# Patient Record
Sex: MALE | Race: WHITE | NOT HISPANIC OR LATINO | Employment: OTHER | URBAN - METROPOLITAN AREA
[De-identification: names, ages, dates, MRNs, and addresses within clinical notes are randomized per-mention and may not be internally consistent; named-entity substitution may affect disease eponyms.]

---

## 2017-04-18 ENCOUNTER — HISTORICAL (OUTPATIENT)
Dept: LAB | Facility: HOSPITAL | Age: 67
End: 2017-04-18

## 2017-04-21 ENCOUNTER — HISTORICAL (OUTPATIENT)
Dept: RADIOLOGY | Facility: HOSPITAL | Age: 67
End: 2017-04-21

## 2018-04-25 ENCOUNTER — HISTORICAL (OUTPATIENT)
Dept: LAB | Facility: HOSPITAL | Age: 68
End: 2018-04-25

## 2018-04-25 LAB
ABS NEUT (OLG): 3.39
ALBUMIN SERPL-MCNC: 3.6 GM/DL (ref 3.4–5)
ALBUMIN/GLOB SERPL: 1.2 RATIO (ref 1.1–2)
ALP SERPL-CCNC: 101 UNIT/L (ref 46–116)
ALT SERPL-CCNC: 25 UNIT/L (ref 12–78)
AST SERPL-CCNC: 12 UNIT/L (ref 10–37)
BASOPHILS # BLD AUTO: 0.1 X10(3)/MCL
BASOPHILS NFR BLD AUTO: 1.7 %
BILIRUB SERPL-MCNC: 0.4 MG/DL (ref 0.2–1)
BILIRUBIN DIRECT+TOT PNL SERPL-MCNC: 0.11 MG/DL (ref 0–0.2)
BILIRUBIN DIRECT+TOT PNL SERPL-MCNC: 0.29 MG/DL
BUN SERPL-MCNC: 16 MG/DL (ref 7–18)
CALCIUM SERPL-MCNC: 8.8 MG/DL (ref 8.5–10.1)
CHLORIDE SERPL-SCNC: 102 MMOL/L (ref 98–107)
CHOLEST SERPL-MCNC: 204 MG/DL (ref 50–200)
CHOLEST/HDLC SERPL: 3 {RATIO} (ref 0–5)
CO2 SERPL-SCNC: 26 MMOL/L (ref 21–32)
CREAT SERPL-MCNC: 0.83 MG/DL (ref 0.7–1.3)
EOSINOPHIL # BLD AUTO: 0.41 X10(3)/MCL
EOSINOPHIL NFR BLD AUTO: 7.1 %
ERYTHROCYTE [DISTWIDTH] IN BLOOD BY AUTOMATED COUNT: 12 %
GLOBULIN SER-MCNC: 3.1 GM/DL (ref 2.4–3.5)
GLUCOSE SERPL-MCNC: 91 MG/DL (ref 74–106)
HCT VFR BLD AUTO: 38.2 % (ref 39–49)
HDLC SERPL-MCNC: 69 MG/DL (ref 35–60)
HGB BLD-MCNC: 13.3 GM/DL (ref 12.6–16.6)
IMM GRANULOCYTES # BLD AUTO: 0.02 10*3/UL (ref 0–0.1)
IMM GRANULOCYTES NFR BLD AUTO: 0.3 % (ref 0–1)
LDLC SERPL CALC-MCNC: 117 MG/DL (ref 50–140)
LYMPHOCYTES # BLD AUTO: 1.38 X10(3)/MCL
LYMPHOCYTES NFR BLD AUTO: 23.8 %
MCH RBC QN AUTO: 33.1 PG (ref 27–33)
MCHC RBC AUTO-ENTMCNC: 34.8 GM/DL (ref 32–35)
MCV RBC AUTO: 95 FL (ref 84–97)
MONOCYTES # BLD AUTO: 0.51 X10(3)/MCL
MONOCYTES NFR BLD AUTO: 8.8 %
NEUTROPHILS # BLD AUTO: 3.39 X10(3)/MCL
NEUTROPHILS NFR BLD AUTO: 58.3 %
PLATELET # BLD AUTO: 298 X10(3)/MCL (ref 151–368)
PMV BLD AUTO: 10 FL
POTASSIUM SERPL-SCNC: 3.5 MMOL/L (ref 3.5–5.1)
PROT SERPL-MCNC: 6.7 GM/DL (ref 6.4–8.2)
RBC # BLD AUTO: 4.02 X10(6)/MCL (ref 4.3–5.6)
SODIUM SERPL-SCNC: 136 MMOL/L (ref 136–145)
TRIGL SERPL-MCNC: 89 MG/DL (ref 30–150)
VLDLC SERPL CALC-MCNC: 18 MG/DL
WBC # SPEC AUTO: 5.81 X10(3)/MCL (ref 3.4–9.2)

## 2019-05-08 ENCOUNTER — HISTORICAL (OUTPATIENT)
Dept: LAB | Facility: HOSPITAL | Age: 69
End: 2019-05-08

## 2019-05-08 LAB
CHOLEST SERPL-MCNC: 191 MG/DL (ref 50–200)
CHOLEST/HDLC SERPL: 3 {RATIO} (ref 0–5)
HDLC SERPL-MCNC: 69 MG/DL (ref 35–60)
LDLC SERPL CALC-MCNC: 110 MG/DL (ref 50–140)
TRIGL SERPL-MCNC: 62 MG/DL (ref 30–150)
VLDLC SERPL CALC-MCNC: 12 MG/DL

## 2019-08-14 ENCOUNTER — HISTORICAL (OUTPATIENT)
Dept: LAB | Facility: HOSPITAL | Age: 69
End: 2019-08-14

## 2019-08-14 LAB — PSA SERPL-MCNC: 1.7 NG/ML (ref 0–4)

## 2019-11-18 ENCOUNTER — HISTORICAL (OUTPATIENT)
Dept: LAB | Facility: HOSPITAL | Age: 69
End: 2019-11-18

## 2019-11-18 LAB
ABS NEUT (OLG): 3.59
ALBUMIN SERPL-MCNC: 3.5 GM/DL (ref 3.4–5)
ALBUMIN/GLOB SERPL: 1.2 RATIO (ref 1.1–2)
ALP SERPL-CCNC: 88 UNIT/L (ref 46–116)
ALT SERPL-CCNC: 17 UNIT/L (ref 12–78)
AST SERPL-CCNC: 7 UNIT/L (ref 10–37)
BASOPHILS # BLD AUTO: 0.07 X10(3)/MCL
BASOPHILS NFR BLD AUTO: 1.2 %
BILIRUB SERPL-MCNC: 0.2 MG/DL (ref 0.2–1)
BILIRUBIN DIRECT+TOT PNL SERPL-MCNC: 0.08 MG/DL (ref 0–0.2)
BILIRUBIN DIRECT+TOT PNL SERPL-MCNC: 0.12 MG/DL
BUN SERPL-MCNC: 35 MG/DL (ref 7–18)
CALCIUM SERPL-MCNC: 8.9 MG/DL (ref 8.5–10.1)
CHLORIDE SERPL-SCNC: 110 MMOL/L (ref 98–107)
CO2 SERPL-SCNC: 24.2 MMOL/L (ref 21–32)
CREAT SERPL-MCNC: 1.01 MG/DL (ref 0.7–1.3)
EOSINOPHIL # BLD AUTO: 0.49 X10(3)/MCL
EOSINOPHIL NFR BLD AUTO: 8.1 %
ERYTHROCYTE [DISTWIDTH] IN BLOOD BY AUTOMATED COUNT: 13 %
GLOBULIN SER-MCNC: 2.9 GM/DL (ref 2.4–3.5)
GLUCOSE SERPL-MCNC: 94 MG/DL (ref 74–106)
HCT VFR BLD AUTO: 39 % (ref 39–49)
HGB BLD-MCNC: 12.6 GM/DL (ref 12.6–16.6)
IMM GRANULOCYTES # BLD AUTO: 0.01 10*3/UL (ref 0–0.1)
IMM GRANULOCYTES NFR BLD AUTO: 0.2 % (ref 0–1)
LYMPHOCYTES # BLD AUTO: 1.28 X10(3)/MCL
LYMPHOCYTES NFR BLD AUTO: 21.2 %
MCH RBC QN AUTO: 33.1 PG (ref 27–33)
MCHC RBC AUTO-ENTMCNC: 32.3 GM/DL (ref 32–35)
MCV RBC AUTO: 102.4 FL (ref 84–97)
MONOCYTES # BLD AUTO: 0.61 X10(3)/MCL
MONOCYTES NFR BLD AUTO: 10.1 %
NEUTROPHILS # BLD AUTO: 3.59 X10(3)/MCL
NEUTROPHILS NFR BLD AUTO: 59.2 %
PLATELET # BLD AUTO: 245 X10(3)/MCL (ref 140–450)
PMV BLD AUTO: 10 FL
POTASSIUM SERPL-SCNC: 3.9 MMOL/L (ref 3.5–5.1)
PROT SERPL-MCNC: 6.4 GM/DL (ref 6.4–8.2)
RBC # BLD AUTO: 3.81 X10(6)/MCL (ref 4.3–5.6)
SODIUM SERPL-SCNC: 144 MMOL/L (ref 136–145)
WBC # SPEC AUTO: 6.05 X10(3)/MCL (ref 3.4–9.2)

## 2020-03-03 ENCOUNTER — HISTORICAL (OUTPATIENT)
Dept: LAB | Facility: HOSPITAL | Age: 70
End: 2020-03-03

## 2020-03-03 LAB — PSA SERPL-MCNC: 0.15 NG/ML

## 2020-09-29 ENCOUNTER — HISTORICAL (OUTPATIENT)
Dept: LAB | Facility: HOSPITAL | Age: 70
End: 2020-09-29

## 2020-09-29 LAB
ABS NEUT (OLG): 3.9
ALBUMIN SERPL-MCNC: 3.7 GM/DL (ref 3.4–4.8)
ALBUMIN/GLOB SERPL: 1.3 RATIO (ref 1.1–2)
ALP SERPL-CCNC: 98 UNIT/L (ref 40–150)
ALT SERPL-CCNC: 13 UNIT/L (ref 0–55)
AST SERPL-CCNC: 14 UNIT/L (ref 5–34)
BASOPHILS # BLD AUTO: 0.06 X10(3)/MCL
BASOPHILS NFR BLD AUTO: 1 %
BILIRUB SERPL-MCNC: 0.4 MG/DL
BILIRUBIN DIRECT+TOT PNL SERPL-MCNC: 0.2 MG/DL
BILIRUBIN DIRECT+TOT PNL SERPL-MCNC: 0.2 MG/DL (ref 0–0.5)
BUN SERPL-MCNC: 21 MG/DL (ref 8.4–25.7)
CALCIUM SERPL-MCNC: 9.4 MG/DL (ref 8.8–10)
CHLORIDE SERPL-SCNC: 107 MMOL/L (ref 98–107)
CHOLEST SERPL-MCNC: 161 MG/DL
CHOLEST/HDLC SERPL: 2 {RATIO} (ref 0–5)
CO2 SERPL-SCNC: 23 MEQ/L (ref 23–31)
CREAT SERPL-MCNC: 0.9 MG/DL (ref 0.73–1.18)
EOSINOPHIL # BLD AUTO: 0.46 X10(3)/MCL
EOSINOPHIL NFR BLD AUTO: 7.7 %
ERYTHROCYTE [DISTWIDTH] IN BLOOD BY AUTOMATED COUNT: 13 %
GLOBULIN SER-MCNC: 2.8 GM/DL (ref 2.4–3.5)
GLUCOSE SERPL-MCNC: 97 MG/DL (ref 82–115)
HCT VFR BLD AUTO: 39.1 % (ref 39–49)
HDLC SERPL-MCNC: 68 MG/DL (ref 35–60)
HGB BLD-MCNC: 12.9 GM/DL (ref 12.6–16.6)
IMM GRANULOCYTES # BLD AUTO: 0.01 10*3/UL (ref 0–0.1)
IMM GRANULOCYTES NFR BLD AUTO: 0.2 % (ref 0–1)
LDLC SERPL CALC-MCNC: 79 MG/DL (ref 50–140)
LYMPHOCYTES # BLD AUTO: 0.91 X10(3)/MCL
LYMPHOCYTES NFR BLD AUTO: 15.3 %
MCH RBC QN AUTO: 33 PG (ref 27–33)
MCHC RBC AUTO-ENTMCNC: 33 GM/DL (ref 32–35)
MCV RBC AUTO: 100 FL (ref 84–97)
MONOCYTES # BLD AUTO: 0.61 X10(3)/MCL
MONOCYTES NFR BLD AUTO: 10.3 %
NEUTROPHILS # BLD AUTO: 3.9 X10(3)/MCL
NEUTROPHILS NFR BLD AUTO: 65.5 %
PLATELET # BLD AUTO: 254 X10(3)/MCL (ref 140–450)
PMV BLD AUTO: 10 FL
POTASSIUM SERPL-SCNC: 3.9 MMOL/L (ref 3.5–5.1)
PROT SERPL-MCNC: 6.5 GM/DL (ref 5.8–7.6)
RBC # BLD AUTO: 3.91 X10(6)/MCL (ref 4.3–5.6)
SODIUM SERPL-SCNC: 139 MMOL/L (ref 136–145)
TRIGL SERPL-MCNC: 69 MG/DL (ref 34–140)
VLDLC SERPL CALC-MCNC: 14 MG/DL
WBC # SPEC AUTO: 5.95 X10(3)/MCL (ref 3.4–9.2)

## 2021-03-04 ENCOUNTER — HISTORICAL (OUTPATIENT)
Dept: LAB | Facility: HOSPITAL | Age: 71
End: 2021-03-04

## 2021-03-04 LAB
PSA SERPL-MCNC: 0.05 NG/ML
PSA SERPL-MCNC: 0.05 NG/ML

## 2022-04-11 ENCOUNTER — HISTORICAL (OUTPATIENT)
Dept: ADMINISTRATIVE | Facility: HOSPITAL | Age: 72
End: 2022-04-11

## 2022-04-19 ENCOUNTER — HISTORICAL (OUTPATIENT)
Dept: LAB | Facility: HOSPITAL | Age: 72
End: 2022-04-19

## 2022-04-19 LAB — PSA SERPL-MCNC: <0.1 NG/ML

## 2022-04-27 VITALS
WEIGHT: 249.13 LBS | OXYGEN SATURATION: 99 % | DIASTOLIC BLOOD PRESSURE: 83 MMHG | HEIGHT: 70 IN | BODY MASS INDEX: 35.66 KG/M2 | SYSTOLIC BLOOD PRESSURE: 140 MMHG

## 2022-05-09 DIAGNOSIS — Z13.6 SCREENING FOR CARDIOVASCULAR CONDITION: Primary | ICD-10-CM

## 2022-05-09 DIAGNOSIS — Z12.5 PROSTATE CANCER SCREENING: ICD-10-CM

## 2022-05-09 DIAGNOSIS — Z00.00 WELLNESS EXAMINATION: ICD-10-CM

## 2022-05-17 ENCOUNTER — LAB VISIT (OUTPATIENT)
Dept: LAB | Facility: HOSPITAL | Age: 72
End: 2022-05-17
Attending: FAMILY MEDICINE
Payer: MEDICAID

## 2022-05-17 DIAGNOSIS — Z12.5 PROSTATE CANCER SCREENING: ICD-10-CM

## 2022-05-17 DIAGNOSIS — Z13.6 SCREENING FOR CARDIOVASCULAR CONDITION: ICD-10-CM

## 2022-05-17 DIAGNOSIS — Z00.00 WELLNESS EXAMINATION: ICD-10-CM

## 2022-05-17 LAB
ALBUMIN SERPL-MCNC: 3.8 GM/DL (ref 3.4–4.8)
ALBUMIN/GLOB SERPL: 1.6 RATIO (ref 1.1–2)
ALP SERPL-CCNC: 100 UNIT/L (ref 40–150)
ALT SERPL-CCNC: 11 UNIT/L (ref 0–55)
AST SERPL-CCNC: 13 UNIT/L (ref 5–34)
BASOPHILS # BLD AUTO: 0.07 X10(3)/MCL (ref 0–0.2)
BASOPHILS NFR BLD AUTO: 1.5 %
BILIRUBIN DIRECT+TOT PNL SERPL-MCNC: 0.4 MG/DL
BUN SERPL-MCNC: 22 MG/DL (ref 8.4–25.7)
CALCIUM SERPL-MCNC: 8.8 MG/DL (ref 8.8–10)
CHLORIDE SERPL-SCNC: 106 MMOL/L (ref 98–107)
CHOLEST SERPL-MCNC: 164 MG/DL
CHOLEST/HDLC SERPL: 3 {RATIO} (ref 0–5)
CO2 SERPL-SCNC: 24 MMOL/L (ref 23–31)
CREAT SERPL-MCNC: 1.26 MG/DL (ref 0.73–1.18)
EOSINOPHIL # BLD AUTO: 0.54 X10(3)/MCL (ref 0–0.9)
EOSINOPHIL NFR BLD AUTO: 11.5 %
ERYTHROCYTE [DISTWIDTH] IN BLOOD BY AUTOMATED COUNT: 12.5 % (ref 11.5–17)
GLOBULIN SER-MCNC: 2.4 GM/DL (ref 2.4–3.5)
GLUCOSE SERPL-MCNC: 101 MG/DL (ref 82–115)
HCT VFR BLD AUTO: 36.4 % (ref 42–52)
HDLC SERPL-MCNC: 60 MG/DL (ref 35–60)
HGB BLD-MCNC: 11.6 GM/DL (ref 14–18)
IMM GRANULOCYTES # BLD AUTO: 0.01 X10(3)/MCL (ref 0–0.02)
IMM GRANULOCYTES NFR BLD AUTO: 0.2 % (ref 0–0.43)
LDLC SERPL CALC-MCNC: 85 MG/DL (ref 50–140)
LYMPHOCYTES # BLD AUTO: 1.07 X10(3)/MCL (ref 0.6–4.6)
LYMPHOCYTES NFR BLD AUTO: 22.9 %
MCH RBC QN AUTO: 31.8 PG (ref 27–31)
MCHC RBC AUTO-ENTMCNC: 31.9 MG/DL (ref 33–36)
MCV RBC AUTO: 99.7 FL (ref 80–94)
MONOCYTES # BLD AUTO: 0.49 X10(3)/MCL (ref 0.1–1.3)
MONOCYTES NFR BLD AUTO: 10.5 %
NEUTROPHILS # BLD AUTO: 2.5 X10(3)/MCL (ref 2.1–9.2)
NEUTROPHILS NFR BLD AUTO: 53.4 %
NRBC BLD AUTO-RTO: 0 %
PLATELET # BLD AUTO: 199 X10(3)/MCL (ref 130–400)
PMV BLD AUTO: 10.2 FL (ref 9.4–12.4)
POTASSIUM SERPL-SCNC: 4.6 MMOL/L (ref 3.5–5.1)
PROT SERPL-MCNC: 6.2 GM/DL (ref 5.8–7.6)
PSA SERPL-MCNC: 0.03 NG/ML
RBC # BLD AUTO: 3.65 X10(6)/MCL (ref 4.7–6.1)
SODIUM SERPL-SCNC: 139 MMOL/L (ref 136–145)
TRIGL SERPL-MCNC: 94 MG/DL (ref 34–140)
VLDLC SERPL CALC-MCNC: 19 MG/DL
WBC # SPEC AUTO: 4.7 X10(3)/MCL (ref 4.5–11.5)

## 2022-05-17 PROCEDURE — 85025 COMPLETE CBC W/AUTO DIFF WBC: CPT

## 2022-05-17 PROCEDURE — 80061 LIPID PANEL: CPT

## 2022-05-17 PROCEDURE — 84153 ASSAY OF PSA TOTAL: CPT

## 2022-05-17 PROCEDURE — 36415 COLL VENOUS BLD VENIPUNCTURE: CPT

## 2022-05-17 PROCEDURE — 80053 COMPREHEN METABOLIC PANEL: CPT

## 2022-05-25 DIAGNOSIS — Z11.4 ENCOUNTER FOR SCREENING FOR HIV: Primary | ICD-10-CM

## 2022-05-25 DIAGNOSIS — Z11.59 NEED FOR HEPATITIS C SCREENING TEST: ICD-10-CM

## 2022-05-31 ENCOUNTER — OFFICE VISIT (OUTPATIENT)
Dept: FAMILY MEDICINE | Facility: CLINIC | Age: 72
End: 2022-05-31
Payer: MEDICARE

## 2022-05-31 VITALS
WEIGHT: 246.13 LBS | BODY MASS INDEX: 32.62 KG/M2 | DIASTOLIC BLOOD PRESSURE: 84 MMHG | HEART RATE: 86 BPM | TEMPERATURE: 97 F | RESPIRATION RATE: 18 BRPM | OXYGEN SATURATION: 98 % | HEIGHT: 73 IN | SYSTOLIC BLOOD PRESSURE: 128 MMHG

## 2022-05-31 DIAGNOSIS — Z00.00 WELLNESS EXAMINATION: Primary | ICD-10-CM

## 2022-05-31 PROBLEM — G40.909 SEIZURE DISORDER: Status: ACTIVE | Noted: 2022-05-31

## 2022-05-31 PROBLEM — K59.00 CONSTIPATION: Status: ACTIVE | Noted: 2022-05-31

## 2022-05-31 PROBLEM — E66.9 OBESITY: Status: ACTIVE | Noted: 2022-05-31

## 2022-05-31 PROBLEM — N40.0 BENIGN PROSTATIC HYPERPLASIA: Status: ACTIVE | Noted: 2022-05-31

## 2022-05-31 PROBLEM — I10 HYPERTENSION: Status: ACTIVE | Noted: 2022-05-31

## 2022-05-31 PROCEDURE — G0439 PPPS, SUBSEQ VISIT: HCPCS | Mod: ,,, | Performed by: FAMILY MEDICINE

## 2022-05-31 PROCEDURE — 3008F BODY MASS INDEX DOCD: CPT | Mod: CPTII,,, | Performed by: FAMILY MEDICINE

## 2022-05-31 PROCEDURE — 1101F PT FALLS ASSESS-DOCD LE1/YR: CPT | Mod: CPTII,,, | Performed by: FAMILY MEDICINE

## 2022-05-31 PROCEDURE — 1160F PR REVIEW ALL MEDS BY PRESCRIBER/CLIN PHARMACIST DOCUMENTED: ICD-10-PCS | Mod: CPTII,,, | Performed by: FAMILY MEDICINE

## 2022-05-31 PROCEDURE — G0439 PR MEDICARE ANNUAL WELLNESS SUBSEQUENT VISIT: ICD-10-PCS | Mod: ,,, | Performed by: FAMILY MEDICINE

## 2022-05-31 PROCEDURE — 3074F PR MOST RECENT SYSTOLIC BLOOD PRESSURE < 130 MM HG: ICD-10-PCS | Mod: CPTII,,, | Performed by: FAMILY MEDICINE

## 2022-05-31 PROCEDURE — 3288F PR FALLS RISK ASSESSMENT DOCUMENTED: ICD-10-PCS | Mod: CPTII,,, | Performed by: FAMILY MEDICINE

## 2022-05-31 PROCEDURE — 3074F SYST BP LT 130 MM HG: CPT | Mod: CPTII,,, | Performed by: FAMILY MEDICINE

## 2022-05-31 PROCEDURE — 1160F RVW MEDS BY RX/DR IN RCRD: CPT | Mod: CPTII,,, | Performed by: FAMILY MEDICINE

## 2022-05-31 PROCEDURE — 3008F PR BODY MASS INDEX (BMI) DOCUMENTED: ICD-10-PCS | Mod: CPTII,,, | Performed by: FAMILY MEDICINE

## 2022-05-31 PROCEDURE — 1101F PR PT FALLS ASSESS DOC 0-1 FALLS W/OUT INJ PAST YR: ICD-10-PCS | Mod: CPTII,,, | Performed by: FAMILY MEDICINE

## 2022-05-31 PROCEDURE — 1159F MED LIST DOCD IN RCRD: CPT | Mod: CPTII,,, | Performed by: FAMILY MEDICINE

## 2022-05-31 PROCEDURE — 1159F PR MEDICATION LIST DOCUMENTED IN MEDICAL RECORD: ICD-10-PCS | Mod: CPTII,,, | Performed by: FAMILY MEDICINE

## 2022-05-31 PROCEDURE — 3288F FALL RISK ASSESSMENT DOCD: CPT | Mod: CPTII,,, | Performed by: FAMILY MEDICINE

## 2022-05-31 PROCEDURE — 3079F PR MOST RECENT DIASTOLIC BLOOD PRESSURE 80-89 MM HG: ICD-10-PCS | Mod: CPTII,,, | Performed by: FAMILY MEDICINE

## 2022-05-31 PROCEDURE — 3079F DIAST BP 80-89 MM HG: CPT | Mod: CPTII,,, | Performed by: FAMILY MEDICINE

## 2022-05-31 RX ORDER — ROSUVASTATIN CALCIUM 10 MG/1
10 TABLET, COATED ORAL DAILY
COMMUNITY
Start: 2022-05-02 | End: 2023-10-23 | Stop reason: SDUPTHER

## 2022-05-31 RX ORDER — LEVETIRACETAM 1000 MG/1
1500 TABLET ORAL 2 TIMES DAILY
COMMUNITY
End: 2023-10-23 | Stop reason: SDUPTHER

## 2022-05-31 RX ORDER — TOPIRAMATE 200 MG/1
200 TABLET ORAL 2 TIMES DAILY
COMMUNITY
Start: 2022-05-02 | End: 2023-10-23 | Stop reason: SDUPTHER

## 2022-05-31 RX ORDER — VITAMIN A ACETATE, .ALPHA.-TOCOPHEROL ACETATE, ASCORBIC ACID, THIAMINE MONONITRATE, RIBOFLAVIN, NIACINAMIDE, PYRIDOXINE HYDROCHLORIDE, BIOTIN, CALCIUM PANTOTHENATE, FOLIC ACID, CYANOCOBALAMIN, FLAVONE, FERROUS FUMARATE, CHROMIC CHLORIDE CR-51, MAGNESIUM OXIDE, MANGANESE GLUCONATE, CUPRIC OXIDE, SELENOMETHIONINE, AND ZINC OXIDE 2000; 30; 500; 20; 20; 100; 25; 150; 25; 1; 50; 50; 27; .1; 50; 5; 3; 50; 22.5 [IU]/1; [IU]/1; MG/1; MG/1; MG/1; MG/1; MG/1; UG/1; MG/1; MG/1; UG/1; MG/1; MG/1; MG/1; MG/1; MG/1; MG/1; UG/1; MG/1
1 TABLET, COATED ORAL DAILY
Qty: 30 TABLET | Refills: 3 | Status: SHIPPED | OUTPATIENT
Start: 2022-05-31 | End: 2022-09-28

## 2022-05-31 RX ORDER — TAMSULOSIN HYDROCHLORIDE 0.4 MG/1
0.4 CAPSULE ORAL 2 TIMES DAILY
Qty: 60 CAPSULE | Refills: 3 | Status: SHIPPED | OUTPATIENT
Start: 2022-05-31 | End: 2022-09-20

## 2022-05-31 RX ORDER — OXCARBAZEPINE 600 MG/1
900 TABLET, FILM COATED ORAL 2 TIMES DAILY
COMMUNITY
Start: 2022-05-02 | End: 2023-10-23 | Stop reason: SDUPTHER

## 2022-05-31 RX ORDER — TAMSULOSIN HYDROCHLORIDE 0.4 MG/1
CAPSULE ORAL
COMMUNITY
Start: 2022-05-16 | End: 2022-05-31 | Stop reason: SDUPTHER

## 2022-05-31 RX ORDER — DOCUSATE SODIUM 100 MG/1
100 CAPSULE, LIQUID FILLED ORAL
COMMUNITY
Start: 2022-03-31 | End: 2023-10-23 | Stop reason: SDUPTHER

## 2022-05-31 NOTE — PROGRESS NOTES
"TIME UP & GO (TUG)  Test begins with patient sitting back in standard arm chair.   When "Go" is said, the patient stands up and walks 10 feet at a normal pace before turning, walking back and sitting down.    Observe the patients postural stability, gait, stride length, and sway.  Check all that apply:  ? [] Slow tentative pace  ? [] Loss of balance  ? [x] Short strides  ? [] Little or no arm swing  ? [] Steadying self on walls  ? [] Shuffling  ? [] En bloc turning  ? [] Not using assistive device properly    Time in seconds:  2 Seconds  (Older adults who takes = or > 12 seconds to complete TUG is at risk for falling.  _________________________________________________________________________________________________________________________________________________________    WHISPER TEST  Test begins with patient standing arms length away (2 feet), facing away from examiner.  Patient covers the ear that is NOT being tested with one finger over the tragus.  Whisper a number-letter-number combination.  If a patient gets 3 total letters and/or numbers correct after a second attempt, it is considered a pass.    Right Ear: passed [] 8-M-3 [] K-5-R [] 2-K-7 [] S-4-G  Left Ear: passed    [] 8-M-3 [] K-5-R [] 2-K-7 [] S-4-G    _________________________________________________________________________________________________________________________________________________________    VISION SCREENING  patient declines measurement  _________________________________________________________________________________________________________________________________________________________    MINI-COGNITIVE  Three Word Registration   []Version 1 []Version 2 []Version 3 []Version 4 []Version 5 []Version 6   [place]  Daughter   [place] [employer] [place]   Chair Table Baby Finger Picture [place]     Word Recall 2 points  Clock Drawing " "2  ________________________________________________________________________________________________________________________________________________________    HOME SAFETY QUESTIONNAIRE  Are emergency numbers kept by the phone and regularly updated? Yesyes   Are all household members aware of the dangers of smoking, especially in bed? Yesyes  Are working smoke alarm(s) and fire extinguisher(s) available for use? Yesyes  Do all household members know how to use them? Yesyes  Are firearms stored unloaded and securely locked? N/Ayes  Have throw rugs been removed or fastened down? Yesyes  Are non-slip mats in all bathtubs and showers?  Yes yes  Do all stairways have a railing or banister?  Yesyes  Are sidewalks and all outdoor steps clear of tools, toys and other articles?  Yesyes  Are doorways, halls, and stairs free of clutter?  Yesyes  Are all electrical cords in working order, easily seen, and not run under rug/carpets or wrapped around nails? Yesyes      Subjective:       Patient ID: Nathen Morales is a 72 y.o. male.    Chief Complaint: wellness      Wellness      Review of Systems   Constitutional: Negative.    HENT: Negative.    Respiratory: Negative.    Cardiovascular: Negative.    Gastrointestinal: Negative.    Genitourinary: Negative.    Musculoskeletal: Negative.    Integumentary:  Negative.   Neurological: Positive for seizures (no recent seizure activity).   Hematological: Negative.    Psychiatric/Behavioral:        Insomnia: having trouble staying asleep, has not tried any OTC medication           Objective:      /84 (BP Location: Left arm, Patient Position: Sitting, BP Method: Large (Manual))   Pulse 86   Temp 97.1 °F (36.2 °C)   Resp 18   Ht 6' 1" (1.854 m)   Wt 111.6 kg (246 lb 1.6 oz)   SpO2 98%   BMI 32.47 kg/m²    Physical Exam  Constitutional:       General: He is not in acute distress.     Appearance: Normal appearance.   Cardiovascular:      Rate and Rhythm: Normal rate and regular " rhythm.   Pulmonary:      Effort: Pulmonary effort is normal.      Breath sounds: Normal breath sounds.   Abdominal:      General: Abdomen is flat. Bowel sounds are normal.      Palpations: Abdomen is soft.   Musculoskeletal:         General: Normal range of motion.   Neurological:      General: No focal deficit present.      Mental Status: He is alert and oriented to person, place, and time.   Psychiatric:         Mood and Affect: Mood normal.         Behavior: Behavior normal.         Thought Content: Thought content normal.         Judgment: Judgment normal.           Recent Results (from the past 504 hour(s))   PSA, Screening    Collection Time: 05/17/22  8:20 AM   Result Value Ref Range    Prostate Specific Antigen 0.03 <=4.00 ng/mL   Comprehensive Metabolic Panel    Collection Time: 05/17/22  8:20 AM   Result Value Ref Range    Sodium Level 139 136 - 145 mmol/L    Potassium Level 4.6 3.5 - 5.1 mmol/L    Chloride 106 98 - 107 mmol/L    Carbon Dioxide 24 23 - 31 mmol/L    Glucose Level 101 82 - 115 mg/dL    Blood Urea Nitrogen 22.0 8.4 - 25.7 mg/dL    Creatinine 1.26 (H) 0.73 - 1.18 mg/dL    Calcium Level Total 8.8 8.8 - 10.0 mg/dL    Protein Total 6.2 5.8 - 7.6 gm/dL    Albumin Level 3.8 3.4 - 4.8 gm/dL    Globulin 2.4 2.4 - 3.5 gm/dL    Albumin/Globulin Ratio 1.6 1.1 - 2.0 ratio    Bilirubin Total 0.4 <=1.5 mg/dL    Alkaline Phosphatase 100 40 - 150 unit/L    Alanine Aminotransferase 11 0 - 55 unit/L    Aspartate Aminotransferase 13 5 - 34 unit/L   Lipid Panel    Collection Time: 05/17/22  8:20 AM   Result Value Ref Range    Cholesterol Total 164 <=200 mg/dL    HDL Cholesterol 60 35 - 60 mg/dL    Triglyceride 94 34 - 140 mg/dL    Cholesterol/HDL Ratio 3 0 - 5    Very Low Density Lipoprotein 19     LDL Cholesterol 85.00 50.00 - 140.00 mg/dL   CBC with Differential    Collection Time: 05/17/22  8:20 AM   Result Value Ref Range    WBC 4.7 4.5 - 11.5 x10(3)/mcL    RBC 3.65 (L) 4.70 - 6.10 x10(6)/mcL    Hgb 11.6  (L) 14.0 - 18.0 gm/dL    Hct 36.4 (L) 42.0 - 52.0 %    MCV 99.7 (H) 80.0 - 94.0 fL    MCH 31.8 (H) 27.0 - 31.0 pg    MCHC 31.9 (L) 33.0 - 36.0 mg/dL    RDW 12.5 11.5 - 17.0 %    Platelet 199 130 - 400 x10(3)/mcL    MPV 10.2 9.4 - 12.4 fL    Neut % 53.4 %    Lymph % 22.9 %    Mono % 10.5 %    Eos % 11.5 %    Basophil % 1.5 %    Lymph # 1.07 0.6 - 4.6 x10(3)/mcL    Neut # 2.5 2.1 - 9.2 x10(3)/mcL    Mono # 0.49 0.1 - 1.3 x10(3)/mcL    Eos # 0.54 0 - 0.9 x10(3)/mcL    Baso # 0.07 0 - 0.2 x10(3)/mcL    IG# 0.01 0 - 0.0155 x10(3)/mcL    IG% 0.2 0 - 0.43 %    NRBC% 0.0 %        Assessment:       Problem List Items Addressed This Visit    None          Plan:     1. Wellness  Lab work reviewed with patient  Continue current medication  Continue diet/exercise  Advanced directive discussed with patient  - Insomnia  Patient use OTC melatonin  Monitor  Return to clinic with any concerns

## 2022-10-27 DIAGNOSIS — E56.9 VITAMIN DEFICIENCY: Primary | ICD-10-CM

## 2022-10-27 RX ORDER — VITAMIN A ACETATE, .ALPHA.-TOCOPHEROL ACETATE, ASCORBIC ACID, THIAMINE MONONITRATE, RIBOFLAVIN, NIACINAMIDE, PYRIDOXINE HYDROCHLORIDE, BIOTIN, CALCIUM PANTOTHENATE, FOLIC ACID, CYANOCOBALAMIN, FLAVONE, FERROUS FUMARATE, CHROMIC CHLORIDE CR-51, MAGNESIUM OXIDE, MANGANESE GLUCONATE, CUPRIC OXIDE, SELENOMETHIONINE, AND ZINC OXIDE 2000; 30; 500; 20; 20; 100; 25; 150; 25; 1; 50; 50; 27; .1; 50; 5; 3; 50; 22.5 [IU]/1; [IU]/1; MG/1; MG/1; MG/1; MG/1; MG/1; UG/1; MG/1; MG/1; UG/1; MG/1; MG/1; MG/1; MG/1; MG/1; MG/1; UG/1; MG/1
TABLET, COATED ORAL
Qty: 30 TABLET | Refills: 3 | Status: SHIPPED | OUTPATIENT
Start: 2022-10-27 | End: 2023-06-05

## 2022-12-06 ENCOUNTER — OFFICE VISIT (OUTPATIENT)
Dept: FAMILY MEDICINE | Facility: CLINIC | Age: 72
End: 2022-12-06
Payer: MEDICARE

## 2022-12-06 VITALS
DIASTOLIC BLOOD PRESSURE: 76 MMHG | OXYGEN SATURATION: 98 % | HEART RATE: 63 BPM | HEIGHT: 73 IN | WEIGHT: 246 LBS | SYSTOLIC BLOOD PRESSURE: 126 MMHG | RESPIRATION RATE: 18 BRPM | BODY MASS INDEX: 32.6 KG/M2 | TEMPERATURE: 98 F

## 2022-12-06 DIAGNOSIS — G40.909 SEIZURE DISORDER: ICD-10-CM

## 2022-12-06 DIAGNOSIS — F32.A DEPRESSION, UNSPECIFIED DEPRESSION TYPE: ICD-10-CM

## 2022-12-06 DIAGNOSIS — N40.0 BENIGN PROSTATIC HYPERPLASIA, UNSPECIFIED WHETHER LOWER URINARY TRACT SYMPTOMS PRESENT: Primary | ICD-10-CM

## 2022-12-06 PROCEDURE — 3074F SYST BP LT 130 MM HG: CPT | Mod: CPTII,,, | Performed by: FAMILY MEDICINE

## 2022-12-06 PROCEDURE — 3078F PR MOST RECENT DIASTOLIC BLOOD PRESSURE < 80 MM HG: ICD-10-PCS | Mod: CPTII,,, | Performed by: FAMILY MEDICINE

## 2022-12-06 PROCEDURE — 3008F PR BODY MASS INDEX (BMI) DOCUMENTED: ICD-10-PCS | Mod: CPTII,,, | Performed by: FAMILY MEDICINE

## 2022-12-06 PROCEDURE — 1160F RVW MEDS BY RX/DR IN RCRD: CPT | Mod: CPTII,,, | Performed by: FAMILY MEDICINE

## 2022-12-06 PROCEDURE — 3074F PR MOST RECENT SYSTOLIC BLOOD PRESSURE < 130 MM HG: ICD-10-PCS | Mod: CPTII,,, | Performed by: FAMILY MEDICINE

## 2022-12-06 PROCEDURE — 3078F DIAST BP <80 MM HG: CPT | Mod: CPTII,,, | Performed by: FAMILY MEDICINE

## 2022-12-06 PROCEDURE — 3008F BODY MASS INDEX DOCD: CPT | Mod: CPTII,,, | Performed by: FAMILY MEDICINE

## 2022-12-06 PROCEDURE — 99214 OFFICE O/P EST MOD 30 MIN: CPT | Mod: ,,, | Performed by: FAMILY MEDICINE

## 2022-12-06 PROCEDURE — 99214 PR OFFICE/OUTPT VISIT, EST, LEVL IV, 30-39 MIN: ICD-10-PCS | Mod: ,,, | Performed by: FAMILY MEDICINE

## 2022-12-06 PROCEDURE — 1160F PR REVIEW ALL MEDS BY PRESCRIBER/CLIN PHARMACIST DOCUMENTED: ICD-10-PCS | Mod: CPTII,,, | Performed by: FAMILY MEDICINE

## 2022-12-06 PROCEDURE — 1159F MED LIST DOCD IN RCRD: CPT | Mod: CPTII,,, | Performed by: FAMILY MEDICINE

## 2022-12-06 PROCEDURE — 1159F PR MEDICATION LIST DOCUMENTED IN MEDICAL RECORD: ICD-10-PCS | Mod: CPTII,,, | Performed by: FAMILY MEDICINE

## 2022-12-06 RX ORDER — DILTIAZEM HYDROCHLORIDE 120 MG/1
120 CAPSULE, EXTENDED RELEASE ORAL DAILY
COMMUNITY
Start: 2022-11-28 | End: 2023-10-23 | Stop reason: SDUPTHER

## 2022-12-06 RX ORDER — SERTRALINE HYDROCHLORIDE 50 MG/1
50 TABLET, FILM COATED ORAL
COMMUNITY
Start: 2022-09-22 | End: 2023-10-23 | Stop reason: SDUPTHER

## 2022-12-06 RX ORDER — TAMSULOSIN HYDROCHLORIDE 0.4 MG/1
CAPSULE ORAL
Qty: 180 CAPSULE | Refills: 3 | Status: SHIPPED | OUTPATIENT
Start: 2022-12-06 | End: 2023-10-23 | Stop reason: SDUPTHER

## 2022-12-06 NOTE — PROGRESS NOTES
"Subjective:       Patient ID: aNthen Morales is a 72 y.o. male.    Chief Complaint: 6  month follow up, occasional weakness      Routine      Review of Systems   Constitutional: Negative.    HENT: Negative.     Respiratory: Negative.     Cardiovascular: Negative.    Gastrointestinal: Negative.    Genitourinary: Negative.         BPH: tolerating medication, medication working well, patient without any complaints     Musculoskeletal: Negative.    Integumentary:  Negative.   Neurological:  Positive for seizures (No recent seizure activity, patient without any complaints).   Hematological: Negative.    Psychiatric/Behavioral: Negative.          Depression: tolerating medication, medication working well, patient without any complaints           Objective:      /76 (BP Location: Left arm, Patient Position: Sitting, BP Method: Large (Manual))   Pulse 63   Temp 97.5 °F (36.4 °C)   Resp 18   Ht 6' 1" (1.854 m)   Wt 111.6 kg (246 lb)   SpO2 98%   BMI 32.46 kg/m²    Physical Exam  Constitutional:       Appearance: Normal appearance.   Cardiovascular:      Rate and Rhythm: Normal rate and regular rhythm.      Heart sounds: Normal heart sounds.   Pulmonary:      Effort: Pulmonary effort is normal.      Breath sounds: Normal breath sounds.   Neurological:      Mental Status: He is alert.   Psychiatric:         Mood and Affect: Mood normal.         Behavior: Behavior normal.         Thought Content: Thought content normal.         Judgment: Judgment normal.           Assessment:       Problem List Items Addressed This Visit          Neuro    Seizure disorder       Psychiatric    Depression       Renal/    Benign prostatic hyperplasia - Primary    Relevant Medications    tamsulosin (FLOMAX) 0.4 mg Cap          Plan:   1. Benign prostatic hyperplasia, unspecified whether lower urinary tract symptoms present  -     tamsulosin (FLOMAX) 0.4 mg Cap; TAKE 1 CAPSULE BY MOUTH TWICE DAILY  Dispense: 180 capsule; Refill: " 3  Controlled  Continue current Rx medication  Return to clinic with any concerns    2. Seizure disorder  Controlled  Continue current Rx medication  Return to clinic with any concerns    3. Depression, unspecified depression type  Controlled  Continue current Rx medication  Return to clinic with any concerns

## 2023-03-18 ENCOUNTER — HOSPITAL ENCOUNTER (OUTPATIENT)
Facility: HOSPITAL | Age: 73
Discharge: HOME | End: 2023-03-24
Attending: STUDENT IN AN ORGANIZED HEALTH CARE EDUCATION/TRAINING PROGRAM | Admitting: STUDENT IN AN ORGANIZED HEALTH CARE EDUCATION/TRAINING PROGRAM
Payer: MEDICARE

## 2023-03-18 DIAGNOSIS — R41.82 AMS (ALTERED MENTAL STATUS): ICD-10-CM

## 2023-03-18 DIAGNOSIS — N30.00 ACUTE CYSTITIS WITHOUT HEMATURIA: Primary | ICD-10-CM

## 2023-03-18 DIAGNOSIS — N17.9 AKI (ACUTE KIDNEY INJURY): ICD-10-CM

## 2023-03-18 DIAGNOSIS — N39.0 ACUTE UTI: ICD-10-CM

## 2023-03-18 DIAGNOSIS — E86.0 DEHYDRATION: ICD-10-CM

## 2023-03-18 DIAGNOSIS — G40.909 SEIZURE DISORDER: ICD-10-CM

## 2023-03-18 DIAGNOSIS — I10 HYPERTENSION, UNSPECIFIED TYPE: ICD-10-CM

## 2023-03-18 DIAGNOSIS — R53.1 WEAKNESS: ICD-10-CM

## 2023-03-18 LAB
ALBUMIN SERPL-MCNC: 3.5 G/DL (ref 3.4–4.8)
ALBUMIN/GLOB SERPL: 1.1 RATIO (ref 1.1–2)
ALP SERPL-CCNC: 107 UNIT/L (ref 40–150)
ALT SERPL-CCNC: 8 UNIT/L (ref 0–55)
APPEARANCE UR: ABNORMAL
AST SERPL-CCNC: 11 UNIT/L (ref 5–34)
BACTERIA #/AREA URNS AUTO: ABNORMAL /HPF
BASOPHILS # BLD AUTO: 0.05 X10(3)/MCL (ref 0–0.2)
BASOPHILS NFR BLD AUTO: 0.8 %
BILIRUB UR QL STRIP.AUTO: NEGATIVE MG/DL
BILIRUBIN DIRECT+TOT PNL SERPL-MCNC: 0.3 MG/DL
BUN SERPL-MCNC: 32 MG/DL (ref 8.4–25.7)
CALCIUM SERPL-MCNC: 9.5 MG/DL (ref 8.8–10)
CHLORIDE SERPL-SCNC: 110 MMOL/L (ref 98–107)
CO2 SERPL-SCNC: 20 MMOL/L (ref 23–31)
COLOR UR AUTO: YELLOW
CREAT SERPL-MCNC: 1.51 MG/DL (ref 0.73–1.18)
EOSINOPHIL # BLD AUTO: 0.01 X10(3)/MCL (ref 0–0.9)
EOSINOPHIL NFR BLD AUTO: 0.2 %
ERYTHROCYTE [DISTWIDTH] IN BLOOD BY AUTOMATED COUNT: 12.3 % (ref 11.5–17)
FLUAV AG UPPER RESP QL IA.RAPID: NOT DETECTED
FLUBV AG UPPER RESP QL IA.RAPID: NOT DETECTED
GFR SERPLBLD CREATININE-BSD FMLA CKD-EPI: 48 MLS/MIN/1.73/M2
GLOBULIN SER-MCNC: 3.2 GM/DL (ref 2.4–3.5)
GLUCOSE SERPL-MCNC: 125 MG/DL (ref 82–115)
GLUCOSE UR QL STRIP.AUTO: NEGATIVE MG/DL
HCT VFR BLD AUTO: 35.9 % (ref 42–52)
HGB BLD-MCNC: 11.7 G/DL (ref 14–18)
IMM GRANULOCYTES # BLD AUTO: 0.02 X10(3)/MCL (ref 0–0.04)
IMM GRANULOCYTES NFR BLD AUTO: 0.3 %
KETONES UR QL STRIP.AUTO: NEGATIVE MG/DL
LACTATE SERPL-SCNC: 0.8 MMOL/L (ref 0.5–2.2)
LEUKOCYTE ESTERASE UR QL STRIP.AUTO: ABNORMAL UNIT/L
LYMPHOCYTES # BLD AUTO: 0.56 X10(3)/MCL (ref 0.6–4.6)
LYMPHOCYTES NFR BLD AUTO: 8.9 %
MAGNESIUM SERPL-MCNC: 2.2 MG/DL (ref 1.6–2.6)
MCH RBC QN AUTO: 32.9 PG
MCHC RBC AUTO-ENTMCNC: 32.6 G/DL (ref 33–36)
MCV RBC AUTO: 100.8 FL (ref 80–94)
MONOCYTES # BLD AUTO: 0.28 X10(3)/MCL (ref 0.1–1.3)
MONOCYTES NFR BLD AUTO: 4.5 %
NEUTROPHILS # BLD AUTO: 5.35 X10(3)/MCL (ref 2.1–9.2)
NEUTROPHILS NFR BLD AUTO: 85.3 %
NITRITE UR QL STRIP.AUTO: NEGATIVE
NRBC BLD AUTO-RTO: 0 %
PH UR STRIP.AUTO: 6.5 [PH]
PLATELET # BLD AUTO: 221 X10(3)/MCL (ref 130–400)
PMV BLD AUTO: 9.8 FL (ref 7.4–10.4)
POCT GLUCOSE: 121 MG/DL (ref 70–110)
POTASSIUM SERPL-SCNC: 4.4 MMOL/L (ref 3.5–5.1)
PROT SERPL-MCNC: 6.7 GM/DL (ref 5.8–7.6)
PROT UR QL STRIP.AUTO: NEGATIVE MG/DL
RBC # BLD AUTO: 3.56 X10(6)/MCL (ref 4.7–6.1)
RBC #/AREA URNS AUTO: ABNORMAL /HPF
RBC UR QL AUTO: ABNORMAL UNIT/L
SARS-COV-2 RNA RESP QL NAA+PROBE: NOT DETECTED
SODIUM SERPL-SCNC: 141 MMOL/L (ref 136–145)
SP GR UR STRIP.AUTO: 1.01
SQUAMOUS #/AREA URNS AUTO: ABNORMAL /HPF
TROPONIN I SERPL-MCNC: 0.03 NG/ML (ref 0–0.04)
UROBILINOGEN UR STRIP-ACNC: 0.2 MG/DL
WBC # SPEC AUTO: 6.3 X10(3)/MCL (ref 4.5–11.5)
WBC #/AREA URNS AUTO: >=100 /HPF
WBC CLUMPS UR QL AUTO: ABNORMAL /HPF

## 2023-03-18 PROCEDURE — 93005 ELECTROCARDIOGRAM TRACING: CPT

## 2023-03-18 PROCEDURE — G0378 HOSPITAL OBSERVATION PER HR: HCPCS

## 2023-03-18 PROCEDURE — 87040 BLOOD CULTURE FOR BACTERIA: CPT | Performed by: STUDENT IN AN ORGANIZED HEALTH CARE EDUCATION/TRAINING PROGRAM

## 2023-03-18 PROCEDURE — 83735 ASSAY OF MAGNESIUM: CPT | Performed by: STUDENT IN AN ORGANIZED HEALTH CARE EDUCATION/TRAINING PROGRAM

## 2023-03-18 PROCEDURE — 25000003 PHARM REV CODE 250: Performed by: INTERNAL MEDICINE

## 2023-03-18 PROCEDURE — 96365 THER/PROPH/DIAG IV INF INIT: CPT

## 2023-03-18 PROCEDURE — 63600175 PHARM REV CODE 636 W HCPCS: Performed by: STUDENT IN AN ORGANIZED HEALTH CARE EDUCATION/TRAINING PROGRAM

## 2023-03-18 PROCEDURE — 99900031 HC PATIENT EDUCATION (STAT)

## 2023-03-18 PROCEDURE — 82962 GLUCOSE BLOOD TEST: CPT

## 2023-03-18 PROCEDURE — 63600175 PHARM REV CODE 636 W HCPCS: Performed by: INTERNAL MEDICINE

## 2023-03-18 PROCEDURE — 81001 URINALYSIS AUTO W/SCOPE: CPT | Performed by: STUDENT IN AN ORGANIZED HEALTH CARE EDUCATION/TRAINING PROGRAM

## 2023-03-18 PROCEDURE — 96372 THER/PROPH/DIAG INJ SC/IM: CPT | Mod: 59 | Performed by: INTERNAL MEDICINE

## 2023-03-18 PROCEDURE — 87077 CULTURE AEROBIC IDENTIFY: CPT | Performed by: STUDENT IN AN ORGANIZED HEALTH CARE EDUCATION/TRAINING PROGRAM

## 2023-03-18 PROCEDURE — 83605 ASSAY OF LACTIC ACID: CPT | Performed by: STUDENT IN AN ORGANIZED HEALTH CARE EDUCATION/TRAINING PROGRAM

## 2023-03-18 PROCEDURE — 84484 ASSAY OF TROPONIN QUANT: CPT | Performed by: STUDENT IN AN ORGANIZED HEALTH CARE EDUCATION/TRAINING PROGRAM

## 2023-03-18 PROCEDURE — 99285 EMERGENCY DEPT VISIT HI MDM: CPT | Mod: 25

## 2023-03-18 PROCEDURE — 0240U COVID/FLU A&B PCR: CPT | Performed by: STUDENT IN AN ORGANIZED HEALTH CARE EDUCATION/TRAINING PROGRAM

## 2023-03-18 PROCEDURE — 85025 COMPLETE CBC W/AUTO DIFF WBC: CPT | Performed by: STUDENT IN AN ORGANIZED HEALTH CARE EDUCATION/TRAINING PROGRAM

## 2023-03-18 PROCEDURE — 96375 TX/PRO/DX INJ NEW DRUG ADDON: CPT

## 2023-03-18 PROCEDURE — 80053 COMPREHEN METABOLIC PANEL: CPT | Performed by: STUDENT IN AN ORGANIZED HEALTH CARE EDUCATION/TRAINING PROGRAM

## 2023-03-18 PROCEDURE — 25000003 PHARM REV CODE 250: Performed by: STUDENT IN AN ORGANIZED HEALTH CARE EDUCATION/TRAINING PROGRAM

## 2023-03-18 PROCEDURE — 87184 SC STD DISK METHOD PER PLATE: CPT | Performed by: STUDENT IN AN ORGANIZED HEALTH CARE EDUCATION/TRAINING PROGRAM

## 2023-03-18 RX ORDER — OXCARBAZEPINE 300 MG/1
900 TABLET, FILM COATED ORAL 2 TIMES DAILY
Status: DISCONTINUED | OUTPATIENT
Start: 2023-03-18 | End: 2023-03-24 | Stop reason: HOSPADM

## 2023-03-18 RX ORDER — ATORVASTATIN CALCIUM 10 MG/1
20 TABLET, FILM COATED ORAL NIGHTLY
Status: DISCONTINUED | OUTPATIENT
Start: 2023-03-18 | End: 2023-03-24 | Stop reason: HOSPADM

## 2023-03-18 RX ORDER — ONDANSETRON 2 MG/ML
4 INJECTION INTRAMUSCULAR; INTRAVENOUS ONCE
Status: COMPLETED | OUTPATIENT
Start: 2023-03-18 | End: 2023-03-18

## 2023-03-18 RX ORDER — LEVETIRACETAM 500 MG/1
1000 TABLET ORAL DAILY
Status: DISCONTINUED | OUTPATIENT
Start: 2023-03-19 | End: 2023-03-18

## 2023-03-18 RX ORDER — TOPIRAMATE 100 MG/1
100 TABLET, FILM COATED ORAL NIGHTLY
Status: DISCONTINUED | OUTPATIENT
Start: 2023-03-18 | End: 2023-03-18

## 2023-03-18 RX ORDER — HYDRALAZINE HYDROCHLORIDE 20 MG/ML
10 INJECTION INTRAMUSCULAR; INTRAVENOUS
Status: COMPLETED | OUTPATIENT
Start: 2023-03-18 | End: 2023-03-18

## 2023-03-18 RX ORDER — OXCARBAZEPINE 300 MG/1
600 TABLET, FILM COATED ORAL DAILY
Status: DISCONTINUED | OUTPATIENT
Start: 2023-03-19 | End: 2023-03-18

## 2023-03-18 RX ORDER — TAMSULOSIN HYDROCHLORIDE 0.4 MG/1
0.4 CAPSULE ORAL DAILY
Status: DISCONTINUED | OUTPATIENT
Start: 2023-03-19 | End: 2023-03-24 | Stop reason: HOSPADM

## 2023-03-18 RX ORDER — LEVETIRACETAM 500 MG/1
1500 TABLET ORAL 2 TIMES DAILY
Status: DISCONTINUED | OUTPATIENT
Start: 2023-03-18 | End: 2023-03-19

## 2023-03-18 RX ORDER — NAPROXEN SODIUM 220 MG/1
81 TABLET, FILM COATED ORAL DAILY
Status: DISCONTINUED | OUTPATIENT
Start: 2023-03-19 | End: 2023-03-24 | Stop reason: HOSPADM

## 2023-03-18 RX ORDER — ENOXAPARIN SODIUM 100 MG/ML
40 INJECTION SUBCUTANEOUS EVERY 24 HOURS
Status: DISCONTINUED | OUTPATIENT
Start: 2023-03-18 | End: 2023-03-24 | Stop reason: HOSPADM

## 2023-03-18 RX ORDER — SODIUM CHLORIDE 0.9 % (FLUSH) 0.9 %
10 SYRINGE (ML) INJECTION
Status: DISCONTINUED | OUTPATIENT
Start: 2023-03-18 | End: 2023-03-24 | Stop reason: HOSPADM

## 2023-03-18 RX ORDER — DILTIAZEM HYDROCHLORIDE 120 MG/1
120 CAPSULE, COATED, EXTENDED RELEASE ORAL DAILY
Status: DISCONTINUED | OUTPATIENT
Start: 2023-03-19 | End: 2023-03-18

## 2023-03-18 RX ORDER — TOPIRAMATE 100 MG/1
200 TABLET, FILM COATED ORAL 2 TIMES DAILY
Status: DISCONTINUED | OUTPATIENT
Start: 2023-03-18 | End: 2023-03-24 | Stop reason: HOSPADM

## 2023-03-18 RX ORDER — SODIUM CHLORIDE 9 MG/ML
INJECTION, SOLUTION INTRAVENOUS ONCE
Status: COMPLETED | OUTPATIENT
Start: 2023-03-19 | End: 2023-03-19

## 2023-03-18 RX ORDER — SODIUM CHLORIDE 9 MG/ML
1000 INJECTION, SOLUTION INTRAVENOUS
Status: COMPLETED | OUTPATIENT
Start: 2023-03-18 | End: 2023-03-18

## 2023-03-18 RX ORDER — SERTRALINE HYDROCHLORIDE 50 MG/1
50 TABLET, FILM COATED ORAL NIGHTLY
Status: DISCONTINUED | OUTPATIENT
Start: 2023-03-18 | End: 2023-03-24 | Stop reason: HOSPADM

## 2023-03-18 RX ORDER — DILTIAZEM HYDROCHLORIDE 120 MG/1
120 CAPSULE, COATED, EXTENDED RELEASE ORAL DAILY
Status: DISCONTINUED | OUTPATIENT
Start: 2023-03-18 | End: 2023-03-24 | Stop reason: HOSPADM

## 2023-03-18 RX ADMIN — ENOXAPARIN SODIUM 40 MG: 40 INJECTION SUBCUTANEOUS at 08:03

## 2023-03-18 RX ADMIN — LEVETIRACETAM 1500 MG: 500 TABLET, FILM COATED ORAL at 10:03

## 2023-03-18 RX ADMIN — ONDANSETRON 4 MG: 2 INJECTION INTRAMUSCULAR; INTRAVENOUS at 02:03

## 2023-03-18 RX ADMIN — ATORVASTATIN CALCIUM 20 MG: 10 TABLET, FILM COATED ORAL at 08:03

## 2023-03-18 RX ADMIN — OXCARBAZEPINE 900 MG: 300 TABLET, FILM COATED ORAL at 10:03

## 2023-03-18 RX ADMIN — TOPIRAMATE 200 MG: 100 TABLET, FILM COATED ORAL at 10:03

## 2023-03-18 RX ADMIN — SERTRALINE HYDROCHLORIDE 50 MG: 50 TABLET ORAL at 08:03

## 2023-03-18 RX ADMIN — SODIUM CHLORIDE 1000 ML: 9 INJECTION, SOLUTION INTRAVENOUS at 04:03

## 2023-03-18 RX ADMIN — SODIUM CHLORIDE 1000 ML: 9 INJECTION, SOLUTION INTRAVENOUS at 08:03

## 2023-03-18 RX ADMIN — CEFTRIAXONE SODIUM 2 G: 2 INJECTION, POWDER, FOR SOLUTION INTRAMUSCULAR; INTRAVENOUS at 04:03

## 2023-03-18 RX ADMIN — HYDRALAZINE HYDROCHLORIDE 10 MG: 20 INJECTION INTRAMUSCULAR; INTRAVENOUS at 04:03

## 2023-03-18 NOTE — ED PROVIDER NOTES
Encounter Date: 3/18/2023       History     Chief Complaint   Patient presents with    Weakness     Weakness with one episode of vomiting after breakfast this morning. Denies any n/d. Denies any pain. Symptoms started this morning     Patient is a 72-year-old white gentleman with past medical history of hypertension, seizures (well-controlled), BPH who presents to the ER via EMS due to weakness and nausea and vomiting.  Patient states the symptoms have since resolved.  A neighbor called EMS when they found patient to have had 2 episodes of nonbloody nonbilious emesis in complaining of weakness.  Arrival patient states he became so weak that he had trouble walking.  Pt denies any diplopia, dysarthria, dysphagia, focal deficits or changes in sensation.  Pt denies CP but states he had that in the past. Denies cough, congestion, SOB, abdominal pain.  Last BM was reported as being 1 day PTA.     Review of patient's allergies indicates:  No Known Allergies  Past Medical History:   Diagnosis Date    BPH (benign prostatic hyperplasia)     Hypertension     Seizures      Past Surgical History:   Procedure Laterality Date    APPENDECTOMY      CHOLECYSTECTOMY      COLONOSCOPY  04/21/2016    Dr Brock Brown    TRANSURETHRAL RESECTION OF PROSTATE       Family History   Problem Relation Age of Onset    Hypertension Mother      Social History     Tobacco Use    Smoking status: Never    Smokeless tobacco: Never   Substance Use Topics    Alcohol use: Not Currently    Drug use: Not Currently     Review of Systems   Constitutional:  Positive for fatigue. Negative for chills and fever.   HENT:  Negative for congestion, sore throat and trouble swallowing.    Eyes:  Negative for pain.   Respiratory:  Negative for cough, shortness of breath and wheezing.    Cardiovascular:  Negative for chest pain and palpitations.   Gastrointestinal:  Positive for nausea and vomiting. Negative for abdominal pain, blood in stool, constipation and  diarrhea.   Genitourinary:  Negative for dysuria and hematuria.   Musculoskeletal:  Negative for back pain and myalgias.   Skin:  Negative for rash and wound.   Neurological:  Positive for weakness. Negative for dizziness, tremors, seizures, syncope, facial asymmetry, speech difficulty, light-headedness, numbness and headaches.   Psychiatric/Behavioral:  Negative for confusion. The patient is not nervous/anxious.      Physical Exam     Initial Vitals [03/18/23 1353]   BP Pulse Resp Temp SpO2   (!) 131/108 63 16 97.9 °F (36.6 °C) 100 %      MAP       --         Physical Exam    Nursing note and vitals reviewed.  Constitutional: He appears well-developed and well-nourished. No distress.   HENT:   Head: Normocephalic and atraumatic.   Eyes: Conjunctivae and EOM are normal. Right eye exhibits no discharge. Left eye exhibits no discharge. No scleral icterus.   Neck: No tracheal deviation present.   Normal range of motion.  Cardiovascular:  Normal rate, regular rhythm and normal heart sounds.     Exam reveals no gallop and no friction rub.       No murmur heard.  Pulmonary/Chest: Breath sounds normal. No respiratory distress. He has no wheezes. He has no rhonchi. He has no rales.   Musculoskeletal:         General: No edema. Normal range of motion.      Cervical back: Normal range of motion.     Neurological: He is alert. He has normal strength. No cranial nerve deficit or sensory deficit.   CN II-XII intact bilaterally, PERRLA, EOMI, AO to name only, GCS 14 due to confusion, no facial asymmetry noted, no abnormalities of vision loss, strength 5/5 x 4 extremities, sensation intact throughout, no focal neurological deficits noted on exam.  Gait not assessed. Negative Pronator drift bilaterally.       Skin: Skin is warm and dry. No rash and no abscess noted. No erythema. No pallor.   Psychiatric: His behavior is normal. Judgment normal.       ED Course   Procedures  Labs Reviewed   COMPREHENSIVE METABOLIC PANEL - Abnormal;  Notable for the following components:       Result Value    Chloride 110 (*)     Carbon Dioxide 20 (*)     Glucose Level 125 (*)     Blood Urea Nitrogen 32.0 (*)     Creatinine 1.51 (*)     All other components within normal limits   URINALYSIS, REFLEX TO URINE CULTURE - Abnormal; Notable for the following components:    Appearance, UA Cloudy (*)     Blood, UA Trace (*)     Leukocyte Esterase, UA 2+ (*)     All other components within normal limits   CBC WITH DIFFERENTIAL - Abnormal; Notable for the following components:    RBC 3.56 (*)     Hgb 11.7 (*)     Hct 35.9 (*)     .8 (*)     MCHC 32.6 (*)     Lymph # 0.56 (*)     All other components within normal limits   URINALYSIS, MICROSCOPIC - Abnormal; Notable for the following components:    Bacteria, UA Few (*)     WBC Clumps, UA Few (*)     WBC, UA >=100 (*)     All other components within normal limits   COVID/FLU A&B PCR - Normal    Narrative:     The Xpert Xpress SARS-CoV-2/FLU/RSV plus is a rapid, multiplexed real-time PCR test intended for the simultaneous qualitative detection and differentiation of SARS-CoV-2, Influenza A, Influenza B, and respiratory syncytial virus (RSV) viral RNA in either nasopharyngeal swab or nasal swab specimens.         TROPONIN I - Normal   MAGNESIUM - Normal   LACTIC ACID, PLASMA - Normal   CULTURE, URINE   BLOOD CULTURE [facility]   BLOOD CULTURE [facility]   CBC W/ AUTO DIFFERENTIAL    Narrative:     The following orders were created for panel order CBC Auto Differential.  Procedure                               Abnormality         Status                     ---------                               -----------         ------                     CBC with Differential[[id number]]        Abnormal            Final result                 Please view results for these tests on the individual orders.   POCT GLUCOSE, HAND-HELD DEVICE     EKG Readings: (Independently Interpreted)   Initial Reading: No STEMI. Rhythm: Normal Sinus Rhythm. Heart  Rate: 64. Conduction: 1st Degree AV Block. ST Segments: Normal ST Segments. T Waves: Normal. Axis: Normal.   ECG Results              EKG 12-lead (In process)  Result time 03/18/23 16:52:57      In process by Interface, Lab In [facility] (03/18/23 16:52:57)                   Narrative:    Test Reason : R41.82,    Vent. Rate : 064 BPM     Atrial Rate : 064 BPM     P-R Int : 232 ms          QRS Dur : 096 ms      QT Int : 398 ms       P-R-T Axes : -02 -09 033 degrees     QTc Int : 410 ms    Sinus rhythm with 1st degree A-V block  Otherwise normal ECG  No previous ECGs available    Referred By: [email or web address]   SELF           Confirmed By:                                   Imaging Results              X-Ray Chest 1 View (Final result)  Result time 03/18/23 15:02:58      Final result by Kendy Paredes MD (03/18/23 15:02:58)                   Impression:      No acute cardiopulmonary abnormality.      Electronically signed by: Kendy Paredes  Date:    03/18/2023  Time:    15:02               Narrative:    EXAMINATION:  XR CHEST 1 VIEW    CLINICAL HISTORY:  weakness;    TECHNIQUE:  Single frontal view of the chest was performed.    COMPARISON:  05/12/2021    FINDINGS:  LINES AND TUBES: EKG/telemetry leads overlie the chest.    MEDIASTINUM AND BELIA: Cardiac silhouette is at the upper limits of normal.    LUNGS: No lobar consolidation. No edema.    PLEURA:No pleural effusion. No pneumothorax.    OTHER: No acute osseous abnormality.                                       CT Head Without Contrast (Final result)  Result time 03/18/23 14:45:33      Final result by Dolly Fajardo MD (03/18/23 14:45:33)                   Impression:      No acute abnormality seen      Electronically signed by: Dolly Fajardo  Date:    03/18/2023  Time:    14:45               Narrative:    EXAMINATION:  CT HEAD WITHOUT CONTRAST    CLINICAL HISTORY:  Mental status change, unknown cause;    TECHNIQUE:  Multiple axial images were obtained  from the base of the brain to the vertex without contrast administration.  Sagittal and coronal reconstructions were performed. .Automatic exposure control  (AEC) is utilized to reduce patient radiation exposure.    COMPARISON:  None    FINDINGS:  There is no intracranial mass or lesion seen.  No hemorrhage is seen.  No infarct is seen.  The ventricles and basilar cisterns appear normal.  Brain parenchyma appears grossly unremarkable.    Posterior fossa appears normal.  The calvarium is intact.  The paranasal sinuses appear grossly unremarkable.                                       Medications   ondansetron injection 4 mg (4 mg Intravenous Given 3/18/23 1436)   0.9%  NaCl infusion (1,000 mLs Intravenous New Bag 3/18/23 1615)   cefTRIAXone (ROCEPHIN) 2 g in sodium chloride 0.9 % 50 mL IVPB (MB+) (2 g Intravenous New Bag 3/18/23 1619)   hydrALAZINE injection 10 mg (10 mg Intravenous Given 3/18/23 1643)     Medical Decision Making:   Initial Assessment:   Overall well-appearing 72-year-old male  Differential Diagnosis:   CVA, TIA, ACS, encephalopathy, UTI  Clinical Tests:   Lab Tests: Ordered and Reviewed  The following lab test(s) were unremarkable: CBC, CMP, Lactate, Urinalysis and Troponin  Radiological Study: Reviewed and Ordered  ED Management:  Hypertensive on arrival patient did not take his antihypertensives   GCS 14 secondary to confusion neuro exam is unremarkable   EKG showed no ischemic changes in troponin negative   Basic labs largely noncontributory creatinine is slightly above baseline   IV fluids given   Blood pressure remained 180/95 and thus 10 of hydralazine was given   CT head unremarkable   Chest x-ray unremarkable  You would a consistent with UTI   Blood cultures x2 drawn lactic acid negative and Rocephin started  Given this patient's poor support at home and living alone I did recommended admission and both patient and friend are amenable   Discussed with Dr. Coronado who accepted for further  care                        Clinical Impression:   Final diagnoses:  [R41.82] AMS (altered mental status)  [N30.00] Acute cystitis without hematuria (Primary)  [E86.0] Dehydration  [N17.9] DORIS (acute kidney injury)        ED Disposition Condition    Observation Fair                Espinoza Denney MD  03/18/23 3955

## 2023-03-18 NOTE — ED NOTES
Dr. Coronado accepted patient to [facility].  Called floor to see if they were ready to bring pt.  Stated they would call back.

## 2023-03-19 LAB
ANION GAP SERPL CALC-SCNC: 9 MEQ/L
BASOPHILS # BLD AUTO: 0.07 X10(3)/MCL (ref 0–0.2)
BASOPHILS NFR BLD AUTO: 1.1 %
BUN SERPL-MCNC: 31 MG/DL (ref 8.4–25.7)
CALCIUM SERPL-MCNC: 9 MG/DL (ref 8.8–10)
CHLORIDE SERPL-SCNC: 116 MMOL/L (ref 98–107)
CO2 SERPL-SCNC: 19 MMOL/L (ref 23–31)
CREAT SERPL-MCNC: 1.56 MG/DL (ref 0.73–1.18)
CREAT/UREA NIT SERPL: 20
EOSINOPHIL # BLD AUTO: 0.13 X10(3)/MCL (ref 0–0.9)
EOSINOPHIL NFR BLD AUTO: 2.1 %
ERYTHROCYTE [DISTWIDTH] IN BLOOD BY AUTOMATED COUNT: 12.5 % (ref 11.5–17)
GFR SERPLBLD CREATININE-BSD FMLA CKD-EPI: 46 MLS/MIN/1.73/M2
GLUCOSE SERPL-MCNC: 99 MG/DL (ref 82–115)
HCT VFR BLD AUTO: 32.5 % (ref 42–52)
HGB BLD-MCNC: 10.8 G/DL (ref 14–18)
IMM GRANULOCYTES # BLD AUTO: 0.03 X10(3)/MCL (ref 0–0.04)
IMM GRANULOCYTES NFR BLD AUTO: 0.5 %
LYMPHOCYTES # BLD AUTO: 0.93 X10(3)/MCL (ref 0.6–4.6)
LYMPHOCYTES NFR BLD AUTO: 15.2 %
MCH RBC QN AUTO: 32.8 PG
MCHC RBC AUTO-ENTMCNC: 33.2 G/DL (ref 33–36)
MCV RBC AUTO: 98.8 FL (ref 80–94)
MONOCYTES # BLD AUTO: 0.68 X10(3)/MCL (ref 0.1–1.3)
MONOCYTES NFR BLD AUTO: 11.1 %
NEUTROPHILS # BLD AUTO: 4.29 X10(3)/MCL (ref 2.1–9.2)
NEUTROPHILS NFR BLD AUTO: 70 %
NRBC BLD AUTO-RTO: 0 %
PLATELET # BLD AUTO: 220 X10(3)/MCL (ref 130–400)
PMV BLD AUTO: 9.8 FL (ref 7.4–10.4)
POTASSIUM SERPL-SCNC: 4.1 MMOL/L (ref 3.5–5.1)
RBC # BLD AUTO: 3.29 X10(6)/MCL (ref 4.7–6.1)
SODIUM SERPL-SCNC: 144 MMOL/L (ref 136–145)
WBC # SPEC AUTO: 6.1 X10(3)/MCL (ref 4.5–11.5)

## 2023-03-19 PROCEDURE — 96366 THER/PROPH/DIAG IV INF ADDON: CPT

## 2023-03-19 PROCEDURE — 80048 BASIC METABOLIC PNL TOTAL CA: CPT | Performed by: INTERNAL MEDICINE

## 2023-03-19 PROCEDURE — 25000003 PHARM REV CODE 250: Performed by: INTERNAL MEDICINE

## 2023-03-19 PROCEDURE — 85025 COMPLETE CBC W/AUTO DIFF WBC: CPT | Performed by: INTERNAL MEDICINE

## 2023-03-19 PROCEDURE — 80177 DRUG SCRN QUAN LEVETIRACETAM: CPT | Mod: 90 | Performed by: INTERNAL MEDICINE

## 2023-03-19 PROCEDURE — 94761 N-INVAS EAR/PLS OXIMETRY MLT: CPT

## 2023-03-19 PROCEDURE — G0378 HOSPITAL OBSERVATION PER HR: HCPCS

## 2023-03-19 PROCEDURE — 63600175 PHARM REV CODE 636 W HCPCS: Performed by: INTERNAL MEDICINE

## 2023-03-19 PROCEDURE — 96372 THER/PROPH/DIAG INJ SC/IM: CPT | Performed by: INTERNAL MEDICINE

## 2023-03-19 RX ORDER — LEVETIRACETAM 500 MG/1
1500 TABLET ORAL 2 TIMES DAILY
Status: DISCONTINUED | OUTPATIENT
Start: 2023-03-19 | End: 2023-03-24 | Stop reason: HOSPADM

## 2023-03-19 RX ORDER — LEVETIRACETAM 500 MG/1
500 TABLET ORAL 2 TIMES DAILY
Status: DISCONTINUED | OUTPATIENT
Start: 2023-03-19 | End: 2023-03-19

## 2023-03-19 RX ORDER — DICLOFENAC SODIUM 10 MG/G
2 GEL TOPICAL 2 TIMES DAILY PRN
Status: DISCONTINUED | OUTPATIENT
Start: 2023-03-19 | End: 2023-03-24 | Stop reason: HOSPADM

## 2023-03-19 RX ADMIN — SERTRALINE HYDROCHLORIDE 50 MG: 50 TABLET ORAL at 09:03

## 2023-03-19 RX ADMIN — TOPIRAMATE 200 MG: 100 TABLET, FILM COATED ORAL at 09:03

## 2023-03-19 RX ADMIN — OXCARBAZEPINE 900 MG: 300 TABLET, FILM COATED ORAL at 09:03

## 2023-03-19 RX ADMIN — TAMSULOSIN HYDROCHLORIDE 0.4 MG: 0.4 CAPSULE ORAL at 08:03

## 2023-03-19 RX ADMIN — SODIUM CHLORIDE: 9 INJECTION, SOLUTION INTRAVENOUS at 06:03

## 2023-03-19 RX ADMIN — ATORVASTATIN CALCIUM 20 MG: 10 TABLET, FILM COATED ORAL at 09:03

## 2023-03-19 RX ADMIN — OXCARBAZEPINE 900 MG: 300 TABLET, FILM COATED ORAL at 08:03

## 2023-03-19 RX ADMIN — DICLOFENAC SODIUM TOPICAL GEL, 1%, 2 G: 10 GEL TOPICAL at 03:03

## 2023-03-19 RX ADMIN — LEVETIRACETAM 1500 MG: 500 TABLET, FILM COATED ORAL at 09:03

## 2023-03-19 RX ADMIN — DILTIAZEM HYDROCHLORIDE 120 MG: 120 CAPSULE, COATED, EXTENDED RELEASE ORAL at 08:03

## 2023-03-19 RX ADMIN — ENOXAPARIN SODIUM 40 MG: 40 INJECTION SUBCUTANEOUS at 05:03

## 2023-03-19 RX ADMIN — ASPIRIN 81 MG CHEWABLE TABLET 81 MG: 81 TABLET CHEWABLE at 08:03

## 2023-03-19 RX ADMIN — CEFTRIAXONE SODIUM 2 G: 2 INJECTION, POWDER, FOR SOLUTION INTRAMUSCULAR; INTRAVENOUS at 03:03

## 2023-03-19 NOTE — H&P
Hospital Medicine History & Physical Examination       Patient Name: Nathen Morales  MRN: [id number]  Patient Class: OP- Observation   Admission Date: 3/18/2023   Admitting Physician:  Service   Length of Stay: 0  Attending Physician: Terrie Coronado MD  Primary Care Provider: Brock Brown MD  Face-to-Face encounter date: 03/18/2023  Code Status:full  Chief Complaint: Weakness (Weakness with one episode of vomiting after breakfast this morning. Denies any n/d. Denies any pain. Symptoms started this morning)        Patient information was obtained from patient, patient's family, past medical records and ER records.     HISTORY OF PRESENT ILLNESS:   Nathen Morales is a 72 y.o. male who is a patient of   has a past medical history of BPH (benign prostatic hyperplasia), Hypertension, and Seizures.. The patient presented to [facility] on 3/18/2023 with a primary complaint of generalized weakness and two episode of nausea and vomiting this morning, his friend /neighbour called ems and was brought in to the ER . Workup in the er - ekg- 1st degree av block, no st segment elevation or depression and normal t waves . Cxr-nl. Ct head- normal . Labs -cr 1.51, bun 32 . Ua suggestive of uti. Wbc 6.3 . Lactic acid normal. Pt has been feeling very weak and difficulty walking lately. No focal deficits. Pt admitted under hospitalist services for further treatment. Blood and urine cx were collected in the er and was started on iv rocephin and IVF . He denies any chest pain, sob, orthopnea, pnd or swelling. Follows w cardiology and is scheduled for stress test next week due to him having chest pains few weeks ago. Denies any chest pain now     PAST MEDICAL HISTORY:     Past Medical History:   Diagnosis Date    BPH (benign prostatic hyperplasia)     Hypertension     Seizures        PAST SURGICAL HISTORY:     Past Surgical History:   Procedure Laterality Date    APPENDECTOMY      CHOLECYSTECTOMY      COLONOSCOPY   04/21/2016    Dr Brock Brown    TRANSURETHRAL RESECTION OF PROSTATE         ALLERGIES:   Patient has no known allergies.    FAMILY HISTORY:   Reviewed and negative    SOCIAL HISTORY:     Social History     Tobacco Use    Smoking status: Never    Smokeless tobacco: Never   Substance Use Topics    Alcohol use: Not Currently        HOME MEDICATIONS:     Prior to Admission medications    Medication Sig Start Date End Date Taking? Authorizing Provider   aspirin 81 mg Cap Take 81 mg by mouth. 11/8/21  Yes Historical Provider   levETIRAcetam (KEPPRA) 1000 MG tablet Take 1,500 mg by mouth.   Yes Historical Provider   multivitamin (THERAGRAN) tablet Take 1 tablet by mouth once daily.   Yes Historical Provider   OXcarbazepine (TRILEPTAL) 600 MG Tab  5/2/22  Yes Historical Provider   rosuvastatin (CRESTOR) 10 MG tablet  5/2/22  Yes Historical Provider   tamsulosin (FLOMAX) 0.4 mg Cap TAKE 1 CAPSULE BY MOUTH TWICE DAILY 12/6/22  Yes Brock Brown MD   TIADYLT  mg 24 hr capsule Take 120 mg by mouth. 11/28/22  Yes Historical Provider   topiramate (TOPAMAX) 200 MG Tab  5/2/22  Yes Historical Provider   BACMIN 27 mg iron- 1 mg Tab TAKE 1 TABLET BY MOUTH DAILY 10/27/22   Brock Brown MD   docusate sodium (COLACE) 100 MG capsule Take 100 mg by mouth. 3/31/22   Historical Provider   sertraline (ZOLOFT) 50 MG tablet Take 50 mg by mouth. 9/22/22   Historical Provider       REVIEW OF SYSTEMS:   Except as documented, all other systems reviewed and negative     PHYSICAL EXAM:     VITAL SIGNS: 24 HRS MIN & MAX LAST   Temp  Min: 97.9 °F (36.6 °C)  Max: 97.9 °F (36.6 °C) 97.9 °F (36.6 °C)   BP  Min: 131/108  Max: 186/96 (!) 176/76     Pulse  Min: 61  Max: 82  82   Resp  Min: 14  Max: 22 18   SpO2  Min: 95 %  Max: 100 % 96 %       General appearance: Well-developed, well-nourished male in no apparent distress.  HENT: Atraumatic head. Moist mucous membranes of oral cavity.  Eyes: Normal extraocular movements.   Neck:  Supple.   Lungs: Clear to auscultation bilaterally. No wheezing present.   Heart: Regular rate and rhythm. S1 and S2 present with no murmurs/gallop/rub. No pedal edema. No JVD present.   Abdomen: Soft, non-distended, non-tender. No rebound tenderness/guarding. Bowel sounds are normal.   Extremities: No cyanosis, clubbing, or edema.  Skin: No Rash.   Neuro: no focal neuro deficits   Psych/mental status: Appropriate mood and affect. Responds appropriately to questions.     LABS AND IMAGING:     Recent Labs   Lab 03/18/23  1411   WBC 6.3   RBC 3.56*   HGB 11.7*   HCT 35.9*   .8*   MCH 32.9   MCHC 32.6*   RDW 12.3      MPV 9.8       Recent Labs   Lab 03/18/23  1411      K 4.4   CO2 20*   BUN 32.0*   CREATININE 1.51*   CALCIUM 9.5   MG 2.20   ALBUMIN 3.5   ALKPHOS 107   ALT 8   AST 11   BILITOT 0.3       Microbiology Results (last 7 days)       Procedure Component Value Units Date/Time    Blood Culture [[id number]] Collected: 03/18/23 1623    Order Status: Sent Specimen: Blood from Antecubital, Left Updated: 03/18/23 1625    Blood Culture [[id number]] Collected: 03/18/23 1602    Order Status: Sent Specimen: Blood from Antecubital, Left Updated: 03/18/23 1625    Urine culture [[id number]] Collected: 03/18/23 1516    Order Status: Sent Specimen: Urine Updated: 03/18/23 1534             X-Ray Chest 1 View  Narrative: EXAMINATION:  XR CHEST 1 VIEW    CLINICAL HISTORY:  weakness;    TECHNIQUE:  Single frontal view of the chest was performed.    COMPARISON:  05/12/2021    FINDINGS:  LINES AND TUBES: EKG/telemetry leads overlie the chest.    MEDIASTINUM AND BELIA: Cardiac silhouette is at the upper limits of normal.    LUNGS: No lobar consolidation. No edema.    PLEURA:No pleural effusion. No pneumothorax.    OTHER: No acute osseous abnormality.  Impression: No acute cardiopulmonary abnormality.    Electronically signed by: Kendy Paredes  Date:    03/18/2023  Time:    15:02  CT Head Without  Contrast  Narrative: EXAMINATION:  CT HEAD WITHOUT CONTRAST    CLINICAL HISTORY:  Mental status change, unknown cause;    TECHNIQUE:  Multiple axial images were obtained from the base of the brain to the vertex without contrast administration.  Sagittal and coronal reconstructions were performed. .Automatic exposure control  (AEC) is utilized to reduce patient radiation exposure.    COMPARISON:  None    FINDINGS:  There is no intracranial mass or lesion seen.  No hemorrhage is seen.  No infarct is seen.  The ventricles and basilar cisterns appear normal.  Brain parenchyma appears grossly unremarkable.    Posterior fossa appears normal.  The calvarium is intact.  The paranasal sinuses appear grossly unremarkable.  Impression: No acute abnormality seen    Electronically signed by: Dolly Fajardo  Date:    03/18/2023  Time:    14:45        ASSESSMENT & PLAN:   Acute cystitis - poa  Mo  Dehydration  Hypertension  Bph  Hld  Epilepsy  Anxiety/depression    Plan  Cont iv rocephin  F/u on urine and blood cx   Consult pt/ot   Resume home meds   Prn hydralazine iv w bp parameters  Labs in the am    Pt admitted under observation     VTE Prophylaxis: lovenox   Patient condition:  Stable/    H and P performed using telemedicine w patient at [facility] and the provider at home   __________________________________________________________________________  INPATIENT LIST OF MEDICATIONS     Scheduled Meds:   sodium chloride 0.9%  1,000 mL Intravenous ED 1 Time    [START ON 3/19/2023] aspirin  81 mg Oral Daily    atorvastatin  20 mg Oral QHS    [START ON 3/19/2023] cefTRIAXone (ROCEPHIN) IVPB  2 g Intravenous Q24H    [START ON 3/19/2023] diltiaZEM  120 mg Oral Daily    enoxaparin  40 mg Subcutaneous Daily    [START ON 3/19/2023] levETIRAcetam  1,000 mg Oral Daily    [START ON 3/19/2023] OXcarbazepine  600 mg Oral Daily    sertraline  50 mg Oral QHS    [START ON 3/19/2023] tamsulosin  0.4 mg Oral Daily    topiramate  100 mg Oral QHS      Continuous Infusions:  PRN Meds:.sodium chloride 0.9%        All diagnosis and differential diagnosis have been reviewed; assessment and plan has been documented; I have personally reviewed the labs and test results that are presently available; I have reviewed the patients medication list; I have reviewed the consulting providers response and recommendations. I have reviewed or attempted to review medical records based upon their availability.    All of the patient and family questions have been addressed and answered. Patient's is agreeable to the above stated plan. I will continue to monitor closely and make adjustments to medical management as needed.      Terrie Coronado MD   03/18/2023

## 2023-03-19 NOTE — PROGRESS NOTES
Hospital Medicine Progress Note        Chief Complaint: Inpatient Follow-up for     HPI: Nathen Morales is a 72 y.o. male who is a patient of   has a past medical history of BPH (benign prostatic hyperplasia), Hypertension, and Seizures.. The patient presented to [facility] on 3/18/2023 with a primary complaint of generalized weakness and two episode of nausea and vomiting this morning, his friend /neighbour called ems and was brought in to the ER . Workup in the er - ekg- 1st degree av block, no st segment elevation or depression and normal t waves . Cxr-nl. Ct head- normal . Labs -cr 1.51, bun 32 . Ua suggestive of uti. Wbc 6.3 . Lactic acid normal. Pt has been feeling very weak and difficulty walking lately. No focal deficits. Pt admitted under hospitalist services for further treatment. Blood and urine cx were collected in the er and was started on iv rocephin and IVF . He denies any chest pain, sob, orthopnea, pnd or swelling. Follows w cardiology and is scheduled for stress test next week due to him having chest pains few weeks ago. Denies any chest pain now        Interval Hx:   Seen and examined. Intermittent episodes of confusion , trouble remembering events . Reports he had a fall on [place] and hit his knee and r kknee hurts . Moderate pain, no radiation, unable to stand on it. Bp elevated     Objective/physical exam:  General: In no acute distress, afebrile  Chest: Clear to auscultation bilaterally  Heart: RRR, +S1, S2, no appreciable murmur  Abdomen: Soft, nontender, BS +  MSK: Warm, trace ble edema, no clubbing or cyanosis  Neurologic: Alert and oriented x4, Cranial nerve II-XII intact, Strength 5/5 in all 4 extremities    VITAL SIGNS: 24 HRS MIN & MAX LAST   Temp  Min: 97.9 °F (36.6 °C)  Max: 99.3 °F (37.4 °C) 97.9 °F (36.6 °C)   BP  Min: 131/108  Max: 186/96 (!) 163/76     Pulse  Min: 61  Max: 83  76   Resp  Min: 14  Max: 22 18   SpO2  Min: 94 %  Max: 100 % 96 %       Recent Labs   Lab  03/18/23  1411 03/19/23  0452   WBC 6.3 6.1   RBC 3.56* 3.29*   HGB 11.7* 10.8*   HCT 35.9* 32.5*   .8* 98.8*   MCH 32.9 32.8   MCHC 32.6* 33.2   RDW 12.3 12.5    220   MPV 9.8 9.8       Recent Labs   Lab 03/18/23  1411 03/19/23  0452    144   K 4.4 4.1   CO2 20* 19*   BUN 32.0* 31.0*   CREATININE 1.51* 1.56*   CALCIUM 9.5 9.0   MG 2.20  --    ALBUMIN 3.5  --    ALKPHOS 107  --    ALT 8  --    AST 11  --    BILITOT 0.3  --           Microbiology Results (last 7 days)       Procedure Component Value Units Date/Time    Blood Culture [[id number]] Collected: 03/18/23 1623    Order Status: Sent Specimen: Blood from Antecubital, Left Updated: 03/18/23 1625    Blood Culture [[id number]] Collected: 03/18/23 1602    Order Status: Sent Specimen: Blood from Antecubital, Left Updated: 03/18/23 1625    Urine culture [[id number]] Collected: 03/18/23 1516    Order Status: Sent Specimen: Urine Updated: 03/18/23 1534             See below for Radiology    Scheduled Med:   aspirin  81 mg Oral Daily    atorvastatin  20 mg Oral QHS    cefTRIAXone (ROCEPHIN) IVPB  2 g Intravenous Q24H    diltiaZEM  120 mg Oral Daily    enoxaparin  40 mg Subcutaneous Daily    levETIRAcetam  1,500 mg Oral BID    OXcarbazepine  900 mg Oral BID    sertraline  50 mg Oral QHS    tamsulosin  0.4 mg Oral Daily    topiramate  200 mg Oral BID        Continuous Infusions:       PRN Meds:  diclofenac sodium, sodium chloride 0.9%       Assessment/Plan:  Acute cystitis - poa  Mo vs ckd 3   Hypertension  Bph  Hld  Epilepsy  Anxiety/depression  Dementia   Fall at home w R knee pain     Plan  Cont iv rocephin  F/u on urine and blood cx   Consult pt/ot   Resumed home meds   Prn hydralazine iv w bp parameters  Xr r knee  Prn diclofenac gel   Dc ivf trace edema   Pt lives home by himself since his mom passed away, gait imbalance and high risk for falls  Will get PT to evaluate him and discharge dispo depending on that  Labs in the am     VTE Prophylaxis:  lovenox   Patient condition:  Stable/          All diagnosis and differential diagnosis have been reviewed; assessment and plan has been documented; I have personally reviewed the labs and test results that are presently available; I have reviewed the patients medication list; I have reviewed the consulting providers response and recommendations. I have reviewed or attempted to review medical records based upon their availability    All of the patient's questions have been  addressed and answered. Patient's is agreeable to the above stated plan. I will continue to monitor closely and make adjustments to medical management as needed.  _____________________________________________________________________    Nutrition Status:    Radiology:  X-Ray Chest 1 View  Narrative: EXAMINATION:  XR CHEST 1 VIEW    CLINICAL HISTORY:  weakness;    TECHNIQUE:  Single frontal view of the chest was performed.    COMPARISON:  05/12/2021    FINDINGS:  LINES AND TUBES: EKG/telemetry leads overlie the chest.    MEDIASTINUM AND BELIA: Cardiac silhouette is at the upper limits of normal.    LUNGS: No lobar consolidation. No edema.    PLEURA:No pleural effusion. No pneumothorax.    OTHER: No acute osseous abnormality.  Impression: No acute cardiopulmonary abnormality.    Electronically signed by: Kendy Paredes  Date:    03/18/2023  Time:    15:02  CT Head Without Contrast  Narrative: EXAMINATION:  CT HEAD WITHOUT CONTRAST    CLINICAL HISTORY:  Mental status change, unknown cause;    TECHNIQUE:  Multiple axial images were obtained from the base of the brain to the vertex without contrast administration.  Sagittal and coronal reconstructions were performed. .Automatic exposure control  (AEC) is utilized to reduce patient radiation exposure.    COMPARISON:  None    FINDINGS:  There is no intracranial mass or lesion seen.  No hemorrhage is seen.  No infarct is seen.  The ventricles and basilar cisterns appear normal.  Brain parenchyma appears  grossly unremarkable.    Posterior fossa appears normal.  The calvarium is intact.  The paranasal sinuses appear grossly unremarkable.  Impression: No acute abnormality seen    Electronically signed by: Dolly Fajardo  Date:    03/18/2023  Time:    14:45      Terrie Coronado MD   03/19/2023

## 2023-03-19 NOTE — PLAN OF CARE
Problem: Adult Inpatient Plan of Care  Goal: Plan of Care Review  Outcome: Ongoing, Progressing  Goal: Patient-Specific Goal (Individualized)  Outcome: Ongoing, Progressing  Goal: Absence of Hospital-Acquired Illness or Injury  Outcome: Ongoing, Progressing  Goal: Optimal Comfort and Wellbeing  Outcome: Ongoing, Progressing  Goal: Readiness for Transition of Care  Outcome: Ongoing, Progressing     Problem: Hypertension Comorbidity  Goal: Blood Pressure in Desired Range  Outcome: Ongoing, Progressing     Problem: Pain Chronic (Persistent) (Comorbidity Management)  Goal: Acceptable Pain Control and Functional Ability  Outcome: Ongoing, Progressing     Problem: Seizure Disorder Comorbidity  Goal: Maintenance of Seizure Control  Outcome: Ongoing, Progressing     Problem: Fall Injury Risk  Goal: Absence of Fall and Fall-Related Injury  Outcome: Ongoing, Progressing     Problem: Fluid Volume Deficit  Goal: Fluid Balance  Outcome: Ongoing, Progressing     Problem: Fatigue  Goal: Improved Activity Tolerance  Outcome: Ongoing, Progressing     Problem: UTI (Urinary Tract Infection)  Goal: Improved Infection Symptoms  Outcome: Ongoing, Progressing

## 2023-03-19 NOTE — PROGRESS NOTES
"Inpatient Nutrition Evaluation    Admit Date: 3/18/2023   Total duration of encounter: 1 day    Nutrition Recommendation/Prescription     Continue Cardiac edentulous diet as tolerated.   Boost Plus, TID, to assist with nutrition. Provides 360 kcal, 14 g protein per serving.  Monitor intake, weight, and labs.     RD following and available as needed. Thank you.     Nutrition Assessment     Chart Review    Reason Seen: continuous nutrition monitoring    Malnutrition Screening Tool Results   Have you recently lost weight without trying?: No  Have you been eating poorly because of a decreased appetite?: No   MST Score: 0     Diagnosis:  Acute cystitis - poa  Mo vs ckd 3   Hypertension  Bph  Hld  Epilepsy  Anxiety/depression  Dementia   Fall at home w R knee pain       Relevant Medical History:   BPH (benign prostatic hyperplasia)      Hypertension      Seizures          Nutrition-Related Medications:   Lovenox.    Nutrition-Related Labs:  3/19: BUN/Crea 31/1.56; GFR 46(L); H/H 10.8/32.5(L).    Diet Order: Diet Cardiac  Oral Supplement Order: none  Appetite/Oral Intake: fair/25-50% of meals  Factors Affecting Nutritional Intake: chewing difficulty  Food/Anglican/Cultural Preferences: none reported  Food Allergies: none reported       Wound(s):       Comments    3/19: Pt is new admit with fair intake. Wears dentures but does not have dentures with him. Will add Boost Plus to assist with nutrition and continue to monitor during stay.     Anthropometrics    Height: 6' 1" (185.4 cm) Height Method: Stated  Last Weight: 95.4 kg (210 lb 5.1 oz) (03/19/23 0500) Weight Method: Bed Scale  BMI (Calculated): 27.8  BMI Classification: overweight (BMI 25-29.9)        Ideal Body Weight (IBW), Male: 184 lb     % Ideal Body Weight, Male (lb): 115.26 %                          Usual Weight Provided By: EMR weight history    Wt Readings from Last 3 Encounters:   03/19/23 0500 95.4 kg (210 lb 5.1 oz)   03/18/23 1815 96.2 kg (212 lb 1.3 " oz)   03/18/23 1353 104.3 kg (230 lb)   12/06/22 1330 111.6 kg (246 lb)   05/31/22 1312 111.6 kg (246 lb 1.6 oz)      Weight Change(s) Since Admission:  Admit Weight: 104.3 kg (230 lb) (03/18/23 1353)      Patient Education    Not applicable.    Monitoring & Evaluation     Dietitian will monitor food and beverage intake, energy intake, weight, electrolyte/renal panel, glucose/endocrine profile, and gastrointestinal profile.  Nutrition Risk/Follow-Up: low (follow-up in 5-7 days)  Patients assigned 'low nutrition risk' status do not qualify for a full nutritional assessment but will be monitored and re-evaluated in a 5-7 day time period. Please consult if re-evaluation needed sooner.

## 2023-03-20 PROBLEM — M25.561 ACUTE PAIN OF RIGHT KNEE: Status: ACTIVE | Noted: 2023-03-20

## 2023-03-20 PROBLEM — N39.0 ACUTE UTI: Status: ACTIVE | Noted: 2023-03-20

## 2023-03-20 PROBLEM — R53.1 WEAKNESS: Status: ACTIVE | Noted: 2023-03-20

## 2023-03-20 LAB
ANION GAP SERPL CALC-SCNC: 10 MEQ/L
BUN SERPL-MCNC: 29 MG/DL (ref 8.4–25.7)
CALCIUM SERPL-MCNC: 9 MG/DL (ref 8.8–10)
CHLORIDE SERPL-SCNC: 116 MMOL/L (ref 98–107)
CO2 SERPL-SCNC: 19 MMOL/L (ref 23–31)
CREAT SERPL-MCNC: 1.57 MG/DL (ref 0.73–1.18)
CREAT/UREA NIT SERPL: 18
GFR SERPLBLD CREATININE-BSD FMLA CKD-EPI: 46 MLS/MIN/1.73/M2
GLUCOSE SERPL-MCNC: 98 MG/DL (ref 82–115)
POTASSIUM SERPL-SCNC: 4.2 MMOL/L (ref 3.5–5.1)
SODIUM SERPL-SCNC: 145 MMOL/L (ref 136–145)

## 2023-03-20 PROCEDURE — 99231 SBSQ HOSP IP/OBS SF/LOW 25: CPT | Mod: ,,, | Performed by: FAMILY MEDICINE

## 2023-03-20 PROCEDURE — 97116 GAIT TRAINING THERAPY: CPT

## 2023-03-20 PROCEDURE — 97162 PT EVAL MOD COMPLEX 30 MIN: CPT

## 2023-03-20 PROCEDURE — 97130 THER IVNTJ EA ADDL 15 MIN: CPT

## 2023-03-20 PROCEDURE — 97166 OT EVAL MOD COMPLEX 45 MIN: CPT

## 2023-03-20 PROCEDURE — 96372 THER/PROPH/DIAG INJ SC/IM: CPT | Performed by: INTERNAL MEDICINE

## 2023-03-20 PROCEDURE — 96376 TX/PRO/DX INJ SAME DRUG ADON: CPT

## 2023-03-20 PROCEDURE — 92523 SPEECH SOUND LANG COMPREHEN: CPT

## 2023-03-20 PROCEDURE — 25000003 PHARM REV CODE 250: Performed by: INTERNAL MEDICINE

## 2023-03-20 PROCEDURE — 97129 THER IVNTJ 1ST 15 MIN: CPT

## 2023-03-20 PROCEDURE — 63600175 PHARM REV CODE 636 W HCPCS: Performed by: INTERNAL MEDICINE

## 2023-03-20 PROCEDURE — 94761 N-INVAS EAR/PLS OXIMETRY MLT: CPT

## 2023-03-20 PROCEDURE — 96366 THER/PROPH/DIAG IV INF ADDON: CPT

## 2023-03-20 PROCEDURE — G0378 HOSPITAL OBSERVATION PER HR: HCPCS

## 2023-03-20 PROCEDURE — 80048 BASIC METABOLIC PNL TOTAL CA: CPT | Performed by: INTERNAL MEDICINE

## 2023-03-20 PROCEDURE — 99231 PR SUBSEQUENT HOSPITAL CARE,LEVL I: ICD-10-PCS | Mod: ,,, | Performed by: FAMILY MEDICINE

## 2023-03-20 RX ORDER — POLYVINYL ALCOHOL 14 MG/ML
1 SOLUTION/ DROPS OPHTHALMIC
Status: DISCONTINUED | OUTPATIENT
Start: 2023-03-20 | End: 2023-03-24 | Stop reason: HOSPADM

## 2023-03-20 RX ORDER — HYDRALAZINE HYDROCHLORIDE 20 MG/ML
10 INJECTION INTRAMUSCULAR; INTRAVENOUS EVERY 4 HOURS PRN
Status: DISCONTINUED | OUTPATIENT
Start: 2023-03-20 | End: 2023-03-24 | Stop reason: HOSPADM

## 2023-03-20 RX ADMIN — SERTRALINE HYDROCHLORIDE 50 MG: 50 TABLET ORAL at 08:03

## 2023-03-20 RX ADMIN — LEVETIRACETAM 1500 MG: 500 TABLET, FILM COATED ORAL at 08:03

## 2023-03-20 RX ADMIN — TOPIRAMATE 200 MG: 100 TABLET, FILM COATED ORAL at 08:03

## 2023-03-20 RX ADMIN — ATORVASTATIN CALCIUM 20 MG: 10 TABLET, FILM COATED ORAL at 08:03

## 2023-03-20 RX ADMIN — CEFTRIAXONE SODIUM 2 G: 2 INJECTION, POWDER, FOR SOLUTION INTRAMUSCULAR; INTRAVENOUS at 03:03

## 2023-03-20 RX ADMIN — POLYVINYL ALCOHOL 1 DROP: 14 SOLUTION/ DROPS OPHTHALMIC at 12:03

## 2023-03-20 RX ADMIN — DILTIAZEM HYDROCHLORIDE 120 MG: 120 CAPSULE, COATED, EXTENDED RELEASE ORAL at 08:03

## 2023-03-20 RX ADMIN — OXCARBAZEPINE 900 MG: 300 TABLET, FILM COATED ORAL at 08:03

## 2023-03-20 RX ADMIN — ENOXAPARIN SODIUM 40 MG: 40 INJECTION SUBCUTANEOUS at 05:03

## 2023-03-20 RX ADMIN — HYDRALAZINE HYDROCHLORIDE 10 MG: 20 INJECTION INTRAMUSCULAR; INTRAVENOUS at 01:03

## 2023-03-20 RX ADMIN — ASPIRIN 81 MG CHEWABLE TABLET 81 MG: 81 TABLET CHEWABLE at 08:03

## 2023-03-20 RX ADMIN — TAMSULOSIN HYDROCHLORIDE 0.4 MG: 0.4 CAPSULE ORAL at 08:03

## 2023-03-20 NOTE — PLAN OF CARE
03/20/23 0906   Discharge Assessment   Assessment Type Discharge Planning Assessment   Confirmed/corrected address, phone number and insurance Yes   Confirmed Demographics Correct on Facesheet   Source of Information patient   Does patient/caregiver understand observation status Yes   Communicated DILSHAD with patient/caregiver Date not available/Unable to determine   Reason For Admission UTI   People in Home alone;significant other  (Has friend that does assist)   Facility Arrived From: home   Do you expect to return to your current living situation? Yes   Do you have help at home or someone to help you manage your care at home? No  (Has a neighbor and a friend to assist with his errands, etc)   Prior to hospitilization cognitive status: Alert/Oriented   Current cognitive status: Alert/Oriented   Do you have any problems with: Errands/Grocery  (Neighbor and friend assists with these errands)   Home Layout Able to live on 1st floor   Equipment Currently Used at Home shower chair   Readmission within 30 days? No   Patient currently being followed by outpatient case management? No   Do you currently have service(s) that help you manage your care at home? No   Do you take prescription medications? Yes   Do you have prescription coverage? Yes   Coverage [employer] dual   Do you have any problems affording any of your prescribed medications? No   Is the patient taking medications as prescribed? yes   Who is going to help you get home at discharge? Kika Hirsch [phone]   How do you get to doctors appointments? family or friend will provide   Are you on dialysis? No   Do you take coumadin? No   Discharge Plan A Home   DME Needed Upon Discharge  none   Discharge Plan discussed with: Patient   Discharge Barriers Identified None   Physical Activity   On average, how many days per week do you engage in moderate to strenuous exercise (like a brisk walk)? 0 days   On average, how many minutes do you engage in  exercise at this level? 0 min   Financial Resource Strain   How hard is it for you to pay for the very basics like food, housing, medical care, and heating? Somewhat   Housing Stability   In the last 12 months, was there a time when you were not able to pay the mortgage or rent on time? N   In the last 12 months, how many places have you lived? 1   In the last 12 months, was there a time when you did not have a steady place to sleep or slept in a shelter (including now)? N   Transportation Needs   In the past 12 months, has lack of transportation kept you from medical appointments or from getting medications? no   In the past 12 months, has lack of transportation kept you from meetings, work, or from getting things needed for daily living? No   Food Insecurity   Within the past 12 months, you worried that your food would run out before you got the money to buy more. Never true   Within the past 12 months, the food you bought just didn't last and you didn't have money to get more. Never true   Stress   Do you feel stress - tense, restless, nervous, or anxious, or unable to sleep at night because your mind is troubled all the time - these days? To some exte   Social Connections   In a typical week, how many times do you talk on the phone with family, friends, or neighbors? More than 3   How often do you get together with friends or relatives? More than 3   How often do you attend Restorationism or Islam services? More than 4   Do you belong to any clubs or organizations such as Restorationism groups, unions, fraternal or athletic groups, or school groups? No   How often do you attend meetings of the clubs or organizations you belong to? Never   Are you , , , , never , or living with a partner? Never marrie   Alcohol Use   Q1: How often do you have a drink containing alcohol? Never   Q2: How many drinks containing alcohol do you have on a typical day when you are drinking? None   Q3: How often  do you have six or more drinks on one occasion? Never   OTHER   Name(s) of People in Home None

## 2023-03-20 NOTE — PLAN OF CARE
[place] [facility] contacted for Meals on Wheels to be sent to patient's home. At this time this service is unable to deliver more meals however, patients name and phone number were placed on a waiting list. He will be contacted when the opportunity is available.

## 2023-03-20 NOTE — PLAN OF CARE
ST POC established.     Problem: SLP  Goal: SLP Goal  Description: LTG:   Pt will demonstrate recall and reasoning/problems solving skills for ADLs, IADLs, and safety in home environment.    STGs:  1. Pt will recall and demonstrate safety strategies for mobility and ADLs with 75% acc and use of strategies for improved safety.  2. Pt will complete teach back of safety precautions with >90% acc with min cues for improved safety.  3. Pt will demonstrate recall and reasoning skills with personally relevant IADLs with adequate awareness of when to ask for assistance with 75% acc for increased independence with IADLs.   Outcome: Ongoing, Progressing

## 2023-03-20 NOTE — NURSING
Marc, PT, reported elevated BP after therapy. Rechecked vitals. /89, HR 82. Patient denies any s/s. Hydralazine 10mg IVP administered per MD order.       1348-/83, HR 80 after medication. No s/s of acute distress. Advised to call if any symptoms occur. Call light within reach. Safety precautions in place. Will continue to monitor.

## 2023-03-20 NOTE — SUBJECTIVE & OBJECTIVE
Interval History:     Review of Systems   Constitutional: Negative.    Respiratory: Negative.     Cardiovascular: Negative.    Genitourinary:  Positive for dysuria.   Musculoskeletal:         Right knee pain   Neurological:  Positive for weakness.   Objective:     Vital Signs (Most Recent):  Temp: 98.6 °F (37 °C) (03/20/23 1600)  Pulse: 86 (03/20/23 1600)  Resp: 20 (03/20/23 1600)  BP: (!) 152/85 (03/20/23 1600)  SpO2: 99 % (03/20/23 1600)   Vital Signs (24h Range):  Temp:  [97.9 °F (36.6 °C)-98.6 °F (37 °C)] 98.6 °F (37 °C)  Pulse:  [67-86] 86  Resp:  [18-20] 20  SpO2:  [95 %-99 %] 99 %  BP: (145-180)/(75-91) 152/85     Weight: 95.4 kg (210 lb 5.1 oz)  Body mass index is 27.75 kg/m².    Intake/Output Summary (Last 24 hours) at 3/20/2023 1758  Last data filed at 3/20/2023 1204  Gross per 24 hour   Intake 240 ml   Output --   Net 240 ml      Physical Exam  Constitutional:       General: He is not in acute distress.     Appearance: Normal appearance.   Cardiovascular:      Rate and Rhythm: Normal rate and regular rhythm.   Pulmonary:      Effort: Pulmonary effort is normal.      Breath sounds: Normal breath sounds.   Abdominal:      General: Abdomen is flat. Bowel sounds are normal.      Palpations: Abdomen is soft.   Musculoskeletal:         General: Normal range of motion.   Skin:     General: Skin is warm.   Neurological:      Mental Status: He is alert.   Psychiatric:         Mood and Affect: Mood normal.         Behavior: Behavior normal.         Thought Content: Thought content normal.         Judgment: Judgment normal.       Significant Labs: All pertinent labs within the past 24 hours have been reviewed.  CBC:   Recent Labs   Lab 03/19/23  0452   WBC 6.1   HGB 10.8*   HCT 32.5*        CMP:   Recent Labs   Lab 03/19/23 0452 03/20/23  0450    145   K 4.1 4.2   CO2 19* 19*   BUN 31.0* 29.0*   CREATININE 1.56* 1.57*   CALCIUM 9.0 9.0       Significant Imaging: I have reviewed all pertinent imaging  results/findings within the past 24 hours.

## 2023-03-20 NOTE — PT/OT/SLP EVAL
"Physical Therapy Acute Care Evaluation    Patient Name:  Nathen Morales   MRN:  [id number]    History:     Per MD:  Nathen Morales is a 72 y.o. male who is a patient of   has a past medical history of BPH (benign prostatic hyperplasia), Hypertension, and Seizures.. The patient presented to [facility] on 3/18/2023 with a primary complaint of generalized weakness and two episode of nausea and vomiting this morning, his friend /neighbour called ems and was brought in to the ER . Workup in the er - ekg- 1st degree av block, no st segment elevation or depression and normal t waves . Cxr-nl. Ct head- normal . Labs -cr 1.51, bun 32 . Ua suggestive of uti. Wbc 6.3 . Lactic acid normal. Pt has been feeling very weak and difficulty walking lately. No focal deficits. Pt admitted under hospitalist services for further treatment. Blood and urine cx were collected in the er and was started on iv rocephin and IVF . He denies any chest pain, sob, orthopnea, pnd or swelling. Follows w cardiology and is scheduled for stress test next week due to him having chest pains few weeks ago. Denies any chest pain now     Past Medical History:   Diagnosis Date    BPH (benign prostatic hyperplasia)     Hypertension     Seizures        Past Surgical History:   Procedure Laterality Date    APPENDECTOMY      CHOLECYSTECTOMY      COLONOSCOPY  04/21/2016    Dr Brock Brown    TRANSURETHRAL RESECTION OF PROSTATE         Recent Surgery: * No surgery found *      Subjective     Chief Complaint:  None stated  Patient/Family Comments/goals: "to go home"  Pain/Comfort:  Location - Side 1: Right  Location 1: knee      Living Environment:  Pt currently resides at home alone in a single story home with 3 steps to enter (R railing). Pt is capable of performing ADLs independently and is also able to cook and clean.  Pt does not drive, but has a friend who provides transportation.  SLP spoke with pt's neighbor who stated that several local " "neighbors assist pt as needed; however, are not available to provide 24 hour care.  Neighbor did report that they can check in on patient several times per day as needed.   Equipment used at home: shower chair.  DME owned (not currently used): rolling walker and wheelchair.        Objective:     Patient found supine with bed alarm  upon PT entry to room.    General Precautions: Standard, fall   Orthopedic Precautions:N/A   Braces: N/A  Respiratory Status: Room air      Functional Mobility:     Assist Level Assistive Device Comments    Bed Mobility SBA  Semi-supine to sitting at EOB.  Pt utilized the bed railing for assistance.     Sit to stand/Stand to sit SBA RW Sit to stand/stand to sit performed from the EOB.  Cues provided for reaching back prior to descent.    Transfers SBA  Stand step performed from the EOB to the WC using no AD.  Somewhat uncontrolled descent into sitting observed.  Flexed posture noted throughout this transition.     Gait SBA-CGA RW Pt able to ambulate 130 ft with a step through gait pattern and slow gait speed.  Cues provided for maintenance of RW proximity. Pt did experience one episode of L knee buckling, but c/o "trouble" with the R knee.  No overt LOB observed.  No significant c/o shortness of breath expressed.    Balance Training      Wheelchair Mobility      Stair Climbing      Car Transfer      Other:               Assessment:     Nathen Morales is a 72 y.o. male admitted with a medical diagnosis of DORIS.  He presents with the following impairments/functional limitations:  weakness, gait instability, impaired endurance, impaired balance, impaired functional mobility, impaired cognition, pain.    Rehab Prognosis: Good; patient would benefit from acute skilled PT services to address these deficits and reach maximum level of function.      Additional information: Pt tolerated evaluation well and was cooperative throughout. Pt is likely near his baseline in terms of  his functional " "mobility.  Pt reports that he does not "walk very far" at home.  Per his reports, pt is likely fairly sedentary at home.  Pt would, however, benefit from continued physical therapy in order to maximize his safety and overall activity tolerance prior to returning home.      Patient left up in chair with call button in reach and chair alarm on.      Plan:     During this hospitalization, patient to be seen 6 x/week to address the identified rehab impairments via gait training, therapeutic activities, therapeutic exercises and progress toward the following goals:    GOALS:   Multidisciplinary Problems       Physical Therapy Goals          Problem: Physical Therapy    Goal Priority Disciplines Outcome Goal Variances Interventions   Physical Therapy Goal     PT, PT/OT      Description: Patient will increase functional independence with mobility by performin. Sit to stand transfer with Modified [place]  2. Gait  x 250 feet with Modified [place] using Rolling Walker.   3. Ascend/descend 3 stair with right Handrails Supervision.                         Recommendations:     Discharge Recommendations:  home with home health   Discharge Equipment Recommendations: none     Time Tracking:       PT Start Time: 1033     PT Stop Time: 1051  PT Total Time (min): 18 min     Billable Minutes: Evaluation 18      2023    "

## 2023-03-20 NOTE — PT/OT/SLP PROGRESS
"Speech Language Pathology Treatment    Patient Name:  Nathen Morales   MRN:  [id number]  Admitting Diagnosis: <principal problem not specified>    Recommendations:                 General Recommendations:  Cognitive-linguistic therapy  Diet recommendations:  Easy to Chew Diet - IDDSI Level 7, Liquid Diet Level: Thin liquids - IDDSI Level 0   Aspiration Precautions:  none; no swallow orders; Pt and nursing report tolerance of diet with avoidance of some items due to dentures at home, Pt reported he does not want a reduced diet level     General Precautions: Standard, fall, seizure  Communication strategies:  repeat as needed, speak slowly    Subjective     Pt participated well throughout. Some frustration with more complex tasks (walking and talking at same time) noted at times.    Patient goals: "get to feeling better"    Pain/Comfort:  Pain Rating 1: 0/10    Respiratory Status: Room air    Objective:     Co treatment with PT targeting mobility with walker and SLP  assessing and targeting dual tasking for attention and recall within more functional situations. Co-treatment necessary for increased Pt safety to target more functional tasks for improved generalization.     Pt required repetition and cues to answer basic questions while walking. Pt demonstrated reduced divided attention with requests to stop to answer questions, stating, "I can't... I need to stop." Repetition of stim and simplified language required with questioning re: BP monitoring at home. It appears Pt takes his blood pressure and documents findings daily at home, but is unaware of BP findings requiring need to call the doctor. BP education recommended, nursing aware.     High BP noted after walking with PT, nursing informed and arrived with medication.      Has the patient been evaluated by SLP for swallowing?   No  Keep patient NPO? No   Current Respiratory Status:        Assessment:     Nathen Morales is a 72 y.o. male with an SLP diagnosis of " Cognitive-Linguistic Impairment.   He presents with poor recall and reasoning skills. Some deficits may be premorbid as it appears Pt calls neighbors for help when needed. Further assessment of cognitive function within ADLs and IADLs warranted.    DARREL NOMS (Functional Communication Measures):  DARREL NOMS: BRISSA (23) CURRENT   Attention: 5 5   Memory: 3 3   Motor Speech: 7 7   Reading: 3 - vision change reported 3   Spoken Language Comprehension: 5 5   Spoken Language Expression: 5 5   Swallowin - Pt reports avoiding some dryer food consistencies 6        Goals:   Multidisciplinary Problems       SLP Goals          Problem: SLP    Goal Priority Disciplines Outcome   SLP Goal     SLP Ongoing, Progressing   Description: LTG:   Pt will demonstrate recall and reasoning/problems solving skills for ADLs, IADLs, and safety in home environment.    STGs:  1. Pt will recall and demonstrate safety strategies for mobility and ADLs with 75% acc and use of strategies for improved safety.  2. Pt will complete teach back of safety precautions with >90% acc with min cues for improved safety.  3. Pt will demonstrate recall and reasoning skills with personally relevant IADLs with adequate awareness of when to ask for assistance with 75% acc for increased independence with IADLs.                        Plan:     Patient to be seen:   (3-5X/week)   Plan of Care expires:  23  Plan of Care reviewed with:  patient   SLP Follow-Up:  Yes       Discharge recommendations:  home with home health   Barriers to Discharge:  Level of Skilled Assistance Needed      Time Tracking:     SLP Treatment Date:   23  Speech Start Time:  1325  Speech Stop Time:  1351     Speech Total Time (min):  26 min    Billable Minutes: Cog Tx / 2 units     Bonnie Brewster MA, CCC-SLP  2023

## 2023-03-20 NOTE — PT/OT/SLP EVAL
Occupational Therapy   Acute Care Evaluation    Name: Nathen Morales  MRN: [id number]  Admitting Diagnosis:  <principal problem not specified>      History:      Nathen Morales is a 72 y.o. male who is a patient of   has a past medical history of BPH (benign prostatic hyperplasia), Hypertension, and Seizures.. The patient presented to [facility] on 3/18/2023 with a primary complaint of generalized weakness and two episode of nausea and vomiting this morning, his friend /neighbour called ems and was brought in to the ER . Workup in the er - ekg- 1st degree av block, no st segment elevation or depression and normal t waves . Cxr-nl. Ct head- normal . Labs -cr 1.51, bun 32 . Ua suggestive of uti. Wbc 6.3 . Lactic acid normal. Pt has been feeling very weak and difficulty walking lately. No focal deficits. Pt admitted under hospitalist services for further treatment. Blood and urine cx were collected in the er and was started on iv rocephin and IVF . He denies any chest pain, sob, orthopnea, pnd or swelling. Follows w cardiology and is scheduled for stress test next week due to him having chest pains few weeks ago. Denies any chest pain now     Past Medical History:   Diagnosis Date    BPH (benign prostatic hyperplasia)     Hypertension     Seizures          Past Surgical History:   Procedure Laterality Date    APPENDECTOMY      CHOLECYSTECTOMY      COLONOSCOPY  04/21/2016    Dr Brock Brown    TRANSURETHRAL RESECTION OF PROSTATE           Subjective     Chief Complaint: Right discomfort with mobility  Patient/Family Comments/goals: Return home and take care of self.    Occupational Profile:  Lives with: Alone  Home Environment: [facility] [place] with 4 steps to enter with rail on right side.  Previous level of function: Prior to admit, pt was living alone independently with functional transfers, mobility, and  ADLs/IADLS.  Pt reported he does not drive secondary to history of seizures, last being 3 months prior.  He has  a friend that assists with driving needs.  Equipment Used at Home:  cane, straight, shower chair, walker, rolling      Objective:     Communicated with: RN prior to session.  Patient found supine with peripheral IV, bed alarm upon OT entry to room.    General Precautions: Standard, fall   Orthopedic Precautions:Full weight bearing   Braces:    Respiratory Status: Room air    Occupational Performance:    Mobility  Assist level Comments    Bed mobility SBA    Balance training     Transfer SBA    Functional mobility     Sit to stand transitions SBA Verbal cuing for proper hand placement.   Other:         Activities of Daily Living Assist level Comments    Feeding Indep    Grooming/hygiene Setup  Oral care face washing and hair care.   Bathing TBA    Upper body dressing Min Tie gown   Lower body dressing SBA Doff/royer pull up and slipper socks using figure 4 method sitting at EOB   Toileting     Toilet transfer     Adaptive equipment training     Other:       Upper Extremity Strength:  Right Upper Extremity: WFL except noted decreased shoulder AROM with proximal weakness.  Left Upper Extremity: WFL except noted decreased shoulder AROM with proximal weakness.    Cognitive/Visual Perceptual:  Pt is alert and oriented x4.  He is able to follow 2 step commands without cuing.  ST to perform cognitive testing      Treatment & Education:  Pt educated on scope and goals of OT and POC.  Pt educated on call bell function and call before you fall.  Pt verbalized understanding.      Assessment:     Nathen Morales is a 72 y.o. male with a medical diagnosis of <principal problem not specified>.  He presents with decreased functional transfers and ADLs secondary to recent illness. Performance deficits affecting function: weakness, impaired endurance, impaired self care skills.      Rehab Prognosis: Good; patient would benefit from acute skilled OT services to address these deficits and reach maximum level of function.      Pain/Comfort:  Pain Rating 1: 5/10  Location - Side 1: Right  Location 1: knee  Pain Addressed 1: Reposition, Distraction    Blood Pressure:        Plan:     Patient to be seen daily (5-7x/wk) to address the above listed problems via self-care/home management, therapeutic exercises, therapeutic activities  Plan of Care Reviewed with: patient      GOALS:   Multidisciplinary Problems       Occupational Therapy Goals          Problem: Occupational Therapy    Goal Priority Disciplines Outcome Interventions   Occupational Therapy Goal     OT, PT/OT     Description:   Patient will increase functional independence with ADLs by performing:    Toileting from toilet with Modified [place] for hygiene and clothing management.   Bathing from  shower chair/bench with Modified [place].  Toilet transfer to toilet with Modified [place].                             Patient left supine with all lines intact, call button in reach, and bed alarm on      Recommendations:     Discharge Recommendations: home health OT  Discharge Equipment Recommendations:  none  Barriers to discharge:  Decreased caregiver support      Time Tracking:     OT Date of Treatment: 03/20/23  OT Start Time: 0830  OT Stop Time: 0900  OT Total Time (min): 30 min    Billable Minutes:Evaluation 30      3/20/2023

## 2023-03-20 NOTE — HOSPITAL COURSE
03/20/2023:  Patient seen on rounds today sitting in bedside wheelchair.  He continues to have right knee pain.  Patient evaluated by PT earlier today.    03/21/2023: Patient seen on rounds today eating supper in bed.  Patient did participate with PT earlier today.  Patient without any complaints    03/22/2023:  Patient seen on rounds today standing up at bedside with PT at side.  Patient rested well last p.m. and without any complaints    03/23/2023:  Patient seen on rounds today lying in bed.  He participated with PT earlier today.  He reports some BM earlier this a.m.

## 2023-03-20 NOTE — HPI
HPI: Nathen Morales is a 72 y.o. male who is a patient of   has a past medical history of BPH (benign prostatic hyperplasia), Hypertension, and Seizures.. The patient presented to [facility] on 3/18/2023 with a primary complaint of generalized weakness and two episode of nausea and vomiting this morning, his friend /neighbour called ems and was brought in to the ER . Workup in the er - ekg- 1st degree av block, no st segment elevation or depression and normal t waves . Cxr-nl. Ct head- normal . Labs -cr 1.51, bun 32 . Ua suggestive of uti. Wbc 6.3 . Lactic acid normal. Pt has been feeling very weak and difficulty walking lately. No focal deficits. Pt admitted under hospitalist services for further treatment. Blood and urine cx were collected in the er and was started on iv rocephin and IVF . He denies any chest pain, sob, orthopnea, pnd or swelling. Follows w cardiology and is scheduled for stress test next week due to him having chest pains few weeks ago. Denies any chest pain now

## 2023-03-20 NOTE — PROGRESS NOTES
[facility] - Medical Surgical Unit  [facility] Medicine  Progress Note    Patient Name: Nathen Morales  MRN: [id number]  Patient Class: OP- Observation   Admission Date: 3/18/2023  Length of Stay: 0 days  Attending Physician: Terrie Coronado MD  Primary Care Provider: Brock Brown MD        Subjective:     Principal Problem:Acute UTI        HPI:  HPI: Nathen Morales is a 72 y.o. male who is a patient of   has a past medical history of BPH (benign prostatic hyperplasia), Hypertension, and Seizures.. The patient presented to [facility] on 3/18/2023 with a primary complaint of generalized weakness and two episode of nausea and vomiting this morning, his friend /neighbour called ems and was brought in to the ER . Workup in the er - ekg- 1st degree av block, no st segment elevation or depression and normal t waves . Cxr-nl. Ct head- normal . Labs -cr 1.51, bun 32 . Ua suggestive of uti. Wbc 6.3 . Lactic acid normal. Pt has been feeling very weak and difficulty walking lately. No focal deficits. Pt admitted under hospitalist services for further treatment. Blood and urine cx were collected in the er and was started on iv rocephin and IVF . He denies any chest pain, sob, orthopnea, pnd or swelling. Follows w cardiology and is scheduled for stress test next week due to him having chest pains few weeks ago. Denies any chest pain now       Overview/Hospital Course:  03/20/2023:  Patient seen on rounds today sitting in bedside wheelchair.  He continues to have right knee pain.  Patient evaluated by PT earlier today.      Interval History:     Review of Systems   Constitutional: Negative.    Respiratory: Negative.     Cardiovascular: Negative.    Genitourinary:  Positive for dysuria.   Musculoskeletal:         Right knee pain   Neurological:  Positive for weakness.   Objective:     Vital Signs (Most Recent):  Temp: 98.6 °F (37 °C) (03/20/23 1600)  Pulse: 86 (03/20/23 1600)  Resp: 20 (03/20/23  1600)  BP: (!) 152/85 (03/20/23 1600)  SpO2: 99 % (03/20/23 1600)   Vital Signs (24h Range):  Temp:  [97.9 °F (36.6 °C)-98.6 °F (37 °C)] 98.6 °F (37 °C)  Pulse:  [67-86] 86  Resp:  [18-20] 20  SpO2:  [95 %-99 %] 99 %  BP: (145-180)/(75-91) 152/85     Weight: 95.4 kg (210 lb 5.1 oz)  Body mass index is 27.75 kg/m².    Intake/Output Summary (Last 24 hours) at 3/20/2023 1758  Last data filed at 3/20/2023 1204  Gross per 24 hour   Intake 240 ml   Output --   Net 240 ml      Physical Exam  Constitutional:       General: He is not in acute distress.     Appearance: Normal appearance.   Cardiovascular:      Rate and Rhythm: Normal rate and regular rhythm.   Pulmonary:      Effort: Pulmonary effort is normal.      Breath sounds: Normal breath sounds.   Abdominal:      General: Abdomen is flat. Bowel sounds are normal.      Palpations: Abdomen is soft.   Musculoskeletal:         General: Normal range of motion.   Skin:     General: Skin is warm.   Neurological:      Mental Status: He is alert.   Psychiatric:         Mood and Affect: Mood normal.         Behavior: Behavior normal.         Thought Content: Thought content normal.         Judgment: Judgment normal.       Significant Labs: All pertinent labs within the past 24 hours have been reviewed.  CBC:   Recent Labs   Lab 03/19/23  0452   WBC 6.1   HGB 10.8*   HCT 32.5*        CMP:   Recent Labs   Lab 03/19/23  0452 03/20/23  0450    145   K 4.1 4.2   CO2 19* 19*   BUN 31.0* 29.0*   CREATININE 1.56* 1.57*   CALCIUM 9.0 9.0       Significant Imaging: I have reviewed all pertinent imaging results/findings within the past 24 hours.      Assessment/Plan:      * Acute UTI  Continue IV Rocephin   Urine culture growing gamma Streptococcus  Sensitivities pending      Acute pain of right knee  X-ray negative   NSAIDs p.r.n.        Weakness  Continue PT/OT in order for patient to be able to transfer back home on discharge      Seizure disorder  Continue home  medication      Hypertension  Continue home medication   Monitor BP        VTE Risk Mitigation (From admission, onward)         Ordered     enoxaparin injection 40 mg  Daily         03/18/23 1858     IP VTE HIGH RISK PATIENT  Once         03/18/23 1858                Discharge Planning   DILSHAD:      Code Status: Full Code   Is the patient medically ready for discharge?:     Reason for patient still in hospital (select all that apply): Patient trending condition  Discharge Plan A: Home                  Brock Brown MD  Department of Hospital Medicine   [facility] - [facility] Surgical Unit

## 2023-03-20 NOTE — PT/OT/SLP PROGRESS
"         Physical Therapy Treatment Note           Name: Nathen Morales    : 1950 (72 y.o.)  MRN: [id number]             Subjective Assessment:     No complaints  Lethargic   X Awake, alert, cooperative  Uncooperative    Agitated  c/o pain    Appropriate  c/o fatigue    Confused  Treated at bedside     Emotionally labile  Treated in gym/dept.    Impulsive  Other:    Flat affect       Pain/Comfort:    Pain Rating 1: 0/10  Location - Side 1: Right  Location 1: knee    Therapy Precautions:      Cognitive deficits  Spinal precautions    Collar - hard  Sternal precautions    Collar - soft   TLSO   X Fall risk  LSO    Hip precautions - posterior  Knee immobilizer    Hip precautions - anterior  WBAT    Impaired communication  Partial weightbearing    Oxygen  TTWB    PEG tube  NWB    Visual deficits  Other:    Hearing deficits        Vital Signs:     Vitals Value    Room air      Oxygen (L)     Blood pressure 183/95    Heart rate          Mobility Training:     Assist Level Assistive Device Comments    Bed Mobility SBA  Semi-supine to sitting at EOB, EOB to supine.  Pt utilized the bed railing to assist with each transition.    Sit to stand/Stand to sit SBA RW Sit to stand/stand to sit performed from EOB. PT provided reminders to reach back prior to sitting, along with cues for RW proximity when approaching the EOB.    Transfers      Gait SBA-CGA RW Pt able to ambulate 200+ ft with a step through gait pattern and slow gait speed.  No knee buckling observed, but foot clearance is quite limited.  Pt experienced no major LOB, but did require cueing for maintenance of RW proximity.  Pt participated in conversation with SLP while PT assessed and monitored pt's gait quality. Per SLP, pt began demonstrated long, slow blinking.  Pt denied complaints initially, but did agreed to return to his room.  Upon returning to sitting, pt stated "I can hardly see at all right now".  BP assessed as documented above.  Nsg notified " immediately.    Balance Training      Wheelchair Mobility      Stair Climbing      Car Transfer      Other:             Assessment:     Patient tolerated session fairly but overall activity was limited due to elevated BP.  PT intended to assess pt's gait without an assistive device; however, activity was discontinued for safety. Nsg was present and remeasured pt's BP as 180/89.  Nsg to administer medication. Pt returned to bed and was left with needs in reach.      PT Plan:     Will continue to see pt for PT services in an effort to maximize his safety with mobility.  At this time, concerns do remain regarding pt returning home alone. Pt would benefit from continued therapy prior to D/C.     GOALS:   Multidisciplinary Problems       Physical Therapy Goals          Problem: Physical Therapy    Goal Priority Disciplines Outcome Goal Variances Interventions   Physical Therapy Goal     PT, PT/OT Ongoing, Progressing     Description: Patient will increase functional independence with mobility by performin. Sit to stand transfer with Modified [place]  2. Gait  x 250 feet with Modified [place] using Rolling Walker.   3. Ascend/descend 3 stair with right Handrails Supervision.                           Discharge Recommendations:      home with home health     Discharge Equipment Recommendations:     none     At end of treatment, patient remained:      Up in chair    X In bed   X With alarm activated    Bed rails up   X Call bell in reach      Family/friends present     Restraints secured properly     In bathroom with CNA/RN notified     In gym with PT/PTA/tech     Nurse aware     Other:           PT Start Time: 1322     PT Stop Time: 1350  PT Total Time (min): 28 min     Billable Minutes: Gait Training 2023

## 2023-03-20 NOTE — PT/OT/SLP EVAL
"Speech Language Pathology Evaluation  Cognitive Communication    Patient Name:  Nathen Morales   MRN:  [id number]  Admitting Diagnosis: general weakness, likely UTI (awaiting cultures)    Recommendations:     Recommendations:                General Recommendations:  Cognitive-linguistic therapy  Diet recommendations:  Easy to Chew Diet - IDDSI Level 7, Thin liquids - IDDSI Level 0   Aspiration Precautions:  none; no dysphagia orders     General Precautions: Standard, fall, seizure  Communication strategies:   repeat as needed, speak slowly    History:     Past Medical History:   Diagnosis Date    BPH (benign prostatic hyperplasia)     Hypertension     Seizures        Past Surgical History:   Procedure Laterality Date    APPENDECTOMY      CHOLECYSTECTOMY      COLONOSCOPY  04/21/2016    Dr Brock Brown    TRANSURETHRAL RESECTION OF PROSTATE         Social History: Patient lives alone and has neighbors who help him as needed with driving and bills. SLP spoke with neighbor, Mr Lavern Hirsch, who reported the Pt has a long history of seizures and some intellectual impairment may be baseline. General awareness of when to ask for help was reported.     Prior Intubation HX:  unknown    Modified Barium Swallow: unknown    Chest X-Rays: 03/18/23 clear     Prior diet: regular consistency diet "dental soft" on orders as Pt is endentulous and dentures are at home.     Occupation/hobbies/homemaking: Pt reports he has never worked. He cared for his mother prior to her passing 5-6 years ago. Pt has a 7th grade education and long standing history of seizures.       Subjective     Pt seen at bedside and was pleasant and cooperative throughout.     Patient goals: "get to feeling better"    Pain/Comfort:  Pain Rating 1: 0/10    Respiratory Status: Room air    Objective:   Orientation: not oriented to time, age, or situation besides "I threw up"  Attention: repetition of stim required at time, although Pt reports some hearing " difficulty     LANGUAGE:   Receptive Language:  1 Step Directions: 100%   2 Step Directions: 75%  Complex Directions: 0%  Simple y/n Questions: 100%  Complex y/n Questions: 50%  Paragraph Comprehension: 0%    Expressive Language:   Automatic Speech: 100%   Repetition: limited to 4 single digits only; unable to repeat 5 single digits nor 3 double digits   Naming:   Divergent: 4/10 - perseverative answers noted   Convergent: not tested  Confrontational: 100%  Phrase completion: not tested  WH questions: 38% - perseverative answers noted   Picture Description: not tested    COGNITIVE STATUS:  Behavioral Observations: somewhat flat affect; reduced understanding of directions/concepts at times, good awareness of confusion demonstrated   Memory:  Immediate: 2/5  Short Term: MIS: 0/8  Long Term: 50%   ORGANIZATION:  Convergent: not tested   Divergent: 4/10  Sequencing:   Functional Events: not tested  Mental Manipulation: 0/3  Problem Solvin%   Insight / Awareness: 50%  Pragmatics: adequate turn taking  Simple Money/Time Management: further assessment warranted  Executive Function: further assessment warranted      RATING SCALES:  Objective Measure:   Memory Impairment Scale (MIS) = 0/8       Assessment:   Nathen Morales is a 72 y.o. male with an SLP diagnosis of Cognitive-Linguistic Impairment.  He presents with poor recall and reasoning skills. Some deficits may be premorbid as it appears Pt calls neighbors for help when needed. Further assessment of cognitive function within ADLs and IADLs warranted.    Goals:   Multidisciplinary Problems       SLP Goals          Problem: SLP    Goal Priority Disciplines Outcome   SLP Goal     SLP Ongoing, Progressing   Description: LTG:   Pt will demonstrate recall and reasoning/problems solving skills for ADLs, IADLs, and safety in home environment.    STGs:  1. Pt will recall and demonstrate safety strategies for mobility and ADLs with 75% acc and use of strategies for improved  safety.  2. Pt will complete teach back of safety precautions with >90% acc with min cues for improved safety.  3. Pt will demonstrate recall and reasoning skills with personally relevant IADLs with adequate awareness of when to ask for assistance with 75% acc for increased independence with IADLs.                        Plan:   Patient to be seen:   (3-5X/week)   Plan of Care expires:  04/03/23  Plan of Care reviewed with:  patient   SLP Follow-Up:  Yes       Discharge recommendations:  Discharge Facility/Level of Care Needs: home with home health   Barriers to Discharge:  Level of Skilled Assistance Needed      Time Tracking:   SLP Treatment Date:   03/20/23  Speech Start Time:  0928  Speech Stop Time:  1000     Speech Total Time (min):  32 min    Billable Minutes: Rola 32 / 1unit         Bonnie Brewster MA, CCC-SLP  03/20/2023

## 2023-03-20 NOTE — PROGRESS NOTES
[facility] Surgical Unit  [facility] Medicine  Progress Note    Patient Name: Nathen Morales  MRN: [id number]  Patient Class: OP- Observation   Admission Date: 3/18/2023  Length of Stay: 0 days  Attending Physician: Terrie Coronado MD  Primary Care Provider: Brock Brown MD        Subjective:     Principal Problem:Acute UTI        HPI:  HPI: Nathen Morales is a 72 y.o. male who is a patient of   has a past medical history of BPH (benign prostatic hyperplasia), Hypertension, and Seizures.. The patient presented to [facility] on 3/18/2023 with a primary complaint of generalized weakness and two episode of nausea and vomiting this morning, his friend /neighbour called ems and was brought in to the ER . Workup in the er - ekg- 1st degree av block, no st segment elevation or depression and normal t waves . Cxr-nl. Ct head- normal . Labs -cr 1.51, bun 32 . Ua suggestive of uti. Wbc 6.3 . Lactic acid normal. Pt has been feeling very weak and difficulty walking lately. No focal deficits. Pt admitted under hospitalist services for further treatment. Blood and urine cx were collected in the er and was started on iv rocephin and IVF . He denies any chest pain, sob, orthopnea, pnd or swelling. Follows w cardiology and is scheduled for stress test next week due to him having chest pains few weeks ago. Denies any chest pain now       Overview/Hospital Course:  03/20/2023:  Patient seen on rounds today sitting in bedside wheelchair.  He continues to have right knee pain.  Patient evaluated by PT earlier today.      No new subjective & objective note has been filed under this hospital service since the last note was generated.      Assessment/Plan:      * Acute UTI  Continue IV Rocephin   Urine culture growing gamma Streptococcus  Sensitivities pending      Acute pain of right knee  X-ray negative   NSAIDs p.r.n.        Weakness  Continue PT/OT in order for patient to be able to transfer back  home on discharge      Seizure disorder  Continue home medication      Hypertension  Continue home medication   Monitor BP        VTE Risk Mitigation (From admission, onward)         Ordered     enoxaparin injection 40 mg  Daily         03/18/23 1858     IP VTE HIGH RISK PATIENT  Once         03/18/23 1858                Discharge Planning   DILSHAD:      Code Status: Full Code   Is the patient medically ready for discharge?:     Reason for patient still in hospital (select all that apply): Patient trending condition  Discharge Plan A: Home                  Brock Brown MD  Department of Hospital Medicine   [facility]

## 2023-03-21 PROBLEM — E86.0 DEHYDRATION: Status: ACTIVE | Noted: 2023-03-21

## 2023-03-21 LAB
ANION GAP SERPL CALC-SCNC: 12 MEQ/L
BACTERIA UR CULT: ABNORMAL
BUN SERPL-MCNC: 31 MG/DL (ref 8.4–25.7)
CALCIUM SERPL-MCNC: 8.9 MG/DL (ref 8.8–10)
CHLORIDE SERPL-SCNC: 115 MMOL/L (ref 98–107)
CO2 SERPL-SCNC: 20 MMOL/L (ref 23–31)
CREAT SERPL-MCNC: 1.59 MG/DL (ref 0.73–1.18)
CREAT/UREA NIT SERPL: 19
GFR SERPLBLD CREATININE-BSD FMLA CKD-EPI: 45 MLS/MIN/1.73/M2
GLUCOSE SERPL-MCNC: 99 MG/DL (ref 82–115)
LEVETIRACETAM SERPL-MCNC: 30.8 MCG/ML (ref 10–40)
POTASSIUM SERPL-SCNC: 3.9 MMOL/L (ref 3.5–5.1)
SODIUM SERPL-SCNC: 147 MMOL/L (ref 136–145)

## 2023-03-21 PROCEDURE — 97530 THERAPEUTIC ACTIVITIES: CPT

## 2023-03-21 PROCEDURE — 97129 THER IVNTJ 1ST 15 MIN: CPT

## 2023-03-21 PROCEDURE — 96367 TX/PROPH/DG ADDL SEQ IV INF: CPT

## 2023-03-21 PROCEDURE — 25000003 PHARM REV CODE 250: Performed by: INTERNAL MEDICINE

## 2023-03-21 PROCEDURE — G0378 HOSPITAL OBSERVATION PER HR: HCPCS

## 2023-03-21 PROCEDURE — 96375 TX/PRO/DX INJ NEW DRUG ADDON: CPT

## 2023-03-21 PROCEDURE — 94761 N-INVAS EAR/PLS OXIMETRY MLT: CPT

## 2023-03-21 PROCEDURE — 96372 THER/PROPH/DIAG INJ SC/IM: CPT | Performed by: INTERNAL MEDICINE

## 2023-03-21 PROCEDURE — 96361 HYDRATE IV INFUSION ADD-ON: CPT

## 2023-03-21 PROCEDURE — 97116 GAIT TRAINING THERAPY: CPT

## 2023-03-21 PROCEDURE — 97535 SELF CARE MNGMENT TRAINING: CPT

## 2023-03-21 PROCEDURE — 99231 SBSQ HOSP IP/OBS SF/LOW 25: CPT | Mod: ,,, | Performed by: FAMILY MEDICINE

## 2023-03-21 PROCEDURE — 80048 BASIC METABOLIC PNL TOTAL CA: CPT | Performed by: FAMILY MEDICINE

## 2023-03-21 PROCEDURE — 63600175 PHARM REV CODE 636 W HCPCS: Performed by: INTERNAL MEDICINE

## 2023-03-21 PROCEDURE — 97130 THER IVNTJ EA ADDL 15 MIN: CPT

## 2023-03-21 PROCEDURE — 63600175 PHARM REV CODE 636 W HCPCS: Performed by: FAMILY MEDICINE

## 2023-03-21 PROCEDURE — 99231 PR SUBSEQUENT HOSPITAL CARE,LEVL I: ICD-10-PCS | Mod: ,,, | Performed by: FAMILY MEDICINE

## 2023-03-21 PROCEDURE — 25000003 PHARM REV CODE 250: Performed by: FAMILY MEDICINE

## 2023-03-21 RX ORDER — LEVOFLOXACIN 5 MG/ML
500 INJECTION, SOLUTION INTRAVENOUS
Status: DISCONTINUED | OUTPATIENT
Start: 2023-03-21 | End: 2023-03-24 | Stop reason: HOSPADM

## 2023-03-21 RX ORDER — SODIUM CHLORIDE 9 MG/ML
INJECTION, SOLUTION INTRAVENOUS CONTINUOUS
Status: ACTIVE | OUTPATIENT
Start: 2023-03-21 | End: 2023-03-23

## 2023-03-21 RX ADMIN — DILTIAZEM HYDROCHLORIDE 120 MG: 120 CAPSULE, COATED, EXTENDED RELEASE ORAL at 09:03

## 2023-03-21 RX ADMIN — OXCARBAZEPINE 900 MG: 300 TABLET, FILM COATED ORAL at 08:03

## 2023-03-21 RX ADMIN — LEVOFLOXACIN 500 MG: 500 INJECTION, SOLUTION INTRAVENOUS at 10:03

## 2023-03-21 RX ADMIN — ATORVASTATIN CALCIUM 20 MG: 10 TABLET, FILM COATED ORAL at 08:03

## 2023-03-21 RX ADMIN — SODIUM CHLORIDE: 9 INJECTION, SOLUTION INTRAVENOUS at 05:03

## 2023-03-21 RX ADMIN — TOPIRAMATE 200 MG: 100 TABLET, FILM COATED ORAL at 08:03

## 2023-03-21 RX ADMIN — TAMSULOSIN HYDROCHLORIDE 0.4 MG: 0.4 CAPSULE ORAL at 09:03

## 2023-03-21 RX ADMIN — ENOXAPARIN SODIUM 40 MG: 40 INJECTION SUBCUTANEOUS at 05:03

## 2023-03-21 RX ADMIN — ASPIRIN 81 MG CHEWABLE TABLET 81 MG: 81 TABLET CHEWABLE at 09:03

## 2023-03-21 RX ADMIN — SERTRALINE HYDROCHLORIDE 50 MG: 50 TABLET ORAL at 08:03

## 2023-03-21 RX ADMIN — OXCARBAZEPINE 900 MG: 300 TABLET, FILM COATED ORAL at 09:03

## 2023-03-21 RX ADMIN — LEVETIRACETAM 1500 MG: 500 TABLET, FILM COATED ORAL at 08:03

## 2023-03-21 RX ADMIN — HYDRALAZINE HYDROCHLORIDE 10 MG: 20 INJECTION INTRAMUSCULAR; INTRAVENOUS at 01:03

## 2023-03-21 RX ADMIN — TOPIRAMATE 200 MG: 100 TABLET, FILM COATED ORAL at 09:03

## 2023-03-21 RX ADMIN — LEVETIRACETAM 1500 MG: 500 TABLET, FILM COATED ORAL at 09:03

## 2023-03-21 RX ADMIN — POLYVINYL ALCOHOL 1 DROP: 14 SOLUTION/ DROPS OPHTHALMIC at 09:03

## 2023-03-21 NOTE — ASSESSMENT & PLAN NOTE
Continue PT/OT in order for patient to be able to transfer back home on discharge  PT Plan:      Concerns remain regarding pt's ability to return home independently.  PT recommends continued inpatient therapy in order to maximize pt's safety and overall activity tolerance prior to D/C. At this time, pt remains at a heightened risk for falls and further decompensation.

## 2023-03-21 NOTE — PT/OT/SLP PROGRESS
Occupational Therapy  Treatment    Name: Nathen Morales    : 1950 (72 y.o.)  MRN: [id number]          TREATMENT SUMMARY AND RECOMMENDATIONS:      Subjective Assessment:   No complaints  Lethargic   x Awake, alert, cooperative  Uncooperative    Agitated x Flat affect    Appropriate  c/o fatigue   x Confused  Treated at bedside     Emotionally liable  Treated in gym/dept.    Impulsive x Other: Pt reported sleeping well last night.       Pain/Comfort:  Pain Rating 1: 4/10  Location - Side 1: Right  Location 1: knee  Pain Addressed 1: Reposition, Distraction      Therapy Precautions:   x Cognitive deficits  Spinal precautions    Collar - hard  Sternal precautions    Collar - soft   TLSO   x Fall risk  LSO    Hip precautions - posterior  Knee immobilizer    Hip precautions - anterior  WBAT    Impaired communication  Partial weightbearing    Oxygen  TTWB    PEG tube  NWB    Visual deficits  Other:    Hearing deficits           Vitals Value   x Room air      Oxygen (L)     Blood pressure     Heart rate         Treatment Objectives:     Mobility Training:    Mobility task Assist level Comments    Bed mobility SBA Transfer training supine to sit SBA using bed rail with HOB elevated.   Balance training CGA Dynamic standing bal/saman during ADL activities, CGA and verbal cuing for hand placement.   Transfer CGA/Min Transfer training performed bed to  CGA and verbal cuing for hand placement.  Transfer training in<>out of walk in shower with hand held assist, grab bar, and verbal cuing.   Functional mobility     Sit to stand transitions CGA Pt required verbal cuing on proper hand placement and forward weight shift sit<>stand 3's from TTB   Other:       ADL Training:    ADL Assist level Comments    Feeding     Grooming/hygiene Setup ADL Grooming activity performed sitting in  setup for; oral care and shaving activity with razor.   Bathing Min ADL bathing training performed sitting on TTB using  shower.  Pt able to  perform with min assist with back and bottom, but required 4 verbal cues during activity for sequencing.   Upper body dressing Min Tie gown   Lower body dressing Min Pt able to doff pull up and slipper socks. Assist required to thread new pull up over feet and start bilat slipper socks secondary to dampness of LEs   Toileting     Toilet transfer     Adaptive equipment training     Other:       Additional Treatment:  Reinfoced call bell function and call before you fall.    Assessment: Patient tolerated session fair.  Performed ADL bathing and dressing activities. Noted pt required extended time and min assist with bathing and dressing.  Noted activities required verbal cuing for sequencing. Pt will benefit from  OT to assess in home setting.    OT Plan: Cont POC      GOALS:   Multidisciplinary Problems       Occupational Therapy Goals          Problem: Occupational Therapy    Goal Priority Disciplines Outcome Interventions   Occupational Therapy Goal     OT, PT/OT Ongoing, Progressing    Description:   Patient will increase functional independence with ADLs by performing:    Toileting from toilet with Modified [place] for hygiene and clothing management.   Bathing from  shower chair/bench with Modified [place].  Toilet transfer to toilet with Modified [place].                         Communication with Treatment Team:     Discharge Recommendations: home health OT  Discharge Equipment Recommendations:  none  Barriers to discharge:  Decreased caregiver support      At end of treatment, patient remained:  x Up in chair     In bed    With alarm activated    Bed rails up   x Call bell in reach     Family/friends present    Restraints secured properly    In bathroom with CNA/RN notified    In gym with PT/PTA/tech   x Nurse aware    Other:         OT Date of Treatment: 03/21/23  OT Start Time: 0800  OT Stop Time: 0900  OT Total Time (min): 60 min    Billable Minutes:Self Care/Home Management  45  Therapeutic Activity 15      3/21/2023

## 2023-03-21 NOTE — ASSESSMENT & PLAN NOTE
Start normal saline at 75 cc/hour   Repeat BMP in a.m.  Patient encouraged to increase p.o. intake

## 2023-03-21 NOTE — PT/OT/SLP PROGRESS
Physical Therapy Treatment Note           Name: Nathen Morales    : 1950 (72 y.o.)  MRN: [id number]             Subjective Assessment:     No complaints  Lethargic   X Awake, alert, cooperative  Uncooperative    Agitated X c/o pain    Appropriate X c/o fatigue    Confused  Treated at bedside     Emotionally labile  Treated in gym/dept.    Impulsive  Other:    Flat affect       Pain/Comfort:    Pain Rating 1: 0/10  Location - Side 1: Right  Location 1: knee    Therapy Precautions:     X Cognitive deficits  Spinal precautions    Collar - hard  Sternal precautions    Collar - soft   TLSO   X Fall risk  LSO    Hip precautions - posterior  Knee immobilizer    Hip precautions - anterior  WBAT    Impaired communication  Partial weightbearing    Oxygen  TTWB    PEG tube  NWB    Visual deficits  Other:    Hearing deficits               Mobility Training:     Assist Level Assistive Device Comments    Bed Mobility SBA  Semi-supine to sitting at EOB. EOB to supine.  Bed railing utilized for assistance during these transitions.    Sit to stand/Stand to sit SBA-CGA  Sit to stand/stand to sit performed from EOB using no AD.  Increased effort required to reach upright, but pt was able to do so without physical assistance from PT.    Transfers      Gait CGA  Pt able to ambulate 150 ft using no AD. Pt initially demonstrated short, shuffled steps but this improved as gait progressed.  Approximately midway through ambulation, pt began to c/o increased R knee pain and gait became more antalgic.  Decreased time spent in single limb stance on the R.  Pt reached for the hallway railing and wall for support/security.   Balance Training      Wheelchair Mobility      Stair Climbing      Car Transfer      Other:           Therapeutic Exercise:     Exercise Sets Reps Comments   Core strengthening 2 10 Modified sit ups at EOB with arms crossed over the chest and two pillows in place for support.   Lat push downs on the RW with  focus on core engagement.                         Assessment:     Patient tolerated session fairly but did c/o increased R knee pain during mobility. Pt's R knee did feel slightly warm to the touch compared to the L.  Pt c/o tenderness to palpation over both the medial and lateral joint spaces.  Nsg aware of pt's complaints.    PT Plan:     Pt will continue to benefit from inpatient PT services to maximize his safety with basic mobility prior to D/C. PT remains concerned about pt's ability to return home alone. Pt is currently at an elevated risk for falls as previously mentioned.     GOALS:   Multidisciplinary Problems       Physical Therapy Goals          Problem: Physical Therapy    Goal Priority Disciplines Outcome Goal Variances Interventions   Physical Therapy Goal     PT, PT/OT Ongoing, Progressing     Description: Patient will increase functional independence with mobility by performin. Sit to stand transfer with Modified [place]  2. Gait  x 250 feet with Modified [place] using Rolling Walker.   3. Ascend/descend 3 stair with right Handrails Supervision.                           Discharge Recommendations:      home with home health     Discharge Equipment Recommendations:     none     At end of treatment, patient remained:      Up in chair    X In bed   X With alarm activated    Bed rails up   X Call bell in reach      Family/friends present     Restraints secured properly     In bathroom with CNA/RN notified     In gym with PT/PTA/tech     Nurse aware     Other:           PT Start Time: 1418     PT Stop Time: 1434  PT Total Time (min): 16 min     Billable Minutes: Therapeutic Activity 2023

## 2023-03-21 NOTE — PT/OT/SLP PROGRESS
"         Physical Therapy Treatment Note           Name: Nathen Morales    : 1950 (72 y.o.)  MRN: [id number]             Subjective Assessment:     No complaints  Lethargic   X Awake, alert, cooperative  Uncooperative    Agitated  c/o pain    Appropriate X c/o fatigue    Confused  Treated at bedside     Emotionally labile  Treated in gym/dept.    Impulsive  Other:    Flat affect       Pain/Comfort:    Pain Rating 1: 0/10    Therapy Precautions:      Cognitive deficits  Spinal precautions    Collar - hard  Sternal precautions    Collar - soft   TLSO   X Fall risk  LSO    Hip precautions - posterior  Knee immobilizer    Hip precautions - anterior  WBAT    Impaired communication  Partial weightbearing    Oxygen  TTWB    PEG tube  NWB    Visual deficits  Other:    Hearing deficits        Vital Signs:     Vitals Value    Room air      Oxygen (L)     Blood pressure 175/86    Heart rate          Mobility Training:     Assist Level Assistive Device Comments    Bed Mobility      Sit to stand/Stand to sit SBA-CGA RW vs no AD Sit to stand/stand to sit performed x 3 trials from WC level. Pt stood with the RW x 1 trial and with no AD x 2 trials.  With the RW, cues were provided for RW proximity when approaching the WC to sit.    Transfers      Gait SBA-CGA RW vs no AD Pt able to ambulate 140 ft x 3 trials with seated rest breaks between each. Pt utilized the RW for the first trial, but no AD for the following two. With the RW, pt requires relatively constant cueing for RW proximity and demonstrated difficulty maintaining corrections.  Since pt plans to D/C home alone, it would certainly be less burdensome for pt to ambulate without an AD.  Without the RW, pt initially demonstrated short, shuffled steps and forward head posture. With increased distance, pt's stride length and overall gait quality improved.  Some increased lateral sway observed during the final gait trial, but no overt LOB was noted. Pt reported "that's how " "I normally walk".     Balance Training      Wheelchair Mobility      Stair Climbing      Car Transfer      Other:             Assessment:     Patient tolerated session fairly and was able to ambulate both with and without an AD. Following the second trial of gait, pt appeared somewhat lethargic.  His eyes began to close and his verbal responses were slightly delayed.  PT measured pt's BP as documented above.  Nsg notified and aware.  After a brief rest, pt's alertness improved and pt stated "I'm just tired".  Pt agreed to a third and final trial of ambulation and was then returned to his room for lunch.      PT Plan:     Concerns remain regarding pt's ability to return home independently.  PT recommends continued inpatient therapy in order to maximize pt's safety and overall activity tolerance prior to D/C. At this time, pt remains at a heightened risk for falls and further decompensation.     GOALS:   Multidisciplinary Problems       Physical Therapy Goals          Problem: Physical Therapy    Goal Priority Disciplines Outcome Goal Variances Interventions   Physical Therapy Goal     PT, PT/OT Ongoing, Progressing     Description: Patient will increase functional independence with mobility by performin. Sit to stand transfer with Modified [place]  2. Gait  x 250 feet with Modified [place] using Rolling Walker.   3. Ascend/descend 3 stair with right Handrails Supervision.                           Discharge Recommendations:      home with home health     Discharge Equipment Recommendations:     RW    At end of treatment, patient remained:    X  Up in chair     In bed   X With alarm activated    Bed rails up   X Call bell in reach      Family/friends present     Restraints secured properly     In bathroom with CNA/RN notified     In gym with PT/PTA/tech     Nurse aware     Other:           PT Start Time: 1035     PT Stop Time: 1102  PT Total Time (min): 27 min     Billable Minutes: Gait Training " 27      03/21/2023

## 2023-03-21 NOTE — ASSESSMENT & PLAN NOTE
Urine cultures positive for Enterococcus faecalis  Sensitivities reviewed   Stop Rocephin  Start Levaquin 500 mg IV q.day

## 2023-03-21 NOTE — SUBJECTIVE & OBJECTIVE
Interval History:     Review of Systems   Constitutional: Negative.    Respiratory: Negative.     Cardiovascular: Negative.    Neurological:  Positive for weakness.   Psychiatric/Behavioral: Negative.     Objective:     Vital Signs (Most Recent):  Temp: 97.2 °F (36.2 °C) (03/21/23 1600)  Pulse: 91 (03/21/23 1600)  Resp: 18 (03/21/23 1600)  BP: (!) 154/76 (03/21/23 1600)  SpO2: 97 % (03/21/23 1600)   Vital Signs (24h Range):  Temp:  [97.2 °F (36.2 °C)-99.5 °F (37.5 °C)] 97.2 °F (36.2 °C)  Pulse:  [72-96] 91  Resp:  [18-20] 18  SpO2:  [94 %-98 %] 97 %  BP: (151-175)/(65-93) 154/76     Weight: 95.4 kg (210 lb 5.1 oz)  Body mass index is 27.75 kg/m².    Intake/Output Summary (Last 24 hours) at 3/21/2023 1714  Last data filed at 3/21/2023 1236  Gross per 24 hour   Intake 840 ml   Output --   Net 840 ml      Physical Exam  Constitutional:       General: He is not in acute distress.     Appearance: Normal appearance.   Cardiovascular:      Rate and Rhythm: Normal rate and regular rhythm.   Pulmonary:      Effort: Pulmonary effort is normal.      Breath sounds: Normal breath sounds.   Abdominal:      General: Abdomen is flat. Bowel sounds are normal.      Palpations: Abdomen is soft.   Skin:     General: Skin is warm.   Neurological:      Mental Status: He is alert.   Psychiatric:         Mood and Affect: Mood normal.         Behavior: Behavior normal.         Thought Content: Thought content normal.         Judgment: Judgment normal.       Significant Labs: All pertinent labs within the past 24 hours have been reviewed.  BMP:   Recent Labs   Lab 03/21/23  0445   *   K 3.9   CO2 20*   BUN 31.0*   CREATININE 1.59*   CALCIUM 8.9       Significant Imaging: I have reviewed all pertinent imaging results/findings within the past 24 hours.

## 2023-03-21 NOTE — PT/OT/SLP PROGRESS
"Speech Language Pathology Treatment    Patient Name:  Nathen Morales   MRN:  [id number]  Admitting Diagnosis: Acute UTI    Recommendations:                 General Recommendations:  Cognitive-linguistic therapy  Diet recommendations:  Regular Diet - IDDSI Level 7, Liquid Diet Level: Thin liquids - IDDSI Level 0   Aspiration Precautions:  none; no swallow orders; Pt and nursing report tolerance of diet with avoidance of some items due to dentures at home, Pt reported he does not want a reduced diet level     General Precautions: Standard, fall, seizure  Communication strategies:  repeat as needed, speak slowly    Subjective     Pt participated well throughout. Some frustration with more complex tasks (walking and talking at same time) noted at times.    Patient goals: "get to feeling better"    Pain/Comfort:  Pain Rating 1: 0/10    Respiratory Status: Room air    Objective:     SLP assisted Pt in completing forms for [place] due to Pt report of difficulty getting food at home. Pt required max repetition and cues for comprehension of questions. Reduced awareness of time reference within question with poor attentional switching between questions and topics.     Pt recalled difficulty with showering this AM    Has the patient been evaluated by SLP for swallowing?   No  Keep patient NPO? No   Current Respiratory Status:        Assessment:     Nathen Morales is a 72 y.o. male with an SLP diagnosis of Cognitive-Linguistic Impairment.   He presents with poor recall and reasoning skills. Some deficits may be premorbid as it appears Pt calls neighbors for help when needed. Further assessment of cognitive function within ADLs and IADLs warranted.    DARREL NOMS (Functional Communication Measures):  DARREL NOMS: BRISSA (23) CURRENT   Attention: 5 5   Memory: 3 3   Motor Speech: 7 7   Reading: 3 - vision change reported 3   Spoken Language Comprehension: 5 5   Spoken Language Expression: 5 5   Swallowin - Pt reports avoiding " some dryer food consistencies 6        Goals:   Multidisciplinary Problems       SLP Goals          Problem: SLP    Goal Priority Disciplines Outcome   SLP Goal     SLP Ongoing, Progressing   Description: LTG:   Pt will demonstrate recall and reasoning/problems solving skills for ADLs, IADLs, and safety in home environment.    STGs:  1. Pt will recall and demonstrate safety strategies for mobility and ADLs with 75% acc for improved safety.  2. Pt will complete teach back of safety precautions with >90% acc with min cues for improved safety.  3. Pt will demonstrate recall and reasoning skills with personally relevant IADLs with adequate awareness of when to ask for assistance with 75% acc for increased independence with IADLs.                        Plan:     Patient to be seen:   (3-5X/week)   Plan of Care expires:  04/03/23  Plan of Care reviewed with:  patient   SLP Follow-Up:  Yes       Discharge recommendations:  home with home health   Barriers to Discharge:  Level of Skilled Assistance Needed      Time Tracking:     SLP Treatment Date:   03/21/23  Speech Start Time:  1301  Speech Stop Time:  1325     Speech Total Time (min):  24 min    Billable Minutes: Cog Tx / 2 units     Bonnie Brewster MA, CCC-SLP  03/21/2023

## 2023-03-21 NOTE — PROGRESS NOTES
[facility] - Medical Surgical Unit  [facility] Medicine  Progress Note    Patient Name: Nathen Morales  MRN: [id number]  Patient Class: OP- Observation   Admission Date: 3/18/2023  Length of Stay: 0 days  Attending Physician: Terrie Coronado MD  Primary Care Provider: Brock Brown MD        Subjective:     Principal Problem:Acute UTI        HPI:  HPI: Nathen Morales is a 72 y.o. male who is a patient of   has a past medical history of BPH (benign prostatic hyperplasia), Hypertension, and Seizures.. The patient presented to [facility] on 3/18/2023 with a primary complaint of generalized weakness and two episode of nausea and vomiting this morning, his friend /neighbour called ems and was brought in to the ER . Workup in the er - ekg- 1st degree av block, no st segment elevation or depression and normal t waves . Cxr-nl. Ct head- normal . Labs -cr 1.51, bun 32 . Ua suggestive of uti. Wbc 6.3 . Lactic acid normal. Pt has been feeling very weak and difficulty walking lately. No focal deficits. Pt admitted under hospitalist services for further treatment. Blood and urine cx were collected in the er and was started on iv rocephin and IVF . He denies any chest pain, sob, orthopnea, pnd or swelling. Follows w cardiology and is scheduled for stress test next week due to him having chest pains few weeks ago. Denies any chest pain now       Overview/Hospital Course:  03/20/2023:  Patient seen on rounds today sitting in bedside wheelchair.  He continues to have right knee pain.  Patient evaluated by PT earlier today.    03/21/2023: Patient seen on rounds today eating supper in bed.  Patient did participate with PT earlier today.  Patient without any complaints      Interval History:     Review of Systems   Constitutional: Negative.    Respiratory: Negative.     Cardiovascular: Negative.    Neurological:  Positive for weakness.   Psychiatric/Behavioral: Negative.     Objective:     Vital Signs (Most  Recent):  Temp: 97.2 °F (36.2 °C) (03/21/23 1600)  Pulse: 91 (03/21/23 1600)  Resp: 18 (03/21/23 1600)  BP: (!) 154/76 (03/21/23 1600)  SpO2: 97 % (03/21/23 1600)   Vital Signs (24h Range):  Temp:  [97.2 °F (36.2 °C)-99.5 °F (37.5 °C)] 97.2 °F (36.2 °C)  Pulse:  [72-96] 91  Resp:  [18-20] 18  SpO2:  [94 %-98 %] 97 %  BP: (151-175)/(65-93) 154/76     Weight: 95.4 kg (210 lb 5.1 oz)  Body mass index is 27.75 kg/m².    Intake/Output Summary (Last 24 hours) at 3/21/2023 1714  Last data filed at 3/21/2023 1236  Gross per 24 hour   Intake 840 ml   Output --   Net 840 ml      Physical Exam  Constitutional:       General: He is not in acute distress.     Appearance: Normal appearance.   Cardiovascular:      Rate and Rhythm: Normal rate and regular rhythm.   Pulmonary:      Effort: Pulmonary effort is normal.      Breath sounds: Normal breath sounds.   Abdominal:      General: Abdomen is flat. Bowel sounds are normal.      Palpations: Abdomen is soft.   Skin:     General: Skin is warm.   Neurological:      Mental Status: He is alert.   Psychiatric:         Mood and Affect: Mood normal.         Behavior: Behavior normal.         Thought Content: Thought content normal.         Judgment: Judgment normal.       Significant Labs: All pertinent labs within the past 24 hours have been reviewed.  BMP:   Recent Labs   Lab 03/21/23  0445   *   K 3.9   CO2 20*   BUN 31.0*   CREATININE 1.59*   CALCIUM 8.9       Significant Imaging: I have reviewed all pertinent imaging results/findings within the past 24 hours.      Assessment/Plan:      * Acute UTI  Urine cultures positive for Enterococcus faecalis  Sensitivities reviewed   Stop Rocephin  Start Levaquin 500 mg IV q.day      Dehydration  Start normal saline at 75 cc/hour   Repeat BMP in a.m.  Patient encouraged to increase p.o. intake      Acute pain of right knee  NSAIDs p.r.n.        Weakness  Continue PT/OT in order for patient to be able to transfer back home on discharge  PT  Plan:      Concerns remain regarding pt's ability to return home independently.  PT recommends continued inpatient therapy in order to maximize pt's safety and overall activity tolerance prior to D/C. At this time, pt remains at a heightened risk for falls and further decompensation.       Seizure disorder  Continue home medication      Hypertension  Continue home medication   Monitor BP        VTE Risk Mitigation (From admission, onward)         Ordered     enoxaparin injection 40 mg  Daily         03/18/23 1858     IP VTE HIGH RISK PATIENT  Once         03/18/23 1858                Discharge Planning   DILSHAD:      Code Status: Full Code   Is the patient medically ready for discharge?:     Reason for patient still in hospital (select all that apply): Patient trending condition  Discharge Plan A: Home                  Brock Brown MD  Department of Hospital Medicine   [facility] - Medical Surgical Unit

## 2023-03-22 LAB
ANION GAP SERPL CALC-SCNC: 9 MEQ/L
BUN SERPL-MCNC: 36 MG/DL (ref 8.4–25.7)
CALCIUM SERPL-MCNC: 8.9 MG/DL (ref 8.8–10)
CHLORIDE SERPL-SCNC: 117 MMOL/L (ref 98–107)
CO2 SERPL-SCNC: 18 MMOL/L (ref 23–31)
CREAT SERPL-MCNC: 1.75 MG/DL (ref 0.73–1.18)
CREAT/UREA NIT SERPL: 21
GFR SERPLBLD CREATININE-BSD FMLA CKD-EPI: 40 MLS/MIN/1.73/M2
GLUCOSE SERPL-MCNC: 99 MG/DL (ref 82–115)
POTASSIUM SERPL-SCNC: 4.1 MMOL/L (ref 3.5–5.1)
SODIUM SERPL-SCNC: 144 MMOL/L (ref 136–145)

## 2023-03-22 PROCEDURE — 80048 BASIC METABOLIC PNL TOTAL CA: CPT | Performed by: FAMILY MEDICINE

## 2023-03-22 PROCEDURE — 97116 GAIT TRAINING THERAPY: CPT

## 2023-03-22 PROCEDURE — 25000003 PHARM REV CODE 250: Performed by: INTERNAL MEDICINE

## 2023-03-22 PROCEDURE — 99231 PR SUBSEQUENT HOSPITAL CARE,LEVL I: ICD-10-PCS | Mod: ,,, | Performed by: FAMILY MEDICINE

## 2023-03-22 PROCEDURE — 97130 THER IVNTJ EA ADDL 15 MIN: CPT

## 2023-03-22 PROCEDURE — 97129 THER IVNTJ 1ST 15 MIN: CPT

## 2023-03-22 PROCEDURE — 63600175 PHARM REV CODE 636 W HCPCS: Performed by: INTERNAL MEDICINE

## 2023-03-22 PROCEDURE — 96372 THER/PROPH/DIAG INJ SC/IM: CPT | Performed by: INTERNAL MEDICINE

## 2023-03-22 PROCEDURE — 97530 THERAPEUTIC ACTIVITIES: CPT

## 2023-03-22 PROCEDURE — 92610 EVALUATE SWALLOWING FUNCTION: CPT

## 2023-03-22 PROCEDURE — 96361 HYDRATE IV INFUSION ADD-ON: CPT

## 2023-03-22 PROCEDURE — 99231 SBSQ HOSP IP/OBS SF/LOW 25: CPT | Mod: ,,, | Performed by: FAMILY MEDICINE

## 2023-03-22 PROCEDURE — 63600175 PHARM REV CODE 636 W HCPCS: Performed by: FAMILY MEDICINE

## 2023-03-22 PROCEDURE — 96365 THER/PROPH/DIAG IV INF INIT: CPT | Mod: 59

## 2023-03-22 PROCEDURE — 25000003 PHARM REV CODE 250: Performed by: FAMILY MEDICINE

## 2023-03-22 PROCEDURE — G0378 HOSPITAL OBSERVATION PER HR: HCPCS

## 2023-03-22 PROCEDURE — 94761 N-INVAS EAR/PLS OXIMETRY MLT: CPT

## 2023-03-22 PROCEDURE — 96376 TX/PRO/DX INJ SAME DRUG ADON: CPT

## 2023-03-22 RX ORDER — LISINOPRIL 5 MG/1
5 TABLET ORAL DAILY
Status: DISCONTINUED | OUTPATIENT
Start: 2023-03-22 | End: 2023-03-24 | Stop reason: HOSPADM

## 2023-03-22 RX ORDER — BISACODYL 5 MG
5 TABLET, DELAYED RELEASE (ENTERIC COATED) ORAL DAILY PRN
Status: DISCONTINUED | OUTPATIENT
Start: 2023-03-22 | End: 2023-03-24 | Stop reason: HOSPADM

## 2023-03-22 RX ADMIN — SODIUM CHLORIDE: 9 INJECTION, SOLUTION INTRAVENOUS at 06:03

## 2023-03-22 RX ADMIN — ENOXAPARIN SODIUM 40 MG: 40 INJECTION SUBCUTANEOUS at 05:03

## 2023-03-22 RX ADMIN — DILTIAZEM HYDROCHLORIDE 120 MG: 120 CAPSULE, COATED, EXTENDED RELEASE ORAL at 09:03

## 2023-03-22 RX ADMIN — SERTRALINE HYDROCHLORIDE 50 MG: 50 TABLET ORAL at 08:03

## 2023-03-22 RX ADMIN — HYDRALAZINE HYDROCHLORIDE 10 MG: 20 INJECTION INTRAMUSCULAR; INTRAVENOUS at 06:03

## 2023-03-22 RX ADMIN — OXCARBAZEPINE 900 MG: 300 TABLET, FILM COATED ORAL at 09:03

## 2023-03-22 RX ADMIN — TOPIRAMATE 200 MG: 100 TABLET, FILM COATED ORAL at 08:03

## 2023-03-22 RX ADMIN — OXCARBAZEPINE 900 MG: 300 TABLET, FILM COATED ORAL at 08:03

## 2023-03-22 RX ADMIN — LISINOPRIL 5 MG: 5 TABLET ORAL at 03:03

## 2023-03-22 RX ADMIN — ATORVASTATIN CALCIUM 20 MG: 10 TABLET, FILM COATED ORAL at 08:03

## 2023-03-22 RX ADMIN — TAMSULOSIN HYDROCHLORIDE 0.4 MG: 0.4 CAPSULE ORAL at 09:03

## 2023-03-22 RX ADMIN — BISACODYL 5 MG: 5 TABLET, COATED ORAL at 03:03

## 2023-03-22 RX ADMIN — POLYVINYL ALCOHOL 1 DROP: 14 SOLUTION/ DROPS OPHTHALMIC at 09:03

## 2023-03-22 RX ADMIN — ASPIRIN 81 MG CHEWABLE TABLET 81 MG: 81 TABLET CHEWABLE at 09:03

## 2023-03-22 RX ADMIN — LEVOFLOXACIN 500 MG: 500 INJECTION, SOLUTION INTRAVENOUS at 09:03

## 2023-03-22 RX ADMIN — TOPIRAMATE 200 MG: 100 TABLET, FILM COATED ORAL at 09:03

## 2023-03-22 RX ADMIN — LEVETIRACETAM 1500 MG: 500 TABLET, FILM COATED ORAL at 08:03

## 2023-03-22 RX ADMIN — LEVETIRACETAM 1500 MG: 500 TABLET, FILM COATED ORAL at 09:03

## 2023-03-22 RX ADMIN — SODIUM CHLORIDE: 9 INJECTION, SOLUTION INTRAVENOUS at 09:03

## 2023-03-22 NOTE — PLAN OF CARE
03/22/23 1511   Discharge Reassessment   Assessment Type Discharge Planning Reassessment   Did the patient's condition or plan change since previous assessment? No   Discharge Plan discussed with: Patient   Communicated DILSHAD with patient/caregiver Date not available/Unable to determine   Discharge Plan A Home Health   DME Needed Upon Discharge  walker, rolling   Discharge Barriers Identified None   Why the patient remains in the hospital Requires continued medical care   Post-Acute Status   Post-Acute Authorization Home Health   Coverage [employer]   Discharge Delays None known at this time

## 2023-03-22 NOTE — PLAN OF CARE
Problem: Adult Inpatient Plan of Care  Goal: Patient-Specific Goal (Individualized)  Outcome: Ongoing, Progressing  Goal: Absence of Hospital-Acquired Illness or Injury  Outcome: Ongoing, Progressing  Goal: Optimal Comfort and Wellbeing  Outcome: Ongoing, Progressing  Goal: Readiness for Transition of Care  Outcome: Ongoing, Progressing     Problem: Hypertension Comorbidity  Goal: Blood Pressure in Desired Range  Outcome: Ongoing, Progressing     Problem: Pain Chronic (Persistent) (Comorbidity Management)  Goal: Acceptable Pain Control and Functional Ability  Outcome: Ongoing, Progressing     Problem: Seizure Disorder Comorbidity  Goal: Maintenance of Seizure Control  Outcome: Ongoing, Progressing     Problem: Fall Injury Risk  Goal: Absence of Fall and Fall-Related Injury  Outcome: Ongoing, Progressing     Problem: Fluid Volume Deficit  Goal: Fluid Balance  Outcome: Ongoing, Progressing     Problem: Fatigue  Goal: Improved Activity Tolerance  Outcome: Ongoing, Progressing     Problem: UTI (Urinary Tract Infection)  Goal: Improved Infection Symptoms  Outcome: Ongoing, Progressing     Problem: Constipation  Goal: Effective Bowel Elimination  Outcome: Ongoing, Progressing

## 2023-03-22 NOTE — PT/OT/SLP EVAL
"[facility]  Speech Therapy Clinical Swallow Exam    Name: Nathen Morales    : 1950 (72 y.o.)  MRN: [id number]  Chief Complaint:   Chief Complaint   Patient presents with    Weakness     Weakness with one episode of vomiting after breakfast this morning. Denies any n/d. Denies any pain. Symptoms started this morning       Past Medical History:   Diagnosis Date    BPH (benign prostatic hyperplasia)     Hypertension     Seizures        Past Surgical History:   Procedure Laterality Date    APPENDECTOMY      CHOLECYSTECTOMY      COLONOSCOPY  2016    Dr Brock Brown    TRANSURETHRAL RESECTION OF PROSTATE         Affect: Alert  Cooperative  Confused at times  Predisposing dysphagia risk factors: long term hx of seizures   Precipitating dysphagia risk factors: new onset weakness and reduced mobility     Pt and nursing staff report excessive coughing this morning with first bite of breakfast. Pt reported, "it went down the wrong hole" and also reported that this does not happen very often. SLP notes that some coughing/penetration/aspiration occurs in normal swallow system at times. Pt reported he typically wears dentures so he is avoiding some harder foods at times (dentures are at home).    Objective:       Pt report / Interview findings: "it went down the wrong hole" and Pt reports that this does not happen very often. No history of swallow problems or concerns reported.     ORIENTATION: not oriented to time  GENERAL COGNITIVE IMPRESSION: confusion at times, reduced comprehension and difficulty following basic conversation noted    CRANIAL NERVE EXAM:  Cranial Nerve 5: Trigeminal Nerve  Motor Jaw Posture at rest: Closed  Mandible Elevation/Depression: WFL  Mandible lateralization: WFL  Abnormal movement: absent Interpretation:   intact   Sensory Forehead: WFL  Cheek: WFL  Jaw: WFL  Facial Pain: None noted Interpretation:   intact     Cranial Nerve 7: Facial Nerve  Motor Facial Symmetry: " WNL  Wrinkle Forehead: WFL  Close eyes tightly: WFL  Labial Protrusion: WFL  Labial Retraction: WFL  Abnormal movement: absent Interpretation:   intact   Sensory Formal testing not completed. Patient denied any changes in taste      Cranial Nerves IX and X: Glossopharyngeal and Vagus Nerves  Motor Palatal Symmetry (Rest): WNL  Palatal Symmetry (Movement): WNL  Cough: Perceptually strong  Voice Prior to PO intake: Clear  Resonance: Normal  Abnormal movement: absent Interpretation:   intact     Cranial Nerve XII: Hypoglossal Nerve  Motor Tongue at rest: WNL  Lingual Protrusion: WNL  Lingual Protrusion against Resistance: WNL  Lingual Lateralization: WNL  Abnormal movement: absent Interpretation:   intact       [place] SWALLOW PROTOCOL:  The [place] Swallow Protocol was administered. The patient was alert and provided the instructions prior to the beginning of the protocol. The patient consumed 3 oz before putting the cup down. Patient drank with consecutive swallows. Patient with no cough, no throat clear, no wet vocal quality. Patient without overt signs or symptoms of penetration/aspiration. Patient  passed the screener.    PO Trials:   Patient presented with:   ICE CHIPS: adequate acceptance via spoon, oral transport, and timely swallow initiation as judged at bedside; no s/s aspiration    THIN:-3 oz water test and thin liquid cup and straw sips; adequate acceptance, oral transport, and timely swallow initiation as judged at bedside; no s/s aspiration    PUREE: self feeding with adequate acceptance via spoon, oral transport, and timely swallow initiation as judged at bedside; no s/s aspiration    MINCED AND MOIST: not tested  SOFT AND BITE SIZED: self feeding with adequate acceptance via spoon, oral transport was mildly prolonged and no residue was noted, and timely swallow initiation as judged at bedside; no s/s aspiration    SOLID: self feeding via hand; oral transport was mildly prolonged and mild residue was noted,  and timely swallow initiation as judged at bedside; no s/s aspiration      *Thicken liquids were not used in this assessment. Chantelle (2018) reported that thickened liquids have no sound evidence as reducing risk of pneumonia in patients with dysphagia and can cause harm by increasing risk of dehydration. It also presents an increased risk of UTI, electrolyte imbalance, constipation, fecal impaction, cognitive impairment, functional decline, and even death (Sonal, 2002; Eleonora, 2016).  This supports the assertion that we should confirm a patient requires thickened liquids with an instrumental swallow study prior to recommending them.    Limitations: confusion, impaired comprehension, and impaired cognition        Assessment :     INTERPRETATION: Pt exhibited functional swallowing skills on this assessment. The patient had no anterior loss of the bolus with adequate closure of the lips around the spoon and straw. No residue remained in the oral cavity following the swallow.  Patient without overt clinical signs/symptoms of aspiration on any PO trials given. SLP is recommending con't regular diet with thin liquids at this time. SLP swallow intervention is not warranted at this time. Please feel free to re-consult as warranted for swallow concerns.  SLP educated Pt and nursing on CSE results and diet recommendations. SLP to follow up x 1 regarding diet tolerance.     PILLARS OF PNA: According to Dr. Rickie Milian (2005), there are 3 pillars that contribute to aspiration related pneumonia. The Three Pillars of Aspiration Pneumonia is research showing that aspiration pneumonia only develops when three factors are present: aspiration, compromised immune system, poor oral hygiene.     Pt presents with the following Pillars of Pneumonia (Maicol, 2008)   Risk factors Present (yes/no) Comments   Impaired health status yes    Poor oral health  no    High prevalence of dysphagia risk factors no      RATING  "SCALES:  Objective Measures:   Functional Oral Intake Scale (FOIS): level 4   The [facility] Assessment of Swallowing Ability (MASA): 200/200   EAT 10: 0/10 (no problems reported)    FOIS is a clinical assessment measure used the define the functional oral intake of patients (Irene et al., 2005). FOIS scores range from Level1 (NPO) to Level 7 (total oral diet with no restrictions).  MASA was utilized as an instrument to assess a patient's swallowing function at bedside. The MASA measure 24 different areas to gauge a patient's swallowing ability, in order to make appropriate recommendations for diet and fluid intake. The highest possible score is 200.  MASA Score Cutoff for Severity Groupings of Dysphagia and Aspiration:  Severity Grouping MASA Score- Dysphagia MASA Score- Aspiration   No abnormality detected Less than 178-200 Less than 170-200   Mild Less than 168-177 Less than 149-169   Moderate Less than 139-167 Less than 148   Severe Less than 138 Less than 140   Twyla Engel: The [facility] Assessment of Swallowing Ability. 2002. [facility].      Patient Reported Outcome Measure: Pt reports no swallow concerns, Pt reported,  "it went down the wrong hole. Coughing this morning appears to be typical of the normal swallow penetration/aspiration at times.     Plan:     Multidisciplinary Problems       SLP Goals          Problem: SLP    Goal Priority Disciplines Outcome   SLP Goal     SLP Ongoing, Progressing   Description: LTG:   Pt will demonstrate recall and reasoning/problems solving skills for ADLs, IADLs, and safety in home environment.    STGs:  1. Pt will recall and demonstrate safety strategies for mobility and ADLs with 75% acc for improved safety.  2. Pt will complete teach back of safety precautions with >90% acc with min cues for improved safety.  3. Pt will demonstrate recall and reasoning skills with personally relevant IADLs with adequate awareness of when to ask for assistance with 75% acc for " increased independence with IADLs.                      Reference:   American Speech-Language-Hearing Association. (n.d.b). Adult dysphagia. (Practice Portal). https://www.darrel.org/practice-portal/clinical-topics/adult-dysphagia/  ADRIEL Lockhart (2018). Use of modified diets to prevent aspiration in oropharyngeal dysphagia: Is current practice justified?. BMC Geriatrics, 18(1), 1-10.  ADRIEL Pruitt., JUNIOR Dior, AISHA Fernández, & JUNO Grimm. (2002). Predictors of aspiration pneumonia in nursing home residents.  Dysphagia, 17(4), 298-307.  JUNIOR Crews (2016). Best practices for dehydration prevention. Perspectives of the DARREL Special Interest Groups - SIG 13, 1(2), 72- 80.    Bonnie Brewster MA, CCC-SLP  03/22/2023

## 2023-03-22 NOTE — PROGRESS NOTES
[facility] - Medical Surgical Unit  [facility] Medicine  Progress Note    Patient Name: Nathen Morales  MRN: [id number]  Patient Class: OP- Observation   Admission Date: 3/18/2023  Length of Stay: 0 days  Attending Physician: Terrie Coronado MD  Primary Care Provider: Brock Brown MD        Subjective:     Principal Problem:Acute UTI        HPI:  HPI: Nathen Morales is a 72 y.o. male who is a patient of   has a past medical history of BPH (benign prostatic hyperplasia), Hypertension, and Seizures.. The patient presented to [facility] on 3/18/2023 with a primary complaint of generalized weakness and two episode of nausea and vomiting this morning, his friend /neighbour called ems and was brought in to the ER . Workup in the er - ekg- 1st degree av block, no st segment elevation or depression and normal t waves . Cxr-nl. Ct head- normal . Labs -cr 1.51, bun 32 . Ua suggestive of uti. Wbc 6.3 . Lactic acid normal. Pt has been feeling very weak and difficulty walking lately. No focal deficits. Pt admitted under hospitalist services for further treatment. Blood and urine cx were collected in the er and was started on iv rocephin and IVF . He denies any chest pain, sob, orthopnea, pnd or swelling. Follows w cardiology and is scheduled for stress test next week due to him having chest pains few weeks ago. Denies any chest pain now       Overview/Hospital Course:  03/20/2023:  Patient seen on rounds today sitting in bedside wheelchair.  He continues to have right knee pain.  Patient evaluated by PT earlier today.    03/21/2023: Patient seen on rounds today eating supper in bed.  Patient did participate with PT earlier today.  Patient without any complaints    03/22/2023:  Patient seen on rounds today standing up at bedside with PT at side.  Patient rested well last p.m. and without any complaints      Interval History:     Review of Systems  Constitutional: Negative.    Respiratory: Negative.      Cardiovascular: Negative.    Neurological:  Positive for weakness.   Psychiatric/Behavioral: Negative.     Objective:     Vital Signs (Most Recent):  Temp: 98 °F (36.7 °C) (03/22/23 1201)  Pulse: 72 (03/22/23 1202)  Resp: 20 (03/22/23 0733)  BP: (!) 177/75 (03/22/23 1202)  SpO2: 97 % (03/22/23 1201)   Vital Signs (24h Range):  Temp:  [97.2 °F (36.2 °C)-98.1 °F (36.7 °C)] 98 °F (36.7 °C)  Pulse:  [69-92] 72  Resp:  [18-20] 20  SpO2:  [94 %-99 %] 97 %  BP: (154-178)/(65-98) 177/75     Weight: 95.4 kg (210 lb 5.1 oz)  Body mass index is 27.75 kg/m².    Intake/Output Summary (Last 24 hours) at 3/22/2023 1251  Last data filed at 3/22/2023 0800  Gross per 24 hour   Intake 600 ml   Output --   Net 600 ml      Physical Exam  Constitutional:       General: He is not in acute distress.     Appearance: Normal appearance.   Cardiovascular:      Rate and Rhythm: Normal rate and regular rhythm.   Pulmonary:      Effort: Pulmonary effort is normal.      Breath sounds: Normal breath sounds.   Abdominal:      General: Bowel sounds are normal.      Palpations: Abdomen is soft.   Skin:     General: Skin is warm.   Neurological:      Mental Status: He is alert.   Psychiatric:         Mood and Affect: Mood normal.         Behavior: Behavior normal.         Thought Content: Thought content normal.         Judgment: Judgment normal.       Significant Labs: All pertinent labs within the past 24 hours have been reviewed.  BMP:   Recent Labs   Lab 03/22/23  0440      K 4.1   CO2 18*   BUN 36.0*   CREATININE 1.75*   CALCIUM 8.9       Significant Imaging: I have reviewed all pertinent imaging results/findings within the past 24 hours.      Assessment/Plan:      * Acute UTI  Urine cultures positive for Enterococcus faecalis  Continue Levaquin 500 mg IV q.day      Dehydration  Continue normal saline at 75 cc/hour   Repeat BMP in a.m.        Acute pain of right knee  NSAIDs p.r.n.        Weakness  Continue PT/OT in order for patient to be  able to transfer back home on discharge  PT Plan:      Concerns remain regarding pt's ability to return home independently.  PT recommends continued inpatient therapy in order to maximize pt's safety and overall activity tolerance prior to D/C. At this time, pt remains at a heightened risk for falls and further decompensation.       Seizure disorder  Continue home medication      Hypertension  BP log reviewed   Continue current medication   Add lisinopril 5 mg q.day  Monitor BP      VTE Risk Mitigation (From admission, onward)         Ordered     enoxaparin injection 40 mg  Daily         03/18/23 1858     IP VTE HIGH RISK PATIENT  Once         03/18/23 1858                Discharge Planning   DILSHAD:      Code Status: Full Code   Is the patient medically ready for discharge?:     Reason for patient still in hospital (select all that apply): Patient trending condition  Discharge Plan A: Home                  Brock Brown MD  Department of Hospital Medicine   [facility]

## 2023-03-22 NOTE — PT/OT/SLP PROGRESS
"         Physical Therapy Treatment Note           Name: Nathen Morales    : 1950 (72 y.o.)  MRN: [id number]             Subjective Assessment:     No complaints  Lethargic   X Awake, alert, cooperative  Uncooperative    Agitated  c/o pain    Appropriate  c/o fatigue    Confused  Treated at bedside     Emotionally labile  Treated in gym/dept.    Impulsive  Other:    Flat affect       Pain/Comfort:    Pain Rating 1: 0/10  Location - Side 1: Right  Location 1: knee    Therapy Precautions:      Cognitive deficits  Spinal precautions    Collar - hard  Sternal precautions    Collar - soft   TLSO   X Fall risk  LSO    Hip precautions - posterior  Knee immobilizer    Hip precautions - anterior  WBAT    Impaired communication  Partial weightbearing    Oxygen  TTWB    PEG tube  NWB    Visual deficits  Other:    Hearing deficits               Mobility Training:     Assist Level Assistive Device Comments    Bed Mobility SBA  Semi-supine to sitting at EOB, EOB to supine.  Bed railing utilized for assistance.    Sit to stand/Stand to sit SBA  Sit to stand/stand to sit performed from EOB x several trials. Increased effort was required for pt to rise from the low height of the bed.  No physical assistance required from PT, but close supervision provided for safety.    Transfers      Gait CGA  Pt able to ambulate 150 ft with a step through gait pattern and slow gait speed. Pt's step length continues to improve, though foot clearance remains limited. Gait appeared slightly antalgic, but pt verbalized no c/o pain.  PT offered for pt to increase his gait distance, but pt stated "I better take a break".     Balance Training SBA  Pt stood without UE support and performed R and L weighted med ball passes incorporating trunk rotation.  Pt performed 10 reps to both the R and L.  No LOB noted.  L sided trunk rotation ROM more limited.  Pt also performed R and L sided med ball lifts (low on R to high on L, low on L to high on R).  Pt " performed 10 reps on each side. This activity was performed to challenge pt's dynamic standing balance without UE support.    Wheelchair Mobility      Stair Climbing      Car Transfer      Other:             Assessment:     Patient tolerated session fairly well and remains in good spirits. Pt was more talkative and alert during each of today's treatments as compared to previous sessions. Per CM, authorization is pending for pt to be transitioned to SNF for continued therapy.  A RW has been delivered to the hospital for pt upon D/C.  PT recommends that pt have this device for use as needed; however, PT will continue to assess pt's mobility without an assistive device since this was his recent baseline.         GOALS:   Multidisciplinary Problems       Physical Therapy Goals          Problem: Physical Therapy    Goal Priority Disciplines Outcome Goal Variances Interventions   Physical Therapy Goal     PT, PT/OT Ongoing, Progressing     Description: Patient will increase functional independence with mobility by performin. Sit to stand transfer with Modified [place]  2. Gait  x 250 feet with Modified [place] using Rolling Walker.   3. Ascend/descend 3 stair with right Handrails Supervision.                           Discharge Recommendations:      home with home health     Discharge Equipment Recommendations:     none     At end of treatment, patient remained:      Up in chair    X In bed   X With alarm activated    Bed rails up   X Call bell in reach      Family/friends present     Restraints secured properly     In bathroom with CNA/RN notified     In gym with PT/PTA/tech     Nurse aware     Other:           PT Start Time: 1233     PT Stop Time: 1256  PT Total Time (min): 23 min     Billable Minutes: Gait Training 10 and Therapeutic Activity 13      2023

## 2023-03-22 NOTE — PROGRESS NOTES
"Inpatient Nutrition Evaluation    Admit Date: 3/18/2023   Total duration of encounter: 4 days    Nutrition Recommendation/Prescription     Continue Cardiac edentulous diet as tolerated. Pt still did not get dentures yet.   Continue Boost Plus, TID, to assist with nutrition. Provides 360 kcal, 14 g protein per serving.  Monitor intake, weight, and labs.     RD following and available as needed. Thank you.     Nutrition Assessment     Chart Review    Reason Seen: continuous nutrition monitoring and follow-up    Malnutrition Screening Tool Results   Have you recently lost weight without trying?: No  Have you been eating poorly because of a decreased appetite?: No   MST Score: 0     Diagnosis:  Acute cystitis - poa  Mo vs ckd 3   Hypertension  Bph  Hld  Epilepsy  Anxiety/depression  Dementia   Fall at home w R knee pain       Relevant Medical History:   BPH (benign prostatic hyperplasia)      Hypertension      Seizures          Nutrition-Related Medications:   Lovenox.    Nutrition-Related Labs:  3/22: BUN/Crea 36/1.75(H).    3/19: BUN/Crea 31/1.56; GFR 46(L); H/H 10.8/32.5(L).    Diet Order: Diet Cardiac  Oral Supplement Order: none  Appetite/Oral Intake: good/% of meals  Factors Affecting Nutritional Intake: chewing difficulty  Food/Quaker/Cultural Preferences: none reported  Food Allergies: none reported       Wound(s):       Comments    3/22: Pt continues with good appetite and intake. C/O difficulty chewing 2/2 not having his dentures. Receiving edentulous diet.      3/19: Pt is new admit with fair intake. Wears dentures but does not have dentures with him. Will add Boost Plus to assist with nutrition and continue to monitor during stay.     Anthropometrics    Height: 6' 1" (185.4 cm) Height Method: Stated  Last Weight: 95.4 kg (210 lb 5.1 oz) (03/19/23 0500) Weight Method: Bed Scale  BMI (Calculated): 27.8  BMI Classification: overweight (BMI 25-29.9)        Ideal Body Weight (IBW), Male: 184 lb     % " Ideal Body Weight, Male (lb): 115.26 %                          Usual Weight Provided By: EMR weight history    Wt Readings from Last 3 Encounters:   03/19/23 0500 95.4 kg (210 lb 5.1 oz)   03/18/23 1815 96.2 kg (212 lb 1.3 oz)   03/18/23 1353 104.3 kg (230 lb)   12/06/22 1330 111.6 kg (246 lb)   05/31/22 1312 111.6 kg (246 lb 1.6 oz)      Weight Change(s) Since Admission:  Admit Weight: 104.3 kg (230 lb) (03/18/23 1353)      Patient Education    Not applicable.    Monitoring & Evaluation     Dietitian will monitor food and beverage intake, energy intake, weight, electrolyte/renal panel, glucose/endocrine profile, and gastrointestinal profile.  Nutrition Risk/Follow-Up: low (follow-up in 5-7 days)  Patients assigned 'low nutrition risk' status do not qualify for a full nutritional assessment but will be monitored and re-evaluated in a 5-7 day time period. Please consult if re-evaluation needed sooner.

## 2023-03-22 NOTE — ASSESSMENT & PLAN NOTE
Called to remind patient his INR is past due to be checked,reports he will try to come in tomorrow.   NSAIDs p.r.n.

## 2023-03-22 NOTE — SUBJECTIVE & OBJECTIVE
Interval History:     Review of Systems  Constitutional: Negative.    Respiratory: Negative.     Cardiovascular: Negative.    Neurological:  Positive for weakness.   Psychiatric/Behavioral: Negative.     Objective:     Vital Signs (Most Recent):  Temp: 98 °F (36.7 °C) (03/22/23 1201)  Pulse: 72 (03/22/23 1202)  Resp: 20 (03/22/23 0733)  BP: (!) 177/75 (03/22/23 1202)  SpO2: 97 % (03/22/23 1201)   Vital Signs (24h Range):  Temp:  [97.2 °F (36.2 °C)-98.1 °F (36.7 °C)] 98 °F (36.7 °C)  Pulse:  [69-92] 72  Resp:  [18-20] 20  SpO2:  [94 %-99 %] 97 %  BP: (154-178)/(65-98) 177/75     Weight: 95.4 kg (210 lb 5.1 oz)  Body mass index is 27.75 kg/m².    Intake/Output Summary (Last 24 hours) at 3/22/2023 1251  Last data filed at 3/22/2023 0800  Gross per 24 hour   Intake 600 ml   Output --   Net 600 ml      Physical Exam  Constitutional:       General: He is not in acute distress.     Appearance: Normal appearance.   Cardiovascular:      Rate and Rhythm: Normal rate and regular rhythm.   Pulmonary:      Effort: Pulmonary effort is normal.      Breath sounds: Normal breath sounds.   Abdominal:      General: Bowel sounds are normal.      Palpations: Abdomen is soft.   Skin:     General: Skin is warm.   Neurological:      Mental Status: He is alert.   Psychiatric:         Mood and Affect: Mood normal.         Behavior: Behavior normal.         Thought Content: Thought content normal.         Judgment: Judgment normal.       Significant Labs: All pertinent labs within the past 24 hours have been reviewed.  BMP:   Recent Labs   Lab 03/22/23  0440      K 4.1   CO2 18*   BUN 36.0*   CREATININE 1.75*   CALCIUM 8.9       Significant Imaging: I have reviewed all pertinent imaging results/findings within the past 24 hours.

## 2023-03-22 NOTE — PT/OT/SLP PROGRESS
Physical Therapy Treatment Note           Name: Nathen Morales    : 1950 (72 y.o.)  MRN: [id number]             Subjective Assessment:     No complaints  Lethargic   X Awake, alert, cooperative  Uncooperative    Agitated X c/o pain    Appropriate  c/o fatigue    Confused  Treated at bedside     Emotionally labile  Treated in gym/dept.    Impulsive  Other:    Flat affect       Pain/Comfort:    Location - Side 1: Right  Location 1: knee    Therapy Precautions:      Cognitive deficits  Spinal precautions    Collar - hard  Sternal precautions    Collar - soft   TLSO   X Fall risk  LSO    Hip precautions - posterior  Knee immobilizer    Hip precautions - anterior  WBAT    Impaired communication  Partial weightbearing    Oxygen  TTWB    PEG tube  NWB    Visual deficits  Other:    Hearing deficits               Mobility Training:     Assist Level Assistive Device Comments    Bed Mobility      Sit to stand/Stand to sit SBA  Sit to stand/stand to sit performed x several trials from WC level.  No AD required.    Transfers      Gait SBA-CGA  Pt able to ambulate 140 ft with a step through gait pattern and slow gait speed.  Pt demonstrated minimal unsteadiness but experienced no overt LOB without the use of an AD.  Pt began to c/o increased R knee discomfort near the conclusion of ambulation. Gait did begin to appear more antalgic.  Decreased UE swing observed bilaterally.    Balance Training      Wheelchair Mobility      Stair Climbing      Car Transfer      Other:  SBA-CGA  Functional mobility with item retrieval and carrying was performed to simulate pt walking to the kitchen and getting a glass of water.  Pt ambulated approximately 15 ft to and from his WC, maneuvering around an obstacle that represented his couch.  Pt retrieved his water jug from a cart and carried it back to his WC.  No LOB noted with negotiation of obstacles or narrow spaces.  After a seated rest break, pt performed this task again to  return the water jug to the car (kitchen).           Assessment:     Prior to initiation of mobility, pt remained seated in his WC while nsg prepared his medications.  Pt participated in conversation/education regarding safesty upon D/C.  Patient tolerated session well and was able to perform functional mobility without the use of an AD. Though pt remains at a heightened risk for falls, the introduction of an AD will impair pt's independence in term of carrying items such as glasses of water or plates of food.  SLP was present throughout session for a co-treatment.  SLP assessed cognition and safety while PT targeted balance and functional mobility.  Per CM, pt may transition to swing bed for continued therapy.        GOALS:   Multidisciplinary Problems       Physical Therapy Goals          Problem: Physical Therapy    Goal Priority Disciplines Outcome Goal Variances Interventions   Physical Therapy Goal     PT, PT/OT Ongoing, Progressing     Description: Patient will increase functional independence with mobility by performin. Sit to stand transfer with Modified [place]  2. Gait  x 250 feet with Modified [place] using Rolling Walker.   3. Ascend/descend 3 stair with right Handrails Supervision.                           Discharge Recommendations:      home with home health     Discharge Equipment Recommendations:     none     At end of treatment, patient remained:    X  Up in chair     In bed    With alarm activated    Bed rails up   X Call bell in reach      Family/friends present     Restraints secured properly     In bathroom with CNA/RN notified     In gym with PT/PTA/tech     Nurse aware     Other:           PT Start Time: 912     PT Stop Time: 950  PT Total Time (min): 38 min     Billable Minutes: Gait Training 28 and Therapeutic Activity 10      2023

## 2023-03-22 NOTE — PT/OT/SLP PROGRESS
"Speech Language Pathology Treatment    Patient Name:  Nathen Morales   MRN:  [id number]  Admitting Diagnosis: Acute UTI    Recommendations:                 General Recommendations:  Cognitive-linguistic therapy  Diet recommendations:  Regular Diet - IDDSI Level 7, Liquid Diet Level: Thin liquids - IDDSI Level 0   Aspiration Precautions:  none; no swallow orders; Pt and nursing report tolerance of diet with avoidance of some items due to dentures at home, Pt reported he does not want a reduced diet level     General Precautions: Standard, fall, seizure  Communication strategies:  repeat as needed, speak slowly    Subjective     Pt participated well throughout. Improved affect and overall participation in conversation today. Good use of humor throughout.    Patient goals: "get to feeling better"    Pain/Comfort:  Pain Rating 1: 0/10    Respiratory Status: Room air    Objective:     Co treatment with PT targeting mobility without walker and SLP assessing and targeting following directions, recall, and dual tasking for attention within more functional situations. Co-treatment necessary for increased Pt safety to target more functional tasks for improved generalization.     Pt required minimal repetition and redirection while walking and talking. Pt followed directions and recalled tasks goals without assistance. Pt demonstrated reasoning for walking with obstacles present and items/furniture on wheels.     Pt recalled education re: strategies for vision without cues - repetition of questions required likely due to reduced comprehension. Teachback completed with vision strategies for post d/c.     Pt educated on specific cognitive goals of task and relation of task to various home management situations.       Has the patient been evaluated by SLP for swallowing?   No  Keep patient NPO? No   Current Respiratory Status:        Assessment:     Nathen Morales is a 72 y.o. male with an SLP diagnosis of Cognitive-Linguistic " Impairment.   He presents with poor recall and reasoning skills. Some deficits may be premorbid as it appears Pt calls neighbors for help when needed. Further assessment of cognitive function within ADLs and IADLs warranted.    DARREL NOMS (Functional Communication Measures):  DARREL NOMS: BRISSA (23) CURRENT   Attention: 5 5   Memory: 3 3   Motor Speech: 7 7   Reading: 3 - vision change reported 3   Spoken Language Comprehension: 5 5   Spoken Language Expression: 5 5   Swallowin - Pt reports avoiding some dryer food consistencies 6        Goals:   Multidisciplinary Problems       SLP Goals          Problem: SLP    Goal Priority Disciplines Outcome   SLP Goal     SLP Ongoing, Progressing   Description: LTG:   Pt will demonstrate recall and reasoning/problems solving skills for ADLs, IADLs, and safety in home environment.    STGs:  1. Pt will recall and demonstrate safety strategies for mobility and ADLs with 75% acc for improved safety.  2. Pt will complete teach back of safety precautions with >90% acc with min cues for improved safety.  3. Pt will demonstrate recall and reasoning skills with personally relevant IADLs with adequate awareness of when to ask for assistance with 75% acc for increased independence with IADLs.                        Plan:     Patient to be seen:   (3-5X/week)   Plan of Care expires:  23  Plan of Care reviewed with:  patient   SLP Follow-Up:  Yes       Discharge recommendations:  home with home health   Barriers to Discharge:  Level of Skilled Assistance Needed      Time Tracking:     SLP Treatment Date:   23  Speech Start Time:  914  Speech Stop Time:  950     Speech Total Time (min):  36 min    Billable Minutes: Cog Tx / 2 units     Bonnie Brewster MA, CCC-SLP  2023

## 2023-03-23 LAB
ANION GAP SERPL CALC-SCNC: 9 MEQ/L
BUN SERPL-MCNC: 35 MG/DL (ref 8.4–25.7)
CALCIUM SERPL-MCNC: 8.7 MG/DL (ref 8.8–10)
CHLORIDE SERPL-SCNC: 115 MMOL/L (ref 98–107)
CO2 SERPL-SCNC: 17 MMOL/L (ref 23–31)
CREAT SERPL-MCNC: 1.7 MG/DL (ref 0.73–1.18)
CREAT/UREA NIT SERPL: 21
GFR SERPLBLD CREATININE-BSD FMLA CKD-EPI: 42 MLS/MIN/1.73/M2
GLUCOSE SERPL-MCNC: 101 MG/DL (ref 82–115)
POTASSIUM SERPL-SCNC: 4 MMOL/L (ref 3.5–5.1)
SODIUM SERPL-SCNC: 141 MMOL/L (ref 136–145)

## 2023-03-23 PROCEDURE — 25000003 PHARM REV CODE 250: Performed by: FAMILY MEDICINE

## 2023-03-23 PROCEDURE — 97535 SELF CARE MNGMENT TRAINING: CPT

## 2023-03-23 PROCEDURE — 63600175 PHARM REV CODE 636 W HCPCS: Performed by: INTERNAL MEDICINE

## 2023-03-23 PROCEDURE — 99231 SBSQ HOSP IP/OBS SF/LOW 25: CPT | Mod: ,,, | Performed by: FAMILY MEDICINE

## 2023-03-23 PROCEDURE — 96365 THER/PROPH/DIAG IV INF INIT: CPT | Mod: 59

## 2023-03-23 PROCEDURE — 99231 PR SUBSEQUENT HOSPITAL CARE,LEVL I: ICD-10-PCS | Mod: ,,, | Performed by: FAMILY MEDICINE

## 2023-03-23 PROCEDURE — 97116 GAIT TRAINING THERAPY: CPT

## 2023-03-23 PROCEDURE — 97130 THER IVNTJ EA ADDL 15 MIN: CPT

## 2023-03-23 PROCEDURE — 92526 ORAL FUNCTION THERAPY: CPT

## 2023-03-23 PROCEDURE — G0378 HOSPITAL OBSERVATION PER HR: HCPCS

## 2023-03-23 PROCEDURE — 96372 THER/PROPH/DIAG INJ SC/IM: CPT | Performed by: INTERNAL MEDICINE

## 2023-03-23 PROCEDURE — 63600175 PHARM REV CODE 636 W HCPCS: Performed by: FAMILY MEDICINE

## 2023-03-23 PROCEDURE — 97530 THERAPEUTIC ACTIVITIES: CPT

## 2023-03-23 PROCEDURE — 97129 THER IVNTJ 1ST 15 MIN: CPT

## 2023-03-23 PROCEDURE — 80048 BASIC METABOLIC PNL TOTAL CA: CPT | Performed by: FAMILY MEDICINE

## 2023-03-23 PROCEDURE — 25000003 PHARM REV CODE 250: Performed by: INTERNAL MEDICINE

## 2023-03-23 PROCEDURE — 94761 N-INVAS EAR/PLS OXIMETRY MLT: CPT

## 2023-03-23 RX ADMIN — SODIUM CHLORIDE: 9 INJECTION, SOLUTION INTRAVENOUS at 12:03

## 2023-03-23 RX ADMIN — ENOXAPARIN SODIUM 40 MG: 40 INJECTION SUBCUTANEOUS at 06:03

## 2023-03-23 RX ADMIN — ASPIRIN 81 MG CHEWABLE TABLET 81 MG: 81 TABLET CHEWABLE at 09:03

## 2023-03-23 RX ADMIN — LEVOFLOXACIN 500 MG: 500 INJECTION, SOLUTION INTRAVENOUS at 11:03

## 2023-03-23 RX ADMIN — LEVETIRACETAM 1500 MG: 500 TABLET, FILM COATED ORAL at 09:03

## 2023-03-23 RX ADMIN — TOPIRAMATE 200 MG: 100 TABLET, FILM COATED ORAL at 09:03

## 2023-03-23 RX ADMIN — OXCARBAZEPINE 900 MG: 300 TABLET, FILM COATED ORAL at 09:03

## 2023-03-23 RX ADMIN — LISINOPRIL 5 MG: 5 TABLET ORAL at 09:03

## 2023-03-23 RX ADMIN — TAMSULOSIN HYDROCHLORIDE 0.4 MG: 0.4 CAPSULE ORAL at 09:03

## 2023-03-23 RX ADMIN — ATORVASTATIN CALCIUM 20 MG: 10 TABLET, FILM COATED ORAL at 09:03

## 2023-03-23 RX ADMIN — SERTRALINE HYDROCHLORIDE 50 MG: 50 TABLET ORAL at 09:03

## 2023-03-23 RX ADMIN — DILTIAZEM HYDROCHLORIDE 120 MG: 120 CAPSULE, COATED, EXTENDED RELEASE ORAL at 09:03

## 2023-03-23 NOTE — PT/OT/SLP PROGRESS
"Speech Language Pathology Treatment    Patient Name:  Nathen Morales   MRN:  [id number]  Admitting Diagnosis: Acute UTI    Recommendations:                 General Recommendations:  Cognitive-linguistic therapy  Diet recommendations:  Regular Diet - IDDSI Level 7, Liquid Diet Level: Thin liquids - IDDSI Level 0   Aspiration Precautions:  none; no swallow orders; Pt and nursing report tolerance of diet with avoidance of some items due to dentures at home, Pt reported he does not want a reduced diet level     General Precautions: Standard, fall, seizure  Communication strategies:  repeat as needed, speak slowly    Subjective     Pt participated well and demonstrated good use of humor throughout.     Patient goals: "get to feeling better"    Pain/Comfort:  Pain Rating 1: 0/10    Respiratory Status: Room air    Objective:     SLP following up during mealtime to ensure diet tolerance. Pt reported need to use restroom so SLP assisted to bathroom and targeted cognitive skills and education for more functional tasks. Pt planned and problem solved for managing IV pole and alarms without assistance. Pt instructed SLP in steps for clearance of space for increased safety without need for cues. Pt managed lines with min cues (cue x1) and followed all safety precautions and strategies without cues.     Pt tolerated meal items without overt s/s aspiration. SLP educated Pt and nursing staff on recommendations. General safety at mealtime along with need for increased attention to swallow while if Pt prescribed specific meds reviewed in depth. Pt asked good questions about general swallow function and con't to report swallow is WFL and coughing episode as "not usual." No further swallowing intervention is required at this time. Staff to re-consult should swallowing concerns arise.     Has the patient been evaluated by SLP for swallowing?   Yes  Keep patient NPO? No   Current Respiratory Status:    room air     Assessment:     Nathen" Andrew is a 72 y.o. male with an SLP diagnosis of Cognitive-Linguistic Impairment.   He presents with poor recall and reasoning skills. Some deficits may be premorbid as it appears Pt calls neighbors for help when needed. Improved affect and reduced cues needed with more complex tasks.     DARREL NOMS (Functional Communication Measures):  DARREL NOMS: BRISSA (23) CURRENT   Attention: 5 5   Memory: 3 3   Motor Speech: 7 7   Reading: 3 - vision change reported 3   Spoken Language Comprehension: 5 5   Spoken Language Expression: 5 5   Swallowin - Pt reports avoiding some dryer food consistencies 6        Goals:   Multidisciplinary Problems       SLP Goals          Problem: SLP    Goal Priority Disciplines Outcome   SLP Goal     SLP Ongoing, Progressing   Description: LTG:   Pt will demonstrate recall and reasoning/problems solving skills for ADLs, IADLs, and safety in home environment.    STGs:  1. Pt will recall and demonstrate safety strategies for mobility and ADLs with 75% acc for improved safety.  2. Pt will complete teach back of safety precautions with >90% acc with min cues for improved safety.  3. Pt will demonstrate recall and reasoning skills with personally relevant IADLs with adequate awareness of when to ask for assistance with 75% acc for increased independence with IADLs.                        Plan:     Patient to be seen:   (3-5X/week)   Plan of Care expires:  23  Plan of Care reviewed with:  patient   SLP Follow-Up:  Yes       Discharge recommendations:  home with home health   Barriers to Discharge:  Level of Skilled Assistance Needed      Time Tracking:     SLP Treatment Date:   23  Speech Start Time:  1050  Speech Stop Time:  1105     Speech Total Time (min):  15 min    Billable Minutes: Cog Tx / 1 units     Bonnie Brewster MA, CCC-SLP  2023

## 2023-03-23 NOTE — PT/OT/SLP PROGRESS
Occupational Therapy  Treatment    Name: Nathen Morales    : 1950 (72 y.o.)  MRN: [id number]          TREATMENT SUMMARY AND RECOMMENDATIONS:      Subjective Assessment:   No complaints  Lethargic   x Awake, alert, cooperative  Uncooperative    Agitated x Flat affect    Appropriate  c/o fatigue   x Confused x Treated at bedside     Emotionally liable  Treated in gym/dept.    Impulsive x Other: Pt reported not sleeping well last night.       Pain/Comfort:  Pain Rating 1: 0/10      Therapy Precautions:   x Cognitive deficits  Spinal precautions    Collar - hard  Sternal precautions    Collar - soft   TLSO   x Fall risk  LSO    Hip precautions - posterior  Knee immobilizer    Hip precautions - anterior  WBAT    Impaired communication  Partial weightbearing    Oxygen  TTWB    PEG tube  NWB    Visual deficits  Other:    Hearing deficits           Vitals Value   x Room air      Oxygen (L)     Blood pressure     Heart rate         Treatment Objectives:     Mobility Training:    Mobility task Assist level Comments    Bed mobility SBA Transfer training supine to sit SBA using bedrail with HOB elevated.   Balance training     Transfer CGA Transfer training bed to wc using RW, CGA and verbal cuing for proper hand placement    Functional mobility     Sit to stand transitions CGA Transfer training bed to RW, pt required 3 rocking attempted to weight shift forward to rise.   Other:       ADL Training:    ADL Assist level Comments    Feeding     Grooming/hygiene Setup ADL grooming performed sitting in WC; setup with oral care and shaving with razor.   Bathing Min ADL bathing training performed sitting at EOB for sponge bath.  After setup, pt able to perform with assist with back and bilat feet. CGA during standing for lulu and bottom area.   Upper body dressing Min Tie gown   Lower body dressing Mod  Pt able to doff pull up and bilat slipper socks, assist to thread new pull up over left foot and royer bilat slipper socks.    Toileting     Toilet transfer     Adaptive equipment training     Other:         Additional Treatment:  Reinforced call bell function and call before you fall.    Assessment: Patient tolerated session fair.  Pt continues to require assist with bathing and dressing activities.  Pt to transition to swing bed this afternoon for cont therapy secondary to go home alone.    OT Plan: Cont POC.      GOALS:   Multidisciplinary Problems       Occupational Therapy Goals          Problem: Occupational Therapy    Goal Priority Disciplines Outcome Interventions   Occupational Therapy Goal     OT, PT/OT Ongoing, Progressing    Description:   Patient will increase functional independence with ADLs by performing:    Toileting from toilet with Modified [place] for hygiene and clothing management.   Bathing from  shower chair/bench with Modified [place].  Toilet transfer to toilet with Modified [place].                         Communication with Treatment Team:     Discharge Recommendations: home health OT  Discharge Equipment Recommendations:  none  Barriers to discharge:  Decreased caregiver support      At end of treatment, patient remained:  x Up in chair     In bed   x With alarm activated    Bed rails up   x Call bell in reach     Family/friends present    Restraints secured properly    In bathroom with CNA/RN notified    In gym with PT/PTA/tech   x Nurse aware    Other:         OT Date of Treatment: 03/23/23  OT Start Time: 0900  OT Stop Time: 0935  OT Total Time (min): 35 min    Billable Minutes:Self Care/Home Management 35      3/23/2023

## 2023-03-23 NOTE — PROGRESS NOTES
[facility] - Medical Surgical Unit  [facility]  Progress Note    Patient Name: Nathen Morales  MRN: [id number]  Patient Class: OP- Observation   Admission Date: 3/18/2023  Length of Stay: 0 days  Attending Physician: Terrie Coronado MD  Primary Care Provider: Brock Brown MD        Subjective:     Principal Problem:Acute UTI        HPI:  HPI: Nathen Morales is a 72 y.o. male who is a patient of   has a past medical history of BPH (benign prostatic hyperplasia), Hypertension, and Seizures.. The patient presented to [facility] on 3/18/2023 with a primary complaint of generalized weakness and two episode of nausea and vomiting this morning, his friend /neighbour called ems and was brought in to the ER . Workup in the er - ekg- 1st degree av block, no st segment elevation or depression and normal t waves . Cxr-nl. Ct head- normal . Labs -cr 1.51, bun 32 . Ua suggestive of uti. Wbc 6.3 . Lactic acid normal. Pt has been feeling very weak and difficulty walking lately. No focal deficits. Pt admitted under hospitalist services for further treatment. Blood and urine cx were collected in the er and was started on iv rocephin and IVF . He denies any chest pain, sob, orthopnea, pnd or swelling. Follows w cardiology and is scheduled for stress test next week due to him having chest pains few weeks ago. Denies any chest pain now       Overview/Hospital Course:  03/20/2023:  Patient seen on rounds today sitting in bedside wheelchair.  He continues to have right knee pain.  Patient evaluated by PT earlier today.    03/21/2023: Patient seen on rounds today eating supper in bed.  Patient did participate with PT earlier today.  Patient without any complaints    03/22/2023:  Patient seen on rounds today standing up at bedside with PT at side.  Patient rested well last p.m. and without any complaints    03/23/2023:  Patient seen on rounds today lying in bed.  He participated with PT earlier today.   He reports some BM earlier this a.m.      Interval History:     Review of Systems  Constitutional: Negative.    Respiratory: Negative.     Cardiovascular: Negative.    Neurological:  Positive for weakness.   Psychiatric/Behavioral: Negative.     Objective:     Vital Signs (Most Recent):  Temp: 98 °F (36.7 °C) (03/23/23 1202)  Pulse: 71 (03/23/23 1202)  Resp: 20 (03/23/23 0322)  BP: (!) 164/75 (03/23/23 1202)  SpO2: 100 % (03/23/23 1202)   Vital Signs (24h Range):  Temp:  [97.8 °F (36.6 °C)-99.2 °F (37.3 °C)] 98 °F (36.7 °C)  Pulse:  [3-86] 71  Resp:  [18-20] 20  SpO2:  [94 %-100 %] 100 %  BP: (151-198)/(67-86) 164/75     Weight: 95.4 kg (210 lb 5.1 oz)  Body mass index is 27.75 kg/m².    Intake/Output Summary (Last 24 hours) at 3/23/2023 1247  Last data filed at 3/23/2023 0008  Gross per 24 hour   Intake 720 ml   Output 4 ml   Net 716 ml      Physical Exam  Constitutional:       General: He is not in acute distress.     Appearance: Normal appearance.   Cardiovascular:      Rate and Rhythm: Normal rate and regular rhythm.   Pulmonary:      Effort: Pulmonary effort is normal.      Breath sounds: Normal breath sounds.   Abdominal:      General: Abdomen is flat. Bowel sounds are normal.      Palpations: Abdomen is soft.   Musculoskeletal:      Comments: Left upper extremity:  Edema present from hand to forearm, IV has been removed   Neurological:      Mental Status: He is alert.   Psychiatric:         Mood and Affect: Mood normal.         Behavior: Behavior normal.         Thought Content: Thought content normal.         Judgment: Judgment normal.       Significant Labs: All pertinent labs within the past 24 hours have been reviewed.  BMP:   Recent Labs   Lab 03/23/23  0505      K 4.0   CO2 17*   BUN 35.0*   CREATININE 1.70*   CALCIUM 8.7*       Significant Imaging: I have reviewed all pertinent imaging results/findings within the past 24 hours.      Assessment/Plan:      * Acute UTI  Urine cultures positive for  Enterococcus faecalis  Continue Levaquin 500 mg IV q.day      Dehydration  Continue normal saline at 75 cc/hour   Repeat BMP in a.m.        Acute pain of right knee  NSAIDs p.r.n.        Weakness  Continue PT/OT    Seizure disorder  Continue home medication      Hypertension  BP log reviewed   Continue current medication   Monitor BP      VTE Risk Mitigation (From admission, onward)         Ordered     enoxaparin injection 40 mg  Daily         03/18/23 1858     IP VTE HIGH RISK PATIENT  Once         03/18/23 1858                Discharge Planning   DILSHAD:      Code Status: Full Code   Is the patient medically ready for discharge?:     Reason for patient still in hospital (select all that apply): Patient trending condition  Discharge Plan A: Home Health   Discharge Delays: None known at this time              Brock Brown MD  Department of Hospital Medicine   [facility] - [facility] Surgical Unit

## 2023-03-23 NOTE — PLAN OF CARE
Problem: Adult Inpatient Plan of Care  Goal: Plan of Care Review  Outcome: Ongoing, Progressing  Goal: Patient-Specific Goal (Individualized)  Outcome: Ongoing, Progressing  Goal: Absence of Hospital-Acquired Illness or Injury  Outcome: Ongoing, Progressing  Goal: Optimal Comfort and Wellbeing  Outcome: Ongoing, Progressing  Goal: Readiness for Transition of Care  Outcome: Ongoing, Progressing     Problem: Hypertension Comorbidity  Goal: Blood Pressure in Desired Range  Outcome: Ongoing, Progressing     Problem: Pain Chronic (Persistent) (Comorbidity Management)  Goal: Acceptable Pain Control and Functional Ability  Outcome: Ongoing, Progressing     Problem: Seizure Disorder Comorbidity  Goal: Maintenance of Seizure Control  Outcome: Ongoing, Progressing     Problem: Fall Injury Risk  Goal: Absence of Fall and Fall-Related Injury  Outcome: Ongoing, Progressing     Problem: Fluid Volume Deficit  Goal: Fluid Balance  Outcome: Ongoing, Progressing     Problem: Fatigue  Goal: Improved Activity Tolerance  Outcome: Ongoing, Progressing     Problem: UTI (Urinary Tract Infection)  Goal: Improved Infection Symptoms  Outcome: Ongoing, Progressing     Problem: Constipation  Goal: Effective Bowel Elimination  Outcome: Ongoing, Progressing

## 2023-03-23 NOTE — SUBJECTIVE & OBJECTIVE
Interval History:     Review of Systems  Constitutional: Negative.    Respiratory: Negative.     Cardiovascular: Negative.    Neurological:  Positive for weakness.   Psychiatric/Behavioral: Negative.     Objective:     Vital Signs (Most Recent):  Temp: 98 °F (36.7 °C) (03/23/23 1202)  Pulse: 71 (03/23/23 1202)  Resp: 20 (03/23/23 0322)  BP: (!) 164/75 (03/23/23 1202)  SpO2: 100 % (03/23/23 1202)   Vital Signs (24h Range):  Temp:  [97.8 °F (36.6 °C)-99.2 °F (37.3 °C)] 98 °F (36.7 °C)  Pulse:  [3-86] 71  Resp:  [18-20] 20  SpO2:  [94 %-100 %] 100 %  BP: (151-198)/(67-86) 164/75     Weight: 95.4 kg (210 lb 5.1 oz)  Body mass index is 27.75 kg/m².    Intake/Output Summary (Last 24 hours) at 3/23/2023 1247  Last data filed at 3/23/2023 0008  Gross per 24 hour   Intake 720 ml   Output 4 ml   Net 716 ml      Physical Exam  Constitutional:       General: He is not in acute distress.     Appearance: Normal appearance.   Cardiovascular:      Rate and Rhythm: Normal rate and regular rhythm.   Pulmonary:      Effort: Pulmonary effort is normal.      Breath sounds: Normal breath sounds.   Abdominal:      General: Abdomen is flat. Bowel sounds are normal.      Palpations: Abdomen is soft.   Musculoskeletal:      Comments: Left upper extremity:  Edema present from hand to forearm, IV has been removed   Neurological:      Mental Status: He is alert.   Psychiatric:         Mood and Affect: Mood normal.         Behavior: Behavior normal.         Thought Content: Thought content normal.         Judgment: Judgment normal.       Significant Labs: All pertinent labs within the past 24 hours have been reviewed.  BMP:   Recent Labs   Lab 03/23/23  0505      K 4.0   CO2 17*   BUN 35.0*   CREATININE 1.70*   CALCIUM 8.7*       Significant Imaging: I have reviewed all pertinent imaging results/findings within the past 24 hours.

## 2023-03-23 NOTE — PT/OT/SLP PROGRESS
Physical Therapy Treatment Note           Name: Nathen Morales    : 1950 (72 y.o.)  MRN: [id number]             Subjective Assessment:     No complaints  Lethargic   X Awake, alert, cooperative  Uncooperative    Agitated  c/o pain    Appropriate  c/o fatigue    Confused  Treated at bedside     Emotionally labile  Treated in gym/dept.    Impulsive  Other:    Flat affect       Pain/Comfort:    Location - Side 1: Right  Location 1: knee    Therapy Precautions:      Cognitive deficits  Spinal precautions    Collar - hard  Sternal precautions    Collar - soft   TLSO   X Fall risk  LSO    Hip precautions - posterior  Knee immobilizer    Hip precautions - anterior  WBAT    Impaired communication  Partial weightbearing    Oxygen  TTWB    PEG tube  NWB    Visual deficits  Other:    Hearing deficits             Mobility Training:     Assist Level Assistive Device Comments    Bed Mobility SBA  Semi-supine to sitting at EOB, EOB to supine. Bed railing utilized.     Sit to stand/Stand to sit SBA  Sit to stand/stand to sit performed from EOB using no AD.    Transfers      Gait SBA-CGA  Pt able to ambulate 200 ft with a step through gait pattern and slow gait speed. Pt continues to present with limited foot clearance bilaterally.  Some slight toe-ing out also noted. Pt slightly unsteady at times, but experienced no overt LOB.  Pt becoming more confident with ambulation and gait speed is improving.    Balance Training SBA-CGA  Pt performed exaggerated side stepping and backward walking near the hallway railing for safety. Pt was able to perform each activity without UE support.  No significant LOB observed. With backward walking, pt was able to demonstrated a step through pattern to PT's surprise.    Wheelchair Mobility      Stair Climbing      Car Transfer      Other:             Assessment:     Patient tolerated session fairly well and continues to progress toward his functional goals.  Per CM, pt will be  transitioned to swing bed for continued therapy later today.      GOALS:   Multidisciplinary Problems       Physical Therapy Goals          Problem: Physical Therapy    Goal Priority Disciplines Outcome Goal Variances Interventions   Physical Therapy Goal     PT, PT/OT Ongoing, Progressing     Description: Patient will increase functional independence with mobility by performin. Sit to stand transfer with Modified [place]  2. Gait  x 250 feet with Modified [place] using Rolling Walker.   3. Ascend/descend 3 stair with right Handrails Supervision.                           Discharge Recommendations:      home with home health     Discharge Equipment Recommendations:     none     At end of treatment, patient remained:      Up in chair    X In bed   X With alarm activated    Bed rails up   X Call bell in reach      Family/friends present     Restraints secured properly     In bathroom with CNA/RN notified     In gym with PT/PTA/tech     Nurse aware     Other:           PT Start Time: 1251     PT Stop Time: 1318  PT Total Time (min): 27 min     Billable Minutes: Gait Training 13 and Therapeutic Activity 14      2023

## 2023-03-24 VITALS
TEMPERATURE: 99 F | HEART RATE: 93 BPM | RESPIRATION RATE: 20 BRPM | WEIGHT: 210.31 LBS | DIASTOLIC BLOOD PRESSURE: 77 MMHG | HEIGHT: 73 IN | BODY MASS INDEX: 27.87 KG/M2 | OXYGEN SATURATION: 99 % | SYSTOLIC BLOOD PRESSURE: 148 MMHG

## 2023-03-24 PROBLEM — M25.561 ACUTE PAIN OF RIGHT KNEE: Status: RESOLVED | Noted: 2023-03-20 | Resolved: 2023-03-24

## 2023-03-24 PROBLEM — R53.1 WEAKNESS: Status: RESOLVED | Noted: 2023-03-20 | Resolved: 2023-03-24

## 2023-03-24 PROBLEM — E86.0 DEHYDRATION: Status: RESOLVED | Noted: 2023-03-21 | Resolved: 2023-03-24

## 2023-03-24 LAB
ANION GAP SERPL CALC-SCNC: 8 MEQ/L
BACTERIA BLD CULT: NORMAL
BACTERIA BLD CULT: NORMAL
BUN SERPL-MCNC: 38 MG/DL (ref 8.4–25.7)
CALCIUM SERPL-MCNC: 8.7 MG/DL (ref 8.8–10)
CHLORIDE SERPL-SCNC: 118 MMOL/L (ref 98–107)
CO2 SERPL-SCNC: 17 MMOL/L (ref 23–31)
CREAT SERPL-MCNC: 1.73 MG/DL (ref 0.73–1.18)
CREAT/UREA NIT SERPL: 22
GFR SERPLBLD CREATININE-BSD FMLA CKD-EPI: 41 MLS/MIN/1.73/M2
GLUCOSE SERPL-MCNC: 98 MG/DL (ref 82–115)
POTASSIUM SERPL-SCNC: 4.1 MMOL/L (ref 3.5–5.1)
SODIUM SERPL-SCNC: 143 MMOL/L (ref 136–145)

## 2023-03-24 PROCEDURE — 99238 HOSP IP/OBS DSCHRG MGMT 30/<: CPT | Mod: ,,, | Performed by: FAMILY MEDICINE

## 2023-03-24 PROCEDURE — 97530 THERAPEUTIC ACTIVITIES: CPT

## 2023-03-24 PROCEDURE — 97129 THER IVNTJ 1ST 15 MIN: CPT

## 2023-03-24 PROCEDURE — G0378 HOSPITAL OBSERVATION PER HR: HCPCS

## 2023-03-24 PROCEDURE — 80048 BASIC METABOLIC PNL TOTAL CA: CPT | Performed by: FAMILY MEDICINE

## 2023-03-24 PROCEDURE — 63600175 PHARM REV CODE 636 W HCPCS: Performed by: FAMILY MEDICINE

## 2023-03-24 PROCEDURE — 25000003 PHARM REV CODE 250: Performed by: FAMILY MEDICINE

## 2023-03-24 PROCEDURE — 25000003 PHARM REV CODE 250: Performed by: INTERNAL MEDICINE

## 2023-03-24 PROCEDURE — 94761 N-INVAS EAR/PLS OXIMETRY MLT: CPT

## 2023-03-24 PROCEDURE — 99238 PR HOSPITAL DISCHARGE DAY,<30 MIN: ICD-10-PCS | Mod: ,,, | Performed by: FAMILY MEDICINE

## 2023-03-24 PROCEDURE — 96365 THER/PROPH/DIAG IV INF INIT: CPT | Mod: 59

## 2023-03-24 RX ORDER — LEVOFLOXACIN 500 MG/1
500 TABLET, FILM COATED ORAL DAILY
Qty: 3 TABLET | Refills: 0 | Status: SHIPPED | OUTPATIENT
Start: 2023-03-24 | End: 2023-03-27

## 2023-03-24 RX ADMIN — LISINOPRIL 5 MG: 5 TABLET ORAL at 08:03

## 2023-03-24 RX ADMIN — OXCARBAZEPINE 900 MG: 300 TABLET, FILM COATED ORAL at 08:03

## 2023-03-24 RX ADMIN — ASPIRIN 81 MG CHEWABLE TABLET 81 MG: 81 TABLET CHEWABLE at 08:03

## 2023-03-24 RX ADMIN — DILTIAZEM HYDROCHLORIDE 120 MG: 120 CAPSULE, COATED, EXTENDED RELEASE ORAL at 08:03

## 2023-03-24 RX ADMIN — LEVETIRACETAM 1500 MG: 500 TABLET, FILM COATED ORAL at 08:03

## 2023-03-24 RX ADMIN — POLYVINYL ALCOHOL 1 DROP: 14 SOLUTION/ DROPS OPHTHALMIC at 08:03

## 2023-03-24 RX ADMIN — TOPIRAMATE 200 MG: 100 TABLET, FILM COATED ORAL at 08:03

## 2023-03-24 RX ADMIN — TAMSULOSIN HYDROCHLORIDE 0.4 MG: 0.4 CAPSULE ORAL at 08:03

## 2023-03-24 RX ADMIN — LEVOFLOXACIN 500 MG: 500 INJECTION, SOLUTION INTRAVENOUS at 09:03

## 2023-03-24 NOTE — DISCHARGE SUMMARY
[facility] - Medical Surgical Unit  [facility] Medicine  Discharge Summary      Patient Name: Nathen Morales  MRN: [id number]  TULIO: [id number]  Patient Class: OP- Observation  Admission Date: 3/18/2023  Hospital Length of Stay: 0 days  Discharge Date and Time:  03/24/2023 1:20 PM  Attending Physician: Terrie Coronado MD   Discharging Provider: Brock Brown MD  Primary Care Provider: Brock Brown MD    Primary Care Team: Networked reference to record PCT     HPI:   HPI: Nathen Morales is a 72 y.o. male who is a patient of   has a past medical history of BPH (benign prostatic hyperplasia), Hypertension, and Seizures.. The patient presented to [facility] on 3/18/2023 with a primary complaint of generalized weakness and two episode of nausea and vomiting this morning, his friend /neighbour called ems and was brought in to the ER . Workup in the er - ekg- 1st degree av block, no st segment elevation or depression and normal t waves . Cxr-nl. Ct head- normal . Labs -cr 1.51, bun 32 . Ua suggestive of uti. Wbc 6.3 . Lactic acid normal. Pt has been feeling very weak and difficulty walking lately. No focal deficits. Pt admitted under hospitalist services for further treatment. Blood and urine cx were collected in the er and was started on iv rocephin and IVF . He denies any chest pain, sob, orthopnea, pnd or swelling. Follows w cardiology and is scheduled for stress test next week due to him having chest pains few weeks ago. Denies any chest pain now       * No surgery found *      Hospital Course:   03/20/2023:  Patient seen on rounds today sitting in bedside wheelchair.  He continues to have right knee pain.  Patient evaluated by PT earlier today.    03/21/2023: Patient seen on rounds today eating supper in bed.  Patient did participate with PT earlier today.  Patient without any complaints    03/22/2023:  Patient seen on rounds today standing up at bedside with PT at side.  Patient  rested well last p.m. and without any complaints    03/23/2023:  Patient seen on rounds today lying in bed.  He participated with PT earlier today.  He reports some BM earlier this a.m.       Goals of Care Treatment Preferences:  Code Status: Full Code    Physical Exam  Constitutional:       Appearance: Normal appearance.   Cardiovascular:      Rate and Rhythm: Normal rate and regular rhythm.   Pulmonary:      Effort: Pulmonary effort is normal.      Breath sounds: Normal breath sounds.   Abdominal:      General: Abdomen is flat. Bowel sounds are normal.      Palpations: Abdomen is soft.   Musculoskeletal:         General: Normal range of motion.   Skin:     General: Skin is warm.   Neurological:      Mental Status: He is alert.   Psychiatric:         Mood and Affect: Mood normal.         Behavior: Behavior normal.         Thought Content: Thought content normal.         Judgment: Judgment normal.       Consults:     Neuro  Seizure disorder  Continue home medication      Cardiac/Vascular  Hypertension  Continue home medication    Renal/  * Acute UTI  Urine cultures positive for Enterococcus faecalis  Rx for Levaquin 500 mg sent to pharmacy to complete treatment      Final Active Diagnoses:    Diagnosis Date Noted POA    PRINCIPAL PROBLEM:  Acute UTI [N39.0] 03/20/2023 Unknown    Hypertension [I10] 05/31/2022 Yes    Seizure disorder [G40.909] 05/31/2022 Yes      Problems Resolved During this Admission:    Diagnosis Date Noted Date Resolved POA    Dehydration [E86.0] 03/21/2023 03/24/2023 Unknown    Weakness [R53.1] 03/20/2023 03/24/2023 Unknown    Acute pain of right knee [M25.561] 03/20/2023 03/24/2023 Unknown       Discharged Condition: good    Disposition:     Follow Up:   Follow-up Information     Brock Brown MD Follow up in 1 week(s).    Specialty: Family Medicine  Contact information:  [address] [place] [address] [phone]                       Patient Instructions:      WALKER  "FOR HOME USE     Order Specific Question Answer Comments   Type of Walker: Adult (5'4"-6'6")    With wheels? Yes    Height: 6' 1" (1.854 m)    Weight: 95.4 kg (210 lb 5.1 oz)    Length of need (1-99 months): 99    Does patient have medical equipment at home? shower chair    Please check all that apply: Patient is unable to safely ambulate without equipment.    Please check all that apply: Patient needs help to get in and out of chair.    Please check all that apply: Walker will be used for gait training.      Ambulatory referral/consult to Home Health   Standing Status: Future   Referral Priority: Routine Referral Type: Home Health   Referral Reason: Specialty Services Required   Requested Specialty: Home Health Services   Number of Visits Requested: 1       Significant Diagnostic Studies: Labs:   [facility]   Recent Labs   Lab 03/23/23  0505 03/24/23  0456    143   K 4.0 4.1   CO2 17* 17*   BUN 35.0* 38.0*   CREATININE 1.70* 1.73*   CALCIUM 8.7* 8.7*       Pending Diagnostic Studies:     None         Medications:  Reconciled Home Medications:      Medication List      START taking these medications    levoFLOXacin 500 MG tablet  Commonly known as: LEVAQUIN  Take 1 tablet (500 mg total) by mouth once daily. for 3 days        CONTINUE taking these medications    aspirin 81 mg Cap  Take 81 mg by mouth.     BACMIN 27 mg iron- 1 mg Tab  Generic drug: multivit, min no.20-iron-folic  TAKE 1 TABLET BY MOUTH DAILY     docusate sodium 100 MG capsule  Commonly known as: COLACE  Take 100 mg by mouth.     levETIRAcetam 1000 MG tablet  Commonly known as: KEPPRA  Take 1,500 mg by mouth 2 (two) times daily.     multivitamin tablet  Commonly known as: THERAGRAN  Take 1 tablet by mouth once daily.     OXcarbazepine 600 MG Tab  Commonly known as: TRILEPTAL  Take 900 mg by mouth 2 (two) times daily.     rosuvastatin 10 MG tablet  Commonly known as: CRESTOR  10 mg once daily.     sertraline 50 MG tablet  Commonly known as: ZOLOFT  Take " 50 mg by mouth.     tamsulosin 0.4 mg Cap  Commonly known as: FLOMAX  TAKE 1 CAPSULE BY MOUTH TWICE DAILY     TIADYLT  mg 24 hr capsule  Generic drug: diltiaZEM HCl  Take 120 mg by mouth once daily.     topiramate 200 MG Tab  Commonly known as: TOPAMAX  200 mg 2 (two) times daily.            Indwelling Lines/Drains at time of discharge:   Lines/Drains/Airways     None                 Time spent on the discharge of patient: 45 minutes         Brock Brown MD  Department of Hospital Medicine  [facility]

## 2023-03-24 NOTE — PROGRESS NOTES
Pharmacist Renal Dose Adjustment Note    Nathen Morales is a 72 y.o. male being treated with the medication Levetiracetam 1500 mg BID    Patient Data:    Vital Signs (Most Recent):  Temp: 98.1 °F (36.7 °C) (03/24/23 0800)  Pulse: 70 (03/24/23 0800)  Resp: 20 (03/24/23 0800)  BP: (!) 165/72 (03/24/23 0800)  SpO2: 96 % (03/24/23 0800)   Vital Signs (72h Range):  Temp:  [97.2 °F (36.2 °C)-99.5 °F (37.5 °C)]   Pulse:  [3-92]   Resp:  [18-20]   BP: (151-198)/(65-98)   SpO2:  [94 %-100 %]      Recent Labs   Lab 03/22/23  0440 03/23/23  0505 03/24/23  0456   CREATININE 1.75* 1.70* 1.73*     Serum creatinine: 1.73 mg/dL (H) 03/24/23 0456  Estimated creatinine clearance: 42.3 mL/min (A)    Medication:Levetiracetam dose: 1500 mg frequency BID will be changed to medication:Levetiracetam  dose:750 mg  frequency:BID    Pharmacist's Name: Maryjane Kelley  Pharmacist's Extension: [phone]

## 2023-03-24 NOTE — PT/OT/SLP DISCHARGE
Speech Language Pathology Discharge Summary    Nathen Morales  MRN: [id number]   Acute UTI     Date of Last Treatment Session: 03/24/23    Past Medical History:   Diagnosis Date    BPH (benign prostatic hyperplasia)     Hypertension     Seizures        Status at initiation of therapy: SLP diagnosis of Cognitive-Linguistic Impairment.  He presented with poor recall and reasoning skills. Some deficits may be premorbid as it appears Pt calls neighbors for help when needed.     Treatment Area(s):  Cognition    Goals:   Multidisciplinary Problems       SLP Goals          Problem: SLP    Goal Priority Disciplines Outcome   SLP Goal     SLP Adequate for Care Transition   Description: LTG:   Pt will demonstrate recall and reasoning/problems solving skills for ADLs, IADLs, and safety in home environment.    STGs:  1. Pt will recall and demonstrate safety strategies for mobility and ADLs with 75% acc for improved safety.  2. Pt will complete teach back of safety precautions with >90% acc with min cues for improved safety.  3. Pt will demonstrate recall and reasoning skills with personally relevant IADLs with adequate awareness of when to ask for assistance with 75% acc for increased independence with IADLs.                        Participation in Treatment (at discharge):  Cooperative    Functional Status at time of Discharge:    Cognition: Patient demonstrates minimal cognitive deficits.    Communication: Patient demonstrates minimal communication deficits.    Language: Patient demonstrates minimal language deficits.    Motor Speech: Patient demonstrates no motor speech deficits.    Swallow: Patient demonstrates no dysphagia.                     Clinical Bedside assessment was completed on 03/22/23                       Instrumental assessment was not completed                                Swallowing Guidelines: Patient tolerating  thin liquids and regular diet             no help required    Patient is discharged to  Home    SLP assisted Pt in making list of phone numbers for his cell phone case. Pt unable to program numbers and new learning with technology does not appear to be safest for carryover post-d/c. Pt had a list of numbers that he used when he needed assistance, but has lost it. Pt answered questions and problem solved how to get phone numbers. Outside of session, SLP types numbers and asked  to laminate. SLP brought phone number card back to Pt later in day.     Pt educated on progress in recall and reasoning since admit. Timing of responses and overa ll reduced confusion in noted in functional and structured tasks. Pt plans to d/c home today.     Bonnie Brewster MA, CCC-SLP  03/24/2023

## 2023-03-24 NOTE — PLAN OF CARE
03/24/23 1329   Final Note   Assessment Type Final Discharge Note   Anticipated Discharge Disposition Home   What phone number can be called within the next 1-3 days to see how you are doing after discharge? [phone]   Hospital Resources/Appts/Education Provided Appointments scheduled and added to AVS   Post-Acute Status   Post-Acute Authorization Home Health   Home Health Status Set-up Complete/Auth obtained   Coverage NSI   Patient choice form signed by patient/caregiver List with quality metrics by geographic area provided   Discharge Delays None known at this time

## 2023-03-24 NOTE — PLAN OF CARE
Problem: Adult Inpatient Plan of Care  Goal: Plan of Care Review  3/24/2023 0046 by Dallas Barrios RN  Outcome: Ongoing, Progressing  3/24/2023 0046 by Dallas Barrios RN  Outcome: Ongoing, Progressing  Goal: Patient-Specific Goal (Individualized)  3/24/2023 0046 by Dallas Barrios RN  Outcome: Ongoing, Progressing  3/24/2023 0046 by Dallas Barrios RN  Outcome: Ongoing, Progressing  Goal: Absence of Hospital-Acquired Illness or Injury  3/24/2023 0046 by Dallas Barrios RN  Outcome: Ongoing, Progressing  3/24/2023 0046 by Dallas Barrios RN  Outcome: Ongoing, Progressing  Goal: Optimal Comfort and Wellbeing  3/24/2023 0046 by Dallas Barrios RN  Outcome: Ongoing, Progressing  3/24/2023 0046 by Dallas Barrios RN  Outcome: Ongoing, Progressing  Goal: Readiness for Transition of Care  3/24/2023 0046 by Dallas Barrios RN  Outcome: Ongoing, Progressing  3/24/2023 0046 by Dallas Barrios RN  Outcome: Ongoing, Progressing     Problem: Hypertension Comorbidity  Goal: Blood Pressure in Desired Range  3/24/2023 0046 by Dallas Barrios RN  Outcome: Ongoing, Progressing  3/24/2023 0046 by Dallas Barrios RN  Outcome: Ongoing, Progressing     Problem: Pain Chronic (Persistent) (Comorbidity Management)  Goal: Acceptable Pain Control and Functional Ability  3/24/2023 0046 by Dallas Barrios RN  Outcome: Ongoing, Progressing  3/24/2023 0046 by Dallas Barrios RN  Outcome: Ongoing, Progressing     Problem: Seizure Disorder Comorbidity  Goal: Maintenance of Seizure Control  3/24/2023 0046 by Dallas Barrios RN  Outcome: Ongoing, Progressing  3/24/2023 0046 by Dallas Barrios RN  Outcome: Ongoing, Progressing     Problem: Fall Injury Risk  Goal: Absence of Fall and Fall-Related Injury  3/24/2023 0046 by Dallas Barrios RN  Outcome: Ongoing, Progressing  3/24/2023 0046 by Dallas Barriso RN  Outcome: Ongoing, Progressing     Problem: Fluid Volume Deficit  Goal: Fluid Balance  3/24/2023 0046 by Dallas Barrios RN  Outcome: Ongoing,  Progressing  3/24/2023 0046 by Dallas Barrios RN  Outcome: Ongoing, Progressing     Problem: Fatigue  Goal: Improved Activity Tolerance  3/24/2023 0046 by Dallas Barrios RN  Outcome: Ongoing, Progressing  3/24/2023 0046 by Dallas Barrios RN  Outcome: Ongoing, Progressing     Problem: UTI (Urinary Tract Infection)  Goal: Improved Infection Symptoms  3/24/2023 0046 by Dallas Barrios RN  Outcome: Ongoing, Progressing  3/24/2023 0046 by Dallas Barrios RN  Outcome: Ongoing, Progressing     Problem: Constipation  Goal: Effective Bowel Elimination  3/24/2023 0046 by Dallas Barrios RN  Outcome: Ongoing, Progressing  3/24/2023 0046 by Dallas Barrios RN  Outcome: Ongoing, Progressing

## 2023-03-24 NOTE — ASSESSMENT & PLAN NOTE
Urine cultures positive for Enterococcus faecalis  Rx for Levaquin 500 mg sent to pharmacy to complete treatment

## 2023-03-24 NOTE — PT/OT/SLP DISCHARGE
Physical Therapy Discharge Summary    Name: Nathen Morales  MRN: [id number]   Principal Problem: Acute UTI     Patient Discharged from acute Physical Therapy on 3-.  Please refer to prior PT noted date on 3- for functional status.     Assessment:     Patient appropriate for care in another setting.    Objective:     GOALS:   Multidisciplinary Problems       Physical Therapy Goals          Problem: Physical Therapy    Goal Priority Disciplines Outcome Goal Variances Interventions   Physical Therapy Goal     PT, PT/OT Adequate for Care Transition     Description: Patient will increase functional independence with mobility by performin. Sit to stand transfer with Modified [place]  2. Gait  x 250 feet with Modified [place] using Rolling Walker.   3. Ascend/descend 3 stair with right Handrails Supervision.                         Reasons for Discontinuation of Therapy Services  Transfer to alternate level of care.      Plan:     Patient Discharged to: Home with Home Health Service.      3/24/2023

## 2023-03-24 NOTE — PT/OT/SLP PROGRESS
Physical Therapy Treatment Note           Name: Nathen Morales    : 1950 (72 y.o.)  MRN: [id number]             Subjective Assessment:     No complaints  Lethargic   X Awake, alert, cooperative  Uncooperative    Agitated  c/o pain    Appropriate  c/o fatigue    Confused  Treated at bedside     Emotionally labile  Treated in gym/dept.    Impulsive  Other:    Flat affect       Pain/Comfort:    Pain Rating 1: 0/10    Therapy Precautions:      Cognitive deficits  Spinal precautions    Collar - hard  Sternal precautions    Collar - soft   TLSO   X Fall risk  LSO    Hip precautions - posterior  Knee immobilizer    Hip precautions - anterior  WBAT    Impaired communication  Partial weightbearing    Oxygen  TTWB    PEG tube  NWB    Visual deficits  Other:    Hearing deficits             Mobility Training:     Assist Level Assistive Device Comments    Bed Mobility Colin  Semi-supine to sitting at EOB   Sit to stand/Stand to sit Colin  Sit to stand/stand to sit from EOB. Pt sandip into standing with improved ease.    Transfers      Gait SBA  Pt able to ambulate a total of approximately  180 ft to and from the therapy gym. Pt demonstrated no LOB and appeared confident in his gait.  Pt aware that he will have a RW for home use as needed, but has demonstrated safety without this device.    Balance Training      Wheelchair Mobility      Stair Climbing SBA  Pt able to negotiate up and down 2 steps using the R sided railing. Pt demonstrated a reciprocal climbing pattern and experienced no difficulty with this skill.    Car Transfer      Other:             Assessment:     Patient tolerated session well and it has been determined that pt is prepare for D/C home today. Pt is functioning at or very near his baseline and will benefit from returning to his familiar home environment. HH will be ordered.  As previously mentioned, a RW has been provided for pt for use at home as needed. Pt is currently demonstrating safety  without the use of this device.  Pt expressed no questions or concerns regarding D/C. Pt's friend was present during the initial portion of today's session and stated that he would return to provide pt a ride home.  SLP present throughout session to observe pt's safety with D/C preparation.        GOALS:   Multidisciplinary Problems       Physical Therapy Goals          Problem: Physical Therapy    Goal Priority Disciplines Outcome Goal Variances Interventions   Physical Therapy Goal     PT, PT/OT Ongoing, Progressing     Description: Patient will increase functional independence with mobility by performin. Sit to stand transfer with Modified [place]  2. Gait  x 250 feet with Modified [place] using Rolling Walker.   3. Ascend/descend 3 stair with right Handrails Supervision.                           Discharge Recommendations:      home with home health     Discharge Equipment Recommendations:     none     At end of treatment, patient remained:     X Up in chair     In bed   X With alarm activated    Bed rails up   X Call bell in reach      Family/friends present     Restraints secured properly     In bathroom with CNA/RN notified     In gym with PT/PTA/tech     Nurse aware     Other:           PT Start Time: 1045     PT Stop Time: 1101  PT Total Time (min): 16 min     Billable Minutes: Therapeutic Activity 2023

## 2023-03-24 NOTE — PT/OT/SLP PROGRESS
Occupational Therapy  Treatment    Name: Nathen Morales    : 1950 (72 y.o.)  MRN: [id number]          TREATMENT SUMMARY AND RECOMMENDATIONS:      Subjective Assessment:   No complaints  Lethargic   x Awake, alert, cooperative  Uncooperative    Agitated x Flat affect    Appropriate  c/o fatigue    Confused  Treated at bedside     Emotionally liable  Treated in gym/dept.    Impulsive  Other:       Pain/Comfort:  Pain Rating 1: 0/10      Therapy Precautions:   x Cognitive deficits  Spinal precautions    Collar - hard  Sternal precautions    Collar - soft   TLSO   x Fall risk  LSO    Hip precautions - posterior  Knee immobilizer    Hip precautions - anterior  WBAT    Impaired communication  Partial weightbearing    Oxygen  TTWB    PEG tube  NWB    Visual deficits  Other:    Hearing deficits           Vitals Value   x Room air      Oxygen (L)     Blood pressure     Heart rate         Treatment Objectives:    Began session with LUE edema reduction retrograde massage secondary to recent onset of edema from infiltrated IV.  Performed LUE AAROM 1x10 shoulder and elbow all planes.  Educated pt on edema reduction techniques with elevation and hand pumps.  Pt able to demonstrate.    Additional Treatment:  Reinforced call bell function and call before you fall.    Assessment: Patient tolerated session well.  Pt seen BS supine in bed.  RN just started IV antibiotics and catheter is in right wrist.  Pt with history of multiple IV infiltrates did not transfer out of bed to protect IV.    OT Plan: Pt may DC home with home health today.      GOALS:   Multidisciplinary Problems       Occupational Therapy Goals          Problem: Occupational Therapy    Goal Priority Disciplines Outcome Interventions   Occupational Therapy Goal     OT, PT/OT Ongoing, Progressing    Description:   Patient will increase functional independence with ADLs by performing:    Toileting from toilet with Modified [place] for hygiene and clothing  management.   Bathing from  shower chair/bench with Modified [place].  Toilet transfer to toilet with Modified [place].                         Communication with Treatment Team:     Discharge Recommendations: home health OT  Discharge Equipment Recommendations:  none  Barriers to discharge:  Decreased caregiver support      At end of treatment, patient remained:   Up in chair    x In bed    With alarm activated    Bed rails up   x Call bell in reach     Family/friends present    Restraints secured properly    In bathroom with CNA/RN notified    In gym with PT/PTA/tech   x Nurse aware    Other:         OT Date of Treatment: 03/24/23  OT Start Time: 0930  OT Stop Time: 0945  OT Total Time (min): 15 min    Billable Minutes:Therapeutic Activity 15      3/24/2023

## 2023-03-24 NOTE — PLAN OF CARE
Problem: Adult Inpatient Plan of Care  Goal: Plan of Care Review  Outcome: Met  Goal: Patient-Specific Goal (Individualized)  Outcome: Met  Goal: Absence of Hospital-Acquired Illness or Injury  Outcome: Met  Goal: Optimal Comfort and Wellbeing  Outcome: Met  Goal: Readiness for Transition of Care  Outcome: Met     Problem: Hypertension Comorbidity  Goal: Blood Pressure in Desired Range  Outcome: Met     Problem: Pain Chronic (Persistent) (Comorbidity Management)  Goal: Acceptable Pain Control and Functional Ability  Outcome: Met     Problem: Seizure Disorder Comorbidity  Goal: Maintenance of Seizure Control  Outcome: Met     Problem: Fall Injury Risk  Goal: Absence of Fall and Fall-Related Injury  Outcome: Met     Problem: Fluid Volume Deficit  Goal: Fluid Balance  Outcome: Met     Problem: Fatigue  Goal: Improved Activity Tolerance  Outcome: Met     Problem: UTI (Urinary Tract Infection)  Goal: Improved Infection Symptoms  Outcome: Met     Problem: Constipation  Goal: Effective Bowel Elimination  Outcome: Met

## 2023-03-25 PROCEDURE — G0180 MD CERTIFICATION HHA PATIENT: HCPCS | Mod: ,,, | Performed by: FAMILY MEDICINE

## 2023-03-25 PROCEDURE — G0180 PR HOME HEALTH MD CERTIFICATION: ICD-10-PCS | Mod: ,,, | Performed by: FAMILY MEDICINE

## 2023-03-31 ENCOUNTER — OFFICE VISIT (OUTPATIENT)
Dept: FAMILY MEDICINE | Facility: CLINIC | Age: 73
End: 2023-03-31
Payer: MEDICARE

## 2023-03-31 VITALS
BODY MASS INDEX: 27.57 KG/M2 | TEMPERATURE: 97 F | HEIGHT: 73 IN | OXYGEN SATURATION: 99 % | DIASTOLIC BLOOD PRESSURE: 88 MMHG | SYSTOLIC BLOOD PRESSURE: 122 MMHG | HEART RATE: 79 BPM | WEIGHT: 208 LBS | RESPIRATION RATE: 18 BRPM

## 2023-03-31 DIAGNOSIS — N39.0 ACUTE UTI: Primary | ICD-10-CM

## 2023-03-31 PROCEDURE — 1160F PR REVIEW ALL MEDS BY PRESCRIBER/CLIN PHARMACIST DOCUMENTED: ICD-10-PCS | Mod: CPTII,,, | Performed by: FAMILY MEDICINE

## 2023-03-31 PROCEDURE — 3008F BODY MASS INDEX DOCD: CPT | Mod: CPTII,,, | Performed by: FAMILY MEDICINE

## 2023-03-31 PROCEDURE — 4010F ACE/ARB THERAPY RXD/TAKEN: CPT | Mod: CPTII,,, | Performed by: FAMILY MEDICINE

## 2023-03-31 PROCEDURE — 1160F RVW MEDS BY RX/DR IN RCRD: CPT | Mod: CPTII,,, | Performed by: FAMILY MEDICINE

## 2023-03-31 PROCEDURE — 99213 OFFICE O/P EST LOW 20 MIN: CPT | Mod: ,,, | Performed by: FAMILY MEDICINE

## 2023-03-31 PROCEDURE — 3008F PR BODY MASS INDEX (BMI) DOCUMENTED: ICD-10-PCS | Mod: CPTII,,, | Performed by: FAMILY MEDICINE

## 2023-03-31 PROCEDURE — 3288F FALL RISK ASSESSMENT DOCD: CPT | Mod: CPTII,,, | Performed by: FAMILY MEDICINE

## 2023-03-31 PROCEDURE — 99213 PR OFFICE/OUTPT VISIT, EST, LEVL III, 20-29 MIN: ICD-10-PCS | Mod: ,,, | Performed by: FAMILY MEDICINE

## 2023-03-31 PROCEDURE — 3079F PR MOST RECENT DIASTOLIC BLOOD PRESSURE 80-89 MM HG: ICD-10-PCS | Mod: CPTII,,, | Performed by: FAMILY MEDICINE

## 2023-03-31 PROCEDURE — 1101F PR PT FALLS ASSESS DOC 0-1 FALLS W/OUT INJ PAST YR: ICD-10-PCS | Mod: CPTII,,, | Performed by: FAMILY MEDICINE

## 2023-03-31 PROCEDURE — 1159F MED LIST DOCD IN RCRD: CPT | Mod: CPTII,,, | Performed by: FAMILY MEDICINE

## 2023-03-31 PROCEDURE — 3074F PR MOST RECENT SYSTOLIC BLOOD PRESSURE < 130 MM HG: ICD-10-PCS | Mod: CPTII,,, | Performed by: FAMILY MEDICINE

## 2023-03-31 PROCEDURE — 3074F SYST BP LT 130 MM HG: CPT | Mod: CPTII,,, | Performed by: FAMILY MEDICINE

## 2023-03-31 PROCEDURE — 3288F PR FALLS RISK ASSESSMENT DOCUMENTED: ICD-10-PCS | Mod: CPTII,,, | Performed by: FAMILY MEDICINE

## 2023-03-31 PROCEDURE — 1159F PR MEDICATION LIST DOCUMENTED IN MEDICAL RECORD: ICD-10-PCS | Mod: CPTII,,, | Performed by: FAMILY MEDICINE

## 2023-03-31 PROCEDURE — 3079F DIAST BP 80-89 MM HG: CPT | Mod: CPTII,,, | Performed by: FAMILY MEDICINE

## 2023-03-31 PROCEDURE — 1101F PT FALLS ASSESS-DOCD LE1/YR: CPT | Mod: CPTII,,, | Performed by: FAMILY MEDICINE

## 2023-03-31 PROCEDURE — 4010F PR ACE/ARB THEARPY RXD/TAKEN: ICD-10-PCS | Mod: CPTII,,, | Performed by: FAMILY MEDICINE

## 2023-03-31 NOTE — PROGRESS NOTES
Subjective:       Patient ID: Nathen Morales is a 72 y.o. male.    Chief Complaint: TCM UTI formerly [facility]       TCM    HPI: Nathen Morales is a 72 y.o. male who is a patient of   has a past medical history of BPH (benign prostatic hyperplasia), Hypertension, and Seizures.. The patient presented to [facility] on 3/18/2023 with a primary complaint of generalized weakness and two episode of nausea and vomiting this morning, his friend /neighbour called ems and was brought in to the ER . Workup in the er - ekg- 1st degree av block, no st segment elevation or depression and normal t waves . Cxr-nl. Ct head- normal . Labs -cr 1.51, bun 32 . Ua suggestive of uti. Wbc 6.3 . Lactic acid normal. Pt has been feeling very weak and difficulty walking lately. No focal deficits. Pt admitted under hospitalist services for further treatment. Blood and urine cx were collected in the er and was started on iv rocephin and IVF . He denies any chest pain, sob, orthopnea, pnd or swelling. Follows w cardiology and is scheduled for stress test next week due to him having chest pains few weeks ago. Denies any chest pain now         * No surgery found *       Hospital Course:   03/20/2023:  Patient seen on rounds today sitting in bedside wheelchair.  He continues to have right knee pain.  Patient evaluated by PT earlier today.     03/21/2023: Patient seen on rounds today eating supper in bed.  Patient did participate with PT earlier today.  Patient without any complaints     03/22/2023:  Patient seen on rounds today standing up at bedside with PT at side.  Patient rested well last p.m. and without any complaints     03/23/2023:  Patient seen on rounds today lying in bed.  He participated with PT earlier today.  He reports some BM earlier this a.m.    Discharged on 3/24/2023    Review of Systems   Constitutional: Negative.    Respiratory: Negative.     Cardiovascular: Negative.         Patient scheduled with outpatient Cardiology  "  Genitourinary:         Acute UTI: Recent admit for IV antibiotics.  Patient has completed outpatient p.o. antibiotics.  Patient without any complaints         Objective:      /88 (BP Location: Left arm, Patient Position: Sitting, BP Method: Large (Manual))   Pulse 79   Temp 97.1 °F (36.2 °C)   Resp 18   Ht 6' 1" (1.854 m)   Wt 94.3 kg (208 lb)   SpO2 99%   BMI 27.44 kg/m²    Physical Exam          Assessment:       Problem List Items Addressed This Visit          Renal/    Acute UTI - Primary          Plan:   1. Acute UTI  Resolved   Continue home health  Keep scheduled appointment with Cardiology       "

## 2023-04-19 ENCOUNTER — EXTERNAL HOME HEALTH (OUTPATIENT)
Dept: HOME HEALTH SERVICES | Facility: HOSPITAL | Age: 73
End: 2023-04-19
Payer: MEDICARE

## 2023-04-23 ENCOUNTER — HOSPITAL ENCOUNTER (EMERGENCY)
Facility: HOSPITAL | Age: 73
Discharge: HOME | End: 2023-04-23
Attending: GENERAL PRACTICE
Payer: MEDICARE

## 2023-04-23 VITALS
TEMPERATURE: 98 F | HEART RATE: 57 BPM | WEIGHT: 220 LBS | BODY MASS INDEX: 29.16 KG/M2 | DIASTOLIC BLOOD PRESSURE: 81 MMHG | OXYGEN SATURATION: 97 % | HEIGHT: 73 IN | SYSTOLIC BLOOD PRESSURE: 109 MMHG | RESPIRATION RATE: 22 BRPM

## 2023-04-23 DIAGNOSIS — R07.82 INTERCOSTAL PAIN: Primary | ICD-10-CM

## 2023-04-23 DIAGNOSIS — R07.9 CHEST PAIN: ICD-10-CM

## 2023-04-23 LAB
ALBUMIN SERPL-MCNC: 3.7 G/DL (ref 3.4–4.8)
ALBUMIN/GLOB SERPL: 1.4 RATIO (ref 1.1–2)
ALP SERPL-CCNC: 103 UNIT/L (ref 40–150)
ALT SERPL-CCNC: 8 UNIT/L (ref 0–55)
AST SERPL-CCNC: 11 UNIT/L (ref 5–34)
BASOPHILS # BLD AUTO: 0.06 X10(3)/MCL (ref 0–0.2)
BASOPHILS NFR BLD AUTO: 1.2 %
BILIRUBIN DIRECT+TOT PNL SERPL-MCNC: 0.3 MG/DL
BUN SERPL-MCNC: 21 MG/DL (ref 8.4–25.7)
CALCIUM SERPL-MCNC: 9.6 MG/DL (ref 8.8–10)
CHLORIDE SERPL-SCNC: 104 MMOL/L (ref 98–107)
CK MB SERPL-MCNC: 0.9 NG/ML
CK SERPL-CCNC: 35 U/L (ref 30–200)
CO2 SERPL-SCNC: 20 MMOL/L (ref 23–31)
CREAT SERPL-MCNC: 1.18 MG/DL (ref 0.73–1.18)
EOSINOPHIL # BLD AUTO: 0.23 X10(3)/MCL (ref 0–0.9)
EOSINOPHIL NFR BLD AUTO: 4.7 %
ERYTHROCYTE [DISTWIDTH] IN BLOOD BY AUTOMATED COUNT: 12.4 % (ref 11.5–17)
GFR SERPLBLD CREATININE-BSD FMLA CKD-EPI: >60 MLS/MIN/1.73/M2
GLOBULIN SER-MCNC: 2.6 GM/DL (ref 2.4–3.5)
GLUCOSE SERPL-MCNC: 99 MG/DL (ref 82–115)
HCT VFR BLD AUTO: 32 % (ref 42–52)
HGB BLD-MCNC: 11 G/DL (ref 14–18)
IMM GRANULOCYTES # BLD AUTO: 0.03 X10(3)/MCL (ref 0–0.04)
IMM GRANULOCYTES NFR BLD AUTO: 0.6 %
LYMPHOCYTES # BLD AUTO: 1.02 X10(3)/MCL (ref 0.6–4.6)
LYMPHOCYTES NFR BLD AUTO: 20.8 %
MCH RBC QN AUTO: 33.2 PG (ref 27–31)
MCHC RBC AUTO-ENTMCNC: 34.4 G/DL (ref 33–36)
MCV RBC AUTO: 96.7 FL (ref 80–94)
MONOCYTES # BLD AUTO: 0.45 X10(3)/MCL (ref 0.1–1.3)
MONOCYTES NFR BLD AUTO: 9.2 %
NEUTROPHILS # BLD AUTO: 3.11 X10(3)/MCL (ref 2.1–9.2)
NEUTROPHILS NFR BLD AUTO: 63.5 %
NRBC BLD AUTO-RTO: 0 %
PLATELET # BLD AUTO: 221 X10(3)/MCL (ref 130–400)
PMV BLD AUTO: 9 FL (ref 7.4–10.4)
POTASSIUM SERPL-SCNC: 4 MMOL/L (ref 3.5–5.1)
PROT SERPL-MCNC: 6.3 GM/DL (ref 5.8–7.6)
RBC # BLD AUTO: 3.31 X10(6)/MCL (ref 4.7–6.1)
SODIUM SERPL-SCNC: 134 MMOL/L (ref 136–145)
TROPONIN I SERPL-MCNC: <0.01 NG/ML (ref 0–0.04)
TSH SERPL-ACNC: 0.79 UIU/ML (ref 0.35–4.94)
WBC # SPEC AUTO: 4.9 X10(3)/MCL (ref 4.5–11.5)

## 2023-04-23 PROCEDURE — 84484 ASSAY OF TROPONIN QUANT: CPT | Performed by: GENERAL PRACTICE

## 2023-04-23 PROCEDURE — 82550 ASSAY OF CK (CPK): CPT | Performed by: GENERAL PRACTICE

## 2023-04-23 PROCEDURE — 85025 COMPLETE CBC W/AUTO DIFF WBC: CPT | Performed by: GENERAL PRACTICE

## 2023-04-23 PROCEDURE — 25000003 PHARM REV CODE 250: Performed by: GENERAL PRACTICE

## 2023-04-23 PROCEDURE — 99285 EMERGENCY DEPT VISIT HI MDM: CPT | Mod: 25

## 2023-04-23 PROCEDURE — 80053 COMPREHEN METABOLIC PANEL: CPT | Performed by: GENERAL PRACTICE

## 2023-04-23 PROCEDURE — 99900031 HC PATIENT EDUCATION (STAT)

## 2023-04-23 PROCEDURE — 93005 ELECTROCARDIOGRAM TRACING: CPT

## 2023-04-23 PROCEDURE — 84443 ASSAY THYROID STIM HORMONE: CPT | Performed by: GENERAL PRACTICE

## 2023-04-23 PROCEDURE — 82553 CREATINE MB FRACTION: CPT | Performed by: GENERAL PRACTICE

## 2023-04-23 RX ORDER — NAPROXEN 375 MG/1
375 TABLET ORAL 2 TIMES DAILY WITH MEALS
Qty: 60 TABLET | Refills: 0 | Status: SHIPPED | OUTPATIENT
Start: 2023-04-23 | End: 2023-06-05

## 2023-04-23 RX ORDER — ASPIRIN 325 MG
325 TABLET ORAL
Status: COMPLETED | OUTPATIENT
Start: 2023-04-23 | End: 2023-04-23

## 2023-04-23 RX ADMIN — ASPIRIN 325 MG ORAL TABLET 325 MG: 325 PILL ORAL at 01:04

## 2023-04-23 NOTE — ED NOTES
Pt to Ed via aasi with CP that started this AM, resolved on its own. Denies CP at this time. Pt was standing in kitchen when pain began. Denies SOB. Pt aaox4, gcs 15.

## 2023-04-23 NOTE — ED PROVIDER NOTES
Encounter Date: 4/23/2023       History     Chief Complaint   Patient presents with    Chest Pain     Chest pain this am while standing at stove, sharp to left chest wall.  Reports dizziness.       HPI  Review of patient's allergies indicates:  No Known Allergies  Past Medical History:   Diagnosis Date    BPH (benign prostatic hyperplasia)     Hypertension     Seizures      Past Surgical History:   Procedure Laterality Date    APPENDECTOMY      CHOLECYSTECTOMY      COLONOSCOPY  04/21/2016    Dr Brock Brown    TRANSURETHRAL RESECTION OF PROSTATE       Family History   Problem Relation Age of Onset    Hypertension Mother      Social History     Tobacco Use    Smoking status: Never    Smokeless tobacco: Never   Substance Use Topics    Alcohol use: Not Currently    Drug use: Not Currently     Review of Systems    Physical Exam     Initial Vitals [04/23/23 1319]   BP Pulse Resp Temp SpO2   (!) 165/86 60 20 98.2 °F (36.8 °C) 98 %      MAP       --         Physical Exam    Nursing note and vitals reviewed.  Constitutional: He appears well-developed and well-nourished.   HENT:   Head: Normocephalic and atraumatic.   Eyes: EOM are normal. Pupils are equal, round, and reactive to light.   Neck: Neck supple.   Normal range of motion.  Cardiovascular:  Normal rate, regular rhythm, normal heart sounds and intact distal pulses.           Pulmonary/Chest: Breath sounds normal.   Abdominal: Abdomen is soft. Bowel sounds are normal.   Musculoskeletal:         General: Normal range of motion.      Cervical back: Normal range of motion and neck supple.     Neurological: He is alert and oriented to person, place, and time. He has normal strength. GCS score is 15. GCS eye subscore is 4. GCS verbal subscore is 5. GCS motor subscore is 6.   Skin: Skin is warm and dry.   Psychiatric: He has a normal mood and affect. His behavior is normal. Judgment and thought content normal.       ED Course   Procedures  Labs Reviewed    COMPREHENSIVE METABOLIC PANEL - Abnormal; Notable for the following components:       Result Value    Sodium Level 134 (*)     Carbon Dioxide 20 (*)     All other components within normal limits   CBC WITH DIFFERENTIAL - Abnormal; Notable for the following components:    RBC 3.31 (*)     Hgb 11.0 (*)     Hct 32.0 (*)     MCV 96.7 (*)     MCH 33.2 (*)     All other components within normal limits   TROPONIN I - Normal   CK - Normal   CK-MB - Normal   TSH - Normal   CBC W/ AUTO DIFFERENTIAL    Narrative:     The following orders were created for panel order CBC auto differential.  Procedure                               Abnormality         Status                     ---------                               -----------         ------                     CBC with Differential[[id number]]        Abnormal            Final result                 Please view results for these tests on the individual orders.     EKG Readings: (Independently Interpreted)   Rhythm: Sinus Bradycardia. Heart Rate: 59. Ectopy: No Ectopy. Conduction: 1st Degree AV Block. ST Segments: Normal ST Segments. T Waves: Normal. Axis: Normal. Clinical Impression: Sinus Bradycardia     Imaging Results              X-Ray Chest PA And Lateral (Preliminary result)  Result time 04/23/23 14:20:37      Wet Read by Jim Guerrero MD (04/23/23 14:20:37, [facility] - Emergency Dept, Emergency Medicine)    Bronchiectasia, no acute cardiopulmonary process noted.                                     Medications   aspirin tablet 325 mg (325 mg Oral Given 4/23/23 1330)                              Clinical Impression:   Final diagnoses:  [R07.9] Chest pain  [R07.82] Intercostal pain (Primary)        ED Disposition Condition    Discharge Stable          ED Prescriptions       Medication Sig Dispense Start Date End Date Auth. Provider    naproxen (NAPROSYN) 375 MG tablet Take 1 tablet (375 mg total) by mouth 2 (two) times daily with meals. 60 tablet 4/23/2023 --  Jim Guerrero MD          Follow-up Information       Follow up With Specialties Details Why Contact Info    Brock Brown MD Family Medicine In 1 day  [address]  [place] [address]  [phone]               Jim Guerrero MD  04/23/23 2713

## 2023-05-17 DIAGNOSIS — Z11.4 ENCOUNTER FOR SCREENING FOR HIV: ICD-10-CM

## 2023-05-17 DIAGNOSIS — Z00.00 WELLNESS EXAMINATION: Primary | ICD-10-CM

## 2023-05-17 DIAGNOSIS — Z13.6 SCREENING FOR ISCHEMIC HEART DISEASE: ICD-10-CM

## 2023-05-17 DIAGNOSIS — Z12.5 SCREENING FOR MALIGNANT NEOPLASM OF PROSTATE: ICD-10-CM

## 2023-05-17 DIAGNOSIS — Z11.59 NEED FOR HEPATITIS C SCREENING TEST: ICD-10-CM

## 2023-05-24 PROCEDURE — G0179 MD RECERTIFICATION HHA PT: HCPCS | Mod: ,,, | Performed by: FAMILY MEDICINE

## 2023-05-24 PROCEDURE — G0179 PR HOME HEALTH MD RECERTIFICATION: ICD-10-PCS | Mod: ,,, | Performed by: FAMILY MEDICINE

## 2023-05-30 ENCOUNTER — LAB REQUISITION (OUTPATIENT)
Dept: LAB | Facility: HOSPITAL | Age: 73
End: 2023-05-30
Payer: MEDICARE

## 2023-05-30 DIAGNOSIS — R29.6 REPEATED FALLS: ICD-10-CM

## 2023-05-30 DIAGNOSIS — G40.909 EPILEPSY, UNSPECIFIED, NOT INTRACTABLE, WITHOUT STATUS EPILEPTICUS: ICD-10-CM

## 2023-05-30 DIAGNOSIS — F03.93 UNSPECIFIED DEMENTIA, UNSPECIFIED SEVERITY, WITH MOOD DISTURBANCE: ICD-10-CM

## 2023-05-30 DIAGNOSIS — I10 ESSENTIAL (PRIMARY) HYPERTENSION: ICD-10-CM

## 2023-05-30 DIAGNOSIS — N17.9 ACUTE KIDNEY FAILURE, UNSPECIFIED: ICD-10-CM

## 2023-05-30 DIAGNOSIS — E78.5 HYPERLIPIDEMIA, UNSPECIFIED: ICD-10-CM

## 2023-05-30 LAB
ALBUMIN SERPL-MCNC: 3.6 G/DL (ref 3.4–4.8)
ALBUMIN/GLOB SERPL: 1.6 RATIO (ref 1.1–2)
ALP SERPL-CCNC: 98 UNIT/L (ref 40–150)
ALT SERPL-CCNC: 10 UNIT/L (ref 0–55)
AST SERPL-CCNC: 13 UNIT/L (ref 5–34)
BASOPHILS # BLD AUTO: 0.1 X10(3)/MCL
BASOPHILS NFR BLD AUTO: 1.4 %
BILIRUBIN DIRECT+TOT PNL SERPL-MCNC: 0.4 MG/DL
BUN SERPL-MCNC: 21 MG/DL (ref 8.4–25.7)
CALCIUM SERPL-MCNC: 9.4 MG/DL (ref 8.8–10)
CHLORIDE SERPL-SCNC: 105 MMOL/L (ref 98–107)
CHOLEST SERPL-MCNC: 157 MG/DL
CHOLEST/HDLC SERPL: 2 {RATIO} (ref 0–5)
CO2 SERPL-SCNC: 23 MMOL/L (ref 23–31)
CREAT SERPL-MCNC: 1 MG/DL (ref 0.73–1.18)
EOSINOPHIL # BLD AUTO: 0.56 X10(3)/MCL (ref 0–0.9)
EOSINOPHIL NFR BLD AUTO: 7.9 %
ERYTHROCYTE [DISTWIDTH] IN BLOOD BY AUTOMATED COUNT: 13.2 % (ref 11.5–17)
GFR SERPLBLD CREATININE-BSD FMLA CKD-EPI: >60 MLS/MIN/1.73/M2
GLOBULIN SER-MCNC: 2.2 GM/DL (ref 2.4–3.5)
GLUCOSE SERPL-MCNC: 100 MG/DL (ref 82–115)
HAV IGM SERPL QL IA: NONREACTIVE
HBV CORE IGM SERPL QL IA: NONREACTIVE
HBV SURFACE AG SERPL QL IA: NONREACTIVE
HCT VFR BLD AUTO: 32.5 % (ref 42–52)
HCV AB SERPL QL IA: NONREACTIVE
HDLC SERPL-MCNC: 70 MG/DL (ref 35–60)
HGB BLD-MCNC: 11 G/DL (ref 14–18)
HIV 1+2 AB+HIV1 P24 AG SERPL QL IA: NONREACTIVE
IMM GRANULOCYTES # BLD AUTO: 0.04 X10(3)/MCL (ref 0–0.04)
IMM GRANULOCYTES NFR BLD AUTO: 0.6 %
INR BLD: 1 (ref 0–1.3)
LDLC SERPL CALC-MCNC: 78 MG/DL (ref 50–140)
LYMPHOCYTES # BLD AUTO: 1 X10(3)/MCL (ref 0.6–4.6)
LYMPHOCYTES NFR BLD AUTO: 14.1 %
MCH RBC QN AUTO: 33.1 PG (ref 27–31)
MCHC RBC AUTO-ENTMCNC: 33.8 G/DL (ref 33–36)
MCV RBC AUTO: 97.9 FL (ref 80–94)
MONOCYTES # BLD AUTO: 0.62 X10(3)/MCL (ref 0.1–1.3)
MONOCYTES NFR BLD AUTO: 8.8 %
NEUTROPHILS # BLD AUTO: 4.75 X10(3)/MCL (ref 2.1–9.2)
NEUTROPHILS NFR BLD AUTO: 67.2 %
NRBC BLD AUTO-RTO: 0 %
PLATELET # BLD AUTO: 294 X10(3)/MCL (ref 130–400)
PMV BLD AUTO: 9.6 FL (ref 7.4–10.4)
POTASSIUM SERPL-SCNC: 4.7 MMOL/L (ref 3.5–5.1)
PROT SERPL-MCNC: 5.8 GM/DL (ref 5.8–7.6)
PROTHROMBIN TIME: 9.9 SECONDS (ref 9.1–10.9)
PSA SERPL-MCNC: 0.02 NG/ML
RBC # BLD AUTO: 3.32 X10(6)/MCL (ref 4.7–6.1)
SODIUM SERPL-SCNC: 134 MMOL/L (ref 136–145)
TRIGL SERPL-MCNC: 45 MG/DL (ref 34–140)
VLDLC SERPL CALC-MCNC: 9 MG/DL
WBC # SPEC AUTO: 7.07 X10(3)/MCL (ref 4.5–11.5)

## 2023-05-30 PROCEDURE — 84153 ASSAY OF PSA TOTAL: CPT | Performed by: FAMILY MEDICINE

## 2023-05-30 PROCEDURE — 80061 LIPID PANEL: CPT | Performed by: FAMILY MEDICINE

## 2023-05-30 PROCEDURE — 85610 PROTHROMBIN TIME: CPT | Performed by: FAMILY MEDICINE

## 2023-05-30 PROCEDURE — 85025 COMPLETE CBC W/AUTO DIFF WBC: CPT | Performed by: FAMILY MEDICINE

## 2023-05-30 PROCEDURE — 87389 HIV-1 AG W/HIV-1&-2 AB AG IA: CPT | Performed by: FAMILY MEDICINE

## 2023-05-30 PROCEDURE — 80053 COMPREHEN METABOLIC PANEL: CPT | Performed by: FAMILY MEDICINE

## 2023-05-30 PROCEDURE — 80074 ACUTE HEPATITIS PANEL: CPT | Performed by: FAMILY MEDICINE

## 2023-05-31 ENCOUNTER — EXTERNAL HOME HEALTH (OUTPATIENT)
Dept: HOME HEALTH SERVICES | Facility: HOSPITAL | Age: 73
End: 2023-05-31
Payer: MEDICARE

## 2023-05-31 ENCOUNTER — TELEPHONE (OUTPATIENT)
Dept: FAMILY MEDICINE | Facility: CLINIC | Age: 73
End: 2023-05-31
Payer: MEDICARE

## 2023-05-31 DIAGNOSIS — E78.5 HYPERLIPIDEMIA, UNSPECIFIED HYPERLIPIDEMIA TYPE: Primary | ICD-10-CM

## 2023-05-31 NOTE — TELEPHONE ENCOUNTER
----- Message from Sole Carter sent at 5/31/2023  2:18 PM CDT -----  Regarding: Labs  .Caller is requesting to schedule their Lab appointment prior to annual appointment.  Order is not listed in EPIC.  Please enter order and contact patient to schedule.    Name of Caller:Miesha  - Nursing speciality     Preferred Date and Time of Labs:    Date of EPP Appointment:    Where would they like the lab performed?    Would the patient rather a call back or a response via [facility]?     Best Call Back Number:[phone] fax [phone]    Additional Information:checking if labs was received fax over this morning

## 2023-06-01 LAB — PATH REV: NORMAL

## 2023-06-05 ENCOUNTER — OFFICE VISIT (OUTPATIENT)
Dept: FAMILY MEDICINE | Facility: CLINIC | Age: 73
End: 2023-06-05
Payer: MEDICARE

## 2023-06-05 VITALS
RESPIRATION RATE: 18 BRPM | TEMPERATURE: 97 F | BODY MASS INDEX: 28.2 KG/M2 | DIASTOLIC BLOOD PRESSURE: 78 MMHG | HEART RATE: 73 BPM | OXYGEN SATURATION: 99 % | WEIGHT: 212.81 LBS | HEIGHT: 73 IN | SYSTOLIC BLOOD PRESSURE: 136 MMHG

## 2023-06-05 DIAGNOSIS — R07.9 CHEST PAIN, UNSPECIFIED TYPE: ICD-10-CM

## 2023-06-05 DIAGNOSIS — Z00.00 WELLNESS EXAMINATION: Primary | ICD-10-CM

## 2023-06-05 PROCEDURE — G0439 PPPS, SUBSEQ VISIT: HCPCS | Mod: ,,, | Performed by: FAMILY MEDICINE

## 2023-06-05 PROCEDURE — 1160F RVW MEDS BY RX/DR IN RCRD: CPT | Mod: CPTII,,, | Performed by: FAMILY MEDICINE

## 2023-06-05 PROCEDURE — 4010F PR ACE/ARB THEARPY RXD/TAKEN: ICD-10-PCS | Mod: CPTII,,, | Performed by: FAMILY MEDICINE

## 2023-06-05 PROCEDURE — 1159F MED LIST DOCD IN RCRD: CPT | Mod: CPTII,,, | Performed by: FAMILY MEDICINE

## 2023-06-05 PROCEDURE — G0439 PR MEDICARE ANNUAL WELLNESS SUBSEQUENT VISIT: ICD-10-PCS | Mod: ,,, | Performed by: FAMILY MEDICINE

## 2023-06-05 PROCEDURE — 4010F ACE/ARB THERAPY RXD/TAKEN: CPT | Mod: CPTII,,, | Performed by: FAMILY MEDICINE

## 2023-06-05 PROCEDURE — 1101F PT FALLS ASSESS-DOCD LE1/YR: CPT | Mod: CPTII,,, | Performed by: FAMILY MEDICINE

## 2023-06-05 PROCEDURE — 3075F PR MOST RECENT SYSTOLIC BLOOD PRESS GE 130-139MM HG: ICD-10-PCS | Mod: CPTII,,, | Performed by: FAMILY MEDICINE

## 2023-06-05 PROCEDURE — 1101F PR PT FALLS ASSESS DOC 0-1 FALLS W/OUT INJ PAST YR: ICD-10-PCS | Mod: CPTII,,, | Performed by: FAMILY MEDICINE

## 2023-06-05 PROCEDURE — 1160F PR REVIEW ALL MEDS BY PRESCRIBER/CLIN PHARMACIST DOCUMENTED: ICD-10-PCS | Mod: CPTII,,, | Performed by: FAMILY MEDICINE

## 2023-06-05 PROCEDURE — 3078F PR MOST RECENT DIASTOLIC BLOOD PRESSURE < 80 MM HG: ICD-10-PCS | Mod: CPTII,,, | Performed by: FAMILY MEDICINE

## 2023-06-05 PROCEDURE — 3078F DIAST BP <80 MM HG: CPT | Mod: CPTII,,, | Performed by: FAMILY MEDICINE

## 2023-06-05 PROCEDURE — 1159F PR MEDICATION LIST DOCUMENTED IN MEDICAL RECORD: ICD-10-PCS | Mod: CPTII,,, | Performed by: FAMILY MEDICINE

## 2023-06-05 PROCEDURE — 3288F PR FALLS RISK ASSESSMENT DOCUMENTED: ICD-10-PCS | Mod: CPTII,,, | Performed by: FAMILY MEDICINE

## 2023-06-05 PROCEDURE — 3075F SYST BP GE 130 - 139MM HG: CPT | Mod: CPTII,,, | Performed by: FAMILY MEDICINE

## 2023-06-05 PROCEDURE — 3288F FALL RISK ASSESSMENT DOCD: CPT | Mod: CPTII,,, | Performed by: FAMILY MEDICINE

## 2023-06-05 NOTE — PROGRESS NOTES
Patient ID: [id number]     Chief Complaint: wellness and occasional chest pain- scheduled with Cardiologist tomorrow      HPI:     Nathen Morales is a 73 y.o. male here today for a Medicare Wellness.       Opioid Screening: Patient medication list reviewed, patient is not taking prescription opioids. Patient is not using additional opioids than prescribed. Patient is not at low risk of substance abuse based on this opioid use history.       ----------------------------  BPH (benign prostatic hyperplasia)  Hypertension  Seizures     Past Surgical History:   Procedure Laterality Date    APPENDECTOMY      CHOLECYSTECTOMY      COLONOSCOPY  04/21/2016    Dr Brock Brown    TRANSURETHRAL RESECTION OF PROSTATE         Review of patient's allergies indicates:  No Known Allergies    Outpatient Medications Marked as Taking for the 6/5/23 encounter (Office Visit) with Brock Brown MD   Medication Sig Dispense Refill    aspirin 81 mg Cap Take 81 mg by mouth.      docusate sodium (COLACE) 100 MG capsule Take 100 mg by mouth.      levETIRAcetam (KEPPRA) 1000 MG tablet Take 1,500 mg by mouth 2 (two) times daily.      multivitamin (THERAGRAN) tablet Take 1 tablet by mouth once daily.      OXcarbazepine (TRILEPTAL) 600 MG Tab Take 900 mg by mouth 2 (two) times daily.      rosuvastatin (CRESTOR) 10 MG tablet 10 mg once daily.      tamsulosin (FLOMAX) 0.4 mg Cap TAKE 1 CAPSULE BY MOUTH TWICE DAILY 180 capsule 3    TIADYLT  mg 24 hr capsule Take 120 mg by mouth once daily.      topiramate (TOPAMAX) 200 MG Tab 200 mg 2 (two) times daily.         Social History     Socioeconomic History    Marital status:    Tobacco Use    Smoking status: Never    Smokeless tobacco: Never   Substance and Sexual Activity    Alcohol use: Not Currently    Drug use: Not Currently     Social Determinants of Health     Financial Resource Strain: Medium Risk    Difficulty of Paying Living Expenses: Somewhat hard   Food Insecurity: No  Food Insecurity    Worried About Running Out of Food in the Last Year: Never true    Ran Out of Food in the Last Year: Never true   Transportation Needs: No Transportation Needs    Lack of Transportation (Medical): No    Lack of Transportation (Non-Medical): No   Physical Activity: Inactive    Days of Exercise per Week: 0 days    Minutes of Exercise per Session: 0 min   Stress: Stress Concern Present    Feeling of Stress : To some extent   Social Connections: Moderately Isolated    Frequency of Communication with Friends and Family: More than three times a week    Frequency of Social Gatherings with Friends and Family: More than three times a week    Attends [employer] Services: More than 4 times per year    Active Member of Clubs or Organizations: No    Attends Club or Organization Meetings: Never    Marital Status: Never    Housing Stability: Low Risk     Unable to Pay for Housing in the Last Year: No    Number of Places Lived in the Last Year: 1    Unstable Housing in the Last Year: No        Family History   Problem Relation Age of Onset    Hypertension Mother         Patient Care Team:  Brock Brown MD as PCP - General (Family Medicine)  Willie Bender MD as Consulting Physician (Neurology)  Usman King MD as Consulting Physician (Urology)       Subjective:     Review of Systems   Constitutional: Negative.    Respiratory: Negative.     Cardiovascular:  Positive for chest pain (scheduled for ECHO and LHC with Dr Rcohe).   Gastrointestinal: Negative.    Psychiatric/Behavioral: Negative.         Patient Reported Health Risk Assessment  What [age] 70-79  Are you male or female?: Male  During the past four weeks, how much have you been bothered by emotional problems such as feeling anxious, depressed, irritable, sad, or downhearted and blue?: Not at all  During the past five weeks, has your physical and/or emotional health limited your social activities with family, friends, neighbors,  or groups?: Not at all  During the past four weeks, how much bodily pain have you generally had?: No pain  During the past four weeks, was someone available to help if you needed and wanted help?: Yes, as much as I wanted  During the past four weeks, what was the hardest physical activity you could do for at least two minutes?: Very light  Can you get to places out of walking distance without help?  (For example, can you travel alone on buses or taxis, or drive your own car?): Yes  Can you go shopping for groceries or clothes without someone's help?: Yes  Can you prepare your own meals?: Yes  Can you do your own housework without help?: Yes  Because of any health problems, do you need the help of another person with your personal care needs such as eating, bathing, dressing, or getting around the house?: No  Can you handle your own money without help?: Yes  During the past four weeks, how would you rate your health in general?: Good  How have things been going for you during the past four weeks?: Good and bad parts about equal  Are you having difficulties driving your car?: Yes, often  Do you always fasten your seat belt when you are in a car?: Yes, sometimes  How often in the past four weeks have you been bothered by falling or dizzy when standing up?: Seldom  How often in the past four weeks have you been bothered by sexual problems?: Never  How often in the past four weeks have you been bothered by trouble eating well?: Never  How often in the past four weeks have you been bothered by teeth or denture problems?: Never  How often in the past four weeks have you been bothered with problems using the telephone?: Never  How often in the past four weeks have you been bothered by tiredness or fatigue?: Never  Have you fallen two or more times in the past year?: No  Are you afraid of falling?: No  Are you a smoker?: No  During the past four weeks, how many drinks of wine, beer, or other alcoholic beverages did you  "have?: No alcohol at all  Do you exercise for about 20 minutes three or more days a week?: No, I usually do not exercise this much  Have you been given any information to help you with hazards in your house that might hurt you?: Yes  Have you been given any information to help you with keeping track of your medications?: Yes  How often do you have trouble taking medicines the way you've been told to take them?: I always take them as prescribed  How confident are you that you can control and manage most of your health problems?: Very confident  What is your race? (Check all that apply.):     Objective:     /78 (BP Location: Left arm, Patient Position: Sitting, BP Method: Large (Manual))   Pulse 73   Temp 97.4 °F (36.3 °C)   Resp 18   Ht 6' 1" (1.854 m)   Wt 96.5 kg (212 lb 12.8 oz)   SpO2 99%   BMI 28.08 kg/m²     Physical Exam  Constitutional:       Appearance: Normal appearance.   Cardiovascular:      Rate and Rhythm: Normal rate and regular rhythm.   Pulmonary:      Effort: Pulmonary effort is normal.      Breath sounds: Normal breath sounds.   Abdominal:      General: Abdomen is flat. Bowel sounds are normal.      Palpations: Abdomen is soft.   Neurological:      Mental Status: He is alert.   Psychiatric:         Mood and Affect: Mood normal.         Behavior: Behavior normal.         Thought Content: Thought content normal.         Judgment: Judgment normal.     Recent Results (from the past 504 hour(s))   Comprehensive Metabolic Panel    Collection Time: 05/30/23  7:45 AM   Result Value Ref Range    Sodium Level 134 (L) 136 - 145 mmol/L    Potassium Level 4.7 3.5 - 5.1 mmol/L    Chloride 105 98 - 107 mmol/L    Carbon Dioxide 23 23 - 31 mmol/L    Glucose Level 100 82 - 115 mg/dL    Blood Urea Nitrogen 21.0 8.4 - 25.7 mg/dL    Creatinine 1.00 0.73 - 1.18 mg/dL    Calcium Level Total 9.4 8.8 - 10.0 mg/dL    Protein Total 5.8 5.8 - 7.6 gm/dL    Albumin Level 3.6 3.4 - 4.8 g/dL    Globulin 2.2 " (L) 2.4 - 3.5 gm/dL    Albumin/Globulin Ratio 1.6 1.1 - 2.0 ratio    Bilirubin Total 0.4 <=1.5 mg/dL    Alkaline Phosphatase 98 40 - 150 unit/L    Alanine Aminotransferase 10 0 - 55 unit/L    Aspartate Aminotransferase 13 5 - 34 unit/L    eGFR >60 mls/min/1.73/m2   Lipid Panel    Collection Time: 05/30/23  7:45 AM   Result Value Ref Range    Cholesterol Total 157 <=200 mg/dL    HDL Cholesterol 70 (H) 35 - 60 mg/dL    Triglyceride 45 34 - 140 mg/dL    Cholesterol/HDL Ratio 2 0 - 5    Very Low Density Lipoprotein 9     LDL Cholesterol 78.00 50.00 - 140.00 mg/dL   Protime-INR    Collection Time: 05/30/23  7:45 AM   Result Value Ref Range    PT 9.9 9.1 - 10.9 seconds    INR 1.00 0.00 - 1.30   PSA, Screening    Collection Time: 05/30/23  7:45 AM   Result Value Ref Range    Prostate Specific Antigen 0.02 <=4.00 ng/mL   HIV 1/2 Ag/Ab (4th Gen)    Collection Time: 05/30/23  7:45 AM   Result Value Ref Range    HIV Nonreactive Nonreactive   Hepatitis Panel, Acute    Collection Time: 05/30/23  7:45 AM   Result Value Ref Range    Hepatitis A IgM Nonreactive Nonreactive    Hepatitis B Core IgM Nonreactive Nonreactive    Hepatitis B Surface Antigen Nonreactive Nonreactive    Hepatitis C Antibody Nonreactive Nonreactive   CBC with Differential    Collection Time: 05/30/23  7:45 AM   Result Value Ref Range    WBC 7.07 4.50 - 11.50 x10(3)/mcL    RBC 3.32 (L) 4.70 - 6.10 x10(6)/mcL    Hgb 11.0 (L) 14.0 - 18.0 g/dL    Hct 32.5 (L) 42.0 - 52.0 %    MCV 97.9 (H) 80.0 - 94.0 fL    MCH 33.1 (H) 27.0 - 31.0 pg    MCHC 33.8 33.0 - 36.0 g/dL    RDW 13.2 11.5 - 17.0 %    Platelet 294 130 - 400 x10(3)/mcL    MPV 9.6 7.4 - 10.4 fL    Neut % 67.2 %    Lymph % 14.1 %    Mono % 8.8 %    Eos % 7.9 %    Basophil % 1.4 %    Lymph # 1.00 0.6 - 4.6 x10(3)/mcL    Neut # 4.75 2.1 - 9.2 x10(3)/mcL    Mono # 0.62 0.1 - 1.3 x10(3)/mcL    Eos # 0.56 0 - 0.9 x10(3)/mcL    Baso # 0.10 <=0.2 x10(3)/mcL    IG# 0.04 0 - 0.04 x10(3)/mcL    IG% 0.6 %    NRBC% 0.0 %  "  Pathologist Interpretation    Collection Time: 05/30/23  7:45 AM   Result Value Ref Range    Pathology Review       No serological evidence of recent or past hepatitis A, B, or C infection.   Nathanael العلي M.D., Ph.D.           No flowsheet data found.  Fall Risk Assessment - Outpatient 6/5/2023 3/31/2023 5/31/2022   Mobility Status Ambulatory Ambulatory Ambulatory   Number of falls 0 0 0   Identified as fall risk 0 0 0           Depression Screening  Over the past two weeks, has the patient felt down, depressed, or hopeless?: No  Over the past two weeks, has the patient felt little interest or pleasure in doing things?: No  Functional Ability/Safety Screening  Was the patient's timed Up & Go test unsteady or longer than 30 seconds?: No  Does the patient need help with phone, transportation, shopping, preparing meals, housework, laundry, meds, or managing money?: No  Does the patient's home have rugs in the hallway, lack grab bars in the bathroom, lack handrails on the stairs or have poor lighting?: No  Have you noticed any hearing difficulties?: No  A "Yes" response to any of the above questions should trigger further investigation.: n  Cognitive Function (Assessed through direct observation with due consideration of information obtained by way of patient reports and/or concerns raised by family, friends, caretakers, or others)    Does the patient repeat questions/statements in the same day?: No  Does the patient have trouble remembering the date, year, and time?: No  Does the patient have difficulty managing finances?: No  Does the patient have a decreased sense of direction?: No  A "Yes" response to any of the above questions could indicate mild cognitive impairment and should trigger further investigation.: n  Assessment/Plan:     1. Wellness examination  Lab work reviewed with patient  Continue current medication  Continue diet/exercise  Advanced directive discussed with patient  Return to clinic with any " concerns    2. Chest pain  Keep scheduled appointment with Dr Melchor NAVARRO precautions  Return to clinic with any concerns    Medicare Annual Wellness and Personalized Prevention Plan:   Fall Risk + Home Safety + Hearing Impairment + Depression Screen + Opioid and Substance Abuse Screening + Cognitive Impairment Screen + Health Risk Assessment all reviewed.     Health Maintenance Topics with due status: Not Due       Topic Last Completion Date    Colorectal Cancer Screening 12/22/2019    Influenza Vaccine 10/21/2020    Lipid Panel 05/30/2023      The patient's Health Maintenance was reviewed and the following appears to be due at this time:   Health Maintenance Due   Topic Date Due    TETANUS VACCINE  Never done    Shingles Vaccine (1 of 2) Never done    Pneumococcal Vaccines (Age 65+) (2 - PCV) 04/13/2018    COVID-19 Vaccine (3 - Pfizer series) 04/19/2021       Advance Care Planning   I attest to discussing Advance Care Planning with patient and/or family member.  Education was provided including the importance of the Health Care Power of , Advance Directives, and/or [place] documentation.  The patient expressed understanding to the importance of this information and discussion.         Follow up in about 6 months (around 12/5/2023). In addition to their scheduled follow up, the patient has also been instructed to follow up on as needed basis.

## 2023-06-26 PROBLEM — N39.0 ACUTE UTI: Status: RESOLVED | Noted: 2023-03-20 | Resolved: 2023-06-26

## 2023-10-23 ENCOUNTER — OFFICE VISIT (OUTPATIENT)
Dept: FAMILY MEDICINE | Facility: CLINIC | Age: 73
End: 2023-10-23
Payer: MEDICARE

## 2023-10-23 VITALS
HEART RATE: 69 BPM | BODY MASS INDEX: 27.57 KG/M2 | DIASTOLIC BLOOD PRESSURE: 70 MMHG | OXYGEN SATURATION: 96 % | SYSTOLIC BLOOD PRESSURE: 120 MMHG | WEIGHT: 208 LBS | TEMPERATURE: 97 F | HEIGHT: 73 IN | RESPIRATION RATE: 18 BRPM

## 2023-10-23 DIAGNOSIS — G40.909 SEIZURE DISORDER: Primary | ICD-10-CM

## 2023-10-23 DIAGNOSIS — N40.0 BENIGN PROSTATIC HYPERPLASIA, UNSPECIFIED WHETHER LOWER URINARY TRACT SYMPTOMS PRESENT: ICD-10-CM

## 2023-10-23 DIAGNOSIS — I10 HYPERTENSION, UNSPECIFIED TYPE: ICD-10-CM

## 2023-10-23 DIAGNOSIS — F32.A DEPRESSION, UNSPECIFIED DEPRESSION TYPE: ICD-10-CM

## 2023-10-23 DIAGNOSIS — K59.00 CONSTIPATION, UNSPECIFIED CONSTIPATION TYPE: ICD-10-CM

## 2023-10-23 DIAGNOSIS — Z95.1 S/P CABG X 3: ICD-10-CM

## 2023-10-23 DIAGNOSIS — R51.9 GENERALIZED HEADACHES: ICD-10-CM

## 2023-10-23 DIAGNOSIS — I82.5Y2 CHRONIC DEEP VEIN THROMBOSIS (DVT) OF PROXIMAL VEIN OF LEFT LOWER EXTREMITY: ICD-10-CM

## 2023-10-23 DIAGNOSIS — M21.70 LEG LENGTH DISCREPANCY: ICD-10-CM

## 2023-10-23 DIAGNOSIS — G47.00 INSOMNIA, UNSPECIFIED TYPE: ICD-10-CM

## 2023-10-23 DIAGNOSIS — I82.5Y2 CHRONIC DEEP VEIN THROMBOSIS (DVT) OF PROXIMAL VEIN OF LEFT LOWER EXTREMITY: Primary | ICD-10-CM

## 2023-10-23 DIAGNOSIS — E78.5 HYPERLIPIDEMIA, UNSPECIFIED HYPERLIPIDEMIA TYPE: ICD-10-CM

## 2023-10-23 PROCEDURE — 1159F PR MEDICATION LIST DOCUMENTED IN MEDICAL RECORD: ICD-10-PCS | Mod: CPTII,,, | Performed by: FAMILY MEDICINE

## 2023-10-23 PROCEDURE — 1125F AMNT PAIN NOTED PAIN PRSNT: CPT | Mod: CPTII,,, | Performed by: FAMILY MEDICINE

## 2023-10-23 PROCEDURE — 3074F SYST BP LT 130 MM HG: CPT | Mod: CPTII,,, | Performed by: FAMILY MEDICINE

## 2023-10-23 PROCEDURE — 3078F PR MOST RECENT DIASTOLIC BLOOD PRESSURE < 80 MM HG: ICD-10-PCS | Mod: CPTII,,, | Performed by: FAMILY MEDICINE

## 2023-10-23 PROCEDURE — 1159F MED LIST DOCD IN RCRD: CPT | Mod: CPTII,,, | Performed by: FAMILY MEDICINE

## 2023-10-23 PROCEDURE — 3288F FALL RISK ASSESSMENT DOCD: CPT | Mod: CPTII,,, | Performed by: FAMILY MEDICINE

## 2023-10-23 PROCEDURE — 4010F PR ACE/ARB THEARPY RXD/TAKEN: ICD-10-PCS | Mod: CPTII,,, | Performed by: FAMILY MEDICINE

## 2023-10-23 PROCEDURE — 1160F PR REVIEW ALL MEDS BY PRESCRIBER/CLIN PHARMACIST DOCUMENTED: ICD-10-PCS | Mod: CPTII,,, | Performed by: FAMILY MEDICINE

## 2023-10-23 PROCEDURE — 3074F PR MOST RECENT SYSTOLIC BLOOD PRESSURE < 130 MM HG: ICD-10-PCS | Mod: CPTII,,, | Performed by: FAMILY MEDICINE

## 2023-10-23 PROCEDURE — 1125F PR PAIN SEVERITY QUANTIFIED, PAIN PRESENT: ICD-10-PCS | Mod: CPTII,,, | Performed by: FAMILY MEDICINE

## 2023-10-23 PROCEDURE — 1157F ADVNC CARE PLAN IN RCRD: CPT | Mod: CPTII,,, | Performed by: FAMILY MEDICINE

## 2023-10-23 PROCEDURE — 3078F DIAST BP <80 MM HG: CPT | Mod: CPTII,,, | Performed by: FAMILY MEDICINE

## 2023-10-23 PROCEDURE — 1157F PR ADVANCE CARE PLAN OR EQUIV PRESENT IN MEDICAL RECORD: ICD-10-PCS | Mod: CPTII,,, | Performed by: FAMILY MEDICINE

## 2023-10-23 PROCEDURE — 3288F PR FALLS RISK ASSESSMENT DOCUMENTED: ICD-10-PCS | Mod: CPTII,,, | Performed by: FAMILY MEDICINE

## 2023-10-23 PROCEDURE — 99214 PR OFFICE/OUTPT VISIT, EST, LEVL IV, 30-39 MIN: ICD-10-PCS | Mod: ,,, | Performed by: FAMILY MEDICINE

## 2023-10-23 PROCEDURE — 1101F PR PT FALLS ASSESS DOC 0-1 FALLS W/OUT INJ PAST YR: ICD-10-PCS | Mod: CPTII,,, | Performed by: FAMILY MEDICINE

## 2023-10-23 PROCEDURE — 99214 OFFICE O/P EST MOD 30 MIN: CPT | Mod: ,,, | Performed by: FAMILY MEDICINE

## 2023-10-23 PROCEDURE — 1101F PT FALLS ASSESS-DOCD LE1/YR: CPT | Mod: CPTII,,, | Performed by: FAMILY MEDICINE

## 2023-10-23 PROCEDURE — 4010F ACE/ARB THERAPY RXD/TAKEN: CPT | Mod: CPTII,,, | Performed by: FAMILY MEDICINE

## 2023-10-23 PROCEDURE — 1160F RVW MEDS BY RX/DR IN RCRD: CPT | Mod: CPTII,,, | Performed by: FAMILY MEDICINE

## 2023-10-23 RX ORDER — MELATONIN 3 MG
3 CAPSULE ORAL NIGHTLY
Qty: 30 CAPSULE | Refills: 3 | Status: SHIPPED | OUTPATIENT
Start: 2023-10-23 | End: 2024-01-29

## 2023-10-23 RX ORDER — OXCARBAZEPINE 300 MG/1
900 TABLET, FILM COATED ORAL 2 TIMES DAILY
Qty: 60 TABLET | Refills: 3 | Status: SHIPPED | OUTPATIENT
Start: 2023-10-23 | End: 2024-02-14

## 2023-10-23 RX ORDER — TAMSULOSIN HYDROCHLORIDE 0.4 MG/1
CAPSULE ORAL
Qty: 180 CAPSULE | Refills: 3 | Status: SHIPPED | OUTPATIENT
Start: 2023-10-23

## 2023-10-23 RX ORDER — SERTRALINE HYDROCHLORIDE 50 MG/1
50 TABLET, FILM COATED ORAL DAILY
Qty: 30 TABLET | Refills: 3 | Status: SHIPPED | OUTPATIENT
Start: 2023-10-23

## 2023-10-23 RX ORDER — ROSUVASTATIN CALCIUM 10 MG/1
10 TABLET, COATED ORAL DAILY
Qty: 30 TABLET | Refills: 3 | Status: SHIPPED | OUTPATIENT
Start: 2023-10-23 | End: 2024-02-14

## 2023-10-23 RX ORDER — AMIODARONE HYDROCHLORIDE 200 MG/1
200 TABLET ORAL DAILY
Qty: 30 TABLET | Refills: 3 | Status: SHIPPED | OUTPATIENT
Start: 2023-10-23 | End: 2024-01-29

## 2023-10-23 RX ORDER — AMIODARONE HYDROCHLORIDE 200 MG/1
TABLET ORAL DAILY
COMMUNITY
End: 2023-10-23 | Stop reason: SDUPTHER

## 2023-10-23 RX ORDER — FUROSEMIDE 20 MG/1
20 TABLET ORAL 2 TIMES DAILY
COMMUNITY
End: 2023-10-23 | Stop reason: SDUPTHER

## 2023-10-23 RX ORDER — FUROSEMIDE 20 MG/1
20 TABLET ORAL 2 TIMES DAILY
Qty: 60 TABLET | Refills: 3 | Status: SHIPPED | OUTPATIENT
Start: 2023-10-23 | End: 2024-02-14

## 2023-10-23 RX ORDER — DILTIAZEM HYDROCHLORIDE 120 MG/1
120 CAPSULE, EXTENDED RELEASE ORAL DAILY
Qty: 30 CAPSULE | Refills: 3 | Status: SHIPPED | OUTPATIENT
Start: 2023-10-23

## 2023-10-23 RX ORDER — POTASSIUM CHLORIDE 750 MG/1
10 CAPSULE, EXTENDED RELEASE ORAL ONCE
Qty: 1 CAPSULE | Refills: 0 | Status: SHIPPED | OUTPATIENT
Start: 2023-10-23 | End: 2023-10-23

## 2023-10-23 RX ORDER — DOCUSATE SODIUM 100 MG/1
100 CAPSULE, LIQUID FILLED ORAL 2 TIMES DAILY
Qty: 60 CAPSULE | Refills: 3 | Status: SHIPPED | OUTPATIENT
Start: 2023-10-23 | End: 2023-12-05

## 2023-10-23 RX ORDER — POTASSIUM CHLORIDE 750 MG/1
10 CAPSULE, EXTENDED RELEASE ORAL ONCE
COMMUNITY
End: 2023-10-23 | Stop reason: SDUPTHER

## 2023-10-23 RX ORDER — LEVETIRACETAM 1000 MG/1
1500 TABLET ORAL 2 TIMES DAILY
Qty: 60 TABLET | Refills: 3 | Status: SHIPPED | OUTPATIENT
Start: 2023-10-23 | End: 2024-02-12

## 2023-10-23 RX ORDER — FERROUS SULFATE 325(65) MG
325 TABLET, DELAYED RELEASE (ENTERIC COATED) ORAL
COMMUNITY
End: 2023-10-23 | Stop reason: SDUPTHER

## 2023-10-23 RX ORDER — MELATONIN 3 MG
CAPSULE ORAL
COMMUNITY
End: 2023-10-23 | Stop reason: SDUPTHER

## 2023-10-23 RX ORDER — FERROUS SULFATE 325(65) MG
325 TABLET, DELAYED RELEASE (ENTERIC COATED) ORAL
Qty: 30 TABLET | Refills: 3 | Status: SHIPPED | OUTPATIENT
Start: 2023-10-23 | End: 2023-12-05

## 2023-10-23 RX ORDER — TOPIRAMATE 200 MG/1
200 TABLET ORAL 2 TIMES DAILY
Qty: 60 TABLET | Refills: 3 | Status: SHIPPED | OUTPATIENT
Start: 2023-10-23 | End: 2024-02-12

## 2023-10-23 NOTE — PROGRESS NOTES
"Subjective:       Patient ID: Nathen Morales is a 73 y.o. male.    Chief Complaint: [facility] discharge, started HH and PT      HPI    Review of Systems   Constitutional: Negative.    Respiratory: Negative.     Cardiovascular: Negative.         Recent CABG x 2, nursing home placement for rehab, extended placement due to left leg DVT   Musculoskeletal:         Weakness: recent discharge from nursing home for rehab, currently in [facility] health for PT           Objective:      /70 (BP Location: Left arm, Patient Position: Sitting, BP Method: Large (Manual))   Pulse 69   Temp 97.3 °F (36.3 °C)   Resp 18   Ht 6' 1" (1.854 m)   Wt 94.3 kg (208 lb)   SpO2 96%   BMI 27.44 kg/m²    Physical Exam  Constitutional:       Appearance: Normal appearance.   Cardiovascular:      Rate and Rhythm: Normal rate and regular rhythm.      Heart sounds: Normal heart sounds.   Pulmonary:      Effort: Pulmonary effort is normal.      Breath sounds: Normal breath sounds.   Musculoskeletal:      Comments: Using walker to aid with ambulation; left leg shorter than right   Neurological:      Mental Status: He is alert.   Psychiatric:         Mood and Affect: Mood normal.         Behavior: Behavior normal.         Thought Content: Thought content normal.         Judgment: Judgment normal.               Assessment:       Problem List Items Addressed This Visit          Cardiac/Vascular    S/P CABG x 3     Other Visit Diagnoses       Chronic deep vein thrombosis (DVT) of proximal vein of left lower extremity    -  Primary    Relevant Orders    Ambulatory referral/consult to Vascular Surgery    Leg length discrepancy                   Plan:   1. Chronic deep vein thrombosis (DVT) of proximal vein of left lower extremity  -     Ambulatory referral/consult to Vascular Surgery; Future; Expected date: 10/30/2023  Continue current medication   Refer patient to Dr. Mckay   Return to clinic with any concerns     2. S/P CABG x " 3  Continue current medication   Keep scheduled follow up with Dr. Hitchcock   Return to clinic with any concerns     3. Leg length discrepancy  Refer patient to [facility] for left shoe orthotic placement

## 2023-11-21 ENCOUNTER — TELEPHONE (OUTPATIENT)
Dept: FAMILY MEDICINE | Facility: CLINIC | Age: 73
End: 2023-11-21
Payer: MEDICARE

## 2023-11-22 ENCOUNTER — EXTERNAL HOME HEALTH (OUTPATIENT)
Dept: HOME HEALTH SERVICES | Facility: HOSPITAL | Age: 73
End: 2023-11-22
Payer: MEDICARE

## 2023-11-29 DIAGNOSIS — Z95.1 S/P CABG X 3: Primary | ICD-10-CM

## 2023-11-29 RX ORDER — POTASSIUM CHLORIDE 750 MG/1
10 TABLET, EXTENDED RELEASE ORAL ONCE
COMMUNITY
Start: 2023-06-26 | End: 2023-11-29 | Stop reason: SDUPTHER

## 2023-11-29 RX ORDER — POTASSIUM CHLORIDE 750 MG/1
10 TABLET, EXTENDED RELEASE ORAL ONCE
Qty: 7 TABLET | Refills: 0 | Status: SHIPPED | OUTPATIENT
Start: 2023-11-29 | End: 2023-11-29

## 2023-12-05 ENCOUNTER — OFFICE VISIT (OUTPATIENT)
Dept: FAMILY MEDICINE | Facility: CLINIC | Age: 73
End: 2023-12-05
Payer: MEDICARE

## 2023-12-05 VITALS
HEART RATE: 69 BPM | TEMPERATURE: 97 F | OXYGEN SATURATION: 95 % | DIASTOLIC BLOOD PRESSURE: 60 MMHG | SYSTOLIC BLOOD PRESSURE: 106 MMHG | WEIGHT: 205 LBS | RESPIRATION RATE: 18 BRPM | HEIGHT: 73 IN | BODY MASS INDEX: 27.17 KG/M2

## 2023-12-05 DIAGNOSIS — G47.00 INSOMNIA, UNSPECIFIED TYPE: ICD-10-CM

## 2023-12-05 DIAGNOSIS — I10 HYPERTENSION, UNSPECIFIED TYPE: Primary | ICD-10-CM

## 2023-12-05 PROCEDURE — 4010F PR ACE/ARB THEARPY RXD/TAKEN: ICD-10-PCS | Mod: CPTII,,, | Performed by: FAMILY MEDICINE

## 2023-12-05 PROCEDURE — 1157F PR ADVANCE CARE PLAN OR EQUIV PRESENT IN MEDICAL RECORD: ICD-10-PCS | Mod: CPTII,,, | Performed by: FAMILY MEDICINE

## 2023-12-05 PROCEDURE — 1159F PR MEDICATION LIST DOCUMENTED IN MEDICAL RECORD: ICD-10-PCS | Mod: CPTII,,, | Performed by: FAMILY MEDICINE

## 2023-12-05 PROCEDURE — 3074F PR MOST RECENT SYSTOLIC BLOOD PRESSURE < 130 MM HG: ICD-10-PCS | Mod: CPTII,,, | Performed by: FAMILY MEDICINE

## 2023-12-05 PROCEDURE — 1101F PR PT FALLS ASSESS DOC 0-1 FALLS W/OUT INJ PAST YR: ICD-10-PCS | Mod: CPTII,,, | Performed by: FAMILY MEDICINE

## 2023-12-05 PROCEDURE — 1157F ADVNC CARE PLAN IN RCRD: CPT | Mod: CPTII,,, | Performed by: FAMILY MEDICINE

## 2023-12-05 PROCEDURE — 1160F RVW MEDS BY RX/DR IN RCRD: CPT | Mod: CPTII,,, | Performed by: FAMILY MEDICINE

## 2023-12-05 PROCEDURE — 1160F PR REVIEW ALL MEDS BY PRESCRIBER/CLIN PHARMACIST DOCUMENTED: ICD-10-PCS | Mod: CPTII,,, | Performed by: FAMILY MEDICINE

## 2023-12-05 PROCEDURE — 4010F ACE/ARB THERAPY RXD/TAKEN: CPT | Mod: CPTII,,, | Performed by: FAMILY MEDICINE

## 2023-12-05 PROCEDURE — 1101F PT FALLS ASSESS-DOCD LE1/YR: CPT | Mod: CPTII,,, | Performed by: FAMILY MEDICINE

## 2023-12-05 PROCEDURE — 3288F FALL RISK ASSESSMENT DOCD: CPT | Mod: CPTII,,, | Performed by: FAMILY MEDICINE

## 2023-12-05 PROCEDURE — 3074F SYST BP LT 130 MM HG: CPT | Mod: CPTII,,, | Performed by: FAMILY MEDICINE

## 2023-12-05 PROCEDURE — 3288F PR FALLS RISK ASSESSMENT DOCUMENTED: ICD-10-PCS | Mod: CPTII,,, | Performed by: FAMILY MEDICINE

## 2023-12-05 PROCEDURE — 3078F PR MOST RECENT DIASTOLIC BLOOD PRESSURE < 80 MM HG: ICD-10-PCS | Mod: CPTII,,, | Performed by: FAMILY MEDICINE

## 2023-12-05 PROCEDURE — 3078F DIAST BP <80 MM HG: CPT | Mod: CPTII,,, | Performed by: FAMILY MEDICINE

## 2023-12-05 PROCEDURE — 99214 OFFICE O/P EST MOD 30 MIN: CPT | Mod: ,,, | Performed by: FAMILY MEDICINE

## 2023-12-05 PROCEDURE — 99214 PR OFFICE/OUTPT VISIT, EST, LEVL IV, 30-39 MIN: ICD-10-PCS | Mod: ,,, | Performed by: FAMILY MEDICINE

## 2023-12-05 PROCEDURE — 1159F MED LIST DOCD IN RCRD: CPT | Mod: CPTII,,, | Performed by: FAMILY MEDICINE

## 2023-12-05 RX ORDER — TEMAZEPAM 15 MG/1
15 CAPSULE ORAL NIGHTLY PRN
Qty: 30 CAPSULE | Refills: 1 | Status: SHIPPED | OUTPATIENT
Start: 2023-12-05 | End: 2024-01-03

## 2023-12-05 RX ORDER — POTASSIUM CHLORIDE 750 MG/1
20 CAPSULE, EXTENDED RELEASE ORAL ONCE
COMMUNITY
Start: 2023-10-23 | End: 2024-01-29

## 2023-12-05 NOTE — PROGRESS NOTES
"Subjective:       Patient ID: Nathen Morales is a 73 y.o. male.    Chief Complaint: 6 month follow up, c/o new onset dizziness ; Insomnia; and scheduled with cardio next week       Routine      Hypertension  - tolerating medication (no side effects)  - no headaches  - patient reports dizziness assoc with low BP    Review of Systems   Constitutional: Negative.    Respiratory: Negative.     Cardiovascular: Negative.    Gastrointestinal: Negative.    Psychiatric/Behavioral: Negative.          Insomnia: having trouble staying asleep           Objective:      /60 (BP Location: Left arm, Patient Position: Sitting, BP Method: Large (Manual))   Pulse 69   Temp 97.3 °F (36.3 °C)   Resp 18   Ht 6' 1" (1.854 m)   Wt 93 kg (205 lb)   SpO2 95%   BMI 27.05 kg/m²    Physical Exam  Constitutional:       Appearance: Normal appearance.   Cardiovascular:      Rate and Rhythm: Normal rate and regular rhythm.      Heart sounds: Normal heart sounds.   Pulmonary:      Effort: Pulmonary effort is normal.      Breath sounds: Normal breath sounds.   Neurological:      Mental Status: He is alert.   Psychiatric:         Mood and Affect: Mood normal.         Behavior: Behavior normal.         Thought Content: Thought content normal.         Judgment: Judgment normal.               Assessment:       Problem List Items Addressed This Visit          Cardiac/Vascular    Hypertension - Primary     Other Visit Diagnoses       Insomnia, unspecified type        Relevant Medications    temazepam (RESTORIL) 15 mg Cap               Plan:   1. Hypertension, unspecified type  Decrease Lasix to 20 mg q.day  Monitor daily weights  Keep scheduled appointment with Dr. Hitchcock   Return to clinic with any concerns    2. Insomnia, unspecified type  -     temazepam (RESTORIL) 15 mg Cap; Take 1 capsule (15 mg total) by mouth nightly as needed (insomnia).  Dispense: 30 capsule; Refill: 1  Start Restoril 15 mg q.h.s.  Sleep hygiene  Monitor  Return to " clinic with any concerns

## 2024-01-14 ENCOUNTER — HOSPITAL ENCOUNTER (EMERGENCY)
Facility: HOSPITAL | Age: 74
Discharge: HOME | End: 2024-01-14
Attending: STUDENT IN AN ORGANIZED HEALTH CARE EDUCATION/TRAINING PROGRAM
Payer: MEDICARE

## 2024-01-14 VITALS
OXYGEN SATURATION: 97 % | TEMPERATURE: 97 F | BODY MASS INDEX: 27.83 KG/M2 | SYSTOLIC BLOOD PRESSURE: 194 MMHG | HEIGHT: 73 IN | RESPIRATION RATE: 14 BRPM | DIASTOLIC BLOOD PRESSURE: 87 MMHG | WEIGHT: 210 LBS | HEART RATE: 57 BPM

## 2024-01-14 DIAGNOSIS — N30.00 ACUTE CYSTITIS WITHOUT HEMATURIA: Primary | ICD-10-CM

## 2024-01-14 DIAGNOSIS — R53.1 WEAKNESS: ICD-10-CM

## 2024-01-14 LAB
ALBUMIN SERPL-MCNC: 3.7 G/DL (ref 3.4–4.8)
ALBUMIN/GLOB SERPL: 1.5 RATIO (ref 1.1–2)
ALP SERPL-CCNC: 144 UNIT/L (ref 40–150)
ALT SERPL-CCNC: 11 UNIT/L (ref 0–55)
APPEARANCE UR: ABNORMAL
AST SERPL-CCNC: 16 UNIT/L (ref 5–34)
BACTERIA #/AREA URNS AUTO: ABNORMAL /HPF
BASOPHILS # BLD AUTO: 0.07 X10(3)/MCL
BASOPHILS NFR BLD AUTO: 1.2 %
BILIRUB SERPL-MCNC: 0.2 MG/DL
BILIRUB UR QL STRIP.AUTO: NEGATIVE
BUN SERPL-MCNC: 28 MG/DL (ref 8.4–25.7)
CALCIUM SERPL-MCNC: 9 MG/DL (ref 8.8–10)
CHLORIDE SERPL-SCNC: 107 MMOL/L (ref 98–107)
CO2 SERPL-SCNC: 22 MMOL/L (ref 23–31)
COLOR UR AUTO: YELLOW
CREAT SERPL-MCNC: 1.41 MG/DL (ref 0.73–1.18)
EOSINOPHIL # BLD AUTO: 0.65 X10(3)/MCL (ref 0–0.9)
EOSINOPHIL NFR BLD AUTO: 11.4 %
ERYTHROCYTE [DISTWIDTH] IN BLOOD BY AUTOMATED COUNT: 13.3 % (ref 11.5–17)
FLUAV AG UPPER RESP QL IA.RAPID: NOT DETECTED
FLUBV AG UPPER RESP QL IA.RAPID: NOT DETECTED
GFR SERPLBLD CREATININE-BSD FMLA CKD-EPI: 52 MLS/MIN/1.73/M2
GLOBULIN SER-MCNC: 2.4 GM/DL (ref 2.4–3.5)
GLUCOSE SERPL-MCNC: 87 MG/DL (ref 82–115)
GLUCOSE UR QL STRIP.AUTO: NEGATIVE
HCT VFR BLD AUTO: 37.1 % (ref 42–52)
HGB BLD-MCNC: 12 G/DL (ref 14–18)
IMM GRANULOCYTES # BLD AUTO: 0.01 X10(3)/MCL (ref 0–0.04)
IMM GRANULOCYTES NFR BLD AUTO: 0.2 %
KETONES UR QL STRIP.AUTO: NEGATIVE
LEUKOCYTE ESTERASE UR QL STRIP.AUTO: ABNORMAL
LYMPHOCYTES # BLD AUTO: 1.06 X10(3)/MCL (ref 0.6–4.6)
LYMPHOCYTES NFR BLD AUTO: 18.5 %
MCH RBC QN AUTO: 33.9 PG (ref 27–31)
MCHC RBC AUTO-ENTMCNC: 32.3 G/DL (ref 33–36)
MCV RBC AUTO: 104.8 FL (ref 80–94)
MONOCYTES # BLD AUTO: 0.48 X10(3)/MCL (ref 0.1–1.3)
MONOCYTES NFR BLD AUTO: 8.4 %
NEUTROPHILS # BLD AUTO: 3.45 X10(3)/MCL (ref 2.1–9.2)
NEUTROPHILS NFR BLD AUTO: 60.3 %
NITRITE UR QL STRIP.AUTO: NEGATIVE
NRBC BLD AUTO-RTO: 0 %
PH UR STRIP.AUTO: 5.5 [PH]
PLATELET # BLD AUTO: 220 X10(3)/MCL (ref 130–400)
PMV BLD AUTO: 10.1 FL (ref 7.4–10.4)
POCT GLUCOSE: 91 MG/DL (ref 70–110)
POTASSIUM SERPL-SCNC: 3.8 MMOL/L (ref 3.5–5.1)
PROT SERPL-MCNC: 6.1 GM/DL (ref 5.8–7.6)
PROT UR QL STRIP.AUTO: ABNORMAL
RBC # BLD AUTO: 3.54 X10(6)/MCL (ref 4.7–6.1)
RBC #/AREA URNS AUTO: ABNORMAL /HPF
RBC UR QL AUTO: ABNORMAL
SARS-COV-2 RNA RESP QL NAA+PROBE: NOT DETECTED
SODIUM SERPL-SCNC: 140 MMOL/L (ref 136–145)
SP GR UR STRIP.AUTO: 1.02 (ref 1–1.03)
SQUAMOUS #/AREA URNS AUTO: ABNORMAL /HPF
TROPONIN I SERPL-MCNC: <0.01 NG/ML (ref 0–0.04)
UROBILINOGEN UR STRIP-ACNC: 0.2
WBC # SPEC AUTO: 5.72 X10(3)/MCL (ref 4.5–11.5)
WBC #/AREA URNS AUTO: ABNORMAL /HPF
WBC CLUMPS UR QL AUTO: ABNORMAL

## 2024-01-14 PROCEDURE — 0240U COVID/FLU A&B PCR: CPT | Performed by: STUDENT IN AN ORGANIZED HEALTH CARE EDUCATION/TRAINING PROGRAM

## 2024-01-14 PROCEDURE — 82962 GLUCOSE BLOOD TEST: CPT

## 2024-01-14 PROCEDURE — 80053 COMPREHEN METABOLIC PANEL: CPT | Performed by: STUDENT IN AN ORGANIZED HEALTH CARE EDUCATION/TRAINING PROGRAM

## 2024-01-14 PROCEDURE — 96365 THER/PROPH/DIAG IV INF INIT: CPT

## 2024-01-14 PROCEDURE — 87077 CULTURE AEROBIC IDENTIFY: CPT | Performed by: STUDENT IN AN ORGANIZED HEALTH CARE EDUCATION/TRAINING PROGRAM

## 2024-01-14 PROCEDURE — 87086 URINE CULTURE/COLONY COUNT: CPT | Performed by: STUDENT IN AN ORGANIZED HEALTH CARE EDUCATION/TRAINING PROGRAM

## 2024-01-14 PROCEDURE — 85025 COMPLETE CBC W/AUTO DIFF WBC: CPT | Performed by: STUDENT IN AN ORGANIZED HEALTH CARE EDUCATION/TRAINING PROGRAM

## 2024-01-14 PROCEDURE — 25000003 PHARM REV CODE 250: Performed by: STUDENT IN AN ORGANIZED HEALTH CARE EDUCATION/TRAINING PROGRAM

## 2024-01-14 PROCEDURE — 84484 ASSAY OF TROPONIN QUANT: CPT | Performed by: STUDENT IN AN ORGANIZED HEALTH CARE EDUCATION/TRAINING PROGRAM

## 2024-01-14 PROCEDURE — 81003 URINALYSIS AUTO W/O SCOPE: CPT | Performed by: STUDENT IN AN ORGANIZED HEALTH CARE EDUCATION/TRAINING PROGRAM

## 2024-01-14 PROCEDURE — 99285 EMERGENCY DEPT VISIT HI MDM: CPT | Mod: 25

## 2024-01-14 PROCEDURE — 63600175 PHARM REV CODE 636 W HCPCS: Performed by: STUDENT IN AN ORGANIZED HEALTH CARE EDUCATION/TRAINING PROGRAM

## 2024-01-14 PROCEDURE — 93010 ELECTROCARDIOGRAM REPORT: CPT | Mod: ,,, | Performed by: INTERNAL MEDICINE

## 2024-01-14 PROCEDURE — 93005 ELECTROCARDIOGRAM TRACING: CPT

## 2024-01-14 RX ORDER — SODIUM CHLORIDE 9 MG/ML
1000 INJECTION, SOLUTION INTRAVENOUS
Status: COMPLETED | OUTPATIENT
Start: 2024-01-14 | End: 2024-01-14

## 2024-01-14 RX ORDER — SULFAMETHOXAZOLE AND TRIMETHOPRIM 800; 160 MG/1; MG/1
1 TABLET ORAL 2 TIMES DAILY
Qty: 14 TABLET | Refills: 0 | Status: SHIPPED | OUTPATIENT
Start: 2024-01-14 | End: 2024-01-21

## 2024-01-14 RX ADMIN — SODIUM CHLORIDE 1000 ML: 9 INJECTION, SOLUTION INTRAVENOUS at 12:01

## 2024-01-14 RX ADMIN — CEFTRIAXONE SODIUM 1 G: 1 INJECTION, POWDER, FOR SOLUTION INTRAMUSCULAR; INTRAVENOUS at 02:01

## 2024-01-14 NOTE — ED PROVIDER NOTES
Encounter Date: 1/14/2024       History     Chief Complaint   Patient presents with    weakness     Arrived via [facility] for weakness 1 hour PTA.  States he got out of chair, felt weak and fell back into chair.       Patient is a 73-year-old white gentleman with a history of hypertension, hyperlipidemia, CKD, CAD status post three-vessel CABG who presented to the ER today due to weakness.  Patient states he has no other symptoms.  He states that he went to rise from his recliner to a standing position when he felt weak so sat back down.  This prompted patient to call EMS.  He denies any diplopia, dysarthria, dysphagia, focal deficits or changes in sensation.  The only complaint patient verbalizes is excess gas.  He states he has no fever, chills, cough, congestion, chest pain, shortness of breath or abdominal pain.      Review of patient's allergies indicates:  No Known Allergies  Past Medical History:   Diagnosis Date    BPH (benign prostatic hyperplasia)     Hypertension     Seizures      Past Surgical History:   Procedure Laterality Date    APPENDECTOMY      CHOLECYSTECTOMY      COLONOSCOPY  04/21/2016    Dr Brock Brown    TRANSURETHRAL RESECTION OF PROSTATE       Family History   Problem Relation Age of Onset    Hypertension Mother      Social History     Tobacco Use    Smoking status: Never    Smokeless tobacco: Never   Substance Use Topics    Alcohol use: Not Currently    Drug use: Not Currently     Review of Systems   Constitutional:  Negative for chills, fatigue and fever.   HENT:  Negative for congestion, sore throat and trouble swallowing.    Eyes:  Negative for pain and visual disturbance.   Respiratory:  Negative for cough, shortness of breath and wheezing.    Cardiovascular:  Negative for chest pain and palpitations.   Gastrointestinal:  Negative for abdominal pain, blood in stool, constipation, diarrhea, nausea and vomiting.   Genitourinary:  Negative for dysuria and hematuria.   Musculoskeletal:   Negative for back pain and myalgias.   Skin:  Negative for rash and wound.   Neurological:  Positive for weakness. Negative for dizziness, tremors, seizures, syncope, facial asymmetry, speech difficulty, light-headedness, numbness and headaches.   Psychiatric/Behavioral:  Negative for confusion. The patient is not nervous/anxious.        Physical Exam     Initial Vitals   BP Pulse Resp Temp SpO2   01/14/24 1149 01/14/24 1149 01/14/24 1149 01/14/24 1146 01/14/24 1149   (!) 189/97 70 20 97.3 °F (36.3 °C) 96 %      MAP       --                Physical Exam    Nursing note and vitals reviewed.  Constitutional: He appears well-developed and well-nourished. No distress.   HENT:   Head: Normocephalic and atraumatic.   Eyes: Conjunctivae and EOM are normal. Right eye exhibits no discharge. Left eye exhibits no discharge. No scleral icterus.   Neck: No tracheal deviation present.   Normal range of motion.  Cardiovascular:  Normal rate, regular rhythm and normal heart sounds.     Exam reveals no gallop and no friction rub.       No murmur heard.  Pulmonary/Chest: Breath sounds normal. No respiratory distress. He has no wheezes. He has no rhonchi. He has no rales.   Abdominal: Abdomen is soft. He exhibits no distension. There is no abdominal tenderness. There is no rebound and no guarding.   Musculoskeletal:         General: No edema. Normal range of motion.      Cervical back: Normal range of motion.     Neurological: He is alert and oriented to person, place, and time. He has normal strength. No cranial nerve deficit or sensory deficit. GCS score is 15. GCS eye subscore is 4. GCS verbal subscore is 5. GCS motor subscore is 6.   CN II-XII intact bilaterally, PERRLA, EOMI, AO, no facial asymmetry noted, no abnormalities of vision loss or peripheral vision loss, strength 5/5 x 4 extremities, sensation intact throughout, no focal neurological deficits noted on exam.  Gait not assessed due to weakness. Negative Pronator drift  bilaterally.       Skin: Skin is warm and dry. No rash and no abscess noted. No erythema. No pallor.   Psychiatric: His behavior is normal. Judgment normal.         ED Course   Procedures  Labs Reviewed   COMPREHENSIVE METABOLIC PANEL - Abnormal; Notable for the following components:       Result Value    Carbon Dioxide 22 (*)     Blood Urea Nitrogen 28.0 (*)     Creatinine 1.41 (*)     All other components within normal limits   URINALYSIS, REFLEX TO URINE CULTURE - Abnormal; Notable for the following components:    Appearance, UA SL CLOUDY (*)     Protein, UA Trace (*)     Blood, UA 3+ (*)     Leukocyte Esterase, UA 2+ (*)     All other components within normal limits   CBC WITH DIFFERENTIAL - Abnormal; Notable for the following components:    RBC 3.54 (*)     Hgb 12.0 (*)     Hct 37.1 (*)     .8 (*)     MCH 33.9 (*)     MCHC 32.3 (*)     All other components within normal limits   URINALYSIS, MICROSCOPIC - Abnormal; Notable for the following components:    Bacteria, UA Few (*)     WBC Clumps, UA Few (*)     RBC, UA 11-20 (*)     WBC, UA 21-50 (*)     All other components within normal limits   COVID/FLU A&B PCR - Normal    Narrative:     The Xpert Xpress SARS-CoV-2/FLU/RSV plus is a rapid, multiplexed real-time PCR test intended for the simultaneous qualitative detection and differentiation of SARS-CoV-2, Influenza A, Influenza B, and respiratory syncytial virus (RSV) viral RNA in either nasopharyngeal swab or nasal swab specimens.         TROPONIN I - Normal   CULTURE, URINE   CBC W/ AUTO DIFFERENTIAL    Narrative:     The following orders were created for panel order CBC Auto Differential.  Procedure                               Abnormality         Status                     ---------                               -----------         ------                     CBC with Differential[[id number]]       Abnormal            Final result                 Please view results for these tests on the individual  orders.   POCT GLUCOSE, HAND-HELD DEVICE   POCT GLUCOSE        ECG Results              EKG 12-lead (Final result)  Result time 01/14/24 13:31:26      Final result by Interface, Lab In [facility] (01/14/24 13:31:26)                   Narrative:    Test Reason : R53.1,    Vent. Rate : 065 BPM     Atrial Rate : 065 BPM     P-R Int : 250 ms          QRS Dur : 108 ms      QT Int : 428 ms       P-R-T Axes : -22 -28 030 degrees     QTc Int : 445 ms    Sinus rhythm with 1st degree A-V block  Minimal voltage criteria for LVH, may be normal variant  Borderline Abnormal ECG  When compared with ECG of 23-APR-2023 13:22,  Non-specific change in ST segment in Inferior leads  Confirmed by Gustabo Vargas MD ([id number]) on 1/14/2024 1:31:16 PM    Referred By: FE   SELF           Confirmed By:Gustabo Vargas MD                                  Imaging Results              X-Ray Chest 1 View (Final result)  Result time 01/14/24 12:21:05      Final result by Jewel Ching MD (01/14/24 12:21:05)                   Impression:      No acute findings.      Electronically signed by: Jewel Ching  Date:    01/14/2024  Time:    12:21               Narrative:    EXAMINATION:  XR CHEST 1 VIEW    CLINICAL HISTORY:  weakness;    COMPARISON:  23 April 2023    FINDINGS:  Frontal view of the chest was obtained. Median sternotomy.  The heart is not significantly enlarged.  Lungs are grossly clear.  No pneumothorax.                                       Medications   cefTRIAXone (ROCEPHIN) 1 g in dextrose 5 % in water (D5W) 100 mL IVPB (MB+) (0 g Intravenous Stopped 1/14/24 1458)   0.9%  NaCl infusion (1,000 mLs Intravenous New Bag 1/14/24 1254)     Medical Decision Making  Differentials:  CVA/TIA, delirium, dehydration, electrolyte disturbance, COVID, flu, UTI  Well-appearing 73-year-old male with a normal neurological exam presents for single episode of weakness.  He states all symptoms are now resolved.  Vital signs are stable.  Afebrile.   Complete neurological exam was performed by myself and no abnormalities were noted.  Basic labs largely unremarkable and creatinine seems to be largely at baseline.  UA consistent with UTI.  Patient had questionably positive orthostatics despite being asymptomatic thus fluids were given and orthostatics rechecked and noted to be negative.  I offered patient admission due to his advanced age and a UTI despite no leukocytosis and after discussion with him he states he does not want to be admitted but instead return home and return to the ER if any symptoms represent.  Rocephin was given here in the emergency room and Bactrim was sent to the pharmacy.  All questions were answered in layman's terms and return precautions were discussed.    Amount and/or Complexity of Data Reviewed  Labs: ordered. Decision-making details documented in ED Course.     Details: Creatinine seems to be at baseline   UA consistent with UTI.  No evidence of leukocytosis.  Radiology: ordered. Decision-making details documented in ED Course.     Details: Chest x-ray shows no acute process.    Risk  Prescription drug management.                                      Clinical Impression:  Final diagnoses:  [R53.1] Weakness  [N30.00] Acute cystitis without hematuria (Primary)          ED Disposition Condition    Discharge Stable          ED Prescriptions       Medication Sig Dispense Start Date End Date Auth. Provider    sulfamethoxazole-trimethoprim 800-160mg (BACTRIM DS) 800-160 mg Tab Take 1 tablet by mouth 2 (two) times daily. for 7 days 14 tablet 1/14/2024 1/21/2024 Espinoza Denney MD          Follow-up Information       Follow up With Specialties Details Why Contact Info    [facility] - Emergency Dept Emergency Medicine  If symptoms worsen [address] [place] [address] [id number] [phone]    Brock Brown MD Family Medicine Schedule an appointment as soon as possible for a visit   [address]  [place]  [address]  [phone]               Espinoza Denney MD  01/14/24 8758

## 2024-01-16 LAB — BACTERIA UR CULT: ABNORMAL

## 2024-01-17 ENCOUNTER — HOSPITAL ENCOUNTER (EMERGENCY)
Facility: HOSPITAL | Age: 74
Discharge: HOME | End: 2024-01-17
Attending: FAMILY MEDICINE
Payer: MEDICARE

## 2024-01-17 VITALS
SYSTOLIC BLOOD PRESSURE: 157 MMHG | HEART RATE: 76 BPM | HEIGHT: 73 IN | WEIGHT: 197.38 LBS | BODY MASS INDEX: 26.16 KG/M2 | RESPIRATION RATE: 15 BRPM | TEMPERATURE: 99 F | OXYGEN SATURATION: 98 % | DIASTOLIC BLOOD PRESSURE: 97 MMHG

## 2024-01-17 DIAGNOSIS — R53.1 WEAKNESS: Primary | ICD-10-CM

## 2024-01-17 DIAGNOSIS — E86.0 DEHYDRATION: ICD-10-CM

## 2024-01-17 DIAGNOSIS — R31.9 URINARY TRACT INFECTION WITH HEMATURIA, SITE UNSPECIFIED: ICD-10-CM

## 2024-01-17 DIAGNOSIS — N39.0 URINARY TRACT INFECTION WITH HEMATURIA, SITE UNSPECIFIED: ICD-10-CM

## 2024-01-17 LAB
ALBUMIN SERPL-MCNC: 3.7 G/DL (ref 3.4–4.8)
ALBUMIN/GLOB SERPL: 1.4 RATIO (ref 1.1–2)
ALP SERPL-CCNC: 129 UNIT/L (ref 40–150)
ALT SERPL-CCNC: 11 UNIT/L (ref 0–55)
APPEARANCE UR: ABNORMAL
AST SERPL-CCNC: 14 UNIT/L (ref 5–34)
BACTERIA #/AREA URNS AUTO: ABNORMAL /HPF
BASOPHILS # BLD AUTO: 0.08 X10(3)/MCL
BASOPHILS NFR BLD AUTO: 1.6 %
BILIRUB SERPL-MCNC: 0.3 MG/DL
BILIRUB UR QL STRIP.AUTO: NEGATIVE
BUN SERPL-MCNC: 27 MG/DL (ref 8.4–25.7)
CALCIUM SERPL-MCNC: 9.5 MG/DL (ref 8.8–10)
CHLORIDE SERPL-SCNC: 109 MMOL/L (ref 98–107)
CO2 SERPL-SCNC: 22 MMOL/L (ref 23–31)
COLOR UR AUTO: YELLOW
CREAT SERPL-MCNC: 1.59 MG/DL (ref 0.73–1.18)
EOSINOPHIL # BLD AUTO: 0.23 X10(3)/MCL (ref 0–0.9)
EOSINOPHIL NFR BLD AUTO: 4.5 %
ERYTHROCYTE [DISTWIDTH] IN BLOOD BY AUTOMATED COUNT: 13.3 % (ref 11.5–17)
GFR SERPLBLD CREATININE-BSD FMLA CKD-EPI: 45 MLS/MIN/1.73/M2
GLOBULIN SER-MCNC: 2.6 GM/DL (ref 2.4–3.5)
GLUCOSE SERPL-MCNC: 95 MG/DL (ref 82–115)
GLUCOSE UR QL STRIP.AUTO: NEGATIVE
HCT VFR BLD AUTO: 36.4 % (ref 42–52)
HGB BLD-MCNC: 11.9 G/DL (ref 14–18)
IMM GRANULOCYTES # BLD AUTO: 0.02 X10(3)/MCL (ref 0–0.04)
IMM GRANULOCYTES NFR BLD AUTO: 0.4 %
KETONES UR QL STRIP.AUTO: NEGATIVE
LEUKOCYTE ESTERASE UR QL STRIP.AUTO: ABNORMAL
LYMPHOCYTES # BLD AUTO: 0.77 X10(3)/MCL (ref 0.6–4.6)
LYMPHOCYTES NFR BLD AUTO: 14.9 %
MCH RBC QN AUTO: 33.5 PG (ref 27–31)
MCHC RBC AUTO-ENTMCNC: 32.7 G/DL (ref 33–36)
MCV RBC AUTO: 102.5 FL (ref 80–94)
MONOCYTES # BLD AUTO: 0.5 X10(3)/MCL (ref 0.1–1.3)
MONOCYTES NFR BLD AUTO: 9.7 %
NEUTROPHILS # BLD AUTO: 3.56 X10(3)/MCL (ref 2.1–9.2)
NEUTROPHILS NFR BLD AUTO: 68.9 %
NITRITE UR QL STRIP.AUTO: NEGATIVE
NRBC BLD AUTO-RTO: 0 %
PH UR STRIP.AUTO: 6 [PH]
PLATELET # BLD AUTO: 232 X10(3)/MCL (ref 130–400)
PMV BLD AUTO: 9.5 FL (ref 7.4–10.4)
POTASSIUM SERPL-SCNC: 4.5 MMOL/L (ref 3.5–5.1)
PROT SERPL-MCNC: 6.3 GM/DL (ref 5.8–7.6)
PROT UR QL STRIP.AUTO: ABNORMAL
RBC # BLD AUTO: 3.55 X10(6)/MCL (ref 4.7–6.1)
RBC #/AREA URNS AUTO: >100 /HPF
RBC UR QL AUTO: ABNORMAL
SODIUM SERPL-SCNC: 142 MMOL/L (ref 136–145)
SP GR UR STRIP.AUTO: 1.02 (ref 1–1.03)
SQUAMOUS #/AREA URNS AUTO: ABNORMAL /HPF
TROPONIN I SERPL-MCNC: 0.02 NG/ML (ref 0–0.04)
UROBILINOGEN UR STRIP-ACNC: 0.2
WBC # SPEC AUTO: 5.16 X10(3)/MCL (ref 4.5–11.5)
WBC #/AREA URNS AUTO: ABNORMAL /HPF

## 2024-01-17 PROCEDURE — 81001 URINALYSIS AUTO W/SCOPE: CPT | Performed by: FAMILY MEDICINE

## 2024-01-17 PROCEDURE — 87086 URINE CULTURE/COLONY COUNT: CPT | Performed by: FAMILY MEDICINE

## 2024-01-17 PROCEDURE — 85025 COMPLETE CBC W/AUTO DIFF WBC: CPT | Performed by: FAMILY MEDICINE

## 2024-01-17 PROCEDURE — 99284 EMERGENCY DEPT VISIT MOD MDM: CPT

## 2024-01-17 PROCEDURE — 80053 COMPREHEN METABOLIC PANEL: CPT | Performed by: FAMILY MEDICINE

## 2024-01-17 PROCEDURE — 84484 ASSAY OF TROPONIN QUANT: CPT | Performed by: FAMILY MEDICINE

## 2024-01-17 PROCEDURE — 25000003 PHARM REV CODE 250: Performed by: FAMILY MEDICINE

## 2024-01-17 RX ORDER — CIPROFLOXACIN 500 MG/1
500 TABLET ORAL 2 TIMES DAILY
Qty: 20 TABLET | Refills: 0 | Status: SHIPPED | OUTPATIENT
Start: 2024-01-17 | End: 2024-01-29

## 2024-01-17 RX ORDER — CIPROFLOXACIN 500 MG/1
500 TABLET ORAL
Status: COMPLETED | OUTPATIENT
Start: 2024-01-17 | End: 2024-01-17

## 2024-01-17 RX ORDER — CLONIDINE HYDROCHLORIDE 0.1 MG/1
0.2 TABLET ORAL
Status: DISCONTINUED | OUTPATIENT
Start: 2024-01-17 | End: 2024-01-17

## 2024-01-17 RX ADMIN — CIPROFLOXACIN HYDROCHLORIDE 500 MG: 500 TABLET, FILM COATED ORAL at 01:01

## 2024-01-17 RX ADMIN — SODIUM CHLORIDE 500 ML: 9 INJECTION, SOLUTION INTRAVENOUS at 12:01

## 2024-01-17 NOTE — ED NOTES
Pt to Ed via EMS with c/o diarrhea, weakness & sts he is taking his abx he was prescribed a few days ago when he was seen in ED for UTI, denies june, n/v.

## 2024-01-17 NOTE — ED PROVIDER NOTES
Encounter Date: 1/17/2024       History     Chief Complaint   Patient presents with    Weakness     Arrived via [facility] for weakness and not feeling well, RTC from 3 days ago.  Diagnosed with UTI.       This is a 73-year-old white male who presents complaining of generalized weakness that started about 3 hours ago.  He also complains of diarrhea.  He is currently taking antibiotic for UTI.  He has a past medical history of BPH, hypertension, seizures and heart disease.  He denies any focal deficit, diplopia, blurry vision or headache    The history is provided by the patient and the EMS personnel.     Review of patient's allergies indicates:  No Known Allergies  Past Medical History:   Diagnosis Date    BPH (benign prostatic hyperplasia)     Hypertension     Seizures      Past Surgical History:   Procedure Laterality Date    APPENDECTOMY      CHOLECYSTECTOMY      COLONOSCOPY  04/21/2016    Dr Brock Brown    TRANSURETHRAL RESECTION OF PROSTATE       Family History   Problem Relation Age of Onset    Hypertension Mother      Social History     Tobacco Use    Smoking status: Never    Smokeless tobacco: Never   Substance Use Topics    Alcohol use: Not Currently    Drug use: Not Currently     Review of Systems   Constitutional:  Positive for fatigue. Negative for fever.   HENT:  Negative for congestion.    Eyes:  Negative for visual disturbance.   Respiratory:  Negative for shortness of breath.    Cardiovascular:  Negative for chest pain.   Gastrointestinal:  Positive for diarrhea. Negative for abdominal pain.   Skin:  Negative for rash.   Neurological:  Positive for weakness. Negative for dizziness.   Psychiatric/Behavioral:  Negative for behavioral problems.    All other systems reviewed and are negative.      Physical Exam     Initial Vitals [01/17/24 1102]   BP Pulse Resp Temp SpO2   (!) 183/87 72 20 99.1 °F (37.3 °C) 98 %      MAP       --         Physical Exam    Nursing note and vitals  reviewed.  Constitutional: He appears well-developed and well-nourished. No distress.   HENT:   Head: Normocephalic and atraumatic.   Eyes: Pupils are equal, round, and reactive to light.   Neck: Neck supple.   Normal range of motion.  Cardiovascular:  Normal rate, regular rhythm and normal heart sounds.           Pulmonary/Chest: Breath sounds normal.   Abdominal: Abdomen is soft. Bowel sounds are normal.   Musculoskeletal:         General: Normal range of motion.      Cervical back: Normal range of motion and neck supple.     Neurological: He is alert and oriented to person, place, and time.   Skin: Skin is warm and dry.         ED Course   Procedures  Labs Reviewed   COMPREHENSIVE METABOLIC PANEL - Abnormal; Notable for the following components:       Result Value    Chloride 109 (*)     Carbon Dioxide 22 (*)     Blood Urea Nitrogen 27.0 (*)     Creatinine 1.59 (*)     All other components within normal limits   URINALYSIS, REFLEX TO URINE CULTURE - Abnormal; Notable for the following components:    Appearance, UA SL CLOUDY (*)     Protein, UA 2+ (*)     Blood, UA 3+ (*)     Leukocyte Esterase, UA 1+ (*)     All other components within normal limits   CBC WITH DIFFERENTIAL - Abnormal; Notable for the following components:    RBC 3.55 (*)     Hgb 11.9 (*)     Hct 36.4 (*)     .5 (*)     MCH 33.5 (*)     MCHC 32.7 (*)     All other components within normal limits   URINALYSIS, MICROSCOPIC - Abnormal; Notable for the following components:    RBC, UA >100 (*)     WBC, UA 21-50 (*)     All other components within normal limits   TROPONIN I - Normal   CLOSTRIDIUM DIFFICILE TOXIN A AND B, EIA   CULTURE, URINE   CBC W/ AUTO DIFFERENTIAL    Narrative:     The following orders were created for panel order CBC auto differential.  Procedure                               Abnormality         Status                     ---------                               -----------         ------                     CBC with  Differential[[id number]]       Abnormal            Final result                 Please view results for these tests on the individual orders.          Imaging Results    None          Medications   sodium chloride 0.9% bolus 500 mL 500 mL (500 mLs Intravenous New Bag 1/17/24 1259)   cloNIDine tablet 0.2 mg (has no administration in time range)     Medical Decision Making  Differential diagnosis:  Generalized weakness, dehydration, adverse drug reaction, diarrhea, nausea and vomit, gastroenteritis    Amount and/or Complexity of Data Reviewed  Labs: ordered.    Risk  Prescription drug management.                                      Clinical Impression:  Final diagnoses:  [R53.1] Weakness (Primary)  [E86.0] Dehydration          ED Disposition Condition    Discharge Stable          ED Prescriptions    None       Follow-up Information       Follow up With Specialties Details Why Contact Info    Brock Brown MD Family Medicine In 1 day  [address] [facility] [address]  [phone]               Rachel Robles MD  01/17/24 4692

## 2024-01-19 LAB — BACTERIA UR CULT: NO GROWTH

## 2024-01-29 ENCOUNTER — OFFICE VISIT (OUTPATIENT)
Dept: FAMILY MEDICINE | Facility: CLINIC | Age: 74
End: 2024-01-29
Payer: MEDICARE

## 2024-01-29 VITALS
RESPIRATION RATE: 18 BRPM | HEART RATE: 71 BPM | WEIGHT: 210 LBS | SYSTOLIC BLOOD PRESSURE: 146 MMHG | BODY MASS INDEX: 27.83 KG/M2 | HEIGHT: 73 IN | DIASTOLIC BLOOD PRESSURE: 84 MMHG | OXYGEN SATURATION: 95 % | TEMPERATURE: 98 F

## 2024-01-29 DIAGNOSIS — I10 HYPERTENSION, UNSPECIFIED TYPE: Primary | ICD-10-CM

## 2024-01-29 PROCEDURE — 99213 OFFICE O/P EST LOW 20 MIN: CPT | Mod: ,,, | Performed by: FAMILY MEDICINE

## 2024-01-29 PROCEDURE — 1160F RVW MEDS BY RX/DR IN RCRD: CPT | Mod: CPTII,,, | Performed by: FAMILY MEDICINE

## 2024-01-29 PROCEDURE — 1157F ADVNC CARE PLAN IN RCRD: CPT | Mod: CPTII,,, | Performed by: FAMILY MEDICINE

## 2024-01-29 PROCEDURE — 1100F PTFALLS ASSESS-DOCD GE2>/YR: CPT | Mod: CPTII,,, | Performed by: FAMILY MEDICINE

## 2024-01-29 PROCEDURE — 3077F SYST BP >= 140 MM HG: CPT | Mod: CPTII,,, | Performed by: FAMILY MEDICINE

## 2024-01-29 PROCEDURE — 3079F DIAST BP 80-89 MM HG: CPT | Mod: CPTII,,, | Performed by: FAMILY MEDICINE

## 2024-01-29 PROCEDURE — 3288F FALL RISK ASSESSMENT DOCD: CPT | Mod: CPTII,,, | Performed by: FAMILY MEDICINE

## 2024-01-29 PROCEDURE — 1159F MED LIST DOCD IN RCRD: CPT | Mod: CPTII,,, | Performed by: FAMILY MEDICINE

## 2024-01-29 RX ORDER — FERROUS SULFATE 325(65) MG
325 TABLET, DELAYED RELEASE (ENTERIC COATED) ORAL
COMMUNITY
Start: 2023-07-03 | End: 2024-07-02

## 2024-01-29 NOTE — PROGRESS NOTES
"Subjective:       Patient ID: Nathen Morales is a 73 y.o. male.    Chief Complaint: 1 month follow up, weakness in am, ER weakness +uti      Follow-up    Hypertension  - tolerating medication (no side effects)  - no headaches  - patient without any complaints  - recent medication change with Dr Hitchcock    Review of Systems   Constitutional: Negative.    Respiratory: Negative.     Cardiovascular: Negative.    Gastrointestinal: Negative.    Psychiatric/Behavioral: Negative.             Objective:      BP (!) 146/84 (BP Location: Left arm, Patient Position: Sitting, BP Method: Large (Manual))   Pulse 71   Temp 97.6 °F (36.4 °C)   Resp 18   Ht 6' 1" (1.854 m)   Wt 95.3 kg (210 lb)   SpO2 95%   BMI 27.71 kg/m²    Physical Exam  Constitutional:       Appearance: Normal appearance.   Cardiovascular:      Rate and Rhythm: Normal rate and regular rhythm.      Heart sounds: Normal heart sounds.   Pulmonary:      Effort: Pulmonary effort is normal.      Breath sounds: Normal breath sounds.   Neurological:      Mental Status: He is alert.   Psychiatric:         Mood and Affect: Mood normal.         Behavior: Behavior normal.         Thought Content: Thought content normal.         Judgment: Judgment normal.               Assessment:       Problem List Items Addressed This Visit          Cardiac/Vascular    Hypertension - Primary          Plan:   1. Hypertension, unspecified type  Continue current medication  Monitor BP  ER precautions  Return to clinic with any concerns               "

## 2024-02-12 DIAGNOSIS — R51.9 GENERALIZED HEADACHES: ICD-10-CM

## 2024-02-12 DIAGNOSIS — G40.909 SEIZURE DISORDER: ICD-10-CM

## 2024-02-12 RX ORDER — TOPIRAMATE 200 MG/1
200 TABLET ORAL 2 TIMES DAILY
Qty: 60 TABLET | Refills: 3 | Status: SHIPPED | OUTPATIENT
Start: 2024-02-12

## 2024-02-12 RX ORDER — LEVETIRACETAM 1000 MG/1
1500 TABLET ORAL 2 TIMES DAILY
Qty: 90 TABLET | Refills: 3 | Status: SHIPPED | OUTPATIENT
Start: 2024-02-12

## 2024-02-14 DIAGNOSIS — E78.5 HYPERLIPIDEMIA, UNSPECIFIED HYPERLIPIDEMIA TYPE: ICD-10-CM

## 2024-02-14 DIAGNOSIS — Z95.1 S/P CABG X 3: ICD-10-CM

## 2024-02-14 RX ORDER — OXCARBAZEPINE 300 MG/1
900 TABLET, FILM COATED ORAL 2 TIMES DAILY
Qty: 180 TABLET | Refills: 3 | Status: SHIPPED | OUTPATIENT
Start: 2024-02-14

## 2024-02-14 RX ORDER — APIXABAN 5 MG/1
5 TABLET, FILM COATED ORAL 2 TIMES DAILY
Qty: 60 TABLET | Refills: 3 | Status: ON HOLD | OUTPATIENT
Start: 2024-02-14 | End: 2024-03-19

## 2024-02-14 RX ORDER — FUROSEMIDE 20 MG/1
20 TABLET ORAL DAILY
Qty: 60 TABLET | Refills: 3 | Status: SHIPPED | OUTPATIENT
Start: 2024-02-14

## 2024-02-14 RX ORDER — ROSUVASTATIN CALCIUM 10 MG/1
10 TABLET, COATED ORAL
Qty: 30 TABLET | Refills: 3 | Status: ON HOLD | OUTPATIENT
Start: 2024-02-14 | End: 2024-03-21

## 2024-02-29 ENCOUNTER — LAB VISIT (OUTPATIENT)
Dept: LAB | Facility: HOSPITAL | Age: 74
End: 2024-02-29
Attending: STUDENT IN AN ORGANIZED HEALTH CARE EDUCATION/TRAINING PROGRAM
Payer: MEDICARE

## 2024-02-29 DIAGNOSIS — Z00.00 WELLNESS EXAMINATION: ICD-10-CM

## 2024-02-29 DIAGNOSIS — E78.5 HYPERLIPIDEMIA, UNSPECIFIED HYPERLIPIDEMIA TYPE: ICD-10-CM

## 2024-02-29 DIAGNOSIS — Z12.5 SCREENING FOR MALIGNANT NEOPLASM OF PROSTATE: ICD-10-CM

## 2024-02-29 DIAGNOSIS — Z11.4 ENCOUNTER FOR SCREENING FOR HIV: ICD-10-CM

## 2024-02-29 DIAGNOSIS — Z11.59 NEED FOR HEPATITIS C SCREENING TEST: ICD-10-CM

## 2024-02-29 LAB
ALBUMIN SERPL-MCNC: 3.5 G/DL (ref 3.4–4.8)
ALBUMIN/GLOB SERPL: 1.4 RATIO (ref 1.1–2)
ALP SERPL-CCNC: 89 UNIT/L (ref 40–150)
ALT SERPL-CCNC: 13 UNIT/L (ref 0–55)
AST SERPL-CCNC: 13 UNIT/L (ref 5–34)
BASOPHILS # BLD AUTO: 0.08 X10(3)/MCL
BASOPHILS NFR BLD AUTO: 1.2 %
BILIRUB SERPL-MCNC: 0.2 MG/DL
BUN SERPL-MCNC: 28 MG/DL (ref 8.4–25.7)
CALCIUM SERPL-MCNC: 9 MG/DL (ref 8.8–10)
CHLORIDE SERPL-SCNC: 107 MMOL/L (ref 98–107)
CHOLEST SERPL-MCNC: 152 MG/DL
CHOLEST/HDLC SERPL: 2 {RATIO} (ref 0–5)
CO2 SERPL-SCNC: 24 MMOL/L (ref 23–31)
CREAT SERPL-MCNC: 1.32 MG/DL (ref 0.73–1.18)
EOSINOPHIL # BLD AUTO: 0.62 X10(3)/MCL (ref 0–0.9)
EOSINOPHIL NFR BLD AUTO: 9.4 %
ERYTHROCYTE [DISTWIDTH] IN BLOOD BY AUTOMATED COUNT: 12.9 % (ref 11.5–17)
GFR SERPLBLD CREATININE-BSD FMLA CKD-EPI: 56 MLS/MIN/1.73/M2
GLOBULIN SER-MCNC: 2.5 GM/DL (ref 2.4–3.5)
GLUCOSE SERPL-MCNC: 105 MG/DL (ref 82–115)
HCT VFR BLD AUTO: 34.5 % (ref 42–52)
HCV AB SERPL QL IA: NONREACTIVE
HDLC SERPL-MCNC: 61 MG/DL (ref 35–60)
HGB BLD-MCNC: 11.3 G/DL (ref 14–18)
HIV 1+2 AB+HIV1 P24 AG SERPL QL IA: NONREACTIVE
IMM GRANULOCYTES # BLD AUTO: 0.01 X10(3)/MCL (ref 0–0.04)
IMM GRANULOCYTES NFR BLD AUTO: 0.2 %
LDLC SERPL CALC-MCNC: 70 MG/DL (ref 50–140)
LYMPHOCYTES # BLD AUTO: 1.16 X10(3)/MCL (ref 0.6–4.6)
LYMPHOCYTES NFR BLD AUTO: 17.6 %
MCH RBC QN AUTO: 34.3 PG (ref 27–31)
MCHC RBC AUTO-ENTMCNC: 32.8 G/DL (ref 33–36)
MCV RBC AUTO: 104.9 FL (ref 80–94)
MONOCYTES # BLD AUTO: 0.58 X10(3)/MCL (ref 0.1–1.3)
MONOCYTES NFR BLD AUTO: 8.8 %
NEUTROPHILS # BLD AUTO: 4.13 X10(3)/MCL (ref 2.1–9.2)
NEUTROPHILS NFR BLD AUTO: 62.8 %
NRBC BLD AUTO-RTO: 0 %
PLATELET # BLD AUTO: 242 X10(3)/MCL (ref 130–400)
PMV BLD AUTO: 10.1 FL (ref 7.4–10.4)
POTASSIUM SERPL-SCNC: 4.4 MMOL/L (ref 3.5–5.1)
PROT SERPL-MCNC: 6 GM/DL (ref 5.8–7.6)
PSA SERPL-MCNC: <0.1 NG/ML
RBC # BLD AUTO: 3.29 X10(6)/MCL (ref 4.7–6.1)
SODIUM SERPL-SCNC: 139 MMOL/L (ref 136–145)
TRIGL SERPL-MCNC: 103 MG/DL (ref 34–140)
VLDLC SERPL CALC-MCNC: 21 MG/DL
WBC # SPEC AUTO: 6.58 X10(3)/MCL (ref 4.5–11.5)

## 2024-02-29 PROCEDURE — 87389 HIV-1 AG W/HIV-1&-2 AB AG IA: CPT

## 2024-02-29 PROCEDURE — 80053 COMPREHEN METABOLIC PANEL: CPT

## 2024-02-29 PROCEDURE — 85025 COMPLETE CBC W/AUTO DIFF WBC: CPT

## 2024-02-29 PROCEDURE — 36415 COLL VENOUS BLD VENIPUNCTURE: CPT

## 2024-02-29 PROCEDURE — 80061 LIPID PANEL: CPT

## 2024-02-29 PROCEDURE — 86803 HEPATITIS C AB TEST: CPT

## 2024-02-29 PROCEDURE — 84153 ASSAY OF PSA TOTAL: CPT

## 2024-03-19 ENCOUNTER — OFFICE VISIT (OUTPATIENT)
Dept: FAMILY MEDICINE | Facility: CLINIC | Age: 74
End: 2024-03-19
Payer: MEDICARE

## 2024-03-19 ENCOUNTER — HOSPITAL ENCOUNTER (INPATIENT)
Facility: HOSPITAL | Age: 74
LOS: 2 days | Discharge: HOME HEALTH | DRG: 603 | End: 2024-03-21
Attending: FAMILY MEDICINE | Admitting: FAMILY MEDICINE
Payer: MEDICARE

## 2024-03-19 VITALS
BODY MASS INDEX: 27.71 KG/M2 | SYSTOLIC BLOOD PRESSURE: 138 MMHG | TEMPERATURE: 98 F | DIASTOLIC BLOOD PRESSURE: 88 MMHG | HEART RATE: 84 BPM | OXYGEN SATURATION: 98 % | HEIGHT: 73 IN | RESPIRATION RATE: 18 BRPM

## 2024-03-19 DIAGNOSIS — L02.415 ABSCESS OF RIGHT THIGH: Primary | ICD-10-CM

## 2024-03-19 DIAGNOSIS — E78.5 HYPERLIPIDEMIA, UNSPECIFIED HYPERLIPIDEMIA TYPE: ICD-10-CM

## 2024-03-19 DIAGNOSIS — L02.91 ABSCESS: ICD-10-CM

## 2024-03-19 DIAGNOSIS — Z01.818 PRE-OP TESTING: ICD-10-CM

## 2024-03-19 LAB
ALBUMIN SERPL-MCNC: 3.3 G/DL (ref 3.4–4.8)
ALBUMIN/GLOB SERPL: 1.2 RATIO (ref 1.1–2)
ALP SERPL-CCNC: 99 UNIT/L (ref 40–150)
ALT SERPL-CCNC: 9 UNIT/L (ref 0–55)
AST SERPL-CCNC: 12 UNIT/L (ref 5–34)
BASOPHILS # BLD AUTO: 0.11 X10(3)/MCL
BASOPHILS NFR BLD AUTO: 1.4 %
BILIRUB SERPL-MCNC: 0.2 MG/DL
BUN SERPL-MCNC: 27 MG/DL (ref 8.4–25.7)
CALCIUM SERPL-MCNC: 8.9 MG/DL (ref 8.8–10)
CHLORIDE SERPL-SCNC: 110 MMOL/L (ref 98–107)
CO2 SERPL-SCNC: 22 MMOL/L (ref 23–31)
CREAT SERPL-MCNC: 1.45 MG/DL (ref 0.73–1.18)
EOSINOPHIL # BLD AUTO: 0.77 X10(3)/MCL (ref 0–0.9)
EOSINOPHIL NFR BLD AUTO: 9.9 %
ERYTHROCYTE [DISTWIDTH] IN BLOOD BY AUTOMATED COUNT: 12.8 % (ref 11.5–17)
GFR SERPLBLD CREATININE-BSD FMLA CKD-EPI: 50 MLS/MIN/1.73/M2
GLOBULIN SER-MCNC: 2.7 GM/DL (ref 2.4–3.5)
GLUCOSE SERPL-MCNC: 97 MG/DL (ref 82–115)
HCT VFR BLD AUTO: 33.7 % (ref 42–52)
HGB BLD-MCNC: 11 G/DL (ref 14–18)
IMM GRANULOCYTES # BLD AUTO: 0.02 X10(3)/MCL (ref 0–0.04)
IMM GRANULOCYTES NFR BLD AUTO: 0.3 %
INR PPP: 0.9
LYMPHOCYTES # BLD AUTO: 1.27 X10(3)/MCL (ref 0.6–4.6)
LYMPHOCYTES NFR BLD AUTO: 16.4 %
MCH RBC QN AUTO: 34.4 PG (ref 27–31)
MCHC RBC AUTO-ENTMCNC: 32.6 G/DL (ref 33–36)
MCV RBC AUTO: 105.3 FL (ref 80–94)
MONOCYTES # BLD AUTO: 0.73 X10(3)/MCL (ref 0.1–1.3)
MONOCYTES NFR BLD AUTO: 9.4 %
NEUTROPHILS # BLD AUTO: 4.84 X10(3)/MCL (ref 2.1–9.2)
NEUTROPHILS NFR BLD AUTO: 62.6 %
NRBC BLD AUTO-RTO: 0 %
PLATELET # BLD AUTO: 334 X10(3)/MCL (ref 130–400)
PMV BLD AUTO: 9.8 FL (ref 7.4–10.4)
POTASSIUM SERPL-SCNC: 3.8 MMOL/L (ref 3.5–5.1)
PROT SERPL-MCNC: 6 GM/DL (ref 5.8–7.6)
PROTHROMBIN TIME: 9.9 SECONDS (ref 9.1–10.9)
RBC # BLD AUTO: 3.2 X10(6)/MCL (ref 4.7–6.1)
SODIUM SERPL-SCNC: 141 MMOL/L (ref 136–145)
WBC # SPEC AUTO: 7.74 X10(3)/MCL (ref 4.5–11.5)

## 2024-03-19 PROCEDURE — 63600175 PHARM REV CODE 636 W HCPCS: Performed by: FAMILY MEDICINE

## 2024-03-19 PROCEDURE — 87075 CULTR BACTERIA EXCEPT BLOOD: CPT | Performed by: FAMILY MEDICINE

## 2024-03-19 PROCEDURE — 85610 PROTHROMBIN TIME: CPT | Performed by: FAMILY MEDICINE

## 2024-03-19 PROCEDURE — 85025 COMPLETE CBC W/AUTO DIFF WBC: CPT | Performed by: FAMILY MEDICINE

## 2024-03-19 PROCEDURE — 0J9L3ZZ DRAINAGE OF RIGHT UPPER LEG SUBCUTANEOUS TISSUE AND FASCIA, PERCUTANEOUS APPROACH: ICD-10-PCS | Performed by: SURGERY

## 2024-03-19 PROCEDURE — 80053 COMPREHEN METABOLIC PANEL: CPT | Performed by: FAMILY MEDICINE

## 2024-03-19 PROCEDURE — 99499 UNLISTED E&M SERVICE: CPT | Mod: ,,, | Performed by: FAMILY MEDICINE

## 2024-03-19 PROCEDURE — 11000001 HC ACUTE MED/SURG PRIVATE ROOM

## 2024-03-19 PROCEDURE — 99222 1ST HOSP IP/OBS MODERATE 55: CPT | Mod: ,,, | Performed by: FAMILY MEDICINE

## 2024-03-19 PROCEDURE — 25000003 PHARM REV CODE 250: Performed by: FAMILY MEDICINE

## 2024-03-19 PROCEDURE — 87070 CULTURE OTHR SPECIMN AEROBIC: CPT | Performed by: FAMILY MEDICINE

## 2024-03-19 RX ORDER — HYDROCODONE BITARTRATE AND ACETAMINOPHEN 5; 325 MG/1; MG/1
1 TABLET ORAL EVERY 4 HOURS PRN
Status: DISCONTINUED | OUTPATIENT
Start: 2024-03-19 | End: 2024-03-21 | Stop reason: HOSPADM

## 2024-03-19 RX ORDER — DILTIAZEM HYDROCHLORIDE 120 MG/1
120 CAPSULE, COATED, EXTENDED RELEASE ORAL DAILY
Status: DISCONTINUED | OUTPATIENT
Start: 2024-03-20 | End: 2024-03-21 | Stop reason: HOSPADM

## 2024-03-19 RX ORDER — ATORVASTATIN CALCIUM 40 MG/1
40 TABLET, FILM COATED ORAL NIGHTLY
Status: DISCONTINUED | OUTPATIENT
Start: 2024-03-19 | End: 2024-03-21 | Stop reason: HOSPADM

## 2024-03-19 RX ORDER — FUROSEMIDE 20 MG/1
20 TABLET ORAL DAILY
Status: DISCONTINUED | OUTPATIENT
Start: 2024-03-20 | End: 2024-03-21 | Stop reason: HOSPADM

## 2024-03-19 RX ORDER — LEVETIRACETAM 500 MG/1
1500 TABLET ORAL 2 TIMES DAILY
Status: DISCONTINUED | OUTPATIENT
Start: 2024-03-19 | End: 2024-03-21 | Stop reason: HOSPADM

## 2024-03-19 RX ORDER — OXCARBAZEPINE 300 MG/1
900 TABLET, FILM COATED ORAL 2 TIMES DAILY
Status: DISCONTINUED | OUTPATIENT
Start: 2024-03-19 | End: 2024-03-21 | Stop reason: HOSPADM

## 2024-03-19 RX ORDER — TAMSULOSIN HYDROCHLORIDE 0.4 MG/1
0.4 CAPSULE ORAL NIGHTLY
Status: DISCONTINUED | OUTPATIENT
Start: 2024-03-19 | End: 2024-03-21 | Stop reason: HOSPADM

## 2024-03-19 RX ORDER — TOPIRAMATE 100 MG/1
200 TABLET, FILM COATED ORAL 2 TIMES DAILY
Status: DISCONTINUED | OUTPATIENT
Start: 2024-03-19 | End: 2024-03-21 | Stop reason: HOSPADM

## 2024-03-19 RX ORDER — SERTRALINE HYDROCHLORIDE 50 MG/1
50 TABLET, FILM COATED ORAL DAILY
Status: DISCONTINUED | OUTPATIENT
Start: 2024-03-20 | End: 2024-03-21 | Stop reason: HOSPADM

## 2024-03-19 RX ADMIN — TAMSULOSIN HYDROCHLORIDE 0.4 MG: 0.4 CAPSULE ORAL at 08:03

## 2024-03-19 RX ADMIN — VANCOMYCIN HYDROCHLORIDE 2000 MG: 1 INJECTION, POWDER, LYOPHILIZED, FOR SOLUTION INTRAVENOUS at 08:03

## 2024-03-19 RX ADMIN — ATORVASTATIN CALCIUM 40 MG: 40 TABLET, FILM COATED ORAL at 09:03

## 2024-03-19 RX ADMIN — LEVETIRACETAM 1500 MG: 500 TABLET, FILM COATED ORAL at 08:03

## 2024-03-19 RX ADMIN — OXCARBAZEPINE 900 MG: 300 TABLET, FILM COATED ORAL at 09:03

## 2024-03-19 RX ADMIN — TOPIRAMATE 200 MG: 100 TABLET, FILM COATED ORAL at 08:03

## 2024-03-19 NOTE — PLAN OF CARE
Problem: Adult Inpatient Plan of Care  Goal: Plan of Care Review  Outcome: Ongoing, Progressing  Goal: Patient-Specific Goal (Individualized)  Outcome: Ongoing, Progressing  Goal: Absence of Hospital-Acquired Illness or Injury  Outcome: Ongoing, Progressing  Goal: Optimal Comfort and Wellbeing  Outcome: Ongoing, Progressing  Goal: Readiness for Transition of Care  Outcome: Ongoing, Progressing     Problem: Fall Injury Risk  Goal: Absence of Fall and Fall-Related Injury  Outcome: Ongoing, Progressing     Problem: Impaired Wound Healing  Goal: Optimal Wound Healing  Outcome: Ongoing, Progressing     Problem: Behavioral Health Comorbidity  Goal: Maintenance of Behavioral Health Symptom Control  Outcome: Ongoing, Progressing     Problem: Seizure Disorder Comorbidity  Goal: Maintenance of Seizure Control  Outcome: Ongoing, Progressing     Problem: Electrolyte Imbalance (Chronic Kidney Disease)  Goal: Electrolyte Balance  Outcome: Ongoing, Progressing     Problem: Fluid Volume Excess (Chronic Kidney Disease)  Goal: Fluid Balance  Outcome: Ongoing, Progressing     Problem: Hematologic Alteration (Chronic Kidney Disease)  Goal: Absence of Anemia Signs and Symptoms  Outcome: Ongoing, Progressing     Problem: Renal Function Impairment (Chronic Kidney Disease)  Goal: Minimize Renal Failure Effects  Outcome: Ongoing, Progressing     Problem: Pain Acute  Goal: Acceptable Pain Control and Functional Ability  Outcome: Ongoing, Progressing     Problem: Infection  Goal: Absence of Infection Signs and Symptoms  Outcome: Ongoing, Progressing     Problem: Skin or Soft Tissue Infection  Goal: Absence of Infection Signs and Symptoms  Outcome: Ongoing, Progressing

## 2024-03-19 NOTE — PROGRESS NOTES
"Subjective:       Patient ID: Nathen Morales is a 73 y.o. male.    Chief Complaint: scrotal edema with bloody discharge x 1 week      Scrotal edema        Review of Systems   Constitutional: Negative.  Negative for chills and fever.   Respiratory: Negative.     Cardiovascular: Negative.    Integumentary:         Abscess: right thigh, present x 4 days, draining, began as a bump, recently stopped blood thinner, increasing in size/pain           Objective:      /88 (BP Location: Left arm, Patient Position: Sitting, BP Method: Large (Manual))   Pulse 84   Temp 98.1 °F (36.7 °C)   Resp 18   Ht 6' 1" (1.854 m)   SpO2 98%   BMI 27.71 kg/m²    Physical Exam  Constitutional:       Appearance: Normal appearance.   Cardiovascular:      Rate and Rhythm: Normal rate and regular rhythm.      Heart sounds: Normal heart sounds.   Pulmonary:      Effort: Pulmonary effort is normal.      Breath sounds: Normal breath sounds.   Skin:     Comments: Right thigh:  Large abscess noted, purulent drainage present, TTP   Neurological:      Mental Status: He is alert.   Psychiatric:         Mood and Affect: Mood normal.         Behavior: Behavior normal.         Thought Content: Thought content normal.         Judgment: Judgment normal.               Assessment:       Problem List Items Addressed This Visit    None  Visit Diagnoses       Abscess of right thigh    -  Primary               Plan:   1. Abscess of right thigh  Admit patient   Start IV antibiotics   Consult Dr. Carrasco for incision/debridement/wound cultures             "

## 2024-03-19 NOTE — H&P
[facility]  [facility] Medicine  History & Physical    Patient Name: Nathen Morales  MRN: [id number]  Patient Class: IP- Inpatient  Admission Date: 3/19/2024  Attending Physician: Brock Brown MD   Primary Care Provider: Brock Brown MD         Patient information was obtained from patient and relative(s).     Subjective:     Principal Problem:Abscess    Chief Complaint:   Chief Complaint   Patient presents with    Abscess        HPI: This 73-year-old white male presented to clinic on the afternoon of 03/19/2024.  Patient complaining of right thigh pain.  States area began as a small pustule and began increasing in size.  Present x4 days.  Patient does report purulent drainage.  On physical exam, large abscess noted on right upper inner thigh.  Apparently drainage noted on bandage, TTP.  Due to size/induration, patient will be admitted for IV antibiotics.  We will consult Dr. Carrasco for I&D.    Past Medical History:   Diagnosis Date    BPH (benign prostatic hyperplasia)     Hypertension     Seizures        Past Surgical History:   Procedure Laterality Date    APPENDECTOMY      CHOLECYSTECTOMY      COLONOSCOPY  04/21/2016    Dr Brock Brown    TRANSURETHRAL RESECTION OF PROSTATE         Review of patient's allergies indicates:  No Known Allergies    No current facility-administered medications on file prior to encounter.     Current Outpatient Medications on File Prior to Encounter   Medication Sig    aspirin 81 mg Cap Take 81 mg by mouth Daily.    furosemide (LASIX) 20 MG tablet Take 1 tablet (20 mg total) by mouth once daily.    levETIRAcetam (KEPPRA) 1000 MG tablet take ONE AND ONE-HALF tablets BY MOUTH TWICE DAILY    multivitamin (THERAGRAN) tablet Take 1 tablet by mouth once daily.    OXcarbazepine (TRILEPTAL) 300 MG Tab TAKE THREE TABLETS BY MOUTH TWICE DAILY    rosuvastatin (CRESTOR) 10 MG tablet TAKE ONE TABLET BY MOUTH ONCE daily (Patient taking  differently: Take 10 mg by mouth every evening.)    sertraline (ZOLOFT) 50 MG tablet Take 1 tablet (50 mg total) by mouth once daily.    tamsulosin (FLOMAX) 0.4 mg Cap TAKE 1 CAPSULE BY MOUTH TWICE DAILY (Patient taking differently: 0.4 mg every evening. TAKE 1 CAPSULE BY MOUTH  DAILY)    temazepam (RESTORIL) 15 mg Cap TAKE ONE CAPSULE BY MOUTH AT BEDTIME    topiramate (TOPAMAX) 200 MG Tab TAKE ONE TABLET BY MOUTH TWICE DAILY    ferrous sulfate 325 (65 FE) MG EC tablet Take 325 mg by mouth.    TIADYLT  mg 24 hr capsule Take 1 capsule (120 mg total) by mouth once daily.    [DISCONTINUED] ELIQUIS 5 mg Tab TAKE ONE TABLET BY MOUTH TWICE DAILY (Patient taking differently: Take 5 mg by mouth 2 (two) times daily. Pt no longer taking. Dr. Aragon (cardiologist) took him off medication.)     Family History       Problem Relation (Age of Onset)    Hypertension Mother          Tobacco Use    Smoking status: Never    Smokeless tobacco: Never   Substance and Sexual Activity    Alcohol use: Not Currently    Drug use: Not Currently    Sexual activity: Not on file     Review of Systems   Constitutional: Negative.    Respiratory: Negative.     Cardiovascular: Negative.    Skin:         Abscess: Right thigh     Objective:     Vital Signs (Most Recent):  Temp: 98.7 °F (37.1 °C) (03/19/24 1558)  Pulse: 66 (03/19/24 1558)  Resp: 18 (03/19/24 1501)  BP: (!) 167/75 (03/19/24 1558)  SpO2: 99 % (03/19/24 1558) Vital Signs (24h Range):  Temp:  [98 °F (36.7 °C)-98.7 °F (37.1 °C)] 98.7 °F (37.1 °C)  Pulse:  [66-84] 66  Resp:  [18] 18  SpO2:  [98 %-100 %] 99 %  BP: (138-167)/(75-88) 167/75     Weight: 92.1 kg (203 lb 0.7 oz)  Body mass index is 26.79 kg/m².     Physical Exam  Constitutional:       Appearance: Normal appearance.   Cardiovascular:      Rate and Rhythm: Normal rate and regular rhythm.      Heart sounds: Normal heart sounds.   Pulmonary:      Effort: Pulmonary effort is normal.      Breath sounds: Normal breath sounds.    Skin:     Comments: Abscess (right inner thigh):  Purulent drainage,+ induration   Neurological:      Mental Status: He is alert.   Psychiatric:         Mood and Affect: Mood normal.         Behavior: Behavior normal.         Thought Content: Thought content normal.         Judgment: Judgment normal.                Significant Labs: All pertinent labs within the past 24 hours have been reviewed.    Significant Imaging: I have reviewed all pertinent imaging results/findings within the past 24 hours.  Assessment/Plan:     * Abscess  Admit patient   Start IV antibiotics to include vancomycin   Consult Dr. Carrasco for I&D  Wound cultures taken      Seizure disorder  Continue home medication      Hypertension  Continue home medication   Monitor BP      VTE Risk Mitigation (From admission, onward)      None                            Brock Brown MD  Department of Hospital Medicine  [facility]

## 2024-03-19 NOTE — HPI
This 73-year-old white male presented to clinic on the afternoon of 03/19/2024.  Patient complaining of right thigh pain.  States area began as a small pustule and began increasing in size.  Present x4 days.  Patient does report purulent drainage.  On physical exam, large abscess noted on right upper inner thigh.  Apparently drainage noted on bandage, TTP.  Due to size/induration, patient will be admitted for IV antibiotics.  We will consult Dr. Carrasco for I&D.

## 2024-03-19 NOTE — ASSESSMENT & PLAN NOTE
Admit patient   Start IV antibiotics to include vancomycin   Consult Dr. Carrasco for I&D  Wound cultures taken

## 2024-03-19 NOTE — SUBJECTIVE & OBJECTIVE
Past Medical History:   Diagnosis Date    BPH (benign prostatic hyperplasia)     Hypertension     Seizures        Past Surgical History:   Procedure Laterality Date    APPENDECTOMY      CHOLECYSTECTOMY      COLONOSCOPY  04/21/2016    Dr Brock Brown    TRANSURETHRAL RESECTION OF PROSTATE         Review of patient's allergies indicates:  No Known Allergies    No current facility-administered medications on file prior to encounter.     Current Outpatient Medications on File Prior to Encounter   Medication Sig    aspirin 81 mg Cap Take 81 mg by mouth Daily.    furosemide (LASIX) 20 MG tablet Take 1 tablet (20 mg total) by mouth once daily.    levETIRAcetam (KEPPRA) 1000 MG tablet take ONE AND ONE-HALF tablets BY MOUTH TWICE DAILY    multivitamin (THERAGRAN) tablet Take 1 tablet by mouth once daily.    OXcarbazepine (TRILEPTAL) 300 MG Tab TAKE THREE TABLETS BY MOUTH TWICE DAILY    rosuvastatin (CRESTOR) 10 MG tablet TAKE ONE TABLET BY MOUTH ONCE daily (Patient taking differently: Take 10 mg by mouth every evening.)    sertraline (ZOLOFT) 50 MG tablet Take 1 tablet (50 mg total) by mouth once daily.    tamsulosin (FLOMAX) 0.4 mg Cap TAKE 1 CAPSULE BY MOUTH TWICE DAILY (Patient taking differently: 0.4 mg every evening. TAKE 1 CAPSULE BY MOUTH  DAILY)    temazepam (RESTORIL) 15 mg Cap TAKE ONE CAPSULE BY MOUTH AT BEDTIME    topiramate (TOPAMAX) 200 MG Tab TAKE ONE TABLET BY MOUTH TWICE DAILY    ferrous sulfate 325 (65 FE) MG EC tablet Take 325 mg by mouth.    TIADYLT  mg 24 hr capsule Take 1 capsule (120 mg total) by mouth once daily.    [DISCONTINUED] ELIQUIS 5 mg Tab TAKE ONE TABLET BY MOUTH TWICE DAILY (Patient taking differently: Take 5 mg by mouth 2 (two) times daily. Pt no longer taking. Dr. Aragon (cardiologist) took him off medication.)     Family History       Problem Relation (Age of Onset)    Hypertension Mother          Tobacco Use    Smoking status: Never    Smokeless tobacco: Never   Substance and  Sexual Activity    Alcohol use: Not Currently    Drug use: Not Currently    Sexual activity: Not on file     Review of Systems   Constitutional: Negative.    Respiratory: Negative.     Cardiovascular: Negative.    Skin:         Abscess: Right thigh     Objective:     Vital Signs (Most Recent):  Temp: 98.7 °F (37.1 °C) (03/19/24 1558)  Pulse: 66 (03/19/24 1558)  Resp: 18 (03/19/24 1501)  BP: (!) 167/75 (03/19/24 1558)  SpO2: 99 % (03/19/24 1558) Vital Signs (24h Range):  Temp:  [98 °F (36.7 °C)-98.7 °F (37.1 °C)] 98.7 °F (37.1 °C)  Pulse:  [66-84] 66  Resp:  [18] 18  SpO2:  [98 %-100 %] 99 %  BP: (138-167)/(75-88) 167/75     Weight: 92.1 kg (203 lb 0.7 oz)  Body mass index is 26.79 kg/m².     Physical Exam  Constitutional:       Appearance: Normal appearance.   Cardiovascular:      Rate and Rhythm: Normal rate and regular rhythm.      Heart sounds: Normal heart sounds.   Pulmonary:      Effort: Pulmonary effort is normal.      Breath sounds: Normal breath sounds.   Skin:     Comments: Abscess (right inner thigh):  Purulent drainage,+ induration   Neurological:      Mental Status: He is alert.   Psychiatric:         Mood and Affect: Mood normal.         Behavior: Behavior normal.         Thought Content: Thought content normal.         Judgment: Judgment normal.                Significant Labs: All pertinent labs within the past 24 hours have been reviewed.    Significant Imaging: I have reviewed all pertinent imaging results/findings within the past 24 hours.

## 2024-03-19 NOTE — PROGRESS NOTES
"Pharmacokinetic Initial Assessment: IV Vancomycin    Assessment/Plan:    Initiate intravenous vancomycin with loading dose of 2000 mg once followed by a maintenance dose of vancomycin 1500 mg IV every 24 hours  Desired empiric serum trough concentration is 10 to 15 mcg/mL  Draw vancomycin trough level 60 min prior to third dose on 3/21 at approximately 1900  Pharmacy will continue to follow and monitor vancomycin.      Please contact pharmacy at extension [phone] with any questions regarding this assessment.     Thank you for the consult,   Ori Arash       Patient brief summary:  Nathen Morales is a 73 y.o. male initiated on antimicrobial therapy with IV Vancomycin for treatment of suspected skin & soft tissue infection    Drug Allergies:   Review of patient's allergies indicates:  No Known Allergies    Actual Body Weight:   92.1 kg    Renal Function:   Estimated Creatinine Clearance: 49.7 mL/min (A) (based on SCr of 1.45 mg/dL (H)).,       CBC (last 72 hours):  Recent Labs   Lab Result Units 03/19/24  1715   WBC x10(3)/mcL 7.74   Hgb g/dL 11.0*   Hct % 33.7*   Platelet x10(3)/mcL 334   Mono % % 9.4   Eos % % 9.9   Basophil % % 1.4       Metabolic Panel (last 72 hours):  Recent Labs   Lab Result Units 03/19/24  1715   Sodium Level mmol/L 141   Potassium Level mmol/L 3.8   Chloride mmol/L 110*   Carbon Dioxide mmol/L 22*   Glucose Level mg/dL 97   Blood Urea Nitrogen mg/dL 27.0*   Creatinine mg/dL 1.45*   Albumin Level g/dL 3.3*   Bilirubin Total mg/dL 0.2   Alkaline Phosphatase unit/L 99   Aspartate Aminotransferase unit/L 12   Alanine Aminotransferase unit/L 9       Drug levels (last 3 results):  No results for input(s): "VANCOMYCINRA", "VANCORANDOM", "VANCOMYCINPE", "VANCOPEAK", "VANCOMYCINTR", "VANCOTROUGH" in the last 72 hours.    Microbiologic Results:  Microbiology Results (last 7 days)       Procedure Component Value Units Date/Time    Wound Culture [[id number]] Collected: 03/19/24 1440    Order Status: Sent " Specimen: Wound from Groin Updated: 03/19/24 6462    Anaerobic Culture [[id number]] Collected: 03/19/24 1440    Order Status: Sent Specimen: Wound from Groin Updated: 03/19/24 9094

## 2024-03-20 ENCOUNTER — ANESTHESIA EVENT (OUTPATIENT)
Dept: SURGERY | Facility: HOSPITAL | Age: 74
DRG: 603 | End: 2024-03-20
Payer: MEDICARE

## 2024-03-20 ENCOUNTER — ANESTHESIA (OUTPATIENT)
Dept: SURGERY | Facility: HOSPITAL | Age: 74
DRG: 603 | End: 2024-03-20
Payer: MEDICARE

## 2024-03-20 PROCEDURE — 99231 SBSQ HOSP IP/OBS SF/LOW 25: CPT | Mod: ,,, | Performed by: FAMILY MEDICINE

## 2024-03-20 PROCEDURE — 25000003 PHARM REV CODE 250: Performed by: NURSE ANESTHETIST, CERTIFIED REGISTERED

## 2024-03-20 PROCEDURE — 00400 ANES INTEGUMENTARY SYS NOS: CPT | Mod: QZ,P3,QS | Performed by: NURSE ANESTHETIST, CERTIFIED REGISTERED

## 2024-03-20 PROCEDURE — 25000003 PHARM REV CODE 250: Performed by: FAMILY MEDICINE

## 2024-03-20 PROCEDURE — 87075 CULTR BACTERIA EXCEPT BLOOD: CPT | Performed by: SURGERY

## 2024-03-20 PROCEDURE — 11000001 HC ACUTE MED/SURG PRIVATE ROOM

## 2024-03-20 PROCEDURE — 37000008 HC ANESTHESIA 1ST 15 MINUTES: Performed by: SURGERY

## 2024-03-20 PROCEDURE — 93010 ELECTROCARDIOGRAM REPORT: CPT | Mod: ,,, | Performed by: INTERNAL MEDICINE

## 2024-03-20 PROCEDURE — 87070 CULTURE OTHR SPECIMN AEROBIC: CPT | Performed by: SURGERY

## 2024-03-20 PROCEDURE — D9220AH HC ANESTHESIA PROFESSIONAL FEE: Mod: QZ,P3,QS | Performed by: NURSE ANESTHETIST, CERTIFIED REGISTERED

## 2024-03-20 PROCEDURE — 25000003 PHARM REV CODE 250: Performed by: SURGERY

## 2024-03-20 PROCEDURE — 93005 ELECTROCARDIOGRAM TRACING: CPT

## 2024-03-20 PROCEDURE — 63600175 PHARM REV CODE 636 W HCPCS: Performed by: SURGERY

## 2024-03-20 PROCEDURE — 63600175 PHARM REV CODE 636 W HCPCS: Performed by: NURSE ANESTHETIST, CERTIFIED REGISTERED

## 2024-03-20 PROCEDURE — 36000704 HC OR TIME LEV I 1ST 15 MIN: Performed by: SURGERY

## 2024-03-20 PROCEDURE — 36000705 HC OR TIME LEV I EA ADD 15 MIN: Performed by: SURGERY

## 2024-03-20 PROCEDURE — 37000009 HC ANESTHESIA EA ADD 15 MINS: Performed by: SURGERY

## 2024-03-20 PROCEDURE — 63600175 PHARM REV CODE 636 W HCPCS: Performed by: FAMILY MEDICINE

## 2024-03-20 RX ORDER — PROPOFOL 10 MG/ML
VIAL (ML) INTRAVENOUS
Status: DISCONTINUED | OUTPATIENT
Start: 2024-03-20 | End: 2024-03-20

## 2024-03-20 RX ORDER — LIDOCAINE HYDROCHLORIDE AND EPINEPHRINE 10; 10 MG/ML; UG/ML
INJECTION, SOLUTION INFILTRATION; PERINEURAL
Status: DISCONTINUED | OUTPATIENT
Start: 2024-03-20 | End: 2024-03-20 | Stop reason: HOSPADM

## 2024-03-20 RX ORDER — MIDAZOLAM HYDROCHLORIDE 1 MG/ML
INJECTION INTRAMUSCULAR; INTRAVENOUS
Status: DISCONTINUED | OUTPATIENT
Start: 2024-03-20 | End: 2024-03-20

## 2024-03-20 RX ORDER — SODIUM CHLORIDE 9 MG/ML
INJECTION, SOLUTION INTRAVENOUS CONTINUOUS
Status: DISCONTINUED | OUTPATIENT
Start: 2024-03-20 | End: 2024-03-20

## 2024-03-20 RX ORDER — FENTANYL CITRATE 50 UG/ML
INJECTION, SOLUTION INTRAMUSCULAR; INTRAVENOUS
Status: DISCONTINUED | OUTPATIENT
Start: 2024-03-20 | End: 2024-03-20

## 2024-03-20 RX ORDER — CEFAZOLIN SODIUM 1 G/3ML
2 INJECTION, POWDER, FOR SOLUTION INTRAMUSCULAR; INTRAVENOUS
Status: COMPLETED | OUTPATIENT
Start: 2024-03-20 | End: 2024-03-20

## 2024-03-20 RX ORDER — LIDOCAINE HYDROCHLORIDE 10 MG/ML
INJECTION, SOLUTION EPIDURAL; INFILTRATION; INTRACAUDAL; PERINEURAL
Status: DISCONTINUED | OUTPATIENT
Start: 2024-03-20 | End: 2024-03-20

## 2024-03-20 RX ADMIN — LIDOCAINE HYDROCHLORIDE 20 MG: 10 INJECTION, SOLUTION EPIDURAL; INFILTRATION; INTRACAUDAL; PERINEURAL at 11:03

## 2024-03-20 RX ADMIN — ATORVASTATIN CALCIUM 40 MG: 40 TABLET, FILM COATED ORAL at 09:03

## 2024-03-20 RX ADMIN — PROPOFOL 25 MG: 10 INJECTION, EMULSION INTRAVENOUS at 11:03

## 2024-03-20 RX ADMIN — CEFAZOLIN 2 G: 330 INJECTION, POWDER, FOR SOLUTION INTRAMUSCULAR; INTRAVENOUS at 11:03

## 2024-03-20 RX ADMIN — OXCARBAZEPINE 900 MG: 300 TABLET, FILM COATED ORAL at 02:03

## 2024-03-20 RX ADMIN — FENTANYL CITRATE 100 MCG: 50 INJECTION INTRAMUSCULAR; INTRAVENOUS at 11:03

## 2024-03-20 RX ADMIN — DILTIAZEM HYDROCHLORIDE 120 MG: 120 CAPSULE, COATED, EXTENDED RELEASE ORAL at 02:03

## 2024-03-20 RX ADMIN — LEVETIRACETAM 1500 MG: 500 TABLET, FILM COATED ORAL at 02:03

## 2024-03-20 RX ADMIN — LEVETIRACETAM 1500 MG: 500 TABLET, FILM COATED ORAL at 09:03

## 2024-03-20 RX ADMIN — VANCOMYCIN HYDROCHLORIDE 1500 MG: 1.5 INJECTION, POWDER, LYOPHILIZED, FOR SOLUTION INTRAVENOUS at 08:03

## 2024-03-20 RX ADMIN — TOPIRAMATE 200 MG: 100 TABLET, FILM COATED ORAL at 09:03

## 2024-03-20 RX ADMIN — MIDAZOLAM HYDROCHLORIDE 2 MG: 1 INJECTION, SOLUTION INTRAMUSCULAR; INTRAVENOUS at 11:03

## 2024-03-20 RX ADMIN — SERTRALINE HYDROCHLORIDE 50 MG: 50 TABLET ORAL at 02:03

## 2024-03-20 RX ADMIN — TOPIRAMATE 200 MG: 100 TABLET, FILM COATED ORAL at 02:03

## 2024-03-20 RX ADMIN — FUROSEMIDE 20 MG: 20 TABLET ORAL at 02:03

## 2024-03-20 RX ADMIN — OXCARBAZEPINE 900 MG: 300 TABLET, FILM COATED ORAL at 09:03

## 2024-03-20 RX ADMIN — TAMSULOSIN HYDROCHLORIDE 0.4 MG: 0.4 CAPSULE ORAL at 09:03

## 2024-03-20 NOTE — ANESTHESIA PREPROCEDURE EVALUATION
03/20/2024  Nathen Morales is a 73 y.o., male.      Pre-op Assessment    I have reviewed the Patient Summary Reports.     I have reviewed the Nursing Notes. I have reviewed the NPO Status.   I have reviewed the Medications.     Review of Systems  Anesthesia Hx:  No problems with previous Anesthesia             Denies Family Hx of Anesthesia complications.    Denies Personal Hx of Anesthesia complications.                    Social:  Non-Smoker       Cardiovascular:     Hypertension Valvular problems/Murmurs, MR                                       Pulmonary:  Pulmonary Normal                       Renal/:  Renal/ Normal                 Hepatic/GI:  Hepatic/GI Normal                 Musculoskeletal:  Musculoskeletal Normal                Neurological:       Seizures                                Endocrine:  Endocrine Normal            Psych:    depression                Physical Exam  General: Well nourished, Cooperative, Alert and Oriented    Airway:  Mallampati: II / II  Mouth Opening: Normal  TM Distance: Normal  Tongue: Normal  Neck ROM: Normal ROM    Dental:  Edentulous    Chest/Lungs:  Normal Respiratory Rate    Heart:  Rate: Normal    Musculoskeletal:  Normal mobility      Anesthesia Plan  Type of Anesthesia, risks & benefits discussed:    Anesthesia Type: MAC  Intra-op Monitoring Plan: Standard ASA Monitors  Post Op Pain Control Plan: multimodal analgesia  Induction:  IV  Informed Consent: Informed consent signed with the Patient and all parties understand the risks and agree with anesthesia plan.  All questions answered.   ASA Score: 3  Day of Surgery Review of History & Physical: H&P Update referred to the surgeon/provider.  Anesthesia Plan Notes: Anesthesia plan was discussed with patient and/or representative. Risks and alternatives were discussed including the possibility of alteration of  plan.     Ready For Surgery From Anesthesia Perspective.     .

## 2024-03-20 NOTE — HOSPITAL COURSE
03/20/2024:  Patient seen on rounds today lying in bed.  He is status post I&D with Dr. Carrasco.  Patient without any complaints    03/21/2024:  Patient has undergone I and D with Dr. Carrasco.  Wound cultures positive for MRSA.  Sensitivities reviewed.  Patient will be discharged home today with [facility] to follow along with wound care.  Wound care discussed with patient.  Showers only, no baths till area completely healed.  Rx for clindamycin 150 mg q.i.d. x1 week sent to pharmacy.  We will follow up with patient in clinic in 1 week.

## 2024-03-20 NOTE — ANESTHESIA POSTPROCEDURE EVALUATION
Anesthesia Post Evaluation    Patient: Nathen Morales    Procedure(s) Performed: Procedure(s) (LRB):  INCISION AND DRAINAGE, ABSCESS (Right)    Final Anesthesia Type: MAC      Patient participation: Yes- Able to Participate  Level of consciousness: awake and alert  Post-procedure vital signs: reviewed and stable  Pain management: adequate  Airway patency: patent    PONV status at discharge: No PONV  Anesthetic complications: no      Cardiovascular status: blood pressure returned to baseline  Respiratory status: unassisted  Hydration status: euvolemic  Follow-up not needed.              Vitals Value Taken Time   /89 03/20/24 1209   Temp 36.9 °C (98.5 °F) 03/20/24 1209   Pulse 67 03/20/24 1209   Resp 18 03/20/24 1209   SpO2 98 % 03/20/24 1209         No case tracking events are documented in the log.      Pain/Memo Score: No data recorded

## 2024-03-20 NOTE — PLAN OF CARE
03/20/24 1104   Discharge Assessment   Assessment Type Discharge Planning Assessment   Confirmed/corrected address, phone number and insurance Yes   Confirmed Demographics Correct on Facesheet   Source of Information patient   When was your last doctors appointment? 03/19/24   Communicated DILSHAD with patient/caregiver Yes   Reason For Admission Abcess to Thigh   People in Home alone   Facility Arrived From: Home   Do you expect to return to your current living situation? Yes   Do you have help at home or someone to help you manage your care at home? Yes   Who are your caregiver(s) and their phone number(s)? Cathie (emelia)[phone]   Prior to hospitilization cognitive status: Alert/Oriented   Current cognitive status: Alert/Oriented   Walking or Climbing Stairs Difficulty no   Dressing/Bathing Difficulty no   Home Accessibility wheelchair accessible   Equipment Currently Used at Home rollator;shower chair;wheelchair   Readmission within 30 days? No   Do you currently have service(s) that help you manage your care at home? No   Do you take prescription medications? Yes   Do you have prescription coverage? Yes   Coverage [facility] Medicare   Do you have any problems affording any of your prescribed medications? No   Is the patient taking medications as prescribed? yes   Who is going to help you get home at discharge? Cathie [phone]   How do you get to doctors appointments? family or friend will provide   Are you on dialysis? No   Do you take coumadin? No   Discharge Plan A Home Health;Home   DME Needed Upon Discharge  none   Discharge Plan discussed with: Patient   Transition of Care Barriers None   Physical Activity   On average, how many days per week do you engage in moderate to strenuous exercise (like a brisk walk)? 0 days   On average, how many minutes do you engage in exercise at this level? 0 min   Financial Resource Strain   How hard is it for you to pay for the very basics like food, housing, medical  care, and heating? Not very   Housing Stability   In the last 12 months, was there a time when you were not able to pay the mortgage or rent on time? N   In the last 12 months, how many places have you lived? 1   In the last 12 months, was there a time when you did not have a steady place to sleep or slept in a shelter (including now)? N   Transportation Needs   In the past 12 months, has lack of transportation kept you from medical appointments or from getting medications? no   In the past 12 months, has lack of transportation kept you from meetings, work, or from getting things needed for daily living? No   Food Insecurity   Within the past 12 months, you worried that your food would run out before you got the money to buy more. Never true   Within the past 12 months, the food you bought just didn't last and you didn't have money to get more. Never true   Stress   Do you feel stress - tense, restless, nervous, or anxious, or unable to sleep at night because your mind is troubled all the time - these days? To some exte   Social Connections   In a typical week, how many times do you talk on the phone with family, friends, or neighbors? More than 3   How often do you get together with friends or relatives? More than 3   How often do you attend Yarsani or Jainism services? More than 4   Do you belong to any clubs or organizations such as Yarsani groups, unions, fraternal or athletic groups, or school groups? No   How often do you attend meetings of the clubs or organizations you belong to? Never   Are you , , , , never , or living with a partner? Never marrie   Alcohol Use   Q1: How often do you have a drink containing alcohol? Never   Q2: How many drinks containing alcohol do you have on a typical day when you are drinking? None   Q3: How often do you have six or more drinks on one occasion? Never     Does not have home health at this time but feels he will need when he  discharged to help with wound care. He has a rollator, shower chair, and wheelchair at home, does not feel he needs any other DME's. Will defer to Dr Brown for further discharge plan.

## 2024-03-20 NOTE — PLAN OF CARE
Problem: Adult Inpatient Plan of Care  Goal: Plan of Care Review  3/20/2024 0618 by Chayo Chance RN  Outcome: Ongoing, Progressing  3/20/2024 0500 by Chayo Chance RN  Outcome: Ongoing, Progressing  Goal: Patient-Specific Goal (Individualized)  3/20/2024 0618 by Chayo Chance RN  Outcome: Ongoing, Progressing  3/20/2024 0500 by Chayo Chance RN  Outcome: Ongoing, Progressing  Goal: Absence of Hospital-Acquired Illness or Injury  3/20/2024 0618 by Chayo Chance RN  Outcome: Ongoing, Progressing  3/20/2024 0500 by Chayo Chance RN  Outcome: Ongoing, Progressing  Goal: Optimal Comfort and Wellbeing  3/20/2024 0618 by Chayo Chance RN  Outcome: Ongoing, Progressing  3/20/2024 0500 by Chayo Chance RN  Outcome: Ongoing, Progressing  Goal: Readiness for Transition of Care  3/20/2024 0618 by Chayo Chance RN  Outcome: Ongoing, Progressing  3/20/2024 0500 by Chayo Chance RN  Outcome: Ongoing, Progressing

## 2024-03-20 NOTE — PROGRESS NOTES
[facility]  [facility] Medicine  Progress Note    Patient Name: Nathen Morales  MRN: [id number]  Patient Class: IP- Inpatient   Admission Date: 3/19/2024  Length of Stay: 1 days  Attending Physician: Brock Brown MD  Primary Care Provider: Brock Brown MD        Subjective:     Principal Problem:Abscess        HPI:  This 73-year-old white male presented to clinic on the afternoon of 03/19/2024.  Patient complaining of right thigh pain.  States area began as a small pustule and began increasing in size.  Present x4 days.  Patient does report purulent drainage.  On physical exam, large abscess noted on right upper inner thigh.  Apparently drainage noted on bandage, TTP.  Due to size/induration, patient will be admitted for IV antibiotics.  We will consult Dr. Carrasco for I&D.    Overview/Hospital Course:  03/20/2024:  Patient seen on rounds today lying in bed.  He is status post I&D with Dr. Carrasco.  Patient without any complaints    Interval History:     Review of Systems   Constitutional: Negative.    Respiratory: Negative.     Cardiovascular: Negative.    Skin:         Right thigh abscess     Objective:     Vital Signs (Most Recent):  Temp: 97.7 °F (36.5 °C) (03/20/24 1224)  Pulse: 62 (03/20/24 1224)  Resp: 20 (03/20/24 1114)  BP: (!) 168/64 (03/20/24 1224)  SpO2: 95 % (03/20/24 1224) Vital Signs (24h Range):  Temp:  [97.7 °F (36.5 °C)-98.7 °F (37.1 °C)] 97.7 °F (36.5 °C)  Pulse:  [50-72] 62  Resp:  [18-20] 20  SpO2:  [95 %-100 %] 95 %  BP: (161-186)/(64-87) 168/64     Weight: 92.1 kg (203 lb 0.7 oz)  Body mass index is 26.79 kg/m².    Intake/Output Summary (Last 24 hours) at 3/20/2024 1310  Last data filed at 3/20/2024 1101  Gross per 24 hour   Intake 240 ml   Output 1751 ml   Net -1511 ml         Physical Exam  Constitutional:       Appearance: Normal appearance.   Cardiovascular:      Rate and Rhythm: Normal rate and regular rhythm.      Heart sounds: Normal heart  sounds.   Pulmonary:      Effort: Pulmonary effort is normal.      Breath sounds: Normal breath sounds.   Musculoskeletal:      Comments: SCDs in place on BLE   Skin:     Comments: Right thigh: Dressing in place, no drainage noted   Neurological:      Mental Status: He is alert.   Psychiatric:         Mood and Affect: Mood normal.         Behavior: Behavior normal.         Thought Content: Thought content normal.         Judgment: Judgment normal.             Significant Labs: All pertinent labs within the past 24 hours have been reviewed.  CBC:   Recent Labs   Lab 03/19/24  1715   WBC 7.74   HGB 11.0*   HCT 33.7*        CMP:   Recent Labs   Lab 03/19/24  1715      K 3.8   CO2 22*   BUN 27.0*   CREATININE 1.45*   CALCIUM 8.9   ALBUMIN 3.3*   BILITOT 0.2   ALKPHOS 99   AST 12   ALT 9       Significant Imaging: I have reviewed all pertinent imaging results/findings within the past 24 hours.    Assessment/Plan:      * Abscess  Continue IV antibiotics (vancomycin)   S/p I&D   Wound cultures pending      Seizure disorder  Continue home medication      Hypertension  Continue home medication   Monitor BP      VTE Risk Mitigation (From admission, onward)      None            Discharge Planning   DILSHAD:      Code Status: Prior   Is the patient medically ready for discharge?:     Reason for patient still in hospital (select all that apply): Patient trending condition  Discharge Plan A: Home Health, Home                  Brock Brown MD  Department of Hospital Medicine   [facility]

## 2024-03-20 NOTE — SUBJECTIVE & OBJECTIVE
Interval History:     Review of Systems   Constitutional: Negative.    Respiratory: Negative.     Cardiovascular: Negative.    Skin:         Right thigh abscess     Objective:     Vital Signs (Most Recent):  Temp: 97.7 °F (36.5 °C) (03/20/24 1224)  Pulse: 62 (03/20/24 1224)  Resp: 20 (03/20/24 1114)  BP: (!) 168/64 (03/20/24 1224)  SpO2: 95 % (03/20/24 1224) Vital Signs (24h Range):  Temp:  [97.7 °F (36.5 °C)-98.7 °F (37.1 °C)] 97.7 °F (36.5 °C)  Pulse:  [50-72] 62  Resp:  [18-20] 20  SpO2:  [95 %-100 %] 95 %  BP: (161-186)/(64-87) 168/64     Weight: 92.1 kg (203 lb 0.7 oz)  Body mass index is 26.79 kg/m².    Intake/Output Summary (Last 24 hours) at 3/20/2024 1310  Last data filed at 3/20/2024 1101  Gross per 24 hour   Intake 240 ml   Output 1751 ml   Net -1511 ml         Physical Exam  Constitutional:       Appearance: Normal appearance.   Cardiovascular:      Rate and Rhythm: Normal rate and regular rhythm.      Heart sounds: Normal heart sounds.   Pulmonary:      Effort: Pulmonary effort is normal.      Breath sounds: Normal breath sounds.   Musculoskeletal:      Comments: SCDs in place on BLE   Skin:     Comments: Right thigh: Dressing in place, no drainage noted   Neurological:      Mental Status: He is alert.   Psychiatric:         Mood and Affect: Mood normal.         Behavior: Behavior normal.         Thought Content: Thought content normal.         Judgment: Judgment normal.             Significant Labs: All pertinent labs within the past 24 hours have been reviewed.  CBC:   Recent Labs   Lab 03/19/24  1715   WBC 7.74   HGB 11.0*   HCT 33.7*        CMP:   Recent Labs   Lab 03/19/24  1715      K 3.8   CO2 22*   BUN 27.0*   CREATININE 1.45*   CALCIUM 8.9   ALBUMIN 3.3*   BILITOT 0.2   ALKPHOS 99   AST 12   ALT 9       Significant Imaging: I have reviewed all pertinent imaging results/findings within the past 24 hours.

## 2024-03-21 VITALS
TEMPERATURE: 99 F | SYSTOLIC BLOOD PRESSURE: 179 MMHG | HEART RATE: 96 BPM | HEIGHT: 73 IN | OXYGEN SATURATION: 95 % | RESPIRATION RATE: 16 BRPM | WEIGHT: 203.06 LBS | DIASTOLIC BLOOD PRESSURE: 82 MMHG | BODY MASS INDEX: 26.91 KG/M2

## 2024-03-21 LAB
ANION GAP SERPL CALC-SCNC: 9 MEQ/L
BACTERIA WND CULT: ABNORMAL
BUN SERPL-MCNC: 21 MG/DL (ref 8.4–25.7)
CALCIUM SERPL-MCNC: 9 MG/DL (ref 8.8–10)
CHLORIDE SERPL-SCNC: 111 MMOL/L (ref 98–107)
CO2 SERPL-SCNC: 19 MMOL/L (ref 23–31)
CREAT SERPL-MCNC: 0.94 MG/DL (ref 0.73–1.18)
CREAT/UREA NIT SERPL: 22
GFR SERPLBLD CREATININE-BSD FMLA CKD-EPI: >60 MLS/MIN/1.73/M2
GLUCOSE SERPL-MCNC: 96 MG/DL (ref 82–115)
OHS QRS DURATION: 92 MS
OHS QTC CALCULATION: 443 MS
POTASSIUM SERPL-SCNC: 4.4 MMOL/L (ref 3.5–5.1)
SODIUM SERPL-SCNC: 139 MMOL/L (ref 136–145)

## 2024-03-21 PROCEDURE — 80048 BASIC METABOLIC PNL TOTAL CA: CPT | Performed by: FAMILY MEDICINE

## 2024-03-21 PROCEDURE — 99238 HOSP IP/OBS DSCHRG MGMT 30/<: CPT | Mod: ,,, | Performed by: FAMILY MEDICINE

## 2024-03-21 PROCEDURE — 25000003 PHARM REV CODE 250: Performed by: FAMILY MEDICINE

## 2024-03-21 RX ORDER — ROSUVASTATIN CALCIUM 10 MG/1
10 TABLET, COATED ORAL NIGHTLY
Qty: 30 TABLET | Refills: 3 | Status: SHIPPED | OUTPATIENT
Start: 2024-03-21 | End: 2024-07-19

## 2024-03-21 RX ORDER — CLINDAMYCIN HYDROCHLORIDE 150 MG/1
150 CAPSULE ORAL EVERY 6 HOURS
Qty: 28 CAPSULE | Refills: 0 | Status: SHIPPED | OUTPATIENT
Start: 2024-03-21 | End: 2024-03-28

## 2024-03-21 RX ADMIN — FUROSEMIDE 20 MG: 20 TABLET ORAL at 09:03

## 2024-03-21 RX ADMIN — DILTIAZEM HYDROCHLORIDE 120 MG: 120 CAPSULE, COATED, EXTENDED RELEASE ORAL at 09:03

## 2024-03-21 RX ADMIN — OXCARBAZEPINE 900 MG: 300 TABLET, FILM COATED ORAL at 09:03

## 2024-03-21 RX ADMIN — SERTRALINE HYDROCHLORIDE 50 MG: 50 TABLET ORAL at 09:03

## 2024-03-21 RX ADMIN — LEVETIRACETAM 1500 MG: 500 TABLET, FILM COATED ORAL at 09:03

## 2024-03-21 RX ADMIN — TOPIRAMATE 200 MG: 100 TABLET, FILM COATED ORAL at 09:03

## 2024-03-21 NOTE — DISCHARGE SUMMARY
[facility]  [facility]  Discharge Summary      Patient Name: Nathen Morales  MRN: [id number]  TULIO: [id number]  Patient Class: IP- Inpatient  Admission Date: 3/19/2024  Hospital Length of Stay: 2 days  Discharge Date and Time:  03/21/2024 1:02 PM  Attending Physician: Brock Brown MD   Discharging Provider: Brock Brown MD  Primary Care Provider: Brock Brown MD    Primary Care Team: Networked reference to record PCT     HPI:   This 73-year-old white male presented to clinic on the afternoon of 03/19/2024.  Patient complaining of right thigh pain.  States area began as a small pustule and began increasing in size.  Present x4 days.  Patient does report purulent drainage.  On physical exam, large abscess noted on right upper inner thigh.  Apparently drainage noted on bandage, TTP.  Due to size/induration, patient will be admitted for IV antibiotics.  We will consult Dr. Carrasco for I&D.    Procedure(s) (LRB):  INCISION AND DRAINAGE, ABSCESS (Right)      Hospital Course:   03/20/2024:  Patient seen on rounds today lying in bed.  He is status post I&D with Dr. Carrasco.  Patient without any complaints    03/21/2024:  Patient has undergone I and D with Dr. Carrasco.  Wound cultures positive for MRSA.  Sensitivities reviewed.  Patient will be discharged home today with [facility] to follow along with wound care.  Wound care discussed with patient.  Showers only, no baths till area completely healed.  Rx for clindamycin 150 mg q.i.d. x1 week sent to pharmacy.  We will follow up with patient in clinic in 1 week.     Goals of Care Treatment Preferences:  Code Status: Full Code    Physical Exam  Constitutional:       Appearance: Normal appearance.   Cardiovascular:      Rate and Rhythm: Normal rate and regular rhythm.   Pulmonary:      Effort: Pulmonary effort is normal.      Breath sounds: Normal breath sounds.   Skin:     Comments: Right thigh:  Dressing  removed, packing in place   Neurological:      Mental Status: He is alert.   Psychiatric:         Mood and Affect: Mood normal.         Behavior: Behavior normal.         Thought Content: Thought content normal.         Judgment: Judgment normal.         Consults:   Consults (From admission, onward)          Status Ordering Provider     Pharmacy to dose Vancomycin consult  Once        Provider:  (Not yet assigned)   See Ricky for full Linked Orders Report.    Acknowledged BROCK BROWN     Inpatient consult to General Surgery  Once        Provider:  Amelia Carrasco MD    Acknowledged BROCK BROWN            Neuro  Seizure disorder  Continue home medication      Cardiac/Vascular  Hypertension  Continue home medication       ID  * Abscess  Wound culture positive for MRSA   Rx for clindamycin 150 mg q.i.d. x1 week sent to pharmacy  Wound care discussed   [facility] to follow along with patient      Final Active Diagnoses:    Diagnosis Date Noted POA    PRINCIPAL PROBLEM:  Abscess [L02.91] 03/19/2024 Unknown    Hypertension [I10] 05/31/2022 Yes    Seizure disorder [G40.909] 05/31/2022 Yes      Problems Resolved During this Admission:    Diagnosis Date Noted Date Resolved POA    Abscess of right thigh [L02.415] 03/19/2024 03/19/2024 Unknown       Discharged Condition: good    Disposition: Home or Self Care    Follow Up:   Follow-up Information       Brock Brown MD Follow up in 1 week(s).    Specialty: Family Medicine  Contact information:  [address] [place] [address] [phone]                           Patient Instructions:      Diet Adult Regular     Change dressing (specify)   Order Comments: Dressing change:  2 times per day, no baths, showers only, home health to follow along with patient for wound care     Activity as tolerated       Significant Diagnostic Studies: Microbiology: Wound Culture: positive for MRSA    Pending Diagnostic Studies:       None            Medications:  Reconciled Home Medications:      Medication List        START taking these medications      clindamycin 150 MG capsule  Commonly known as: CLEOCIN  Take 1 capsule (150 mg total) by mouth every 6 (six) hours. for 7 days            CHANGE how you take these medications      tamsulosin 0.4 mg Cap  Commonly known as: FLOMAX  TAKE 1 CAPSULE BY MOUTH TWICE DAILY  What changed:   how much to take  when to take this  additional instructions            CONTINUE taking these medications      aspirin 81 mg Cap  Take 81 mg by mouth Daily.     furosemide 20 MG tablet  Commonly known as: LASIX  Take 1 tablet (20 mg total) by mouth once daily.     levETIRAcetam 1000 MG tablet  Commonly known as: KEPPRA  take ONE AND ONE-HALF tablets BY MOUTH TWICE DAILY     multivitamin tablet  Commonly known as: THERAGRAN  Take 1 tablet by mouth once daily.     OXcarbazepine 300 MG Tab  Commonly known as: TRILEPTAL  TAKE THREE TABLETS BY MOUTH TWICE DAILY     rosuvastatin 10 MG tablet  Commonly known as: CRESTOR  Take 1 tablet (10 mg total) by mouth every evening.     sertraline 50 MG tablet  Commonly known as: ZOLOFT  Take 1 tablet (50 mg total) by mouth once daily.     temazepam 15 mg Cap  Commonly known as: RESTORIL  TAKE ONE CAPSULE BY MOUTH AT BEDTIME     TIADYLT  mg 24 hr capsule  Generic drug: diltiaZEM HCl  Take 1 capsule (120 mg total) by mouth once daily.     topiramate 200 MG Tab  Commonly known as: TOPAMAX  TAKE ONE TABLET BY MOUTH TWICE DAILY            ASK your doctor about these medications      ferrous sulfate 325 (65 FE) MG EC tablet  Take 325 mg by mouth.              Indwelling Lines/Drains at time of discharge:   Lines/Drains/Airways       None                   Time spent on the discharge of patient: 45 minutes         Brock Brown MD  Department of Hospital Medicine  [facility] - Medical Surgical Unit

## 2024-03-21 NOTE — ASSESSMENT & PLAN NOTE
Wound culture positive for MRSA   Rx for clindamycin 150 mg q.i.d. x1 week sent to pharmacy  Wound care discussed   [facility] to follow along with patient

## 2024-03-21 NOTE — PLAN OF CARE
03/21/24 1252   Final Note   Assessment Type Final Discharge Note   Anticipated Discharge Disposition Home-Health   What phone number can be called within the next 1-3 days to see how you are doing after discharge? [phone]   Post-Acute Status   Post-Acute Authorization Home Health   Home Health Status Referrals Sent   Coverage [employer] Medicare   Discharge Delays None known at this time     [facility] is making follow up appointments at this time.

## 2024-03-21 NOTE — DISCHARGE INSTRUCTIONS
Keep all follow-up appointments or reschedule as needed. Home Health to follow up on wound care once home. Take all medications as prescribed and take all antibiotics until completed. Return to the ER if signs of infection occur, such as fever or excessive or foul drainage from wound.

## 2024-03-21 NOTE — PROGRESS NOTES
Inpatient Nutrition Assessment    Admit Date: 3/19/2024   Total duration of encounter: 2 days   Patient Age: 73 y.o.    Nutrition Recommendation/Prescription     Continue regular diet as tolerated  Will provide Boost Plus TID with meals. (360 kcal and 14 gm protein per serving)  Weigh weekly  Consider Vitamin C 500 mg BID to aid in wound healing.        Communication of Recommendations: reviewed with patient    Nutrition Assessment     Malnutrition Assessment/Nutrition-Focused Physical Exam    Malnutrition Context: other (see comments) (Does not meet criteria) (03/21/24 1119)                                                           A minimum of two characteristics is recommended for diagnosis of either severe or non-severe malnutrition.    Chart Review    Reason Seen: continuous nutrition monitoring    Malnutrition Screening Tool Results   Have you recently lost weight without trying?: No  Have you been eating poorly because of a decreased appetite?: No   MST Score: 0   Diagnosis:  Abscess    Relevant Medical History:   HTN, Seizures, BPH     Scheduled Medications:  atorvastatin, 40 mg, QHS  diltiaZEM, 120 mg, Daily  furosemide, 20 mg, Daily  levETIRAcetam, 1,500 mg, BID  OXcarbazepine, 900 mg, BID  sertraline, 50 mg, Daily  tamsulosin, 0.4 mg, QHS  topiramate, 200 mg, BID  vancomycin (VANCOCIN) IV (PEDS and ADULTS), 1,500 mg, Q24H    Continuous Infusions:   PRN Medications: HYDROcodone-acetaminophen, Pharmacy to dose Vancomycin consult **AND** vancomycin - pharmacy to dose    Calorie Containing IV Medications: no significant kcals from medications at this time    Recent Labs   Lab 03/19/24  1715 03/21/24  0536    139   K 3.8 4.4   CALCIUM 8.9 9.0   CHLORIDE 110* 111*   CO2 22* 19*   BUN 27.0* 21.0   CREATININE 1.45* 0.94   EGFRNORACEVR 50 >60   GLUCOSE 97 96   BILITOT 0.2  --    ALKPHOS 99  --    ALT 9  --    AST 12  --    ALBUMIN 3.3*  --    WBC 7.74  --    HGB 11.0*  --    HCT 33.7*  --      Nutrition  "Orders:  Diet Adult Regular      Appetite/Oral Intake: good/% of meals  Factors Affecting Nutritional Intake: none identified  Food/Synagogue/Cultural Preferences: none reported  Food Allergies: none reported  Last Bowel Movement: 03/20/24  Wound(s):     Altered Skin Integrity 03/19/24 1535 Right medial Thigh #1 Full thickness tissue loss. Base is covered by slough and/or eschar in the wound bed-Tissue loss description: Full thickness     Comments    (3/21) Pt with good appetite and intake. Consumes ~75% of meals. Denied N/V/C/D. No issues chewing or swallowing. Able to feed self with set-up assist. Pt reported no recent weight changes. Med/labs reviewed.      Anthropometrics    Height: 6' 1" (185.4 cm), Height Method: Stated  Last Weight: 92.1 kg (203 lb 0.7 oz) (03/19/24 1501), Weight Method: Bed Scale  BMI (Calculated): 26.8  BMI Classification: overweight (BMI 25-29.9)        Ideal Body Weight (IBW), Male: 184 lb     % Ideal Body Weight, Male (lb): 110.35 %                          Usual Weight Provided By: patient denies unintentional weight loss    Wt Readings from Last 5 Encounters:   03/19/24 92.1 kg (203 lb 0.7 oz)   01/29/24 95.3 kg (210 lb)   01/17/24 89.5 kg (197 lb 6.4 oz)   01/14/24 95.3 kg (210 lb)   12/05/23 93 kg (205 lb)     Weight Change(s) Since Admission:   Wt Readings from Last 1 Encounters:   03/19/24 1501 92.1 kg (203 lb 0.7 oz)   Admit Weight: 92.1 kg (203 lb 0.7 oz) (03/19/24 1501), Weight Method: Bed Scale    Estimated Needs    Weight Used For Calorie Calculations: 92.1 kg (203 lb 0.7 oz)  Energy Calorie Requirements (kcal): 2303 kcal (30 kcal/kg)  Energy Need Method: Kcal/kg  Weight Used For Protein Calculations: 92.1 kg (203 lb 0.7 oz)  Protein Requirements: 138 gm protein (1.5 gm/kg)  Fluid Requirements (mL): 2303 ml (1 ml/kcal)    Enteral Nutrition     Patient not receiving enteral nutrition at this time.    Parenteral Nutrition     Patient not receiving parenteral nutrition " support at this time.    Evaluation of Received Nutrient Intake    Calories: not meeting estimated needs  Protein: not meeting estimated needs    Patient Education     Not applicable.    Nutrition Diagnosis     PES: Increased nutrient needs (protein/energy) related to increased protein energy demand for wound healing as evidenced by   Altered Skin Integrity 03/19/24 1535 Right medial Thigh #1 Full thickness tissue loss. Base is covered by slough and/or eschar in the wound bed-Tissue loss description: Full thickness. (new)         Nutrition Interventions     Intervention(s): general/healthful diet, commercial beverage, and collaboration with other providers    Goal: Consume % of oral supplements by follow-up. (new)  Goal: Maintain weight throughout hospitalization. (new)    Nutrition Goals & Monitoring     Dietitian will monitor: energy intake, weight, electrolyte/renal panel, glucose/endocrine profile, and gastrointestinal profile    Nutrition Risk/Follow-Up: moderate (follow-up in 3-5 days)   Please consult if re-assessment needed sooner.

## 2024-03-22 ENCOUNTER — PATIENT OUTREACH (OUTPATIENT)
Dept: ADMINISTRATIVE | Facility: CLINIC | Age: 74
End: 2024-03-22
Payer: MEDICARE

## 2024-03-22 LAB
BACTERIA SPEC ANAEROBE CULT: NORMAL
BACTERIA WND CULT: ABNORMAL

## 2024-03-22 NOTE — PROGRESS NOTES
C3 nurse spoke with Nathen Morales  for a TCC post hospital discharge follow up call. Patient with Home Health nurse @ present and gave nurse the phone. Home Health nurse made aware to let patient know I will call back.The patient has a scheduled HOS appointment with Brock Brown MD  on 03/28/2024 @ 245 pm. Appointment with Dr. Amelia Carrasco on 03/27/2024.

## 2024-03-22 NOTE — PROGRESS NOTES
C3 nurse spoke with Nathen Morales  for a TCC post hospital discharge follow up call. The patient has a scheduled [facility] appointment with Brock Brown MD  on 03/28/2024 @ 245 pm. Appointment with Dr. Amelia Carrasco on 03/27/2024.

## 2024-03-23 LAB — BACTERIA SPEC ANAEROBE CULT: NORMAL

## 2024-04-04 ENCOUNTER — OFFICE VISIT (OUTPATIENT)
Dept: FAMILY MEDICINE | Facility: CLINIC | Age: 74
End: 2024-04-04
Payer: MEDICARE

## 2024-04-04 VITALS
OXYGEN SATURATION: 95 % | RESPIRATION RATE: 18 BRPM | BODY MASS INDEX: 26.51 KG/M2 | DIASTOLIC BLOOD PRESSURE: 82 MMHG | WEIGHT: 200 LBS | SYSTOLIC BLOOD PRESSURE: 168 MMHG | HEIGHT: 73 IN | HEART RATE: 84 BPM | TEMPERATURE: 98 F

## 2024-04-04 DIAGNOSIS — R53.1 WEAKNESS: ICD-10-CM

## 2024-04-04 DIAGNOSIS — I10 HYPERTENSION, UNSPECIFIED TYPE: Primary | ICD-10-CM

## 2024-04-04 PROCEDURE — 99495 TRANSJ CARE MGMT MOD F2F 14D: CPT | Mod: ,,, | Performed by: FAMILY MEDICINE

## 2024-04-04 PROCEDURE — 1160F RVW MEDS BY RX/DR IN RCRD: CPT | Mod: CPTII,,, | Performed by: FAMILY MEDICINE

## 2024-04-04 PROCEDURE — 3079F DIAST BP 80-89 MM HG: CPT | Mod: CPTII,,, | Performed by: FAMILY MEDICINE

## 2024-04-04 PROCEDURE — 3077F SYST BP >= 140 MM HG: CPT | Mod: CPTII,,, | Performed by: FAMILY MEDICINE

## 2024-04-04 PROCEDURE — 1111F DSCHRG MED/CURRENT MED MERGE: CPT | Mod: CPTII,,, | Performed by: FAMILY MEDICINE

## 2024-04-04 PROCEDURE — 1159F MED LIST DOCD IN RCRD: CPT | Mod: CPTII,,, | Performed by: FAMILY MEDICINE

## 2024-04-04 PROCEDURE — 1157F ADVNC CARE PLAN IN RCRD: CPT | Mod: CPTII,,, | Performed by: FAMILY MEDICINE

## 2024-04-04 NOTE — PROGRESS NOTES
"Subjective:       Patient ID: Nathen Morales is a 73 y.o. male.    Chief Complaint: TCM, Weakness, fatigue, insomnia      TCM      HPI:   This 73-year-old white male presented to clinic on the afternoon of 03/19/2024.  Patient complaining of right thigh pain.  States area began as a small pustule and began increasing in size.  Present x4 days.  Patient does report purulent drainage.  On physical exam, large abscess noted on right upper inner thigh.  Apparently drainage noted on bandage, TTP.  Due to size/induration, patient will be admitted for IV antibiotics.  We will consult Dr. Carrasco for I&D.     Procedure(s) (LRB):  INCISION AND DRAINAGE, ABSCESS (Right)       Hospital Course:   03/20/2024:  Patient seen on rounds today lying in bed.  He is status post I&D with Dr. Carrasco.  Patient without any complaints     03/21/2024:  Patient has undergone I and D with Dr. Carrasco.  Wound cultures positive for MRSA.  Sensitivities reviewed.  Patient will be discharged [facility] today with [facility] to follow along with wound care.  Wound care discussed with patient.  Showers only, no baths till area completely healed.  Rx for clindamycin 150 mg q.i.d. x1 week sent to pharmacy.  We will follow up with patient in clinic in 1 week.       Review of Systems   Constitutional:  Positive for fatigue.   Respiratory: Negative.     Cardiovascular: Negative.    Gastrointestinal: Negative.    Neurological:  Positive for weakness.   Psychiatric/Behavioral: Negative.          Insomnia           Objective:      BP (!) 168/82 (BP Location: Right arm, Patient Position: Sitting, BP Method: Large (Manual))   Pulse 84   Temp 97.9 °F (36.6 °C)   Resp 18   Ht 6' 1" (1.854 m)   Wt 90.7 kg (200 lb)   SpO2 95%   BMI 26.39 kg/m²    Physical Exam  Constitutional:       Appearance: Normal appearance.   Cardiovascular:      Rate and Rhythm: Normal rate and regular rhythm.      Heart sounds: Normal heart sounds.   Pulmonary:      Effort: " Pulmonary effort is normal.      Breath sounds: Normal breath sounds.   Musculoskeletal:      Comments: Ambulating with aid of rollator   Neurological:      Mental Status: He is alert.   Psychiatric:         Mood and Affect: Mood normal.         Behavior: Behavior normal.         Thought Content: Thought content normal.         Judgment: Judgment normal.               Assessment:       Problem List Items Addressed This Visit          Cardiac/Vascular    Hypertension - Primary     Other Visit Diagnoses       Weakness                   Plan:   1. Hypertension, unspecified type  Continue Lasix 20 mg q.day  Monitor BP with home health  Return to clinic with any concerns     2. Weakness  Continue Rollator   Refer patient for home health PT

## 2024-04-15 ENCOUNTER — HOSPITAL ENCOUNTER (EMERGENCY)
Facility: HOSPITAL | Age: 74
Discharge: HOME | End: 2024-04-15
Attending: EMERGENCY MEDICINE
Payer: MEDICARE

## 2024-04-15 VITALS
OXYGEN SATURATION: 98 % | HEART RATE: 84 BPM | RESPIRATION RATE: 15 BRPM | BODY MASS INDEX: 26.39 KG/M2 | TEMPERATURE: 98 F | DIASTOLIC BLOOD PRESSURE: 78 MMHG | SYSTOLIC BLOOD PRESSURE: 145 MMHG | HEIGHT: 73 IN

## 2024-04-15 DIAGNOSIS — E86.0 DEHYDRATION: ICD-10-CM

## 2024-04-15 DIAGNOSIS — R19.7 ACUTE DIARRHEA: Primary | ICD-10-CM

## 2024-04-15 LAB
ALBUMIN SERPL-MCNC: 3.1 G/DL (ref 3.4–4.8)
ALBUMIN/GLOB SERPL: 1 RATIO (ref 1.1–2)
ALP SERPL-CCNC: 87 UNIT/L (ref 40–150)
ALT SERPL-CCNC: 14 UNIT/L (ref 0–55)
AST SERPL-CCNC: 23 UNIT/L (ref 5–34)
BASOPHILS # BLD AUTO: 0.05 X10(3)/MCL
BASOPHILS NFR BLD AUTO: 0.3 %
BILIRUB SERPL-MCNC: 0.4 MG/DL
BUN SERPL-MCNC: 35 MG/DL (ref 8.4–25.7)
CALCIUM SERPL-MCNC: 9 MG/DL (ref 8.8–10)
CHLORIDE SERPL-SCNC: 103 MMOL/L (ref 98–107)
CO2 SERPL-SCNC: 20 MMOL/L (ref 23–31)
CREAT SERPL-MCNC: 1.71 MG/DL (ref 0.73–1.18)
EOSINOPHIL # BLD AUTO: 0.18 X10(3)/MCL (ref 0–0.9)
EOSINOPHIL NFR BLD AUTO: 1.1 %
ERYTHROCYTE [DISTWIDTH] IN BLOOD BY AUTOMATED COUNT: 13.2 % (ref 11.5–17)
GFR SERPLBLD CREATININE-BSD FMLA CKD-EPI: 41 MLS/MIN/1.73/M2
GLOBULIN SER-MCNC: 3.2 GM/DL (ref 2.4–3.5)
GLUCOSE SERPL-MCNC: 129 MG/DL (ref 82–115)
HCT VFR BLD AUTO: 32.2 % (ref 42–52)
HGB BLD-MCNC: 10.8 G/DL (ref 14–18)
IMM GRANULOCYTES # BLD AUTO: 0.18 X10(3)/MCL (ref 0–0.04)
IMM GRANULOCYTES NFR BLD AUTO: 1.1 %
LYMPHOCYTES # BLD AUTO: 0.48 X10(3)/MCL (ref 0.6–4.6)
LYMPHOCYTES NFR BLD AUTO: 2.9 %
MCH RBC QN AUTO: 34.1 PG (ref 27–31)
MCHC RBC AUTO-ENTMCNC: 33.5 G/DL (ref 33–36)
MCV RBC AUTO: 101.6 FL (ref 80–94)
MONOCYTES # BLD AUTO: 1.52 X10(3)/MCL (ref 0.1–1.3)
MONOCYTES NFR BLD AUTO: 9.3 %
NEUTROPHILS # BLD AUTO: 13.95 X10(3)/MCL (ref 2.1–9.2)
NEUTROPHILS NFR BLD AUTO: 85.3 %
NRBC BLD AUTO-RTO: 0 %
PLATELET # BLD AUTO: 174 X10(3)/MCL (ref 130–400)
PMV BLD AUTO: 10.6 FL (ref 7.4–10.4)
POTASSIUM SERPL-SCNC: 3.9 MMOL/L (ref 3.5–5.1)
PROT SERPL-MCNC: 6.3 GM/DL (ref 5.8–7.6)
RBC # BLD AUTO: 3.17 X10(6)/MCL (ref 4.7–6.1)
SODIUM SERPL-SCNC: 134 MMOL/L (ref 136–145)
WBC # SPEC AUTO: 16.36 X10(3)/MCL (ref 4.5–11.5)

## 2024-04-15 PROCEDURE — 25000003 PHARM REV CODE 250: Performed by: EMERGENCY MEDICINE

## 2024-04-15 PROCEDURE — 80053 COMPREHEN METABOLIC PANEL: CPT | Performed by: EMERGENCY MEDICINE

## 2024-04-15 PROCEDURE — 85025 COMPLETE CBC W/AUTO DIFF WBC: CPT | Performed by: EMERGENCY MEDICINE

## 2024-04-15 PROCEDURE — 96360 HYDRATION IV INFUSION INIT: CPT

## 2024-04-15 PROCEDURE — 99284 EMERGENCY DEPT VISIT MOD MDM: CPT | Mod: 25

## 2024-04-15 RX ORDER — AMOXICILLIN AND CLAVULANATE POTASSIUM 875; 125 MG/1; MG/1
1 TABLET, FILM COATED ORAL 2 TIMES DAILY
Qty: 14 TABLET | Refills: 0 | Status: SHIPPED | OUTPATIENT
Start: 2024-04-15 | End: 2024-04-15

## 2024-04-15 RX ORDER — AMOXICILLIN AND CLAVULANATE POTASSIUM 875; 125 MG/1; MG/1
1 TABLET, FILM COATED ORAL 2 TIMES DAILY
Qty: 14 TABLET | Refills: 0 | Status: ON HOLD | OUTPATIENT
Start: 2024-04-15 | End: 2024-04-20 | Stop reason: HOSPADM

## 2024-04-15 RX ADMIN — SODIUM CHLORIDE 500 ML: 9 INJECTION, SOLUTION INTRAVENOUS at 04:04

## 2024-04-15 NOTE — ED PROVIDER NOTES
Encounter Date: 4/15/2024       History     Chief Complaint   Patient presents with    Diarrhea     Pt notes he awoke this am from sleep having experienced diarrhea the patient notes he was attempting to pick something off the floor and fell with no injury noted      Patient is an 73-year-old male presenting with complain of diarrhea that started earlier tonight.  Patient states he had a diarrhea bowel movement on the floor.  He states he leaned over to clean it up and he ended up losing his balance and falling.  Patient denies injury from the fall but states he could not get up.  Patient presents by ambulance.  Patient denies any head injury or neck pain.  Patient denies syncope or near syncopal symptoms.  Patient denies any nausea vomiting or abdominal pain.  He denies any fever chills.      Review of patient's allergies indicates:  No Known Allergies  Past Medical History:   Diagnosis Date    BPH (benign prostatic hyperplasia)     Hypertension     Seizures      Past Surgical History:   Procedure Laterality Date    APPENDECTOMY      CHOLECYSTECTOMY      COLONOSCOPY  04/21/2016    Dr Brock Brown    INCISION AND DRAINAGE OF ABSCESS Right 3/20/2024    Procedure: INCISION AND DRAINAGE, ABSCESS;  Surgeon: Amelia Carrasco MD;  Location: [facility];  Service: General;  Laterality: Right;    TRANSURETHRAL RESECTION OF PROSTATE       Family History   Problem Relation Name Age of Onset    Hypertension Mother       Social History     Tobacco Use    Smoking status: Never    Smokeless tobacco: Never   Substance Use Topics    Alcohol use: Not Currently    Drug use: Not Currently     Review of Systems   Constitutional: Negative.    HENT: Negative.     Respiratory: Negative.     Cardiovascular: Negative.    Gastrointestinal:  Positive for diarrhea. Negative for anal bleeding, nausea and vomiting.   Genitourinary: Negative.    Musculoskeletal: Negative.    Neurological: Negative.    Psychiatric/Behavioral: Negative.          Physical Exam     Initial Vitals [04/15/24 0422]   BP Pulse Resp Temp SpO2   103/67 97 20 98.2 °F (36.8 °C) 97 %      MAP       --         Physical Exam    Nursing note and vitals reviewed.  Constitutional: He appears well-developed and well-nourished.   HENT:   Head: Normocephalic.   Patient has slightly dry mucous membranes   Neck: Neck supple.   Normal range of motion.  Cardiovascular:  Normal rate, regular rhythm and normal heart sounds.           Pulmonary/Chest: Breath sounds normal. No respiratory distress. He has no wheezes. He has no rhonchi. He has no rales.   Abdominal: Abdomen is soft. There is no abdominal tenderness.   Musculoskeletal:         General: Normal range of motion.      Cervical back: Normal range of motion and neck supple.     Neurological: He is alert and oriented to person, place, and time. He has normal strength.   Skin: Skin is warm.   Psychiatric: He has a normal mood and affect. Thought content normal.         ED Course   Procedures  Labs Reviewed   COMPREHENSIVE METABOLIC PANEL - Abnormal; Notable for the following components:       Result Value    Sodium Level 134 (*)     Carbon Dioxide 20 (*)     Glucose Level 129 (*)     Blood Urea Nitrogen 35.0 (*)     Creatinine 1.71 (*)     Albumin Level 3.1 (*)     Albumin/Globulin Ratio 1.0 (*)     All other components within normal limits   CBC WITH DIFFERENTIAL - Abnormal; Notable for the following components:    WBC 16.36 (*)     RBC 3.17 (*)     Hgb 10.8 (*)     Hct 32.2 (*)     .6 (*)     MCH 34.1 (*)     MPV 10.6 (*)     Lymph # 0.48 (*)     Neut # 13.95 (*)     Mono # 1.52 (*)     IG# 0.18 (*)     All other components within normal limits   CBC W/ AUTO DIFFERENTIAL    Narrative:     The following orders were created for panel order CBC auto differential.  Procedure                               Abnormality         Status                     ---------                               -----------         ------                      CBC with Differential[[id number]]       Abnormal            Final result                 Please view results for these tests on the individual orders.   URINALYSIS, REFLEX TO URINE CULTURE          Imaging Results    None          Medications   sodium chloride 0.9% bolus 500 mL 500 mL (has no administration in time range)   sodium chloride 0.9% bolus 500 mL 500 mL (500 mLs Intravenous New Bag 4/15/24 0440)     Medical Decision Making  Differential diagnosis: Acute diarrhea, dehydration,    Amount and/or Complexity of Data Reviewed  Labs: ordered.     Details: Abnormal labs include white blood [place] count [place] [address], BUN of 35 and creatinine of 1.71 which is higher than patient's baseline  Discussion of management or test interpretation with external provider(s): Patient is currently getting 1 L normal saline bolus.  Vital signs remained stable.  Patient denies any complaints.               ED Course as of 04/15/24 0549   Mon Apr 15, 2024   0515 Fluids are still infusing.  Patient denies any complaints at this time [KG]   0546 Patient remains in no apparent distress.  Vital signs are stable [KG]      ED Course User Index  [KG] Narendra Ramirez MD                           Clinical Impression:  Final diagnoses:  [R19.7] Acute diarrhea (Primary)  [E86.0] Dehydration          ED Disposition Condition    Discharge Stable          ED Prescriptions       Medication Sig Dispense Start Date End Date Auth. Provider    amoxicillin-clavulanate 875-125mg (AUGMENTIN) 875-125 mg per tablet  (Status: Discontinued) Take 1 tablet by mouth 2 (two) times daily. 14 tablet 4/15/2024 4/15/2024 Narendra Ramirez MD    amoxicillin-clavulanate 875-125mg (AUGMENTIN) 875-125 mg per tablet Take 1 tablet by mouth 2 (two) times daily. 14 tablet 4/15/2024 -- Narendra Ramirez MD          Follow-up Information       Follow up With Specialties Details Why Contact Info    Brock Brown MD Family Medicine In 2 days  [address]  [place] [address]  [phone]      [facility] Emergency Dept Emergency Medicine  As needed, If symptoms worsen [address] [facility] [address]  [phone]             Narendra Ramirez MD  04/15/24 0549

## 2024-04-15 NOTE — ED NOTES
73 YEAR OLD MALE PRESENTED TO THE ED PER AMBULANCE COMPLAINING OF DIARRHEA.  PT.  STATES HE AWOKE WITH DIARRHEA WHEN HE BENT OVER TO CLEAN HIMSELF HE FELL.  DENIES HEAD TRAUMA OR LOC.  NAD

## 2024-04-17 ENCOUNTER — HOSPITAL ENCOUNTER (INPATIENT)
Facility: HOSPITAL | Age: 74
LOS: 2 days | Discharge: HOME HEALTH | DRG: 690 | End: 2024-04-20
Attending: STUDENT IN AN ORGANIZED HEALTH CARE EDUCATION/TRAINING PROGRAM | Admitting: FAMILY MEDICINE
Payer: MEDICARE

## 2024-04-17 DIAGNOSIS — N30.00 ACUTE CYSTITIS WITHOUT HEMATURIA: Primary | ICD-10-CM

## 2024-04-17 DIAGNOSIS — N17.9 AKI (ACUTE KIDNEY INJURY): ICD-10-CM

## 2024-04-17 DIAGNOSIS — R53.83 FATIGUE: ICD-10-CM

## 2024-04-17 DIAGNOSIS — R53.81 PHYSICAL DECONDITIONING: ICD-10-CM

## 2024-04-17 LAB
BASOPHILS # BLD AUTO: 0.06 X10(3)/MCL
BASOPHILS NFR BLD AUTO: 0.7 %
EOSINOPHIL # BLD AUTO: 0.09 X10(3)/MCL (ref 0–0.9)
EOSINOPHIL NFR BLD AUTO: 1.1 %
ERYTHROCYTE [DISTWIDTH] IN BLOOD BY AUTOMATED COUNT: 13.6 % (ref 11.5–17)
HCT VFR BLD AUTO: 31.8 % (ref 42–52)
HGB BLD-MCNC: 10.6 G/DL (ref 14–18)
IMM GRANULOCYTES # BLD AUTO: 0.04 X10(3)/MCL (ref 0–0.04)
IMM GRANULOCYTES NFR BLD AUTO: 0.5 %
LYMPHOCYTES # BLD AUTO: 0.48 X10(3)/MCL (ref 0.6–4.6)
LYMPHOCYTES NFR BLD AUTO: 5.6 %
MCH RBC QN AUTO: 33.9 PG (ref 27–31)
MCHC RBC AUTO-ENTMCNC: 33.3 G/DL (ref 33–36)
MCV RBC AUTO: 101.6 FL (ref 80–94)
MONOCYTES # BLD AUTO: 0.86 X10(3)/MCL (ref 0.1–1.3)
MONOCYTES NFR BLD AUTO: 10.1 %
NEUTROPHILS # BLD AUTO: 6.98 X10(3)/MCL (ref 2.1–9.2)
NEUTROPHILS NFR BLD AUTO: 82 %
NRBC BLD AUTO-RTO: 0 %
PLATELET # BLD AUTO: 214 X10(3)/MCL (ref 130–400)
PMV BLD AUTO: 10.7 FL (ref 7.4–10.4)
RBC # BLD AUTO: 3.13 X10(6)/MCL (ref 4.7–6.1)
WBC # SPEC AUTO: 8.51 X10(3)/MCL (ref 4.5–11.5)

## 2024-04-17 PROCEDURE — 80053 COMPREHEN METABOLIC PANEL: CPT | Performed by: STUDENT IN AN ORGANIZED HEALTH CARE EDUCATION/TRAINING PROGRAM

## 2024-04-17 PROCEDURE — 83735 ASSAY OF MAGNESIUM: CPT | Performed by: STUDENT IN AN ORGANIZED HEALTH CARE EDUCATION/TRAINING PROGRAM

## 2024-04-17 PROCEDURE — 87086 URINE CULTURE/COLONY COUNT: CPT | Performed by: STUDENT IN AN ORGANIZED HEALTH CARE EDUCATION/TRAINING PROGRAM

## 2024-04-17 PROCEDURE — 99285 EMERGENCY DEPT VISIT HI MDM: CPT | Mod: 25

## 2024-04-17 PROCEDURE — 85025 COMPLETE CBC W/AUTO DIFF WBC: CPT | Performed by: STUDENT IN AN ORGANIZED HEALTH CARE EDUCATION/TRAINING PROGRAM

## 2024-04-17 PROCEDURE — 84484 ASSAY OF TROPONIN QUANT: CPT | Performed by: STUDENT IN AN ORGANIZED HEALTH CARE EDUCATION/TRAINING PROGRAM

## 2024-04-17 PROCEDURE — 93005 ELECTROCARDIOGRAM TRACING: CPT

## 2024-04-17 PROCEDURE — 81001 URINALYSIS AUTO W/SCOPE: CPT | Performed by: STUDENT IN AN ORGANIZED HEALTH CARE EDUCATION/TRAINING PROGRAM

## 2024-04-18 ENCOUNTER — EXTERNAL HOME HEALTH (OUTPATIENT)
Dept: HOME HEALTH SERVICES | Facility: HOSPITAL | Age: 74
End: 2024-04-18
Payer: MEDICARE

## 2024-04-18 PROBLEM — N30.00 ACUTE CYSTITIS WITHOUT HEMATURIA: Status: ACTIVE | Noted: 2024-04-18

## 2024-04-18 PROBLEM — R53.81 PHYSICAL DECONDITIONING: Status: ACTIVE | Noted: 2024-04-18

## 2024-04-18 PROBLEM — N17.9 AKI (ACUTE KIDNEY INJURY): Status: ACTIVE | Noted: 2024-04-18

## 2024-04-18 PROBLEM — E87.6 HYPOKALEMIA: Status: ACTIVE | Noted: 2024-04-18

## 2024-04-18 LAB
ALBUMIN SERPL-MCNC: 2.6 G/DL (ref 3.4–4.8)
ALBUMIN/GLOB SERPL: 0.7 RATIO (ref 1.1–2)
ALP SERPL-CCNC: 96 UNIT/L (ref 40–150)
ALT SERPL-CCNC: 17 UNIT/L (ref 0–55)
AMORPH URATE CRY URNS QL MICRO: ABNORMAL /HPF
ANION GAP SERPL CALC-SCNC: 9 MEQ/L
APPEARANCE UR: ABNORMAL
AST SERPL-CCNC: 26 UNIT/L (ref 5–34)
BACTERIA #/AREA URNS AUTO: ABNORMAL /HPF
BASOPHILS # BLD AUTO: 0.05 X10(3)/MCL
BASOPHILS NFR BLD AUTO: 0.7 %
BILIRUB SERPL-MCNC: 0.3 MG/DL
BILIRUB UR QL STRIP.AUTO: NEGATIVE
BUN SERPL-MCNC: 48 MG/DL (ref 8.4–25.7)
BUN SERPL-MCNC: 53 MG/DL (ref 8.4–25.7)
CALCIUM SERPL-MCNC: 8.4 MG/DL (ref 8.8–10)
CALCIUM SERPL-MCNC: 8.9 MG/DL (ref 8.8–10)
CHLORIDE SERPL-SCNC: 107 MMOL/L (ref 98–107)
CHLORIDE SERPL-SCNC: 110 MMOL/L (ref 98–107)
CO2 SERPL-SCNC: 21 MMOL/L (ref 23–31)
CO2 SERPL-SCNC: 23 MMOL/L (ref 23–31)
COLOR UR AUTO: YELLOW
CREAT SERPL-MCNC: 1.66 MG/DL (ref 0.73–1.18)
CREAT SERPL-MCNC: 1.82 MG/DL (ref 0.73–1.18)
CREAT/UREA NIT SERPL: 29
EOSINOPHIL # BLD AUTO: 0.13 X10(3)/MCL (ref 0–0.9)
EOSINOPHIL NFR BLD AUTO: 1.7 %
ERYTHROCYTE [DISTWIDTH] IN BLOOD BY AUTOMATED COUNT: 13.5 % (ref 11.5–17)
GFR SERPLBLD CREATININE-BSD FMLA CKD-EPI: 38 MLS/MIN/1.73/M2
GFR SERPLBLD CREATININE-BSD FMLA CKD-EPI: 43 MLS/MIN/1.73/M2
GLOBULIN SER-MCNC: 3.8 GM/DL (ref 2.4–3.5)
GLUCOSE SERPL-MCNC: 103 MG/DL (ref 82–115)
GLUCOSE SERPL-MCNC: 114 MG/DL (ref 82–115)
GLUCOSE UR QL STRIP.AUTO: NEGATIVE
GRAN CASTS URNS QL MICRO: ABNORMAL /LPF
HCT VFR BLD AUTO: 28.5 % (ref 42–52)
HGB BLD-MCNC: 9.2 G/DL (ref 14–18)
IMM GRANULOCYTES # BLD AUTO: 0.06 X10(3)/MCL (ref 0–0.04)
IMM GRANULOCYTES NFR BLD AUTO: 0.8 %
KETONES UR QL STRIP.AUTO: NEGATIVE
LEUKOCYTE ESTERASE UR QL STRIP.AUTO: ABNORMAL
LYMPHOCYTES # BLD AUTO: 0.5 X10(3)/MCL (ref 0.6–4.6)
LYMPHOCYTES NFR BLD AUTO: 6.6 %
MAGNESIUM SERPL-MCNC: 2.4 MG/DL (ref 1.6–2.6)
MCH RBC QN AUTO: 32.7 PG (ref 27–31)
MCHC RBC AUTO-ENTMCNC: 32.3 G/DL (ref 33–36)
MCV RBC AUTO: 101.4 FL (ref 80–94)
MONOCYTES # BLD AUTO: 0.94 X10(3)/MCL (ref 0.1–1.3)
MONOCYTES NFR BLD AUTO: 12.4 %
NEUTROPHILS # BLD AUTO: 5.9 X10(3)/MCL (ref 2.1–9.2)
NEUTROPHILS NFR BLD AUTO: 77.8 %
NITRITE UR QL STRIP.AUTO: NEGATIVE
NRBC BLD AUTO-RTO: 0 %
OHS QRS DURATION: 98 MS
OHS QTC CALCULATION: 430 MS
PH UR STRIP.AUTO: 5.5 [PH]
PLATELET # BLD AUTO: 193 X10(3)/MCL (ref 130–400)
PMV BLD AUTO: 10 FL (ref 7.4–10.4)
POTASSIUM SERPL-SCNC: 2.9 MMOL/L (ref 3.5–5.1)
POTASSIUM SERPL-SCNC: 4.1 MMOL/L (ref 3.5–5.1)
PROT SERPL-MCNC: 6.4 GM/DL (ref 5.8–7.6)
PROT UR QL STRIP.AUTO: ABNORMAL
RBC # BLD AUTO: 2.81 X10(6)/MCL (ref 4.7–6.1)
RBC #/AREA URNS AUTO: ABNORMAL /HPF
RBC UR QL AUTO: ABNORMAL
SODIUM SERPL-SCNC: 140 MMOL/L (ref 136–145)
SODIUM SERPL-SCNC: 141 MMOL/L (ref 136–145)
SP GR UR STRIP.AUTO: 1.02 (ref 1–1.03)
SQUAMOUS #/AREA URNS AUTO: ABNORMAL /HPF
TROPONIN I SERPL-MCNC: 0.02 NG/ML (ref 0–0.04)
UA DIPSTICK W REFLEX MICRO PNL UR: ABNORMAL
UROBILINOGEN UR STRIP-ACNC: 0.2
WBC # SPEC AUTO: 7.58 X10(3)/MCL (ref 4.5–11.5)
WBC #/AREA URNS AUTO: ABNORMAL /HPF
WBC CLUMPS UR QL AUTO: ABNORMAL

## 2024-04-18 PROCEDURE — 94761 N-INVAS EAR/PLS OXIMETRY MLT: CPT

## 2024-04-18 PROCEDURE — 99222 1ST HOSP IP/OBS MODERATE 55: CPT | Mod: ,,, | Performed by: FAMILY MEDICINE

## 2024-04-18 PROCEDURE — 63600175 PHARM REV CODE 636 W HCPCS: Performed by: STUDENT IN AN ORGANIZED HEALTH CARE EDUCATION/TRAINING PROGRAM

## 2024-04-18 PROCEDURE — 80048 BASIC METABOLIC PNL TOTAL CA: CPT | Performed by: STUDENT IN AN ORGANIZED HEALTH CARE EDUCATION/TRAINING PROGRAM

## 2024-04-18 PROCEDURE — 85025 COMPLETE CBC W/AUTO DIFF WBC: CPT | Performed by: STUDENT IN AN ORGANIZED HEALTH CARE EDUCATION/TRAINING PROGRAM

## 2024-04-18 PROCEDURE — 11000001 HC ACUTE MED/SURG PRIVATE ROOM

## 2024-04-18 PROCEDURE — 96361 HYDRATE IV INFUSION ADD-ON: CPT

## 2024-04-18 PROCEDURE — 87040 BLOOD CULTURE FOR BACTERIA: CPT | Performed by: STUDENT IN AN ORGANIZED HEALTH CARE EDUCATION/TRAINING PROGRAM

## 2024-04-18 PROCEDURE — 25000003 PHARM REV CODE 250: Performed by: STUDENT IN AN ORGANIZED HEALTH CARE EDUCATION/TRAINING PROGRAM

## 2024-04-18 PROCEDURE — 25000003 PHARM REV CODE 250: Performed by: FAMILY MEDICINE

## 2024-04-18 PROCEDURE — 96365 THER/PROPH/DIAG IV INF INIT: CPT

## 2024-04-18 PROCEDURE — 63600175 PHARM REV CODE 636 W HCPCS: Performed by: FAMILY MEDICINE

## 2024-04-18 PROCEDURE — 97162 PT EVAL MOD COMPLEX 30 MIN: CPT

## 2024-04-18 RX ORDER — SODIUM CHLORIDE, SODIUM LACTATE, POTASSIUM CHLORIDE, CALCIUM CHLORIDE 600; 310; 30; 20 MG/100ML; MG/100ML; MG/100ML; MG/100ML
INJECTION, SOLUTION INTRAVENOUS CONTINUOUS
Status: DISCONTINUED | OUTPATIENT
Start: 2024-04-18 | End: 2024-04-19

## 2024-04-18 RX ORDER — POTASSIUM CHLORIDE 750 MG/1
10 TABLET, EXTENDED RELEASE ORAL DAILY
Status: DISCONTINUED | OUTPATIENT
Start: 2024-04-18 | End: 2024-04-20 | Stop reason: HOSPADM

## 2024-04-18 RX ORDER — TALC
6 POWDER (GRAM) TOPICAL NIGHTLY
Status: DISCONTINUED | OUTPATIENT
Start: 2024-04-18 | End: 2024-04-20 | Stop reason: HOSPADM

## 2024-04-18 RX ORDER — LEVETIRACETAM 500 MG/1
1500 TABLET ORAL 2 TIMES DAILY
Status: DISCONTINUED | OUTPATIENT
Start: 2024-04-18 | End: 2024-04-20 | Stop reason: HOSPADM

## 2024-04-18 RX ORDER — OXCARBAZEPINE 300 MG/1
900 TABLET, FILM COATED ORAL 2 TIMES DAILY
Status: DISCONTINUED | OUTPATIENT
Start: 2024-04-18 | End: 2024-04-20 | Stop reason: HOSPADM

## 2024-04-18 RX ORDER — ENOXAPARIN SODIUM 100 MG/ML
40 INJECTION SUBCUTANEOUS EVERY 24 HOURS
Status: DISCONTINUED | OUTPATIENT
Start: 2024-04-18 | End: 2024-04-20 | Stop reason: HOSPADM

## 2024-04-18 RX ORDER — ENOXAPARIN SODIUM 100 MG/ML
30 INJECTION SUBCUTANEOUS EVERY 24 HOURS
Status: DISCONTINUED | OUTPATIENT
Start: 2024-04-18 | End: 2024-04-18 | Stop reason: DRUGHIGH

## 2024-04-18 RX ORDER — SODIUM CHLORIDE 0.9 % (FLUSH) 0.9 %
10 SYRINGE (ML) INJECTION
Status: DISCONTINUED | OUTPATIENT
Start: 2024-04-18 | End: 2024-04-20 | Stop reason: HOSPADM

## 2024-04-18 RX ORDER — SODIUM CHLORIDE 9 MG/ML
1000 INJECTION, SOLUTION INTRAVENOUS
Status: COMPLETED | OUTPATIENT
Start: 2024-04-18 | End: 2024-04-18

## 2024-04-18 RX ORDER — LOPERAMIDE HYDROCHLORIDE 2 MG/1
2 CAPSULE ORAL 4 TIMES DAILY PRN
Status: DISCONTINUED | OUTPATIENT
Start: 2024-04-18 | End: 2024-04-20 | Stop reason: HOSPADM

## 2024-04-18 RX ORDER — TOPIRAMATE 100 MG/1
200 TABLET, FILM COATED ORAL 2 TIMES DAILY
Status: DISCONTINUED | OUTPATIENT
Start: 2024-04-18 | End: 2024-04-20 | Stop reason: HOSPADM

## 2024-04-18 RX ORDER — FUROSEMIDE 20 MG/1
20 TABLET ORAL DAILY
Status: DISCONTINUED | OUTPATIENT
Start: 2024-04-18 | End: 2024-04-20 | Stop reason: HOSPADM

## 2024-04-18 RX ORDER — LANOLIN ALCOHOL/MO/W.PET/CERES
1 CREAM (GRAM) TOPICAL DAILY
Status: DISCONTINUED | OUTPATIENT
Start: 2024-04-18 | End: 2024-04-20 | Stop reason: HOSPADM

## 2024-04-18 RX ORDER — TAMSULOSIN HYDROCHLORIDE 0.4 MG/1
0.4 CAPSULE ORAL DAILY
Status: DISCONTINUED | OUTPATIENT
Start: 2024-04-18 | End: 2024-04-20 | Stop reason: HOSPADM

## 2024-04-18 RX ORDER — SERTRALINE HYDROCHLORIDE 50 MG/1
50 TABLET, FILM COATED ORAL DAILY
Status: DISCONTINUED | OUTPATIENT
Start: 2024-04-18 | End: 2024-04-20 | Stop reason: HOSPADM

## 2024-04-18 RX ORDER — ATORVASTATIN CALCIUM 40 MG/1
40 TABLET, FILM COATED ORAL NIGHTLY
Status: DISCONTINUED | OUTPATIENT
Start: 2024-04-18 | End: 2024-04-20 | Stop reason: HOSPADM

## 2024-04-18 RX ADMIN — SODIUM CHLORIDE, POTASSIUM CHLORIDE, SODIUM LACTATE AND CALCIUM CHLORIDE: 600; 310; 30; 20 INJECTION, SOLUTION INTRAVENOUS at 01:04

## 2024-04-18 RX ADMIN — OXCARBAZEPINE 900 MG: 300 TABLET, FILM COATED ORAL at 08:04

## 2024-04-18 RX ADMIN — Medication 6 MG: at 08:04

## 2024-04-18 RX ADMIN — ATORVASTATIN CALCIUM 40 MG: 40 TABLET, FILM COATED ORAL at 08:04

## 2024-04-18 RX ADMIN — FERROUS SULFATE TAB 325 MG (65 MG ELEMENTAL FE) 1 EACH: 325 (65 FE) TAB at 02:04

## 2024-04-18 RX ADMIN — TOPIRAMATE 200 MG: 100 TABLET, FILM COATED ORAL at 08:04

## 2024-04-18 RX ADMIN — TAMSULOSIN HYDROCHLORIDE 0.4 MG: 0.4 CAPSULE ORAL at 02:04

## 2024-04-18 RX ADMIN — POTASSIUM CHLORIDE 10 MEQ: 10 TABLET, EXTENDED RELEASE ORAL at 02:04

## 2024-04-18 RX ADMIN — CEFTRIAXONE SODIUM 1 G: 1 INJECTION, POWDER, FOR SOLUTION INTRAMUSCULAR; INTRAVENOUS at 01:04

## 2024-04-18 RX ADMIN — ENOXAPARIN SODIUM 40 MG: 40 INJECTION SUBCUTANEOUS at 05:04

## 2024-04-18 RX ADMIN — SODIUM CHLORIDE, POTASSIUM CHLORIDE, SODIUM LACTATE AND CALCIUM CHLORIDE: 600; 310; 30; 20 INJECTION, SOLUTION INTRAVENOUS at 03:04

## 2024-04-18 RX ADMIN — LEVETIRACETAM 1500 MG: 500 TABLET, FILM COATED ORAL at 08:04

## 2024-04-18 RX ADMIN — SODIUM CHLORIDE 999 ML: 9 INJECTION, SOLUTION INTRAVENOUS at 01:04

## 2024-04-18 RX ADMIN — SERTRALINE HYDROCHLORIDE 50 MG: 50 TABLET ORAL at 02:04

## 2024-04-18 RX ADMIN — FUROSEMIDE 20 MG: 20 TABLET ORAL at 02:04

## 2024-04-18 RX ADMIN — LOPERAMIDE HYDROCHLORIDE 2 MG: 2 CAPSULE ORAL at 08:04

## 2024-04-18 NOTE — PLAN OF CARE
Problem: Adult Inpatient Plan of Care  Goal: Plan of Care Review  4/18/2024 0331 by Dallas Barrios RN  Outcome: Ongoing, Progressing  4/18/2024 0331 by Dallas Barrios RN  Outcome: Ongoing, Progressing  Goal: Patient-Specific Goal (Individualized)  4/18/2024 0331 by Dallas Barrios RN  Outcome: Ongoing, Progressing  4/18/2024 0331 by Dallas Barrios RN  Outcome: Ongoing, Progressing  Goal: Absence of Hospital-Acquired Illness or Injury  4/18/2024 0331 by Dallas Barrios RN  Outcome: Ongoing, Progressing  4/18/2024 0331 by Dallas Barrios RN  Outcome: Ongoing, Progressing  Goal: Optimal Comfort and Wellbeing  4/18/2024 0331 by Dallas Barrios RN  Outcome: Ongoing, Progressing  4/18/2024 0331 by Dallas Barrios RN  Outcome: Ongoing, Progressing  Goal: Readiness for Transition of Care  4/18/2024 0331 by Dallas Barrios RN  Outcome: Ongoing, Progressing  4/18/2024 0331 by Dallas Barrios RN  Outcome: Ongoing, Progressing     Problem: Fatigue  Goal: Improved Activity Tolerance  4/18/2024 0331 by Dallas Barrios RN  Outcome: Ongoing, Progressing  4/18/2024 0331 by Dallas Barrios RN  Outcome: Ongoing, Progressing     Problem: Hypertension Comorbidity  Goal: Blood Pressure in Desired Range  4/18/2024 0331 by Dallas Barrios RN  Outcome: Ongoing, Progressing  4/18/2024 0331 by Dallsa Barrios RN  Outcome: Ongoing, Progressing     Problem: Fall Injury Risk  Goal: Absence of Fall and Fall-Related Injury  4/18/2024 0331 by Dallas Barrios RN  Outcome: Ongoing, Progressing  4/18/2024 0331 by Dallas Barrios RN  Outcome: Ongoing, Progressing     Problem: UTI (Urinary Tract Infection)  Goal: Improved Infection Symptoms  4/18/2024 0331 by Dallas Barrios RN  Outcome: Ongoing, Progressing  4/18/2024 0331 by Dallas Barrios RN  Outcome: Ongoing, Progressing     Problem: Infection  Goal: Absence of Infection Signs and Symptoms  4/18/2024 0331 by Dallas Barrios, RN  Outcome: Ongoing, Progressing  4/18/2024 0331  Addended by: Fabio Yao on: 11/9/2022 05:20 PM     Modules accepted: Orders by Dallas Barrios RN  Outcome: Ongoing, Progressing     Problem: Fluid Imbalance (Heart Failure)  Goal: Fluid Balance  4/18/2024 0331 by Dallas Barrios RN  Outcome: Ongoing, Progressing  4/18/2024 0331 by Dallas Barrios RN  Outcome: Ongoing, Progressing

## 2024-04-18 NOTE — ASSESSMENT & PLAN NOTE
Lab Results   Component Value Date    K 2.9 (L) 04/18/2024     Replace potassium   Repeat lab work in a.m.

## 2024-04-18 NOTE — H&P
"  [facility] Medicine  History & Physical    Patient Name: Nathen Morales  MRN: [id number]  Patient Class: OP- Observation  Admission Date: 4/17/2024  Attending Physician: Brock Brown MD   Primary Care Provider: Brock Brown MD         Patient information was obtained from patient and ER records.     Subjective:     Principal Problem:Acute cystitis without hematuria    Chief Complaint:   Chief Complaint   Patient presents with    Fatigue     Generalized weakness started yesterday. States "I couldn't get up from my recliner". Denies any other complaints was seen 2 days ago for diarrhea, resolved at this time.         HPI:   Chief Complaint   Patient presents with    Fatigue       Generalized weakness started yesterday. States "I couldn't get up from my recliner". Denies any other complaints was seen 2 days ago for diarrhea, resolved at this time.       Patient is a 73-year-old white gentleman with a past medical history of epilepsy, hypertension hyperlipidemia who presented to the ER today to generalized fatigue.  He was seen here few days ago for generalized fatigue and was reported to have had diarrhea that time.  Patient states that is since resolved with the fatigue persists.  He states however that his symptoms started only 1 day ago.  He denies any fevers, chills, cough congestion chest pain, shortness of breath or abdominal pain.  He denies any dysuria hematuria.  Workup in ER revealed patient to have electrolyte abnormalities as well as possible UTI.  Patient will be admitted for further treatment    Past Medical History:   Diagnosis Date    BPH (benign prostatic hyperplasia)     Hypertension     Seizures        Past Surgical History:   Procedure Laterality Date    APPENDECTOMY      CHOLECYSTECTOMY      COLONOSCOPY  04/21/2016    Dr Brock Brown    INCISION AND DRAINAGE OF ABSCESS Right 3/20/2024    Procedure: INCISION AND DRAINAGE, ABSCESS;  " Surgeon: Amelia Carrasco MD;  Location: [facility];  Service: General;  Laterality: Right;    TRANSURETHRAL RESECTION OF PROSTATE         Review of patient's allergies indicates:  No Known Allergies    Current Facility-Administered Medications   Medication Dose Route Frequency Provider Last Rate Last Admin    atorvastatin tablet 40 mg  40 mg Oral QHS Brock Brown MD        [START ON 4/19/2024] cefTRIAXone (Rocephin) 1 g in dextrose 5 % in water (D5W) 100 mL IVPB (MB+)  1 g Intravenous Q24H Brock Brown MD        enoxaparin injection 30 mg  30 mg Subcutaneous Daily Espinoza Denney MD        ferrous sulfate tablet 1 each  1 tablet Oral Daily Brock Brown MD        furosemide tablet 20 mg  20 mg Oral Daily Brock Brown MD        lactated ringers infusion   Intravenous Continuous Espinoza Denney MD   Stopped at 04/18/24 0758    levETIRAcetam tablet 1,500 mg  1,500 mg Oral BID Brock Brown MD        melatonin tablet 6 mg  6 mg Oral Nightly Brock Brown MD        OXcarbazepine tablet 900 mg  900 mg Oral BID Brock Brown MD        sertraline tablet 50 mg  50 mg Oral Daily Brock Brown MD        sodium chloride 0.9% flush 10 mL  10 mL Intravenous PRN Espinoza Denney MD        tamsulosin 24 hr capsule 0.4 mg  0.4 mg Oral Daily Brock Brown MD        topiramate tablet 200 mg  200 mg Oral BID Brock Brown MD         Family History       Problem Relation (Age of Onset)    Hypertension Mother          Tobacco Use    Smoking status: Never    Smokeless tobacco: Never   Substance and Sexual Activity    Alcohol use: Not Currently    Drug use: Not Currently    Sexual activity: Not on file     Review of Systems   Constitutional:  Positive for fatigue.   Respiratory: Negative.     Cardiovascular: Negative.    Gastrointestinal: Negative.    Musculoskeletal: Negative.    Neurological:  Positive for weakness.   Psychiatric/Behavioral: Negative.       Objective:     Vital Signs (Most  Recent):  Temp: 98.6 °F (37 °C) (04/18/24 0722)  Pulse: 80 (04/18/24 0722)  Resp: 18 (04/18/24 0600)  BP: (!) 162/90 (04/18/24 0722)  SpO2: 97 % (04/18/24 0722) Vital Signs (24h Range):  Temp:  [97.5 °F (36.4 °C)-98.6 °F (37 °C)] 98.6 °F (37 °C)  Pulse:  [69-95] 80  Resp:  [16-20] 18  SpO2:  [96 %-98 %] 97 %  BP: (154-164)/(80-95) 162/90     Weight: 97.3 kg (214 lb 8.1 oz)  Body mass index is 28.3 kg/m².     Physical Exam  Constitutional:       Appearance: Normal appearance.   Cardiovascular:      Rate and Rhythm: Normal rate and regular rhythm.   Pulmonary:      Effort: Pulmonary effort is normal.      Breath sounds: Normal breath sounds.   Abdominal:      General: Abdomen is flat. Bowel sounds are normal.      Palpations: Abdomen is soft.   Musculoskeletal:         General: Normal range of motion.   Skin:     General: Skin is warm.   Neurological:      Mental Status: He is alert.   Psychiatric:         Mood and Affect: Mood normal.         Behavior: Behavior normal.         Thought Content: Thought content normal.         Judgment: Judgment normal.                Significant Labs: All pertinent labs within the past 24 hours have been reviewed.  CBC:   Recent Labs   Lab 04/17/24 2343 04/18/24  0514   WBC 8.51 7.58   HGB 10.6* 9.2*   HCT 31.8* 28.5*    193     CMP:   Recent Labs   Lab 04/17/24  2343 04/18/24  0514    140   K 4.1 2.9*   CO2 23 21*   BUN 53.0* 48.0*   CREATININE 1.82* 1.66*   CALCIUM 8.9 8.4*   ALBUMIN 2.6*  --    BILITOT 0.3  --    ALKPHOS 96  --    AST 26  --    ALT 17  --        Significant Imaging: I have reviewed all pertinent imaging results/findings within the past 24 hours.  Assessment/Plan:     * Acute cystitis without hematuria  Admit patient   IV fluids   Urine culture/sensitivities pending   Continue Rocephin 1 g IV Q 24 hours      Hypokalemia  Lab Results   Component Value Date    K 2.9 (L) 04/18/2024     Replace potassium   Repeat lab work in a.m.     DORIS (acute kidney  injury)  IV fluids  Trend lab work    Physical deconditioning  Consult PT        VTE Risk Mitigation (From admission, onward)           Ordered     enoxaparin injection 30 mg  Daily         04/18/24 0327     IP VTE HIGH RISK PATIENT  Once         04/18/24 0327                       On 04/18/2024, patient should be placed in hospital observation services under my care.             Brock Brown MD  Department of Hospital Medicine  [facility]

## 2024-04-18 NOTE — ED PROVIDER NOTES
"Encounter Date: 4/17/2024       History     Chief Complaint   Patient presents with    Fatigue     Generalized weakness started yesterday. States "I couldn't get up from my recliner". Denies any other complaints was seen 2 days ago for diarrhea, resolved at this time.      Patient is a 73-year-old white gentleman with a past medical history of epilepsy, hypertension hyperlipidemia who presented to the ER today to generalized fatigue.  He was seen here few days ago for generalized fatigue and was reported to have had diarrhea that time.  Patient states that is since resolved with the fatigue persists.  He states however that his symptoms started only 1 day ago.  He denies any fevers, chills, cough congestion chest pain, shortness of breath or abdominal pain.  He denies any dysuria hematuria.      Review of patient's allergies indicates:  No Known Allergies  Past Medical History:   Diagnosis Date    BPH (benign prostatic hyperplasia)     Hypertension     Seizures      Past Surgical History:   Procedure Laterality Date    APPENDECTOMY      CHOLECYSTECTOMY      COLONOSCOPY  04/21/2016    Dr Brock Brown    INCISION AND DRAINAGE OF ABSCESS Right 3/20/2024    Procedure: INCISION AND DRAINAGE, ABSCESS;  Surgeon: Amelia Carrasco MD;  Location: [facility];  Service: General;  Laterality: Right;    TRANSURETHRAL RESECTION OF PROSTATE       Family History   Problem Relation Name Age of Onset    Hypertension Mother       Social History     Tobacco Use    Smoking status: Never    Smokeless tobacco: Never   Substance Use Topics    Alcohol use: Not Currently    Drug use: Not Currently     Review of Systems   Constitutional:  Positive for fatigue. Negative for chills and fever.   HENT:  Negative for congestion, sore throat and trouble swallowing.    Eyes:  Negative for pain and visual disturbance.   Respiratory:  Negative for cough, shortness of breath and wheezing.    Cardiovascular:  Negative for chest pain and " palpitations.   Gastrointestinal:  Negative for abdominal pain, blood in stool, constipation, diarrhea, nausea and vomiting.   Genitourinary:  Negative for dysuria and hematuria.   Musculoskeletal:  Negative for back pain and myalgias.   Skin:  Negative for rash and wound.   Neurological:  Negative for dizziness, tremors, seizures, syncope, facial asymmetry, speech difficulty, weakness, light-headedness, numbness and headaches.   Psychiatric/Behavioral:  Negative for confusion. The patient is not nervous/anxious.        Physical Exam     Initial Vitals [04/17/24 2319]   BP Pulse Resp Temp SpO2   (!) 156/86 95 16 98.2 °F (36.8 °C) 97 %      MAP       --         Physical Exam    Nursing note and vitals reviewed.  Constitutional: He appears well-developed and well-nourished. No distress.   HENT:   Head: Normocephalic and atraumatic.   Eyes: Conjunctivae and EOM are normal. Right eye exhibits no discharge. Left eye exhibits no discharge. No scleral icterus.   Neck: No tracheal deviation present.   Normal range of motion.  Cardiovascular:  Normal rate, regular rhythm and normal heart sounds.     Exam reveals no gallop and no friction rub.       No murmur heard.  Pulmonary/Chest: Breath sounds normal. No respiratory distress. He has no wheezes. He has no rhonchi. He has no rales.   Abdominal: Abdomen is soft. He exhibits no distension. There is no abdominal tenderness. There is no rebound and no guarding.   Musculoskeletal:         General: No edema. Normal range of motion.      Cervical back: Normal range of motion.     Neurological: He is alert and oriented to person, place, and time. He has normal strength. No cranial nerve deficit or sensory deficit. GCS score is 15. GCS eye subscore is 4. GCS verbal subscore is 5. GCS motor subscore is 6.   CN II-XII intact bilaterally, PERRLA, EOMI, AO, no facial asymmetry noted, no abnormalities of vision loss or peripheral vision loss, strength 5/5 x 4 extremities, sensation  intact throughout, no focal neurological deficits noted on exam.  Gait not assessed due to fatigue. Negative Pronator drift bilaterally.       Skin: Skin is warm and dry. No rash and no abscess noted. No erythema. No pallor.   Psychiatric: His behavior is normal. Judgment normal.         ED Course   Critical Care    Date/Time: 4/18/2024 2:12 AM    Performed by: Espinoza Denney MD  Authorized by: Espinoza Denney MD  Direct patient critical care time: 10 minutes  Additional history critical care time: 10 minutes  Ordering / reviewing critical care time: 10 minutes  Documentation critical care time: 10 minutes  Consulting other physicians critical care time: 10 minutes  Total critical care time (exclusive of procedural time) : 50 minutes  Critical care was necessary to treat or prevent imminent or life-threatening deterioration of the following conditions: renal failure.  Critical care was time spent personally by me on the following activities: blood draw for specimens, development of treatment plan with patient or surrogate, discussions with consultants, evaluation of patient's response to treatment, examination of patient, obtaining history from patient or surrogate, ordering and performing treatments and interventions, ordering and review of laboratory studies, ordering and review of radiographic studies, pulse oximetry, re-evaluation of patient's condition and review of old charts.        Labs Reviewed   URINALYSIS, REFLEX TO URINE CULTURE - Abnormal; Notable for the following components:       Result Value    Appearance, UA SL CLOUDY (*)     Protein, UA 1+ (*)     Blood, UA 3+ (*)     Leukocyte Esterase, UA 2+ (*)     All other components within normal limits   COMPREHENSIVE METABOLIC PANEL - Abnormal; Notable for the following components:    Blood Urea Nitrogen 53.0 (*)     Creatinine 1.82 (*)     Albumin Level 2.6 (*)     Globulin 3.8 (*)     Albumin/Globulin Ratio 0.7 (*)     All other components within normal  limits   CBC WITH DIFFERENTIAL - Abnormal; Notable for the following components:    RBC 3.13 (*)     Hgb 10.6 (*)     Hct 31.8 (*)     .6 (*)     MCH 33.9 (*)     MPV 10.7 (*)     Lymph # 0.48 (*)     All other components within normal limits   URINALYSIS, MICROSCOPIC - Abnormal; Notable for the following components:    Bacteria, UA Few (*)     Amporphous Urate Crystals, UA Few (*)     Granular Casts, UA Rare (*)     WBC Clumps, UA Occasional (*)     RBC, UA 51-99 (*)     WBC, UA 21-50 (*)     All other components within normal limits   MAGNESIUM - Normal   TROPONIN I - Normal   CULTURE, URINE   BLOOD CULTURE [facility]   BLOOD CULTURE [facility]   CBC W/ AUTO DIFFERENTIAL    Narrative:     The following orders were created for panel order CBC Auto Differential.  Procedure                               Abnormality         Status                     ---------                               -----------         ------                     CBC with Differential[[id number]]       Abnormal            Final result                 Please view results for these tests on the individual orders.     EKG Readings: (Independently Interpreted)   Initial Reading: No STEMI. Rhythm: Normal Sinus Rhythm. Heart Rate: 77. Ectopy: No Ectopy. Conduction: 1st Degree AV Block. ST Segments: Normal ST Segments. T Waves: Normal. Axis: Left Axis Deviation.       Imaging Results              X-Ray Chest 1 View (In process)                   X-Rays:   Independently Interpreted Readings:   Chest X-Ray: Normal heart size.  No infiltrates.  No acute abnormalities.     Medications   cefTRIAXone (Rocephin) 1 g in dextrose 5 % in water (D5W) 100 mL IVPB (MB+) (1 g Intravenous New Bag 4/18/24 0152)   0.9%  NaCl infusion (999 mLs Intravenous New Bag 4/18/24 0110)     Medical Decision Making  Differentials UTI, sepsis, dehydration physical deconditioning, CVA/TIA   Historian is the patient and EMS  73-year-old male presents stable vital signs and generalized  fatigue.  Neuro exam was done in full with notable deficits appreciated.  EKG showed no ischemic changes.  Troponin negative and basic labs significant for a DORIS.  Creatinine of 1.8 from baseline 1.0.  Fluids started.  UA concerning for UTI and chest x-ray showed no acute process per my interpretation.  Pt amenable to being observed in hospital for continued ivf and abx.  Discussed with Dr. Brown who accepted for further management.     Amount and/or Complexity of Data Reviewed  Labs: ordered. Decision-making details documented in ED Course.  Radiology: ordered. Decision-making details documented in ED Course.  ECG/medicine tests: ordered and independent interpretation performed. Decision-making details documented in ED Course.    Risk  Prescription drug management.  Decision regarding hospitalization.                                      Clinical Impression:  Final diagnoses:  [R53.83] Fatigue  [N30.00] Acute cystitis without hematuria (Primary)  [N17.9] DORIS (acute kidney injury)  [R53.81] Physical deconditioning          ED Disposition Condition    Observation Stable                Espinoza Denney MD  04/18/24 0215       Espinoza Denney MD  04/18/24 0218

## 2024-04-18 NOTE — PLAN OF CARE
Problem: Adult Inpatient Plan of Care  Goal: Plan of Care Review  Outcome: Ongoing, Progressing  Goal: Patient-Specific Goal (Individualized)  Outcome: Ongoing, Progressing  Goal: Absence of Hospital-Acquired Illness or Injury  Outcome: Ongoing, Progressing  Goal: Optimal Comfort and Wellbeing  Outcome: Ongoing, Progressing  Goal: Readiness for Transition of Care  Outcome: Ongoing, Progressing     Problem: Fatigue  Goal: Improved Activity Tolerance  Outcome: Ongoing, Progressing     Problem: Hypertension Comorbidity  Goal: Blood Pressure in Desired Range  Outcome: Ongoing, Progressing     Problem: Fall Injury Risk  Goal: Absence of Fall and Fall-Related Injury  Outcome: Ongoing, Progressing     Problem: UTI (Urinary Tract Infection)  Goal: Improved Infection Symptoms  Outcome: Ongoing, Progressing     Problem: Infection  Goal: Absence of Infection Signs and Symptoms  Outcome: Ongoing, Progressing     Problem: Fluid Volume Excess (Chronic Kidney Disease)  Goal: Fluid Balance  Outcome: Ongoing, Progressing     Problem: Renal Function Impairment (Chronic Kidney Disease)  Goal: Minimize Renal Failure Effects  Outcome: Ongoing, Progressing

## 2024-04-18 NOTE — ASSESSMENT & PLAN NOTE
Admit patient   IV fluids   Urine culture/sensitivities pending   Continue Rocephin 1 g IV Q 24 hours

## 2024-04-18 NOTE — ED NOTES
72yo male presented to the ED per EMS complaining of generalized weakness.  States he was at home and tried to get out of his chair but was unable to.  Weakness is his only complaint

## 2024-04-18 NOTE — PLAN OF CARE
Problem: Fluid Volume Excess (Chronic Kidney Disease)  Goal: Fluid Balance  Outcome: Ongoing, Progressing     Problem: Renal Function Impairment (Chronic Kidney Disease)  Goal: Minimize Renal Failure Effects  Outcome: Ongoing, Progressing

## 2024-04-18 NOTE — SUBJECTIVE & OBJECTIVE
Past Medical History:   Diagnosis Date    BPH (benign prostatic hyperplasia)     Hypertension     Seizures        Past Surgical History:   Procedure Laterality Date    APPENDECTOMY      CHOLECYSTECTOMY      COLONOSCOPY  04/21/2016    Dr Brock Brown    INCISION AND DRAINAGE OF ABSCESS Right 3/20/2024    Procedure: INCISION AND DRAINAGE, ABSCESS;  Surgeon: Amelia Carrasco MD;  Location: [facility];  Service: General;  Laterality: Right;    TRANSURETHRAL RESECTION OF PROSTATE         Review of patient's allergies indicates:  No Known Allergies    Current Facility-Administered Medications   Medication Dose Route Frequency Provider Last Rate Last Admin    atorvastatin tablet 40 mg  40 mg Oral QHS Brock Brown MD        [START ON 4/19/2024] cefTRIAXone (Rocephin) 1 g in dextrose 5 % in water (D5W) 100 mL IVPB (MB+)  1 g Intravenous Q24H Brock Brown MD        enoxaparin injection 30 mg  30 mg Subcutaneous Daily Espinoza Denney MD        ferrous sulfate tablet 1 each  1 tablet Oral Daily Brock Brown MD        furosemide tablet 20 mg  20 mg Oral Daily Brock Brown MD        lactated ringers infusion   Intravenous Continuous Espinoza Denney MD   Stopped at 04/18/24 0758    levETIRAcetam tablet 1,500 mg  1,500 mg Oral BID Brock Brown MD        melatonin tablet 6 mg  6 mg Oral Nightly Brock Brown MD        OXcarbazepine tablet 900 mg  900 mg Oral BID Brock Brown MD        sertraline tablet 50 mg  50 mg Oral Daily Brock Brown MD        sodium chloride 0.9% flush 10 mL  10 mL Intravenous PRN Espinoza Denney MD        tamsulosin 24 hr capsule 0.4 mg  0.4 mg Oral Daily Brock Brown MD        topiramate tablet 200 mg  200 mg Oral BID Brock Brown MD         Family History       Problem Relation (Age of Onset)    Hypertension Mother          Tobacco Use    Smoking status: Never    Smokeless tobacco: Never   Substance and Sexual Activity    Alcohol use: Not  Currently    Drug use: Not Currently    Sexual activity: Not on file     Review of Systems   Constitutional:  Positive for fatigue.   Respiratory: Negative.     Cardiovascular: Negative.    Gastrointestinal: Negative.    Musculoskeletal: Negative.    Neurological:  Positive for weakness.   Psychiatric/Behavioral: Negative.       Objective:     Vital Signs (Most Recent):  Temp: 98.6 °F (37 °C) (04/18/24 0722)  Pulse: 80 (04/18/24 0722)  Resp: 18 (04/18/24 0600)  BP: (!) 162/90 (04/18/24 0722)  SpO2: 97 % (04/18/24 0722) Vital Signs (24h Range):  Temp:  [97.5 °F (36.4 °C)-98.6 °F (37 °C)] 98.6 °F (37 °C)  Pulse:  [69-95] 80  Resp:  [16-20] 18  SpO2:  [96 %-98 %] 97 %  BP: (154-164)/(80-95) 162/90     Weight: 97.3 kg (214 lb 8.1 oz)  Body mass index is 28.3 kg/m².     Physical Exam  Constitutional:       Appearance: Normal appearance.   Cardiovascular:      Rate and Rhythm: Normal rate and regular rhythm.   Pulmonary:      Effort: Pulmonary effort is normal.      Breath sounds: Normal breath sounds.   Abdominal:      General: Abdomen is flat. Bowel sounds are normal.      Palpations: Abdomen is soft.   Musculoskeletal:         General: Normal range of motion.   Skin:     General: Skin is warm.   Neurological:      Mental Status: He is alert.   Psychiatric:         Mood and Affect: Mood normal.         Behavior: Behavior normal.         Thought Content: Thought content normal.         Judgment: Judgment normal.                Significant Labs: All pertinent labs within the past 24 hours have been reviewed.  CBC:   Recent Labs   Lab 04/17/24 2343 04/18/24 0514   WBC 8.51 7.58   HGB 10.6* 9.2*   HCT 31.8* 28.5*    193     CMP:   Recent Labs   Lab 04/17/24 2343 04/18/24  0514    140   K 4.1 2.9*   CO2 23 21*   BUN 53.0* 48.0*   CREATININE 1.82* 1.66*   CALCIUM 8.9 8.4*   ALBUMIN 2.6*  --    BILITOT 0.3  --    ALKPHOS 96  --    AST 26  --    ALT 17  --        Significant Imaging: I have reviewed all  pertinent imaging results/findings within the past 24 hours.

## 2024-04-18 NOTE — PT/OT/SLP EVAL
"Physical Therapy Acute Care Evaluation    Patient Name:  Nathen Morales   MRN:  [id number]    History:     Per MD:  Principal Problem:Acute cystitis without hematuria     Chief Complaint:        Chief Complaint   Patient presents with    Fatigue       Generalized weakness started yesterday. States "I couldn't get up from my recliner". Denies any other complaints was seen 2 days ago for diarrhea, resolved at this time.          HPI:        Chief Complaint   Patient presents with    Fatigue       Generalized weakness started yesterday. States "I couldn't get up from my recliner". Denies any other complaints was seen 2 days ago for diarrhea, resolved at this time.       Patient is a 73-year-old white gentleman with a past medical history of epilepsy, hypertension hyperlipidemia who presented to the ER today to generalized fatigue.  He was seen here few days ago for generalized fatigue and was reported to have had diarrhea that time.  Patient states that is since resolved with the fatigue persists.  He states however that his symptoms started only 1 day ago.  He denies any fevers, chills, cough congestion chest pain, shortness of breath or abdominal pain.  He denies any dysuria hematuria.  Workup in ER revealed patient to have electrolyte abnormalities as well as possible UTI.  Patient will be admitted for further treatment    Past Medical History:   Diagnosis Date    BPH (benign prostatic hyperplasia)     Hypertension     Seizures        Past Surgical History:   Procedure Laterality Date    APPENDECTOMY      CHOLECYSTECTOMY      COLONOSCOPY  04/21/2016    Dr Brock Brown    INCISION AND DRAINAGE OF ABSCESS Right 3/20/2024    Procedure: INCISION AND DRAINAGE, ABSCESS;  Surgeon: Amelia Carrasco MD;  Location: [facility];  Service: General;  Laterality: Right;    TRANSURETHRAL RESECTION OF PROSTATE         Recent Surgery: * No surgery found *      Subjective     Chief Complaint: "I still don't feel " "good"  Patient/Family Comments/goals: "to get stronger and go home"  Pain/Comfort:  Pain Rating 1: 0/10      Living Environment:  Pt currently resides at home alone in a single story home with ramp entry. Pt is typically ambulatory with a rollator and is capable of performing ADLs independently. Pt does not drive, but his niece provides transportation as needed. Niece also brings pt his groceries and picks up his medications.    Equipment used at home: rollator, shower chair.  DME owned (not currently used): rolling walker, bedside commode, and wheelchair.        Objective:     Communicated with nurse prior to session.  Patient found supine with bed alarm, peripheral IV  upon PT entry to room.    General Precautions: Standard, fall   Orthopedic Precautions:N/A   Braces: N/A  Respiratory Status: Room air        Functional Mobility:     Assist Level Assistive Device Comments    Bed Mobility SBA  Semi-supine to sitting at EOB.  Pt able to mobilize into sitting without use of the railing.    Sit to stand/Stand to sit SBA RW Sit to stand/stand to sit. Pt initially stood from the EOB.  Three attempts at standing were required to reach upright. PT provided cues for increased anterior weight shifting and pt was eventually able to reach standing without any physical assistance provided by PT.  Pt also stood from toilet level utilizing the grab bar on the way.     Transfers SBA  Toilet t/f performed for a +void and BM.  Pt requested assistance with hygiene, but PT encouraged pt to attempt this task on his own.  As previously mentioned, pt utilized the grab bar for support.     Gait SBA RW Pt able to ambulate 100 ft with a step through gait pattern and slow gait speed. Pt demonstrated shuffled steps with limited foot clearance bilaterally. Severe thoracic kyphosis noted.  Cues provided for RW proximity for safety.  Very slow pace noted which pt reports is very different from his baseline.  Pt stated "I walk a lot better than " "this at home".    Balance Training      Wheelchair Mobility      Stair Climbing      Car Transfer      Other:             Assessment:     Nathen Morales is a 73 y.o. male admitted with a medical diagnosis of Acute cystitis without hematuria.  He presents with the following impairments/functional limitations:  weakness, impaired endurance, impaired self care skills, impaired functional mobility, gait instability, impaired balance, decreased safety awareness, impaired cognition.    Rehab Prognosis: Fair; patient would benefit from acute skilled PT services to address these deficits and reach maximum level of function.      Additional information: Pt tolerated evaluation fairly well and was able to perform basic mobility with SBA only.  Pt has not yet returned to his baseline and reports that his gait speed is significantly slowed compared to "normal". Pt was left sitting up in the bedside recliner and was educated to contact nsg for assistance.  Nsg and CNA aware of pt's performance and status up in the chair.     Patient left up in chair with all lines intact, call button in reach, and chair alarm on.      Plan:     During this hospitalization, patient to be seen 6 x/week to address the identified rehab impairments via gait training, therapeutic activities, therapeutic exercises and progress toward the following goals:    GOALS:   Multidisciplinary Problems       Physical Therapy Goals          Problem: Physical Therapy    Goal Priority Disciplines Outcome Goal Variances Interventions   Physical Therapy Goal     PT, PT/OT      Description: Patient will increase functional independence with mobility by performin. Sit to stand transfer with Modified [place]  2. Gait  x 200 feet with Modified [place] using Rolling Walker.                          Recommendations:     Discharge Recommendations:  Home with  services  Discharge Equipment Recommendations: none     Time Tracking:       PT Start Time: 1356 "     PT Stop Time: 1424  PT Total Time (min): 28 min     Billable Minutes: Evaluation 28 04/18/2024

## 2024-04-18 NOTE — PLAN OF CARE
04/18/24 1046   Discharge Assessment   Assessment Type Discharge Planning Assessment   Confirmed/corrected address, phone number and insurance Yes   Confirmed Demographics Correct on Facesheet   Source of Information patient   Does patient/caregiver understand observation status Yes   Communicated DILSHAD with patient/caregiver Date not available/Unable to determine   Reason For Admission UTI   People in Home alone   Facility Arrived From: Home   Do you expect to return to your current living situation? Yes   Do you have help at home or someone to help you manage your care at home? No   Prior to hospitilization cognitive status: Alert/Oriented   Current cognitive status: Alert/Oriented   Walking or Climbing Stairs Difficulty no   Dressing/Bathing Difficulty no   Home Accessibility wheelchair accessible   Equipment Currently Used at Home rollator;shower chair;wheelchair   Readmission within 30 days? Yes   Do you currently have service(s) that help you manage your care at home? Yes   Name and Contact number of agency [facility] [phone]   Is the pt/caregiver preference to resume services with current agency Yes   Do you take prescription medications? Yes   Do you have prescription coverage? Yes   Coverage [facility]   Do you have any problems affording any of your prescribed medications? No   Is the patient taking medications as prescribed? yes   How do you get to doctors appointments? family or friend will provide   Are you on dialysis? No   Do you take coumadin? No   Discharge Plan A Home;Home Health   DME Needed Upon Discharge  none   Discharge Plan discussed with: Patient   Transition of Care Barriers None   Physical Activity   On average, how many days per week do you engage in moderate to strenuous exercise (like a brisk walk)? 0 days   On average, how many minutes do you engage in exercise at this level? 0 min   Financial Resource Strain   How hard is it for you to pay for the very basics like food, housing,  medical care, and heating? Not very   Housing Stability   In the last 12 months, was there a time when you were not able to pay the mortgage or rent on time? N   At any time in the past 12 months, were you homeless or living in a shelter (including now)? N   Transportation Needs   In the past 12 months, has lack of transportation kept you from medical appointments or from getting medications? no   In the past 12 months, has lack of transportation kept you from meetings, work, or from getting things needed for daily living? No   Food Insecurity   Within the past 12 months, you worried that your food would run out before you got the money to buy more. Never true   Within the past 12 months, the food you bought just didn't last and you didn't have money to get more. Never true   Stress   Do you feel stress - tense, restless, nervous, or anxious, or unable to sleep at night because your mind is troubled all the time - these days? To some exte   Social Connections   In a typical week, how many times do you talk on the phone with family, friends, or neighbors? More than 3   How often do you get together with friends or relatives? More than 3   How often do you attend Mormonism or Hindu services? More than 4   Do you belong to any clubs or organizations such as Mormonism groups, unions, fraternal or athletic groups, or school groups? No   How often do you attend meetings of the clubs or organizations you belong to? Never   Are you , , , , never , or living with a partner? Never marrie   Alcohol Use   Q1: How often do you have a drink containing alcohol? Never   Q2: How many drinks containing alcohol do you have on a typical day when you are drinking? None   Q3: How often do you have six or more drinks on one occasion? Never     Currently with [facility]. Plan to return once discharged. Will defer to Dr. Brown for further discharge plan.

## 2024-04-18 NOTE — HPI
"Chief Complaint   Patient presents with    Fatigue       Generalized weakness started yesterday. States "I couldn't get up from my recliner". Denies any other complaints was seen 2 days ago for diarrhea, resolved at this time.       Patient is a 73-year-old white gentleman with a past medical history of epilepsy, hypertension hyperlipidemia who presented to the ER today to generalized fatigue.  He was seen here few days ago for generalized fatigue and was reported to have had diarrhea that time.  Patient states that is since resolved with the fatigue persists.  He states however that his symptoms started only 1 day ago.  He denies any fevers, chills, cough congestion chest pain, shortness of breath or abdominal pain.  He denies any dysuria hematuria.  Workup in ER revealed patient to have electrolyte abnormalities as well as possible UTI.  Patient will be admitted for further treatment  "

## 2024-04-18 NOTE — ED NOTES
Pt. Transported to [facility], per str, condistion stable.  Report given to Lety GA

## 2024-04-18 NOTE — NURSING
Nurses Note -- 4 Eyes      4/18/2024   3:41 AM      Skin assessed during: Admit      [x] No Altered Skin Integrity Present    []Prevention Measures Documented      [] Yes- Altered Skin Integrity Present or Discovered   [] LDA Added if Not in Epic (Describe Wound)   [] New Altered Skin Integrity was Present on Admit and Documented in LDA   [] Wound Image Taken    Wound Care Consulted? No    Attending Nurse:  Dallas Schuster RN/Staff Member:   SUNG Davidson

## 2024-04-19 LAB
ALBUMIN SERPL-MCNC: 2.2 G/DL (ref 3.4–4.8)
ALBUMIN/GLOB SERPL: 0.8 RATIO (ref 1.1–2)
ALP SERPL-CCNC: 81 UNIT/L (ref 40–150)
ALT SERPL-CCNC: 19 UNIT/L (ref 0–55)
AST SERPL-CCNC: 24 UNIT/L (ref 5–34)
BASOPHILS # BLD AUTO: 0.07 X10(3)/MCL
BASOPHILS NFR BLD AUTO: 1.2 %
BILIRUB SERPL-MCNC: 0.2 MG/DL
BUN SERPL-MCNC: 35 MG/DL (ref 8.4–25.7)
CALCIUM SERPL-MCNC: 8.3 MG/DL (ref 8.8–10)
CHLORIDE SERPL-SCNC: 112 MMOL/L (ref 98–107)
CO2 SERPL-SCNC: 21 MMOL/L (ref 23–31)
CREAT SERPL-MCNC: 1.26 MG/DL (ref 0.73–1.18)
EOSINOPHIL # BLD AUTO: 0.18 X10(3)/MCL (ref 0–0.9)
EOSINOPHIL NFR BLD AUTO: 3 %
ERYTHROCYTE [DISTWIDTH] IN BLOOD BY AUTOMATED COUNT: 13.6 % (ref 11.5–17)
GFR SERPLBLD CREATININE-BSD FMLA CKD-EPI: 59 MLS/MIN/1.73/M2
GLOBULIN SER-MCNC: 2.9 GM/DL (ref 2.4–3.5)
GLUCOSE SERPL-MCNC: 101 MG/DL (ref 82–115)
HCT VFR BLD AUTO: 29.1 % (ref 42–52)
HGB BLD-MCNC: 9.6 G/DL (ref 14–18)
IMM GRANULOCYTES # BLD AUTO: 0.11 X10(3)/MCL (ref 0–0.04)
IMM GRANULOCYTES NFR BLD AUTO: 1.8 %
LYMPHOCYTES # BLD AUTO: 0.66 X10(3)/MCL (ref 0.6–4.6)
LYMPHOCYTES NFR BLD AUTO: 11 %
MCH RBC QN AUTO: 33.1 PG (ref 27–31)
MCHC RBC AUTO-ENTMCNC: 33 G/DL (ref 33–36)
MCV RBC AUTO: 100.3 FL (ref 80–94)
MONOCYTES # BLD AUTO: 0.84 X10(3)/MCL (ref 0.1–1.3)
MONOCYTES NFR BLD AUTO: 14 %
NEUTROPHILS # BLD AUTO: 4.14 X10(3)/MCL (ref 2.1–9.2)
NEUTROPHILS NFR BLD AUTO: 69 %
NRBC BLD AUTO-RTO: 0 %
PLATELET # BLD AUTO: 203 X10(3)/MCL (ref 130–400)
PMV BLD AUTO: 9.4 FL (ref 7.4–10.4)
POTASSIUM SERPL-SCNC: 3.4 MMOL/L (ref 3.5–5.1)
PROT SERPL-MCNC: 5.1 GM/DL (ref 5.8–7.6)
RBC # BLD AUTO: 2.9 X10(6)/MCL (ref 4.7–6.1)
SODIUM SERPL-SCNC: 140 MMOL/L (ref 136–145)
WBC # SPEC AUTO: 6 X10(3)/MCL (ref 4.5–11.5)

## 2024-04-19 PROCEDURE — 99231 SBSQ HOSP IP/OBS SF/LOW 25: CPT | Mod: ,,, | Performed by: FAMILY MEDICINE

## 2024-04-19 PROCEDURE — 25000003 PHARM REV CODE 250: Performed by: FAMILY MEDICINE

## 2024-04-19 PROCEDURE — 63600175 PHARM REV CODE 636 W HCPCS: Performed by: FAMILY MEDICINE

## 2024-04-19 PROCEDURE — 85025 COMPLETE CBC W/AUTO DIFF WBC: CPT | Performed by: FAMILY MEDICINE

## 2024-04-19 PROCEDURE — 97116 GAIT TRAINING THERAPY: CPT

## 2024-04-19 PROCEDURE — 80053 COMPREHEN METABOLIC PANEL: CPT | Performed by: FAMILY MEDICINE

## 2024-04-19 PROCEDURE — 11000001 HC ACUTE MED/SURG PRIVATE ROOM

## 2024-04-19 PROCEDURE — 94760 N-INVAS EAR/PLS OXIMETRY 1: CPT

## 2024-04-19 PROCEDURE — 94761 N-INVAS EAR/PLS OXIMETRY MLT: CPT

## 2024-04-19 RX ORDER — MUPIROCIN 20 MG/G
OINTMENT TOPICAL 2 TIMES DAILY
Status: DISCONTINUED | OUTPATIENT
Start: 2024-04-19 | End: 2024-04-20 | Stop reason: HOSPADM

## 2024-04-19 RX ADMIN — LEVETIRACETAM 1500 MG: 500 TABLET, FILM COATED ORAL at 08:04

## 2024-04-19 RX ADMIN — TOPIRAMATE 200 MG: 100 TABLET, FILM COATED ORAL at 08:04

## 2024-04-19 RX ADMIN — POTASSIUM CHLORIDE 10 MEQ: 10 TABLET, EXTENDED RELEASE ORAL at 08:04

## 2024-04-19 RX ADMIN — SERTRALINE HYDROCHLORIDE 50 MG: 50 TABLET ORAL at 08:04

## 2024-04-19 RX ADMIN — Medication 6 MG: at 08:04

## 2024-04-19 RX ADMIN — FERROUS SULFATE TAB 325 MG (65 MG ELEMENTAL FE) 1 EACH: 325 (65 FE) TAB at 08:04

## 2024-04-19 RX ADMIN — TAMSULOSIN HYDROCHLORIDE 0.4 MG: 0.4 CAPSULE ORAL at 08:04

## 2024-04-19 RX ADMIN — ATORVASTATIN CALCIUM 40 MG: 40 TABLET, FILM COATED ORAL at 08:04

## 2024-04-19 RX ADMIN — OXCARBAZEPINE 900 MG: 300 TABLET, FILM COATED ORAL at 08:04

## 2024-04-19 RX ADMIN — FUROSEMIDE 20 MG: 20 TABLET ORAL at 08:04

## 2024-04-19 RX ADMIN — ENOXAPARIN SODIUM 40 MG: 40 INJECTION SUBCUTANEOUS at 05:04

## 2024-04-19 RX ADMIN — CEFTRIAXONE SODIUM 1 G: 1 INJECTION, POWDER, FOR SOLUTION INTRAMUSCULAR; INTRAVENOUS at 01:04

## 2024-04-19 RX ADMIN — MUPIROCIN 1 G: 20 OINTMENT TOPICAL at 08:04

## 2024-04-19 NOTE — ASSESSMENT & PLAN NOTE
Continue IV fluids   Urine culture/sensitivities currently showing no growth  Continue Rocephin 1 g IV Q 24 hours

## 2024-04-19 NOTE — PLAN OF CARE
04/19/24 1245   Final Note   Assessment Type Final Discharge Note   Anticipated Discharge Disposition Home-Health   What phone number can be called within the next 1-3 days to see how you are doing after discharge? [phone]   Post-Acute Status   Post-Acute Authorization Home Health   Home Health Status Referrals Sent   Coverage [facility]   Discharge Delays None known at this time     Possible discharge tomorrow 4/202/2024 with [facility].

## 2024-04-19 NOTE — PLAN OF CARE
Problem: Adult Inpatient Plan of Care  Goal: Plan of Care Review  Outcome: Ongoing, Progressing  Goal: Patient-Specific Goal (Individualized)  Outcome: Ongoing, Progressing  Goal: Absence of Hospital-Acquired Illness or Injury  Outcome: Ongoing, Progressing  Goal: Optimal Comfort and Wellbeing  Outcome: Ongoing, Progressing  Goal: Readiness for Transition of Care  Outcome: Ongoing, Progressing     Problem: Fatigue  Goal: Improved Activity Tolerance  Outcome: Ongoing, Progressing     Problem: Hypertension Comorbidity  Goal: Blood Pressure in Desired Range  Outcome: Ongoing, Progressing     Problem: Fall Injury Risk  Goal: Absence of Fall and Fall-Related Injury  Outcome: Ongoing, Progressing     Problem: UTI (Urinary Tract Infection)  Goal: Improved Infection Symptoms  Outcome: Ongoing, Progressing     Problem: Infection  Goal: Absence of Infection Signs and Symptoms  Outcome: Ongoing, Progressing     Problem: Fluid Volume Excess (Chronic Kidney Disease)  Goal: Fluid Balance  Outcome: Ongoing, Progressing     Problem: Renal Function Impairment (Chronic Kidney Disease)  Goal: Minimize Renal Failure Effects  Outcome: Ongoing, Progressing     Problem: Fluid and Electrolyte Imbalance (Acute Kidney Injury/Impairment)  Goal: Fluid and Electrolyte Balance  Outcome: Ongoing, Progressing     Problem: Oral Intake Inadequate (Acute Kidney Injury/Impairment)  Goal: Optimal Nutrition Intake  Outcome: Ongoing, Progressing     Problem: Renal Function Impairment (Acute Kidney Injury/Impairment)  Goal: Effective Renal Function  Outcome: Ongoing, Progressing

## 2024-04-19 NOTE — PROGRESS NOTES
"[facility]  [facility] Medicine  Progress Note    Patient Name: Nathen Morales  MRN: [id number]  Patient Class: IP- Inpatient   Admission Date: 4/17/2024  Length of Stay: 1 days  Attending Physician: Brock Brown MD  Primary Care Provider: Brock Brown MD        Subjective:     Principal Problem:Acute cystitis without hematuria        HPI:  Chief Complaint   Patient presents with    Fatigue       Generalized weakness started yesterday. States "I couldn't get up from my recliner". Denies any other complaints was seen 2 days ago for diarrhea, resolved at this time.       Patient is a 73-year-old white gentleman with a past medical history of epilepsy, hypertension hyperlipidemia who presented to the ER today to generalized fatigue.  He was seen here few days ago for generalized fatigue and was reported to have had diarrhea that time.  Patient states that is since resolved with the fatigue persists.  He states however that his symptoms started only 1 day ago.  He denies any fevers, chills, cough congestion chest pain, shortness of breath or abdominal pain.  He denies any dysuria hematuria.  Workup in ER revealed patient to have electrolyte abnormalities as well as possible UTI.  Patient will be admitted for further treatment    Overview/Hospital Course:  04/19/2024:  Patient seen on rounds sitting in bedside recliner.  Patient participated with PT earlier today    Interval History:     Review of Systems   Constitutional: Negative.    Respiratory: Negative.     Cardiovascular: Negative.    Gastrointestinal: Negative.    Genitourinary: Negative.    Neurological:  Positive for weakness.   Psychiatric/Behavioral: Negative.       Objective:     Vital Signs (Most Recent):  Temp: 99.7 °F (37.6 °C) (04/19/24 1219)  Pulse: 81 (04/19/24 1219)  Resp: 18 (04/19/24 1134)  BP: (!) 159/74 (04/19/24 1219)  SpO2: 97 % (04/19/24 1219) Vital Signs (24h Range):  Temp:  [98 °F (36.7 °C)-100.4 °F " (38 °C)] 99.7 °F (37.6 °C)  Pulse:  [] 81  Resp:  [16-18] 18  SpO2:  [94 %-98 %] 97 %  BP: (149-175)/(68-91) 159/74     Weight: 98.7 kg (217 lb 9.5 oz)  Body mass index is 28.71 kg/m².    Intake/Output Summary (Last 24 hours) at 4/19/2024 1254  Last data filed at 4/19/2024 0528  Gross per 24 hour   Intake 1365.93 ml   Output 1020 ml   Net 345.93 ml         Physical Exam  Constitutional:       Appearance: Normal appearance.   Cardiovascular:      Rate and Rhythm: Normal rate and regular rhythm.      Heart sounds: Normal heart sounds.   Pulmonary:      Effort: Pulmonary effort is normal.      Breath sounds: Normal breath sounds.   Skin:     General: Skin is warm.   Neurological:      Mental Status: He is alert.   Psychiatric:         Mood and Affect: Mood normal.         Behavior: Behavior normal.         Thought Content: Thought content normal.         Judgment: Judgment normal.             Significant Labs: All pertinent labs within the past 24 hours have been reviewed.  CBC:   Recent Labs   Lab 04/17/24  2343 04/18/24  0514 04/19/24  0417   WBC 8.51 7.58 6.00   HGB 10.6* 9.2* 9.6*   HCT 31.8* 28.5* 29.1*    193 203     CMP:   Recent Labs   Lab 04/17/24  2343 04/18/24  0514 04/19/24  0417    140 140   K 4.1 2.9* 3.4*   CO2 23 21* 21*   BUN 53.0* 48.0* 35.0*   CREATININE 1.82* 1.66* 1.26*   CALCIUM 8.9 8.4* 8.3*   ALBUMIN 2.6*  --  2.2*   BILITOT 0.3  --  0.2   ALKPHOS 96  --  81   AST 26  --  24   ALT 17  --  19       Significant Imaging: I have reviewed all pertinent imaging results/findings within the past 24 hours.    Assessment/Plan:      * Acute cystitis without hematuria  Continue IV fluids   Urine culture/sensitivities currently showing no growth  Continue Rocephin 1 g IV Q 24 hours      Hypokalemia  Lab Results   Component Value Date    K 3.4 (L) 04/19/2024     Potassium improving  Replace potassium   Repeat lab work in a.m.     DORIS (acute kidney injury)  IV fluids  Trend lab  work    Physical deconditioning  Continue PT   Plan for outpatient PT with home health on discharge        VTE Risk Mitigation (From admission, onward)           Ordered     enoxaparin injection 40 mg  Every 24 hours         04/18/24 1249     IP VTE HIGH RISK PATIENT  Once         04/18/24 0327                    Discharge Planning   DILSHAD:      Code Status: Full Code   Is the patient medically ready for discharge?:     Reason for patient still in hospital (select all that apply): Patient trending condition  Discharge Plan A: Home, Home Health   Discharge Delays: None known at this time              Brock Brown MD  Department of Hospital Medicine   [facility]

## 2024-04-19 NOTE — ASSESSMENT & PLAN NOTE
Lab Results   Component Value Date    K 3.4 (L) 04/19/2024     Potassium improving  Replace potassium   Repeat lab work in a.m.

## 2024-04-19 NOTE — PT/OT/SLP PROGRESS
"         Physical Therapy Treatment Note           Name: Nathen Morales    : 1950 (73 y.o.)  MRN: [id number]             Subjective Assessment:     No complaints  Lethargic   X Awake, alert, cooperative  Uncooperative    Agitated  c/o pain    Appropriate  c/o fatigue    Confused  Treated at bedside     Emotionally labile  Treated in gym/dept.    Impulsive  Other:    Flat affect       Pain/Comfort:    Location - Side 1: Right  Location - Orientation 1: lower  Location 1: leg    Therapy Precautions:     X Cognitive deficits  Spinal precautions    Collar - hard  Sternal precautions    Collar - soft   TLSO   X Fall risk  LSO    Hip precautions - posterior  Knee immobilizer    Hip precautions - anterior  WBAT    Impaired communication  Partial weightbearing    Oxygen  TTWB    PEG tube  NWB    Visual deficits  Other:    Hearing deficits               Mobility Training:     Assist Level Assistive Device Comments    Bed Mobility Sup  Semi-supine to sitting at EOB.     Sit to stand/Stand to sit SBA RW Sit to stand/stand to sit.  Pt stood from the EOB and from the recliner. VC provided for increased anterior weight shifting when standing from the bed.  Pt stood with increased ease from recliner level.    Transfers      Gait SBA RW Pt able to ambulate 70 ft x 2 trials with a seated rest break between each.  Pt continues to demonstrate limited foot clearance and step length, but did this did improve slightly during the second trial of gait.  Flexed posture noted due to thoracic kyphosis.  VC required for RW proximity, specifically when turning.     Balance Training      Wheelchair Mobility      Stair Climbing      Car Transfer      Other:             Assessment:     Patient tolerated session fairly well but stated he is still "not feeling much better".  Despite this, pt did tolerate a slight increase in activity as compared to initial evaluation.  PT does have concerns about pt returning home independently; however, PT " also wonders if pt is actually functioning at his baseline despite his reports.        GOALS:   Multidisciplinary Problems       Physical Therapy Goals          Problem: Physical Therapy    Goal Priority Disciplines Outcome Goal Variances Interventions   Physical Therapy Goal     PT, PT/OT Ongoing, Progressing     Description: Patient will increase functional independence with mobility by performin. Sit to stand transfer with Modified [place]  2. Gait  x 200 feet with Modified [place] using Rolling Walker.                            Discharge Recommendations:      Home with HH    Discharge Equipment Recommendations:     none     At end of treatment, patient remained:    X  Up in chair     In bed   X With alarm activated    Bed rails up   X Call bell in reach      Family/friends present     Restraints secured properly     In bathroom with CNA/RN notified     In gym with PT/PTA/tech     Nurse aware     Other:           PT Start Time: 932     PT Stop Time: 955  PT Total Time (min): 23 min     Billable Minutes: Gait Training 2024

## 2024-04-19 NOTE — PROGRESS NOTES
Inpatient Nutrition Assessment    Admit Date: 4/17/2024   Total duration of encounter: 2 days   Patient Age: 73 y.o.    Nutrition Recommendation/Prescription     Continue Cardiac, Low Na+, 2 gm as tolerated  Continue daily weights    Communication of Recommendations:  EMR    Nutrition Assessment     Malnutrition Assessment/Nutrition-Focused Physical Exam    Malnutrition Context: other (see comments) (Does not meet criteria) (04/19/24 8801)                                                           A minimum of two characteristics is recommended for diagnosis of either severe or non-severe malnutrition.    Chart Review    Reason Seen: continuous nutrition monitoring    Malnutrition Screening Tool Results   Have you recently lost weight without trying?: No  Have you been eating poorly because of a decreased appetite?: No   MST Score: 0   Diagnosis:  Acute cystitis without hematuria, hypokalemia, DORIS, Physical deconditioning    Relevant Medical History:   HTN, seizures, BPH, HLD    Scheduled Medications:  atorvastatin, 40 mg, QHS  cefTRIAXone (Rocephin) IV (PEDS and ADULTS), 1 g, Q24H  enoxparin, 40 mg, Q24H (prophylaxis, 1700)  ferrous sulfate, 1 tablet, Daily  furosemide, 20 mg, Daily  levETIRAcetam, 1,500 mg, BID  melatonin, 6 mg, Nightly  mupirocin, , BID  OXcarbazepine, 900 mg, BID  potassium chloride, 10 mEq, Daily  sertraline, 50 mg, Daily  tamsulosin, 0.4 mg, Daily  topiramate, 200 mg, BID    Continuous Infusions:   PRN Medications:   Current Facility-Administered Medications:     loperamide, 2 mg, Oral, QID PRN    sodium chloride 0.9%, 10 mL, Intravenous, PRN    Calorie Containing IV Medications: no significant kcals from medications at this time    Recent Labs   Lab 04/15/24  0427 04/17/24  2343 04/18/24  0514 04/19/24  0417   * 141 140 140   K 3.9 4.1 2.9* 3.4*   CALCIUM 9.0 8.9 8.4* 8.3*   MG  --  2.40  --   --    CHLORIDE 103 107 110* 112*   CO2 20* 23 21* 21*   BUN 35.0* 53.0* 48.0* 35.0*  "  CREATININE 1.71* 1.82* 1.66* 1.26*   EGFRNORACEVR 41 38 43 59   GLUCOSE 129* 114 103 101   BILITOT 0.4 0.3  --  0.2   ALKPHOS 87 96  --  81   ALT 14 17  --  19   AST 23 26  --  24   ALBUMIN 3.1* 2.6*  --  2.2*   WBC 16.36* 8.51 7.58 6.00   HGB 10.8* 10.6* 9.2* 9.6*   HCT 32.2* 31.8* 28.5* 29.1*     Nutrition Orders:  Diet Cardiac Low Sodium,2gm      Appetite/Oral Intake: good/50-75% of meals  Factors Affecting Nutritional Intake: none identified  Social Needs Impacting Access to Food: unable to assess at this time; will attempt on follow-up  Food/Jain/Cultural Preferences: unable to obtain  Food Allergies: unable to obtain  Last Bowel Movement: 04/18/24  Wound(s):  no pressure ulcers    Comments    (4/19) Pt with good appetite and intake. Recorded intake of 75% of meals. No reports of N/V/C/D. No chewing or swallowing difficulties noted. Able to feed self. Med/labs reviewed. Pt noted with 3# weight gain in 24 hours.     Anthropometrics    Height: 6' 1" (185.4 cm), Height Method: Stated  Last Weight: 98.7 kg (217 lb 9.5 oz) (04/19/24 0524), Weight Method: Bed Scale  BMI (Calculated): 28.7  BMI Classification: overweight (BMI 25-29.9)        Ideal Body Weight (IBW), Male: 184 lb     % Ideal Body Weight, Male (lb): 116.58 %                          Usual Weight Provided By: unable to obtain usual weight    Wt Readings from Last 5 Encounters:   04/19/24 98.7 kg (217 lb 9.5 oz)   04/04/24 90.7 kg (200 lb)   03/19/24 92.1 kg (203 lb 0.7 oz)   01/29/24 95.3 kg (210 lb)   01/17/24 89.5 kg (197 lb 6.4 oz)     Weight Change(s) Since Admission: gained 3# in 24 hours  Wt Readings from Last 1 Encounters:   04/19/24 0524 98.7 kg (217 lb 9.5 oz)   04/18/24 0300 97.3 kg (214 lb 8.1 oz)   04/17/24 2319 91.6 kg (202 lb)   Admit Weight: 91.6 kg (202 lb) (04/17/24 2319), Weight Method: Stated    Estimated Needs    Weight Used For Calorie Calculations: 98.7 kg (217 lb 9.5 oz)  Energy Calorie Requirements (kcal): 1777 kcal (18 " kcal/kg)  Energy Need Method: Kcal/kg  Weight Used For Protein Calculations: 98.7 kg (217 lb 9.5 oz)  Protein Requirements: 99 gm (1 gm/kg)  Fluid Requirements (mL): 2468 ml (25 kcal/kg)        Enteral Nutrition     Patient not receiving enteral nutrition at this time.    Parenteral Nutrition     Patient not receiving parenteral nutrition support at this time.    Evaluation of Received Nutrient Intake    Calories: meeting estimated needs  Protein: meeting estimated needs    Patient Education     Not applicable.    Nutrition Diagnosis     PES: Altered nutrition-related laboratory values related to acute illness as evidenced by elevated BUN,Cr and decreased K+. (new)         Nutrition Interventions     Intervention(s): general/healthful diet and collaboration with other providers      Goal: Maintain weight throughout hospitalization. (new)    Nutrition Goals & Monitoring     Dietitian will monitor: energy intake, weight, electrolyte/renal panel, glucose/endocrine profile, and gastrointestinal profile  Discharge planning: continue Cardiac, Low Na+, 2 gm diet  Nutrition Risk/Follow-Up: low (follow-up in 5-7 days)   Please consult if re-assessment needed sooner.

## 2024-04-19 NOTE — SUBJECTIVE & OBJECTIVE
Interval History:     Review of Systems   Constitutional: Negative.    Respiratory: Negative.     Cardiovascular: Negative.    Gastrointestinal: Negative.    Genitourinary: Negative.    Neurological:  Positive for weakness.   Psychiatric/Behavioral: Negative.       Objective:     Vital Signs (Most Recent):  Temp: 99.7 °F (37.6 °C) (04/19/24 1219)  Pulse: 81 (04/19/24 1219)  Resp: 18 (04/19/24 1134)  BP: (!) 159/74 (04/19/24 1219)  SpO2: 97 % (04/19/24 1219) Vital Signs (24h Range):  Temp:  [98 °F (36.7 °C)-100.4 °F (38 °C)] 99.7 °F (37.6 °C)  Pulse:  [] 81  Resp:  [16-18] 18  SpO2:  [94 %-98 %] 97 %  BP: (149-175)/(68-91) 159/74     Weight: 98.7 kg (217 lb 9.5 oz)  Body mass index is 28.71 kg/m².    Intake/Output Summary (Last 24 hours) at 4/19/2024 1254  Last data filed at 4/19/2024 0528  Gross per 24 hour   Intake 1365.93 ml   Output 1020 ml   Net 345.93 ml         Physical Exam  Constitutional:       Appearance: Normal appearance.   Cardiovascular:      Rate and Rhythm: Normal rate and regular rhythm.      Heart sounds: Normal heart sounds.   Pulmonary:      Effort: Pulmonary effort is normal.      Breath sounds: Normal breath sounds.   Skin:     General: Skin is warm.   Neurological:      Mental Status: He is alert.   Psychiatric:         Mood and Affect: Mood normal.         Behavior: Behavior normal.         Thought Content: Thought content normal.         Judgment: Judgment normal.             Significant Labs: All pertinent labs within the past 24 hours have been reviewed.  CBC:   Recent Labs   Lab 04/17/24  2343 04/18/24  0514 04/19/24  0417   WBC 8.51 7.58 6.00   HGB 10.6* 9.2* 9.6*   HCT 31.8* 28.5* 29.1*    193 203     CMP:   Recent Labs   Lab 04/17/24  2343 04/18/24  0514 04/19/24  0417    140 140   K 4.1 2.9* 3.4*   CO2 23 21* 21*   BUN 53.0* 48.0* 35.0*   CREATININE 1.82* 1.66* 1.26*   CALCIUM 8.9 8.4* 8.3*   ALBUMIN 2.6*  --  2.2*   BILITOT 0.3  --  0.2   ALKPHOS 96  --  81   AST  26  --  24   ALT 17  --  19       Significant Imaging: I have reviewed all pertinent imaging results/findings within the past 24 hours.

## 2024-04-19 NOTE — HOSPITAL COURSE
04/19/2024:  Patient seen on rounds sitting in bedside recliner.  Patient participated with PT earlier today.    4/20/24: Pt sitting in chair at bedside shaving. HE denies any complaints. Denies urinary complaints. Feels ready to go home. PT coming in to work with the pt prior to dc. Will dc home with HH with PT. Dc on bactrim for uti with hematuria. Will need follow-up with pcp with repeat ua.    PE:   General: alert and oriented.  HEENT: NC, AT.  Resp: CTAB.  Cardio: RRR, no m/r/g. 1+ pitting edema right leg and left foot.  Abd: soft, NT, ND, + BS x 4

## 2024-04-20 VITALS
HEART RATE: 87 BPM | TEMPERATURE: 98 F | OXYGEN SATURATION: 96 % | RESPIRATION RATE: 18 BRPM | DIASTOLIC BLOOD PRESSURE: 91 MMHG | HEIGHT: 73 IN | SYSTOLIC BLOOD PRESSURE: 167 MMHG | BODY MASS INDEX: 27.59 KG/M2 | WEIGHT: 208.13 LBS

## 2024-04-20 PROBLEM — N17.9 AKI (ACUTE KIDNEY INJURY): Status: RESOLVED | Noted: 2024-04-18 | Resolved: 2024-04-20

## 2024-04-20 PROBLEM — N30.01 ACUTE CYSTITIS WITH HEMATURIA: Status: ACTIVE | Noted: 2024-04-18

## 2024-04-20 LAB
ANION GAP SERPL CALC-SCNC: 8 MEQ/L
BACTERIA UR CULT: NO GROWTH
BUN SERPL-MCNC: 29 MG/DL (ref 8.4–25.7)
CALCIUM SERPL-MCNC: 8.5 MG/DL (ref 8.8–10)
CHLORIDE SERPL-SCNC: 112 MMOL/L (ref 98–107)
CO2 SERPL-SCNC: 20 MMOL/L (ref 23–31)
CREAT SERPL-MCNC: 1.1 MG/DL (ref 0.73–1.18)
CREAT/UREA NIT SERPL: 26
GFR SERPLBLD CREATININE-BSD FMLA CKD-EPI: >60 MLS/MIN/1.73/M2
GLUCOSE SERPL-MCNC: 94 MG/DL (ref 82–115)
POTASSIUM SERPL-SCNC: 3.6 MMOL/L (ref 3.5–5.1)
SODIUM SERPL-SCNC: 140 MMOL/L (ref 136–145)

## 2024-04-20 PROCEDURE — 80048 BASIC METABOLIC PNL TOTAL CA: CPT | Performed by: FAMILY MEDICINE

## 2024-04-20 PROCEDURE — 97530 THERAPEUTIC ACTIVITIES: CPT

## 2024-04-20 PROCEDURE — 97116 GAIT TRAINING THERAPY: CPT

## 2024-04-20 PROCEDURE — 94761 N-INVAS EAR/PLS OXIMETRY MLT: CPT

## 2024-04-20 PROCEDURE — 25000003 PHARM REV CODE 250: Performed by: FAMILY MEDICINE

## 2024-04-20 PROCEDURE — 63600175 PHARM REV CODE 636 W HCPCS: Performed by: FAMILY MEDICINE

## 2024-04-20 RX ORDER — SULFAMETHOXAZOLE AND TRIMETHOPRIM 800; 160 MG/1; MG/1
1 TABLET ORAL 2 TIMES DAILY
Qty: 14 TABLET | Refills: 0 | Status: SHIPPED | OUTPATIENT
Start: 2024-04-20 | End: 2024-04-27

## 2024-04-20 RX ADMIN — CEFTRIAXONE SODIUM 1 G: 1 INJECTION, POWDER, FOR SOLUTION INTRAMUSCULAR; INTRAVENOUS at 02:04

## 2024-04-20 RX ADMIN — MUPIROCIN 1 G: 20 OINTMENT TOPICAL at 09:04

## 2024-04-20 RX ADMIN — SERTRALINE HYDROCHLORIDE 50 MG: 50 TABLET ORAL at 09:04

## 2024-04-20 RX ADMIN — TAMSULOSIN HYDROCHLORIDE 0.4 MG: 0.4 CAPSULE ORAL at 09:04

## 2024-04-20 RX ADMIN — FERROUS SULFATE TAB 325 MG (65 MG ELEMENTAL FE) 1 EACH: 325 (65 FE) TAB at 09:04

## 2024-04-20 RX ADMIN — OXCARBAZEPINE 900 MG: 300 TABLET, FILM COATED ORAL at 09:04

## 2024-04-20 RX ADMIN — TOPIRAMATE 200 MG: 100 TABLET, FILM COATED ORAL at 09:04

## 2024-04-20 RX ADMIN — POTASSIUM CHLORIDE 10 MEQ: 10 TABLET, EXTENDED RELEASE ORAL at 09:04

## 2024-04-20 RX ADMIN — FUROSEMIDE 20 MG: 20 TABLET ORAL at 09:04

## 2024-04-20 RX ADMIN — LEVETIRACETAM 1500 MG: 500 TABLET, FILM COATED ORAL at 09:04

## 2024-04-20 NOTE — DISCHARGE SUMMARY
"[facility]  [facility] Medicine  Discharge Summary      Patient Name: Nathen Morales  MRN: [id number]  TULIO: [id number]  Patient Class: IP- Inpatient  Admission Date: 4/17/2024  Hospital Length of Stay: 2 days  Discharge Date and Time:  04/20/2024 10:00 AM  Attending Physician: Brock Brown MD   Discharging Provider: ETHAN NORTH DO  Primary Care Provider: Brock Brown MD    Primary Care Team: Networked reference to record PCT     HPI:   Chief Complaint   Patient presents with    Fatigue       Generalized weakness started yesterday. States "I couldn't get up from my recliner". Denies any other complaints was seen 2 days ago for diarrhea, resolved at this time.       Patient is a 73-year-old white gentleman with a past medical history of epilepsy, hypertension hyperlipidemia who presented to the ER today to generalized fatigue.  He was seen here few days ago for generalized fatigue and was reported to have had diarrhea that time.  Patient states that is since resolved with the fatigue persists.  He states however that his symptoms started only 1 day ago.  He denies any fevers, chills, cough congestion chest pain, shortness of breath or abdominal pain.  He denies any dysuria hematuria.  Workup in ER revealed patient to have electrolyte abnormalities as well as possible UTI.  Patient will be admitted for further treatment    * No surgery found *      Hospital Course:   04/19/2024:  Patient seen on rounds sitting in bedside recliner.  Patient participated with PT earlier today.    4/20/24: Pt sitting in chair at bedside shaving. HE denies any complaints. Denies urinary complaints. Feels ready to go home. PT coming in to work with the pt prior to dc. Will dc home with HH with PT. Dc on bactrim for uti with hematuria. Will need follow-up with pcp with repeat ua.    PE:   General: alert and oriented.  HEENT: NC, AT.  Resp: CTAB.  Cardio: RRR, no m/r/g. 1+ pitting edema " right leg and left foot.  Abd: soft, NT, ND, + BS x 4       Goals of Care Treatment Preferences:  Code Status: Full Code      Consults:     No new Assessment & Plan notes have been filed under this hospital service since the last note was generated.  Service: Hospital Medicine    Final Active Diagnoses:    Diagnosis Date Noted POA    Physical deconditioning [R53.81] 04/18/2024 Yes    Hypokalemia [E87.6] 04/18/2024 Yes    Acute cystitis with hematuria [N30.01] 04/18/2024 Yes      Problems Resolved During this Admission:    Diagnosis Date Noted Date Resolved POA    DORIS (acute kidney injury) [N17.9] 04/18/2024 04/20/2024 Unknown       Discharged Condition: good    Disposition: home with     Follow Up: pcp 1 week    Patient Instructions:      Ambulatory referral/consult to Home Health   Standing Status: Future   Referral Priority: Routine Referral Type: Home Health   Referral Reason: Specialty Services Required   Requested Specialty: Home Health Services   Number of Visits Requested: 1       Significant Diagnostic Studies: N/A    Pending Diagnostic Studies:       None           Medications:  Reconciled Home Medications:      Medication List        START taking these medications      sulfamethoxazole-trimethoprim 800-160mg 800-160 mg Tab  Commonly known as: BACTRIM DS  Take 1 tablet by mouth 2 (two) times daily. for 7 days            CHANGE how you take these medications      tamsulosin 0.4 mg Cap  Commonly known as: FLOMAX  TAKE 1 CAPSULE BY MOUTH TWICE DAILY  What changed:   how much to take  when to take this  additional instructions            CONTINUE taking these medications      aspirin 81 mg Cap  Take 81 mg by mouth Daily.     ferrous sulfate 325 (65 FE) MG EC tablet  Take 325 mg by mouth.     furosemide 20 MG tablet  Commonly known as: LASIX  Take 1 tablet (20 mg total) by mouth once daily.     levETIRAcetam 1000 MG tablet  Commonly known as: KEPPRA  take ONE AND ONE-HALF tablets BY MOUTH TWICE DAILY      multivitamin tablet  Commonly known as: THERAGRAN  Take 1 tablet by mouth once daily.     OXcarbazepine 300 MG Tab  Commonly known as: TRILEPTAL  TAKE THREE TABLETS BY MOUTH TWICE DAILY     rosuvastatin 10 MG tablet  Commonly known as: CRESTOR  Take 1 tablet (10 mg total) by mouth every evening.     sertraline 50 MG tablet  Commonly known as: ZOLOFT  Take 1 tablet (50 mg total) by mouth once daily.     temazepam 15 mg Cap  Commonly known as: RESTORIL  TAKE ONE CAPSULE BY MOUTH AT BEDTIME     topiramate 200 MG Tab  Commonly known as: TOPAMAX  TAKE ONE TABLET BY MOUTH TWICE DAILY            STOP taking these medications      amoxicillin-clavulanate 875-125mg 875-125 mg per tablet  Commonly known as: AUGMENTIN              Indwelling Lines/Drains at time of discharge:   Lines/Drains/Airways       None                   Time spent on the discharge of patient: 41 minutes         ETHAN NORTH DO  Department of Hospital Medicine  [facility]

## 2024-04-20 NOTE — ASSESSMENT & PLAN NOTE
Continue IV fluids   Urine culture/sensitivities currently showing no growth  Continue Rocephin 1 g IV Q 24 hours    Pt was on Augmentin at home. Will dc on bactrim.

## 2024-04-20 NOTE — ASSESSMENT & PLAN NOTE
Lab Results   Component Value Date    K 3.6 04/20/2024     Potassium improving  Replace potassium   Repeat lab work in a.m.

## 2024-04-20 NOTE — DISCHARGE SUMMARY
"[facility]  [facility] Medicine  Discharge Summary      Patient Name: Nathen Morales  MRN: [id number]  TULIO: [id number]  Patient Class: IP- Inpatient  Admission Date: 4/17/2024  Hospital Length of Stay: 2 days  Discharge Date and Time:  04/20/2024 9:49 AM  Attending Physician: Brock Brown MD   Discharging Provider: ETHAN NORTH DO  Primary Care Provider: Brock Brown MD    Primary Care Team: Networked reference to record PCT     HPI:   Chief Complaint   Patient presents with    Fatigue       Generalized weakness started yesterday. States "I couldn't get up from my recliner". Denies any other complaints was seen 2 days ago for diarrhea, resolved at this time.       Patient is a 73-year-old white gentleman with a past medical history of epilepsy, hypertension hyperlipidemia who presented to the ER today to generalized fatigue.  He was seen here few days ago for generalized fatigue and was reported to have had diarrhea that time.  Patient states that is since resolved with the fatigue persists.  He states however that his symptoms started only 1 day ago.  He denies any fevers, chills, cough congestion chest pain, shortness of breath or abdominal pain.  He denies any dysuria hematuria.  Workup in ER revealed patient to have electrolyte abnormalities as well as possible UTI.  Patient will be admitted for further treatment    * No surgery found *      Hospital Course:   04/19/2024:  Patient seen on rounds sitting in bedside recliner.  Patient participated with PT earlier today.    4/20/24: Pt sitting in chair at bedside shaving. HE denies any complaints. Denies urinary complaints. Feels ready to go home. PT coming in to work with the pt prior to dc. Will dc home with HH with PT. Dc on bactrim for uti with hematuria. Will need follow-up with pcp with repeat ua.    PE:   General: alert and oriented.  HEENT: NC, AT.  Resp: CTAB.  Cardio: RRR, no m/r/g. 1+ pitting edema right " leg and left foot.  Abd: soft, NT, ND, + BS x 4       Goals of Care Treatment Preferences:  Code Status: Full Code      Consults:     Renal/  Hypokalemia  Lab Results   Component Value Date    K 3.6 04/20/2024     Potassium improving  Replace potassium   Repeat lab work in a.m.     Acute cystitis with hematuria  Continue IV fluids   Urine culture/sensitivities currently showing no growth  Continue Rocephin 1 g IV Q 24 hours    Pt was on Augmentin at home. Will dc on bactrim.     Other  Physical deconditioning  Continue PT   Plan for outpatient PT with home health on discharge        Final Active Diagnoses:    Diagnosis Date Noted POA    Physical deconditioning [R53.81] 04/18/2024 Yes    Hypokalemia [E87.6] 04/18/2024 Yes    Acute cystitis with hematuria [N30.01] 04/18/2024 Yes      Problems Resolved During this Admission:    Diagnosis Date Noted Date Resolved POA    DORIS (acute kidney injury) [N17.9] 04/18/2024 04/20/2024 Unknown       Discharged Condition: good    Disposition:     Follow Up:    Patient Instructions:      Ambulatory referral/consult to Home Health   Standing Status: Future   Referral Priority: Routine Referral Type: Home Health   Referral Reason: Specialty Services Required   Requested Specialty: Home Health Services   Number of Visits Requested: 1       Significant Diagnostic Studies: N/A    Pending Diagnostic Studies:       None           Medications:  Reconciled Home Medications:      Medication List        START taking these medications      sulfamethoxazole-trimethoprim 800-160mg 800-160 mg Tab  Commonly known as: BACTRIM DS  Take 1 tablet by mouth 2 (two) times daily. for 7 days            CHANGE how you take these medications      tamsulosin 0.4 mg Cap  Commonly known as: FLOMAX  TAKE 1 CAPSULE BY MOUTH TWICE DAILY  What changed:   how much to take  when to take this  additional instructions            CONTINUE taking these medications      aspirin 81 mg Cap  Take 81 mg by mouth Daily.      ferrous sulfate 325 (65 FE) MG EC tablet  Take 325 mg by mouth.     furosemide 20 MG tablet  Commonly known as: LASIX  Take 1 tablet (20 mg total) by mouth once daily.     levETIRAcetam 1000 MG tablet  Commonly known as: KEPPRA  take ONE AND ONE-HALF tablets BY MOUTH TWICE DAILY     multivitamin tablet  Commonly known as: THERAGRAN  Take 1 tablet by mouth once daily.     OXcarbazepine 300 MG Tab  Commonly known as: TRILEPTAL  TAKE THREE TABLETS BY MOUTH TWICE DAILY     rosuvastatin 10 MG tablet  Commonly known as: CRESTOR  Take 1 tablet (10 mg total) by mouth every evening.     sertraline 50 MG tablet  Commonly known as: ZOLOFT  Take 1 tablet (50 mg total) by mouth once daily.     temazepam 15 mg Cap  Commonly known as: RESTORIL  TAKE ONE CAPSULE BY MOUTH AT BEDTIME     topiramate 200 MG Tab  Commonly known as: TOPAMAX  TAKE ONE TABLET BY MOUTH TWICE DAILY            STOP taking these medications      amoxicillin-clavulanate 875-125mg 875-125 mg per tablet  Commonly known as: AUGMENTIN              Indwelling Lines/Drains at time of discharge:   Lines/Drains/Airways       None                   Time spent on the discharge of patient: 45 minutes         ETHAN NORTH DO  Department of Hospital Medicine  [facility]

## 2024-04-20 NOTE — SUBJECTIVE & OBJECTIVE
Interval History:     Review of Systems   Constitutional: Negative.    Respiratory:  Negative for chest tightness and shortness of breath.    Cardiovascular:  Positive for leg swelling. Negative for chest pain.   Gastrointestinal: Negative.    Genitourinary:  Negative for difficulty urinating, dysuria, frequency and urgency.   Neurological:  Positive for weakness.   Psychiatric/Behavioral: Negative.       Objective:     Vital Signs (Most Recent):  Temp: 98.2 °F (36.8 °C) (04/20/24 0804)  Pulse: 87 (04/20/24 0804)  Resp: 18 (04/20/24 0335)  BP: (!) 167/91 (04/20/24 0804)  SpO2: 96 % (04/20/24 0804) Vital Signs (24h Range):  Temp:  [98 °F (36.7 °C)-99.7 °F (37.6 °C)] 98.2 °F (36.8 °C)  Pulse:  [69-87] 87  Resp:  [16-18] 18  SpO2:  [94 %-97 %] 96 %  BP: (159-185)/(74-91) 167/91     Weight: 94.4 kg (208 lb 1.8 oz)  Body mass index is 27.46 kg/m².    Intake/Output Summary (Last 24 hours) at 4/20/2024 0910  Last data filed at 4/20/2024 0528  Gross per 24 hour   Intake 480 ml   Output 3000 ml   Net -2520 ml         Physical Exam  Constitutional:       Appearance: Normal appearance.   HENT:      Head: Normocephalic and atraumatic.      Nose: Nose normal.   Eyes:      Conjunctiva/sclera: Conjunctivae normal.   Cardiovascular:      Rate and Rhythm: Normal rate and regular rhythm.      Pulses: Normal pulses.      Heart sounds: Normal heart sounds.   Pulmonary:      Effort: Pulmonary effort is normal. No respiratory distress.      Breath sounds: Normal breath sounds. No wheezing, rhonchi or rales.   Abdominal:      General: Abdomen is flat. Bowel sounds are normal. There is no distension.      Palpations: Abdomen is soft.      Tenderness: There is no abdominal tenderness.   Musculoskeletal:         General: No swelling or deformity. Normal range of motion.      Cervical back: Normal range of motion.      Right lower leg: Edema present.      Left lower leg: Edema present.   Skin:     General: Skin is warm and dry.       Coloration: Skin is not jaundiced.      Findings: No rash.   Neurological:      General: No focal deficit present.      Mental Status: He is alert and oriented to person, place, and time.   Psychiatric:         Mood and Affect: Mood normal.         Behavior: Behavior normal.         Thought Content: Thought content normal.         Judgment: Judgment normal.             Significant Labs: All pertinent labs within the past 24 hours have been reviewed.  CBC:   Recent Labs   Lab 04/19/24 0417   WBC 6.00   HGB 9.6*   HCT 29.1*        CMP:   Recent Labs   Lab 04/19/24 0417 04/20/24  0533    140   K 3.4* 3.6   CO2 21* 20*   BUN 35.0* 29.0*   CREATININE 1.26* 1.10   CALCIUM 8.3* 8.5*   ALBUMIN 2.2*  --    BILITOT 0.2  --    ALKPHOS 81  --    AST 24  --    ALT 19  --        Significant Imaging: I have reviewed all pertinent imaging results/findings within the past 24 hours.

## 2024-04-20 NOTE — PT/OT/SLP PROGRESS
Physical Therapy Treatment Note           Name: Nathen oMrales    : 1950 (73 y.o.)  MRN: [id number]             Subjective Assessment:    X No complaints  Lethargic   X Awake, alert, cooperative  Uncooperative    Agitated  c/o pain   X Appropriate  c/o fatigue    Confused  Treated at bedside     Emotionally labile  Treated in gym/dept.    Impulsive  Other:    Flat affect       Pain/Comfort:    Pain Rating 1: 0/10    Therapy Precautions:      Cognitive deficits  Spinal precautions    Collar - hard  Sternal precautions    Collar - soft   TLSO   X Fall risk  LSO    Hip precautions - posterior  Knee immobilizer    Hip precautions - anterior  WBAT    Impaired communication  Partial weightbearing    Oxygen  TTWB    PEG tube  NWB    Visual deficits  Other:    Hearing deficits           Mobility Training:     Assist level Comments    Bed mobility     Sit to stand/stand to sit SBA with RW 4 sit to stand performed from recliner with RW in front; pt doesn't need any cues to push up from chair; very slow but safe with activity    Transfers     Gait RW SBA Pt ambulated 100 ft 2x with a rest break in between. Pt has slow jose, decreased hip ext and shorten stride. Pt needs Min VC for upright posture to decrease flexed trunk posture.    Balance training     Wheelchair mobility     Car transfer     Other: ADL SBA Pt stood to perform shaving and washing face at sink using RW. Pt also went through drawers to get  with SBA from therapist.        Therapeutic Exercise:     Exercise Sets Reps Comments   LAQ and march 2 10 Seated B LE                       Assessment:     Patient tolerated session well with no complaints of pain or fatigue with activity. Pt performed all task in room with SBA from therapist and was able to place self in very safe positions. Pt did well with endurance needing a rest break in between gait sessions. Pt seems at baseline and is ready to be D/C back home.     PT Plan:      Pt is being  D/C today back home with some assist from niece.     GOALS:   Multidisciplinary Problems       Physical Therapy Goals          Problem: Physical Therapy    Goal Priority Disciplines Outcome Goal Variances Interventions   Physical Therapy Goal     PT, PT/OT Ongoing, Progressing     Description: Patient will increase functional independence with mobility by performin. Sit to stand transfer with Modified [place]  2. Gait  x 200 feet with Modified [place] using Rolling Walker.                            Discharge Recommendations:       Home with HH    Discharge Equipment Recommendations:     none     At end of treatment, patient remained:     X Up in chair     In bed   X With alarm activated    Bed rails up   X Call bell in reach      Family/friends present     Restraints secured properly     In bathroom with CNA/RN notified     In gym with PT/PTA/tech    X Nurse aware     Other:           PT Start Time: 0815     PT Stop Time: 0850  PT Total Time (min): 35 min     Billable Minutes: Gait Training 20 and Therapeutic Activity 15      2024

## 2024-04-22 ENCOUNTER — TELEPHONE (OUTPATIENT)
Dept: FAMILY MEDICINE | Facility: CLINIC | Age: 74
End: 2024-04-22
Payer: MEDICARE

## 2024-04-22 NOTE — PT/OT/SLP DISCHARGE
Physical Therapy Discharge Summary    Name: Nathen Morales  MRN: [id number]   Principal Problem: Acute cystitis with hematuria     Patient Discharged from acute Physical Therapy on 2024.  Please refer to prior PT noted date on 2024 for functional status.     Assessment:     Patient appropriate for care in another setting.    Objective:     GOALS:   Multidisciplinary Problems       Physical Therapy Goals          Problem: Physical Therapy    Goal Priority Disciplines Outcome Goal Variances Interventions   Physical Therapy Goal     PT, PT/OT Adequate for Care Transition     Description: Patient will increase functional independence with mobility by performin. Sit to stand transfer with Modified [place]  2. Gait  x 200 feet with Modified [place] using Rolling Walker.                          Reasons for Discontinuation of Therapy Services  Transfer to alternate level of care.      Plan:     Patient Discharged to: Home with Home Health Service.      2024

## 2024-04-23 LAB
BACTERIA BLD CULT: NORMAL
BACTERIA BLD CULT: NORMAL

## 2024-04-24 ENCOUNTER — DOCUMENT SCAN (OUTPATIENT)
Dept: HOME HEALTH SERVICES | Facility: HOSPITAL | Age: 74
End: 2024-04-24
Payer: MEDICARE

## 2024-04-25 ENCOUNTER — DOCUMENT SCAN (OUTPATIENT)
Dept: HOME HEALTH SERVICES | Facility: HOSPITAL | Age: 74
End: 2024-04-25
Payer: MEDICARE

## 2024-04-29 NOTE — OP NOTE
Procedure date:  03/20/2024     Indications:  74-year-old white male with a previous history of inflamed and draining region along the medial aspect of the right thigh undergoing therapeutic antibiotic therapy in need of operative debridement.      Preoperative diagnosis:  Carbuncle/abscess of right medial thigh   Postoperative diagnosis: Carbuncle/abscess of right medial thigh     Procedure performed:  Incision and drainage with debridement of right thigh carbuncle And abscess     Procedure in detail:  Patient was brought to the operative theater laid in a supine position.  Intravenous general anesthesia provided.  Preoperative antibiotics administered.  The area of the right thigh was then sterilely prepped and draped in normal surgical fashion.  1% lidocaine with epinephrine infiltrate the subcutaneous tissue surrounding the indurated region along the right medial thigh.  A 15 blade was then used to incise the skin with dissection down the fatty tissues.  The carbuncle death measured proximally 1 cm up to the deep fatty tissues.  This region was then debrided of all of its necrotic tissue including epidermis dermis subcutaneous fatty tissues using a combination of a 15 scalpel and Metzenbaum scissors.  The region was debrided with a proximally 4 sq cm sent to pathology as well as to microbiology for wound culture.  The cavity was thoroughly irrigated.  It was then lightly packed with plain gauze.  A sterile dressing placed on the top.  The patient was then relieved of anesthesia stable condition and transferred to postanesthesia care unit.      Complications:  None  Estimated blood loss:  5 cc   Specimens:  Epidermis dermis subcutaneous fatty tissues along with tissue and fluid for culture.    Disposition: Upon recovering from anesthesia patient will be transferred back to the floor for further medical management and therapeutic antibiotic treatments.    Amelia Carrasco MD

## 2024-05-03 ENCOUNTER — DOCUMENT SCAN (OUTPATIENT)
Dept: HOME HEALTH SERVICES | Facility: HOSPITAL | Age: 74
End: 2024-05-03
Payer: MEDICARE

## 2024-05-07 ENCOUNTER — DOCUMENT SCAN (OUTPATIENT)
Dept: HOME HEALTH SERVICES | Facility: HOSPITAL | Age: 74
End: 2024-05-07
Payer: MEDICARE

## 2024-05-10 ENCOUNTER — DOCUMENT SCAN (OUTPATIENT)
Dept: HOME HEALTH SERVICES | Facility: HOSPITAL | Age: 74
End: 2024-05-10
Payer: MEDICARE

## 2024-05-13 ENCOUNTER — DOCUMENT SCAN (OUTPATIENT)
Dept: HOME HEALTH SERVICES | Facility: HOSPITAL | Age: 74
End: 2024-05-13
Payer: MEDICARE

## 2024-05-21 DIAGNOSIS — N32.81 OAB (OVERACTIVE BLADDER): Primary | ICD-10-CM

## 2024-05-21 RX ORDER — MIRABEGRON 25 MG/1
25 TABLET, FILM COATED, EXTENDED RELEASE ORAL DAILY
Qty: 30 TABLET | Refills: 3 | Status: SHIPPED | OUTPATIENT
Start: 2024-05-21 | End: 2025-05-21

## 2024-05-22 ENCOUNTER — DOCUMENT SCAN (OUTPATIENT)
Dept: HOME HEALTH SERVICES | Facility: HOSPITAL | Age: 74
End: 2024-05-22
Payer: MEDICARE

## 2024-05-23 DIAGNOSIS — N32.81 OAB (OVERACTIVE BLADDER): Primary | ICD-10-CM

## 2024-05-27 ENCOUNTER — HOSPITAL ENCOUNTER (EMERGENCY)
Facility: HOSPITAL | Age: 74
Discharge: HOME | End: 2024-05-27
Attending: FAMILY MEDICINE
Payer: MEDICARE

## 2024-05-27 VITALS
DIASTOLIC BLOOD PRESSURE: 86 MMHG | HEART RATE: 63 BPM | BODY MASS INDEX: 26.51 KG/M2 | OXYGEN SATURATION: 95 % | SYSTOLIC BLOOD PRESSURE: 176 MMHG | TEMPERATURE: 98 F | RESPIRATION RATE: 18 BRPM | HEIGHT: 73 IN | WEIGHT: 200 LBS

## 2024-05-27 DIAGNOSIS — R53.1 GENERALIZED WEAKNESS: Primary | ICD-10-CM

## 2024-05-27 DIAGNOSIS — R53.1 WEAKNESS: ICD-10-CM

## 2024-05-27 DIAGNOSIS — E87.1 HYPONATREMIA: ICD-10-CM

## 2024-05-27 DIAGNOSIS — N30.00 ACUTE CYSTITIS WITHOUT HEMATURIA: ICD-10-CM

## 2024-05-27 LAB
ALBUMIN SERPL-MCNC: 2.8 G/DL (ref 3.4–4.8)
ALBUMIN/GLOB SERPL: 0.9 RATIO (ref 1.1–2)
ALP SERPL-CCNC: 81 UNIT/L (ref 40–150)
ALT SERPL-CCNC: 10 UNIT/L (ref 0–55)
ANION GAP SERPL CALC-SCNC: 10 MEQ/L
AST SERPL-CCNC: 15 UNIT/L (ref 5–34)
BACTERIA #/AREA URNS AUTO: ABNORMAL /HPF
BASOPHILS # BLD AUTO: 0.07 X10(3)/MCL
BASOPHILS NFR BLD AUTO: 1.1 %
BILIRUB SERPL-MCNC: 0.2 MG/DL
BILIRUB UR QL STRIP.AUTO: NEGATIVE
BNP BLD-MCNC: 322.8 PG/ML
BUN SERPL-MCNC: 17 MG/DL (ref 8.4–25.7)
CALCIUM SERPL-MCNC: 8.5 MG/DL (ref 8.8–10)
CHLORIDE SERPL-SCNC: 92 MMOL/L (ref 98–107)
CLARITY UR: ABNORMAL
CO2 SERPL-SCNC: 19 MMOL/L (ref 23–31)
COLOR UR AUTO: YELLOW
CREAT SERPL-MCNC: 1.01 MG/DL (ref 0.73–1.18)
CREAT/UREA NIT SERPL: 17
EOSINOPHIL # BLD AUTO: 0.29 X10(3)/MCL (ref 0–0.9)
EOSINOPHIL NFR BLD AUTO: 4.5 %
ERYTHROCYTE [DISTWIDTH] IN BLOOD BY AUTOMATED COUNT: 13.1 % (ref 11.5–17)
FLUAV AG UPPER RESP QL IA.RAPID: NOT DETECTED
FLUBV AG UPPER RESP QL IA.RAPID: NOT DETECTED
GFR SERPLBLD CREATININE-BSD FMLA CKD-EPI: >60 ML/MIN/1.73/M2
GLOBULIN SER-MCNC: 3.1 GM/DL (ref 2.4–3.5)
GLUCOSE SERPL-MCNC: 99 MG/DL (ref 82–115)
GLUCOSE UR QL STRIP: NEGATIVE
HCT VFR BLD AUTO: 26 % (ref 42–52)
HGB BLD-MCNC: 9.2 G/DL (ref 14–18)
HGB UR QL STRIP: ABNORMAL
IMM GRANULOCYTES # BLD AUTO: 0.03 X10(3)/MCL (ref 0–0.04)
IMM GRANULOCYTES NFR BLD AUTO: 0.5 %
KETONES UR QL STRIP: NEGATIVE
LACTATE SERPL-SCNC: 0.4 MMOL/L (ref 0.5–2.2)
LEUKOCYTE ESTERASE UR QL STRIP: ABNORMAL
LYMPHOCYTES # BLD AUTO: 0.66 X10(3)/MCL (ref 0.6–4.6)
LYMPHOCYTES NFR BLD AUTO: 10.2 %
MCH RBC QN AUTO: 33 PG (ref 27–31)
MCHC RBC AUTO-ENTMCNC: 35.4 G/DL (ref 33–36)
MCV RBC AUTO: 93.2 FL (ref 80–94)
MONOCYTES # BLD AUTO: 0.82 X10(3)/MCL (ref 0.1–1.3)
MONOCYTES NFR BLD AUTO: 12.7 %
NEUTROPHILS # BLD AUTO: 4.59 X10(3)/MCL (ref 2.1–9.2)
NEUTROPHILS NFR BLD AUTO: 71 %
NITRITE UR QL STRIP: NEGATIVE
NRBC BLD AUTO-RTO: 0 %
PH UR STRIP: 6 [PH]
PLATELET # BLD AUTO: 346 X10(3)/MCL (ref 130–400)
PMV BLD AUTO: 8.9 FL (ref 7.4–10.4)
POC CARDIAC TROPONIN I: 0.01 NG/ML (ref 0–0.08)
POTASSIUM SERPL-SCNC: 3.9 MMOL/L (ref 3.5–5.1)
PROT SERPL-MCNC: 5.9 GM/DL (ref 5.8–7.6)
PROT UR QL STRIP: ABNORMAL
RBC # BLD AUTO: 2.79 X10(6)/MCL (ref 4.7–6.1)
RBC #/AREA URNS AUTO: ABNORMAL /HPF
RSV A 5' UTR RNA NPH QL NAA+PROBE: NOT DETECTED
SAMPLE: NORMAL
SARS-COV-2 RNA RESP QL NAA+PROBE: NOT DETECTED
SODIUM SERPL-SCNC: 121 MMOL/L (ref 136–145)
SP GR UR STRIP.AUTO: 1.01 (ref 1–1.03)
SQUAMOUS #/AREA URNS AUTO: ABNORMAL /HPF
TSH SERPL-ACNC: 0.65 UIU/ML (ref 0.35–4.94)
UROBILINOGEN UR STRIP-ACNC: 0.2
WBC # SPEC AUTO: 6.46 X10(3)/MCL (ref 4.5–11.5)
WBC #/AREA URNS AUTO: >100 /HPF

## 2024-05-27 PROCEDURE — 85025 COMPLETE CBC W/AUTO DIFF WBC: CPT | Performed by: FAMILY MEDICINE

## 2024-05-27 PROCEDURE — 93005 ELECTROCARDIOGRAM TRACING: CPT

## 2024-05-27 PROCEDURE — 80053 COMPREHEN METABOLIC PANEL: CPT | Performed by: FAMILY MEDICINE

## 2024-05-27 PROCEDURE — 96360 HYDRATION IV INFUSION INIT: CPT

## 2024-05-27 PROCEDURE — 25000003 PHARM REV CODE 250: Performed by: FAMILY MEDICINE

## 2024-05-27 PROCEDURE — 83605 ASSAY OF LACTIC ACID: CPT | Performed by: FAMILY MEDICINE

## 2024-05-27 PROCEDURE — 87086 URINE CULTURE/COLONY COUNT: CPT | Performed by: FAMILY MEDICINE

## 2024-05-27 PROCEDURE — 0241U COVID/RSV/FLU A&B PCR: CPT | Performed by: FAMILY MEDICINE

## 2024-05-27 PROCEDURE — 81003 URINALYSIS AUTO W/O SCOPE: CPT | Performed by: FAMILY MEDICINE

## 2024-05-27 PROCEDURE — 83880 ASSAY OF NATRIURETIC PEPTIDE: CPT | Performed by: FAMILY MEDICINE

## 2024-05-27 PROCEDURE — 84443 ASSAY THYROID STIM HORMONE: CPT | Performed by: FAMILY MEDICINE

## 2024-05-27 PROCEDURE — 99285 EMERGENCY DEPT VISIT HI MDM: CPT | Mod: 25

## 2024-05-27 RX ORDER — CIPROFLOXACIN 500 MG/1
500 TABLET ORAL
Status: COMPLETED | OUTPATIENT
Start: 2024-05-27 | End: 2024-05-27

## 2024-05-27 RX ORDER — CIPROFLOXACIN 500 MG/1
500 TABLET ORAL 2 TIMES DAILY
Qty: 14 TABLET | Refills: 0 | Status: SHIPPED | OUTPATIENT
Start: 2024-05-27 | End: 2024-06-18

## 2024-05-27 RX ADMIN — CIPROFLOXACIN HYDROCHLORIDE 500 MG: 500 TABLET, FILM COATED ORAL at 03:05

## 2024-05-27 RX ADMIN — SODIUM CHLORIDE 500 ML: 9 INJECTION, SOLUTION INTRAVENOUS at 01:05

## 2024-05-27 NOTE — ED PROVIDER NOTES
Encounter Date: 5/27/2024       History     Chief Complaint   Patient presents with    Weakness     Weakness since this am.  Arrived via [facility].         This is a 74-year-old  male who presents complaining of generalized weakness that started this morning at 7:00 a.m..  He says he has this who occasionally.  He says that he has been eating okay.  He denies any nausea, vomiting or diarrhea.  He has past medical history of BPH, hypertension, and seizures    The history is provided by the patient and the EMS personnel.     Review of patient's allergies indicates:  No Known Allergies  Past Medical History:   Diagnosis Date    BPH (benign prostatic hyperplasia)     Hypertension     Seizures      Past Surgical History:   Procedure Laterality Date    APPENDECTOMY      CHOLECYSTECTOMY      COLONOSCOPY  04/21/2016    Dr Brock Brown    INCISION AND DRAINAGE OF ABSCESS Right 3/20/2024    Procedure: INCISION AND DRAINAGE, ABSCESS;  Surgeon: Amelia Carrasco MD;  Location: [facility];  Service: General;  Laterality: Right;    TRANSURETHRAL RESECTION OF PROSTATE       Family History   Problem Relation Name Age of Onset    Hypertension Mother       Social History     Tobacco Use    Smoking status: Never    Smokeless tobacco: Never   Substance Use Topics    Alcohol use: Not Currently    Drug use: Not Currently     Review of Systems   Constitutional:  Positive for fatigue. Negative for fever.   HENT:  Negative for congestion.    Eyes:  Negative for visual disturbance.   Respiratory:  Negative for shortness of breath.    Cardiovascular:  Negative for chest pain.   Gastrointestinal:  Negative for abdominal pain.   Skin:  Negative for rash.   Neurological:  Negative for dizziness.   Psychiatric/Behavioral:  Negative for behavioral problems.    All other systems reviewed and are negative.      Physical Exam     Initial Vitals [05/27/24 1253]   BP Pulse Resp Temp SpO2   (!) 169/85 66 16 97.7 °F (36.5 °C) 99 %      MAP        --         Physical Exam    Nursing note and vitals reviewed.  Constitutional: He appears well-developed and well-nourished. No distress.   HENT:   Head: Normocephalic and atraumatic.   Eyes: Pupils are equal, round, and reactive to light.   Neck: Neck supple.   Normal range of motion.  Cardiovascular:  Normal rate, regular rhythm and normal heart sounds.           Pulmonary/Chest: Breath sounds normal.   Abdominal: Abdomen is soft. Bowel sounds are normal.   Musculoskeletal:         General: Normal range of motion.      Cervical back: Normal range of motion and neck supple.     Neurological: He is alert and oriented to person, place, and time.   Skin: Skin is warm and dry.         ED Course   Procedures  Labs Reviewed   B-TYPE NATRIURETIC PEPTIDE - Abnormal; Notable for the following components:       Result Value    Natriuretic Peptide 322.8 (*)     All other components within normal limits   COMPREHENSIVE METABOLIC PANEL - Abnormal; Notable for the following components:    Sodium 121 (*)     Chloride 92 (*)     CO2 19 (*)     Calcium 8.5 (*)     Albumin 2.8 (*)     Albumin/Globulin Ratio 0.9 (*)     All other components within normal limits   LACTIC ACID, PLASMA - Abnormal; Notable for the following components:    Lactic Acid Level 0.4 (*)     All other components within normal limits   URINALYSIS, REFLEX TO URINE CULTURE - Abnormal; Notable for the following components:    Appearance, UA Cloudy (*)     Protein, UA Trace (*)     Blood, UA 1+ (*)     Leukocyte Esterase, UA 3+ (*)     All other components within normal limits   CBC WITH DIFFERENTIAL - Abnormal; Notable for the following components:    RBC 2.79 (*)     Hgb 9.2 (*)     Hct 26.0 (*)     MCH 33.0 (*)     All other components within normal limits   URINALYSIS, MICROSCOPIC - Abnormal; Notable for the following components:    Bacteria, UA Many (*)     RBC, UA 6-10 (*)     WBC, UA >100 (*)     All other components within normal limits   COVID/RSV/FLU A&B  PCR - Normal    Narrative:     The Xpert Xpress SARS-CoV-2/FLU/RSV plus is a rapid, multiplexed real-time PCR test intended for the simultaneous qualitative detection and differentiation of SARS-CoV-2, Influenza A, Influenza B, and respiratory syncytial virus (RSV) viral RNA in either nasopharyngeal swab or nasal swab specimens.         TSH - Normal   CULTURE, URINE   CBC W/ AUTO DIFFERENTIAL    Narrative:     The following orders were created for panel order CBC auto differential.  Procedure                               Abnormality         Status                     ---------                               -----------         ------                     CBC with Differential[[id number]]       Abnormal            Final result                 Please view results for these tests on the individual orders.   TROPONIN ISTAT     EKG Readings: (Independently Interpreted)   Rhythm: Normal Sinus Rhythm. Ectopy: No Ectopy. Conduction: 1st Degree AV Block. ST Segments: Non-Specific ST Segment Depression.       Imaging Results              X-Ray Chest 1 View (Final result)  Result time 05/27/24 13:42:31      Final result by Parminder Garcia MD (05/27/24 13:42:31)                   Impression:      No acute chest disease is identified.      Electronically signed by: Parminder Garcia  Date:    05/27/2024  Time:    13:42               Narrative:    EXAMINATION:  XR CHEST 1 VIEW    CLINICAL HISTORY:  , Weakness.    COMPARISON:  April 17, 2024    FINDINGS:  No alveolar consolidation, effusion, or pneumothorax is seen.   The thoracic aorta is normal  cardiac silhouette, central pulmonary vessels and mediastinum are normal in size and are grossly unremarkable.   visualized osseous structures are grossly unremarkable..    Hiatus hernia is identified                                       Medications   ciprofloxacin HCl tablet 500 mg (has no administration in time range)   sodium chloride 0.9% bolus 500 mL 500 mL (0 mLs Intravenous  Stopped 5/27/24 1418)     Medical Decision Making  Differential diagnosis:  Weakness, electrolyte imbalance, nausea vomiting, diarrhea, deconditioning, loss of appetite, decreased food intake, indigestion                                      Clinical Impression:  Final diagnoses:  [R53.1] Weakness  [R53.1] Generalized weakness (Primary)  [E87.1] Hyponatremia  [N30.00] Acute cystitis without hematuria          ED Disposition Condition    Discharge Stable          ED Prescriptions       Medication Sig Dispense Start Date End Date Auth. Provider    ciprofloxacin HCl (CIPRO) 500 MG tablet Take 1 tablet (500 mg total) by mouth 2 (two) times daily. for 7 days 14 tablet 5/27/2024 6/3/2024 Rachel Robles MD          Follow-up Information       Follow up With Specialties Details Why Contact Info    Brock Brown MD Family Medicine In 1 day  [address]  [facility] [address]  [phone]               Rachel Robles MD  05/27/24 4043

## 2024-05-28 LAB
OHS QRS DURATION: 94 MS
OHS QTC CALCULATION: 438 MS

## 2024-05-30 DIAGNOSIS — Z11.59 NEED FOR HEPATITIS C SCREENING TEST: ICD-10-CM

## 2024-05-30 DIAGNOSIS — I10 HYPERTENSION, UNSPECIFIED TYPE: ICD-10-CM

## 2024-05-30 DIAGNOSIS — Z11.4 ENCOUNTER FOR HIV (HUMAN IMMUNODEFICIENCY VIRUS) TEST: Primary | ICD-10-CM

## 2024-05-30 DIAGNOSIS — Z12.5 SCREENING FOR MALIGNANT NEOPLASM OF PROSTATE: ICD-10-CM

## 2024-05-30 DIAGNOSIS — E03.9 HYPOTHYROIDISM, UNSPECIFIED TYPE: ICD-10-CM

## 2024-05-30 DIAGNOSIS — E78.5 HYPERLIPIDEMIA, UNSPECIFIED HYPERLIPIDEMIA TYPE: ICD-10-CM

## 2024-05-30 DIAGNOSIS — F32.A DEPRESSION, UNSPECIFIED DEPRESSION TYPE: ICD-10-CM

## 2024-05-30 DIAGNOSIS — Z13.6 SCREENING FOR ISCHEMIC HEART DISEASE: ICD-10-CM

## 2024-05-30 DIAGNOSIS — Z00.00 WELLNESS EXAMINATION: ICD-10-CM

## 2024-05-30 LAB — BACTERIA UR CULT: ABNORMAL

## 2024-06-04 RX ORDER — MUPIROCIN 20 MG/G
OINTMENT TOPICAL 3 TIMES DAILY
Qty: 15 G | Refills: 0 | Status: SHIPPED | OUTPATIENT
Start: 2024-06-04 | End: 2024-06-14

## 2024-06-10 DIAGNOSIS — G47.00 INSOMNIA, UNSPECIFIED TYPE: ICD-10-CM

## 2024-06-10 RX ORDER — TEMAZEPAM 15 MG/1
15 CAPSULE ORAL NIGHTLY
Qty: 30 CAPSULE | Refills: 3 | Status: SHIPPED | OUTPATIENT
Start: 2024-06-10

## 2024-06-13 ENCOUNTER — EXTERNAL HOME HEALTH (OUTPATIENT)
Dept: HOME HEALTH SERVICES | Facility: HOSPITAL | Age: 74
End: 2024-06-13
Payer: MEDICARE

## 2024-06-17 ENCOUNTER — LAB VISIT (OUTPATIENT)
Dept: LAB | Facility: HOSPITAL | Age: 74
End: 2024-06-17
Attending: FAMILY MEDICINE
Payer: MEDICARE

## 2024-06-17 DIAGNOSIS — Z11.4 ENCOUNTER FOR HIV (HUMAN IMMUNODEFICIENCY VIRUS) TEST: ICD-10-CM

## 2024-06-17 DIAGNOSIS — Z11.59 NEED FOR HEPATITIS C SCREENING TEST: ICD-10-CM

## 2024-06-17 DIAGNOSIS — Z13.6 SCREENING FOR ISCHEMIC HEART DISEASE: ICD-10-CM

## 2024-06-17 DIAGNOSIS — Z00.00 WELLNESS EXAMINATION: ICD-10-CM

## 2024-06-17 DIAGNOSIS — E78.5 HYPERLIPIDEMIA, UNSPECIFIED HYPERLIPIDEMIA TYPE: ICD-10-CM

## 2024-06-17 DIAGNOSIS — I10 HYPERTENSION, UNSPECIFIED TYPE: ICD-10-CM

## 2024-06-17 LAB
ALBUMIN SERPL-MCNC: 3.5 G/DL (ref 3.4–4.8)
ALBUMIN/GLOB SERPL: 1.3 RATIO (ref 1.1–2)
ALP SERPL-CCNC: 73 UNIT/L (ref 40–150)
ALT SERPL-CCNC: 11 UNIT/L (ref 0–55)
ANION GAP SERPL CALC-SCNC: 8 MEQ/L
AST SERPL-CCNC: 15 UNIT/L (ref 5–34)
BASOPHILS # BLD AUTO: 0.1 X10(3)/MCL
BASOPHILS NFR BLD AUTO: 1.7 %
BILIRUB SERPL-MCNC: 0.3 MG/DL
BUN SERPL-MCNC: 24 MG/DL (ref 8.4–25.7)
CALCIUM SERPL-MCNC: 9 MG/DL (ref 8.8–10)
CHLORIDE SERPL-SCNC: 101 MMOL/L (ref 98–107)
CHOLEST SERPL-MCNC: 166 MG/DL
CHOLEST/HDLC SERPL: 2 {RATIO} (ref 0–5)
CO2 SERPL-SCNC: 22 MMOL/L (ref 23–31)
CREAT SERPL-MCNC: 1.21 MG/DL (ref 0.73–1.18)
CREAT/UREA NIT SERPL: 20
EOSINOPHIL # BLD AUTO: 0.62 X10(3)/MCL (ref 0–0.9)
EOSINOPHIL NFR BLD AUTO: 10.3 %
ERYTHROCYTE [DISTWIDTH] IN BLOOD BY AUTOMATED COUNT: 14.1 % (ref 11.5–17)
GFR SERPLBLD CREATININE-BSD FMLA CKD-EPI: >60 ML/MIN/1.73/M2
GLOBULIN SER-MCNC: 2.6 GM/DL (ref 2.4–3.5)
GLUCOSE SERPL-MCNC: 102 MG/DL (ref 82–115)
HCT VFR BLD AUTO: 30.1 % (ref 42–52)
HCV AB SERPL QL IA: NONREACTIVE
HDLC SERPL-MCNC: 68 MG/DL (ref 35–60)
HGB BLD-MCNC: 10.3 G/DL (ref 14–18)
HIV 1+2 AB+HIV1 P24 AG SERPL QL IA: NONREACTIVE
IMM GRANULOCYTES # BLD AUTO: 0.02 X10(3)/MCL (ref 0–0.04)
IMM GRANULOCYTES NFR BLD AUTO: 0.3 %
LDLC SERPL CALC-MCNC: 89 MG/DL (ref 50–140)
LYMPHOCYTES # BLD AUTO: 1.01 X10(3)/MCL (ref 0.6–4.6)
LYMPHOCYTES NFR BLD AUTO: 16.8 %
MCH RBC QN AUTO: 33.3 PG (ref 27–31)
MCHC RBC AUTO-ENTMCNC: 34.2 G/DL (ref 33–36)
MCV RBC AUTO: 97.4 FL (ref 80–94)
MONOCYTES # BLD AUTO: 0.61 X10(3)/MCL (ref 0.1–1.3)
MONOCYTES NFR BLD AUTO: 10.1 %
NEUTROPHILS # BLD AUTO: 3.65 X10(3)/MCL (ref 2.1–9.2)
NEUTROPHILS NFR BLD AUTO: 60.8 %
NRBC BLD AUTO-RTO: 0 %
PLATELET # BLD AUTO: 244 X10(3)/MCL (ref 130–400)
PMV BLD AUTO: 9.3 FL (ref 7.4–10.4)
POTASSIUM SERPL-SCNC: 4.5 MMOL/L (ref 3.5–5.1)
PROT SERPL-MCNC: 6.1 GM/DL (ref 5.8–7.6)
RBC # BLD AUTO: 3.09 X10(6)/MCL (ref 4.7–6.1)
SODIUM SERPL-SCNC: 131 MMOL/L (ref 136–145)
TRIGL SERPL-MCNC: 43 MG/DL (ref 34–140)
VLDLC SERPL CALC-MCNC: 9 MG/DL
WBC # BLD AUTO: 6.01 X10(3)/MCL (ref 4.5–11.5)

## 2024-06-17 PROCEDURE — 85025 COMPLETE CBC W/AUTO DIFF WBC: CPT

## 2024-06-17 PROCEDURE — 80053 COMPREHEN METABOLIC PANEL: CPT

## 2024-06-17 PROCEDURE — 86803 HEPATITIS C AB TEST: CPT

## 2024-06-17 PROCEDURE — 87389 HIV-1 AG W/HIV-1&-2 AB AG IA: CPT

## 2024-06-17 PROCEDURE — 80061 LIPID PANEL: CPT

## 2024-06-17 PROCEDURE — 36415 COLL VENOUS BLD VENIPUNCTURE: CPT

## 2024-06-18 ENCOUNTER — OFFICE VISIT (OUTPATIENT)
Dept: FAMILY MEDICINE | Facility: CLINIC | Age: 74
End: 2024-06-18
Payer: MEDICARE

## 2024-06-18 VITALS
WEIGHT: 206 LBS | HEIGHT: 73 IN | HEART RATE: 74 BPM | BODY MASS INDEX: 27.3 KG/M2 | SYSTOLIC BLOOD PRESSURE: 146 MMHG | DIASTOLIC BLOOD PRESSURE: 80 MMHG | RESPIRATION RATE: 18 BRPM | TEMPERATURE: 98 F | OXYGEN SATURATION: 99 %

## 2024-06-18 DIAGNOSIS — Z00.00 WELLNESS EXAMINATION: Primary | ICD-10-CM

## 2024-06-18 PROCEDURE — G0439 PPPS, SUBSEQ VISIT: HCPCS | Mod: ,,, | Performed by: FAMILY MEDICINE

## 2024-06-18 PROCEDURE — 1159F MED LIST DOCD IN RCRD: CPT | Mod: CPTII,,, | Performed by: FAMILY MEDICINE

## 2024-06-18 PROCEDURE — 1123F ACP DISCUSS/DSCN MKR DOCD: CPT | Mod: CPTII,,, | Performed by: FAMILY MEDICINE

## 2024-06-18 PROCEDURE — 3288F FALL RISK ASSESSMENT DOCD: CPT | Mod: CPTII,,, | Performed by: FAMILY MEDICINE

## 2024-06-18 PROCEDURE — 3079F DIAST BP 80-89 MM HG: CPT | Mod: CPTII,,, | Performed by: FAMILY MEDICINE

## 2024-06-18 PROCEDURE — 1160F RVW MEDS BY RX/DR IN RCRD: CPT | Mod: CPTII,,, | Performed by: FAMILY MEDICINE

## 2024-06-18 PROCEDURE — 3077F SYST BP >= 140 MM HG: CPT | Mod: CPTII,,, | Performed by: FAMILY MEDICINE

## 2024-06-18 PROCEDURE — 1101F PT FALLS ASSESS-DOCD LE1/YR: CPT | Mod: CPTII,,, | Performed by: FAMILY MEDICINE

## 2024-06-18 NOTE — PROGRESS NOTES
Patient ID: [id number]     Chief Complaint: Wellness      HPI:     Nathen Morales is a 74 y.o. male here today for a Medicare Wellness.       Opioid Screening: Patient medication list reviewed, patient is not taking prescription opioids. Patient is not using additional opioids than prescribed. Patient is not at low risk of substance abuse based on this opioid use history.       -------------------------------------    BPH (benign prostatic hyperplasia)    Hypertension    Seizures        Past Surgical History:   Procedure Laterality Date    APPENDECTOMY      CHOLECYSTECTOMY      COLONOSCOPY  04/21/2016    Dr Brock Brown    INCISION AND DRAINAGE OF ABSCESS Right 3/20/2024    Procedure: INCISION AND DRAINAGE, ABSCESS;  Surgeon: Amelia Carrasco MD;  Location: [facility];  Service: General;  Laterality: Right;    TRANSURETHRAL RESECTION OF PROSTATE         Review of patient's allergies indicates:  No Known Allergies    Outpatient Medications Marked as Taking for the 6/18/24 encounter (Office Visit) with Brock Brown MD   Medication Sig Dispense Refill    aspirin 81 mg Cap Take 81 mg by mouth Daily. 30 capsule 3    ferrous sulfate 325 (65 FE) MG EC tablet Take 325 mg by mouth.      furosemide (LASIX) 20 MG tablet Take 1 tablet (20 mg total) by mouth once daily. 60 tablet 3    levETIRAcetam (KEPPRA) 1000 MG tablet take ONE AND ONE-HALF tablets BY MOUTH TWICE DAILY 90 tablet 3    mirabegron (MYRBETRIQ) 25 mg Tb24 ER tablet Take 1 tablet (25 mg total) by mouth once daily. 30 tablet 3    multivitamin (THERAGRAN) tablet Take 1 tablet by mouth once daily. 30 tablet 3    OXcarbazepine (TRILEPTAL) 300 MG Tab TAKE THREE TABLETS BY MOUTH TWICE DAILY 180 tablet 3    rosuvastatin (CRESTOR) 10 MG tablet Take 1 tablet (10 mg total) by mouth every evening. 30 tablet 3    sertraline (ZOLOFT) 50 MG tablet Take 1 tablet (50 mg total) by mouth once daily. 30 tablet 3    tamsulosin (FLOMAX) 0.4 mg Cap TAKE 1 CAPSULE BY MOUTH  TWICE DAILY (Patient taking differently: 0.4 mg every evening. TAKE 1 CAPSULE BY MOUTH  DAILY) 180 capsule 3    temazepam (RESTORIL) 15 mg Cap TAKE ONE CAPSULE BY MOUTH AT BEDTIME 30 capsule 3    topiramate (TOPAMAX) 200 MG Tab TAKE ONE TABLET BY MOUTH TWICE DAILY 60 tablet 3       Social History     Socioeconomic History    Marital status: Single   Tobacco Use    Smoking status: Never    Smokeless tobacco: Never   Substance and Sexual Activity    Alcohol use: Not Currently    Drug use: Not Currently    Sexual activity: Never     Social Determinants of Health     Financial Resource Strain: Low Risk  (4/19/2024)    Overall Financial Resource Strain (CARDIA)     Difficulty of Paying Living Expenses: Not very hard   Food Insecurity: No Food Insecurity (4/19/2024)    Hunger Vital Sign     Worried About Running Out of Food in the Last Year: Never true     Ran Out of Food in the Last Year: Never true   Transportation Needs: No Transportation Needs (4/19/2024)    PRAPARE - Transportation     Lack of Transportation (Medical): No     Lack of Transportation (Non-Medical): No   Physical Activity: Inactive (4/18/2024)    Exercise Vital Sign     Days of Exercise per Week: 0 days     Minutes of Exercise per Session: 0 min   Stress: Stress Concern Present (4/19/2024)    [employer] - Occupational Stress Questionnaire     Feeling of Stress : To some extent   Housing Stability: Low Risk  (4/19/2024)    Housing Stability Vital Sign     Unable to Pay for Housing in the Last Year: No     Homeless in the Last Year: No        Family History   Problem Relation Name Age of Onset    Hypertension Mother          Patient Care Team:  Brock Brown MD as PCP - General (Family Medicine)  Willie Bender MD as Consulting Physician (Neurology)  Usman King MD as Consulting Physician (Urology)  Abdoulaye Hitchcock MD as Consulting Physician (Cardiology)  Reggie Roche MD as Consulting Physician  "(Cardiothoracic Surgery)       Subjective:     Review of Systems   Constitutional: Negative.    Respiratory: Negative.     Cardiovascular: Negative.    Gastrointestinal: Negative.    Psychiatric/Behavioral: Negative.           Patient Reported Health Risk Assessment       Objective:     BP (!) 146/80 (BP Location: Left arm, Patient Position: Sitting, BP Method: Large (Manual))   Pulse 74   Temp 97.5 °F (36.4 °C)   Resp 18   Ht 6' 1" (1.854 m)   Wt 93.4 kg (206 lb)   SpO2 99%   BMI 27.18 kg/m²     Physical Exam  Constitutional:       Appearance: Normal appearance.   Cardiovascular:      Rate and Rhythm: Normal rate and regular rhythm.   Pulmonary:      Effort: Pulmonary effort is normal.      Breath sounds: Normal breath sounds.   Abdominal:      General: Abdomen is flat. Bowel sounds are normal.      Palpations: Abdomen is soft.   Neurological:      Mental Status: He is alert.   Psychiatric:         Mood and Affect: Mood normal.         Behavior: Behavior normal.         Thought Content: Thought content normal.         Judgment: Judgment normal.       Recent Results (from the past 504 hour(s))   Comprehensive Metabolic Panel    Collection Time: 06/17/24  3:22 PM   Result Value Ref Range    Sodium 131 (L) 136 - 145 mmol/L    Potassium 4.5 3.5 - 5.1 mmol/L    Chloride 101 98 - 107 mmol/L    CO2 22 (L) 23 - 31 mmol/L    Glucose 102 82 - 115 mg/dL    Blood Urea Nitrogen 24.0 8.4 - 25.7 mg/dL    Creatinine 1.21 (H) 0.73 - 1.18 mg/dL    Calcium 9.0 8.8 - 10.0 mg/dL    Protein Total 6.1 5.8 - 7.6 gm/dL    Albumin 3.5 3.4 - 4.8 g/dL    Globulin 2.6 2.4 - 3.5 gm/dL    Albumin/Globulin Ratio 1.3 1.1 - 2.0 ratio    Bilirubin Total 0.3 <=1.5 mg/dL    ALP 73 40 - 150 unit/L    ALT 11 0 - 55 unit/L    AST 15 5 - 34 unit/L    eGFR >60 mL/min/1.73/m2    Anion Gap 8.0 mEq/L    BUN/Creatinine Ratio 20    Hepatitis C Antibody    Collection Time: 06/17/24  3:22 PM   Result Value Ref Range    Hep C Ab Interp Nonreactive " Nonreactive   HIV 1/2 Ag/Ab (4th Gen)    Collection Time: 06/17/24  3:22 PM   Result Value Ref Range    HIV Nonreactive Nonreactive   Lipid Panel    Collection Time: 06/17/24  3:22 PM   Result Value Ref Range    Cholesterol Total 166 <=200 mg/dL    HDL Cholesterol 68 (H) 35 - 60 mg/dL    Triglyceride 43 34 - 140 mg/dL    Cholesterol/HDL Ratio 2 0 - 5    Very Low Density Lipoprotein 9     LDL Cholesterol 89.00 50.00 - 140.00 mg/dL   CBC with Differential    Collection Time: 06/17/24  3:22 PM   Result Value Ref Range    WBC 6.01 4.50 - 11.50 x10(3)/mcL    RBC 3.09 (L) 4.70 - 6.10 x10(6)/mcL    Hgb 10.3 (L) 14.0 - 18.0 g/dL    Hct 30.1 (L) 42.0 - 52.0 %    MCV 97.4 (H) 80.0 - 94.0 fL    MCH 33.3 (H) 27.0 - 31.0 pg    MCHC 34.2 33.0 - 36.0 g/dL    RDW 14.1 11.5 - 17.0 %    Platelet 244 130 - 400 x10(3)/mcL    MPV 9.3 7.4 - 10.4 fL    Neut % 60.8 %    Lymph % 16.8 %    Mono % 10.1 %    Eos % 10.3 %    Basophil % 1.7 %    Lymph # 1.01 0.6 - 4.6 x10(3)/mcL    Neut # 3.65 2.1 - 9.2 x10(3)/mcL    Mono # 0.61 0.1 - 1.3 x10(3)/mcL    Eos # 0.62 0 - 0.9 x10(3)/mcL    Baso # 0.10 <=0.2 x10(3)/mcL    IG# 0.02 0 - 0.04 x10(3)/mcL    IG% 0.3 %    NRBC% 0.0 %               No data to display                  6/18/2024     1:15 PM 3/19/2024     9:15 AM 1/29/2024     2:00 PM 12/5/2023     1:00 PM 10/23/2023     1:30 PM 6/5/2023     1:30 PM 3/31/2023     8:30 AM   Fall Risk Assessment - Outpatient   Mobility Status Ambulatory w/ assistance Ambulatory Ambulatory w/ assistance Ambulatory w/ assistance Ambulatory w/ assistance Ambulatory Ambulatory   Number of falls 0 0 2+ 0 0 0 0   Identified as fall risk True False True True True False False              Assessment/Plan:     1. Wellness examination  Lab work reviewed with patient  Continue current medication  Continue diet/exercise  Advanced directive discussed with patient  Return to clinic with any concerns    Advance Care Planning     Date: 06/18/2024    Living Will  During this  visit, I engaged the patient  in the voluntary advance care planning process.  The patient and I reviewed the role for advance directives and their purpose in directing future healthcare if the patient's unable to speak for him/herself.  At this point in time, the patient does have full decision-making capacity.  We discussed different extreme health states that he could experience, and reviewed what kind of medical care he would want in those situations.  The patient communicated that if he were comatose and had little chance of a meaningful recovery, he would not want machines/life-sustaining treatments used.  I spent a total of 5 minutes engaging the patient in this advance care planning discussion.    Medicare Annual Wellness and Personalized Prevention Plan:   Fall Risk + Home Safety + Hearing Impairment + Depression Screen + Opioid and Substance Abuse Screening + Cognitive Impairment Screen + Health Risk Assessment all reviewed.     Health Maintenance Topics with due status: Not Due       Topic Last Completion Date    Lipid Panel 06/17/2024      The patient's Health Maintenance was reviewed and the following appears to be due at this time:   Health Maintenance Due   Topic Date Due    TETANUS VACCINE  Never done    Shingles Vaccine (1 of 2) Never done    RSV Vaccine (Age 60+ and Pregnant patients) (1 - 1-dose 60+ series) Never done    Pneumococcal Vaccines (Age 65+) (2 of 2 - PCV) 04/13/2018    COVID-19 Vaccine (3 - 2023-24 season) 09/01/2023       Advance Care Planning   I attest to discussing Advance Care Planning with patient and/or family member.  Education was provided including the importance of the Health Care Power of , Advance Directives, and/or [place] documentation.  The patient expressed understanding to the importance of this information and discussion.         Follow up in about 6 months (around 12/18/2024). In addition to their scheduled follow up, the patient has also been instructed to  follow up on as needed basis.

## 2024-06-19 ENCOUNTER — DOCUMENT SCAN (OUTPATIENT)
Dept: HOME HEALTH SERVICES | Facility: HOSPITAL | Age: 74
End: 2024-06-19
Payer: MEDICARE

## 2024-06-21 ENCOUNTER — DOCUMENT SCAN (OUTPATIENT)
Dept: HOME HEALTH SERVICES | Facility: HOSPITAL | Age: 74
End: 2024-06-21
Payer: MEDICARE

## 2024-07-03 DIAGNOSIS — F32.A DEPRESSION, UNSPECIFIED DEPRESSION TYPE: ICD-10-CM

## 2024-07-03 RX ORDER — SERTRALINE HYDROCHLORIDE 50 MG/1
50 TABLET, FILM COATED ORAL
Qty: 30 TABLET | Refills: 3 | Status: SHIPPED | OUTPATIENT
Start: 2024-07-03

## 2024-07-30 DIAGNOSIS — R91.1 RIGHT UPPER LOBE PULMONARY NODULE: ICD-10-CM

## 2024-07-30 DIAGNOSIS — R93.89 ABNORMAL CHEST X-RAY: Primary | ICD-10-CM

## 2024-08-07 ENCOUNTER — HOSPITAL ENCOUNTER (OUTPATIENT)
Dept: RADIOLOGY | Facility: HOSPITAL | Age: 74
Discharge: HOME | End: 2024-08-07
Attending: FAMILY MEDICINE
Payer: MEDICARE

## 2024-08-07 DIAGNOSIS — R91.1 RIGHT UPPER LOBE PULMONARY NODULE: ICD-10-CM

## 2024-08-07 DIAGNOSIS — R93.89 ABNORMAL CHEST X-RAY: ICD-10-CM

## 2024-08-07 PROCEDURE — 71250 CT THORAX DX C-: CPT | Mod: TC

## 2024-08-08 ENCOUNTER — TELEPHONE (OUTPATIENT)
Dept: FAMILY MEDICINE | Facility: CLINIC | Age: 74
End: 2024-08-08
Payer: MEDICARE

## 2024-08-08 DIAGNOSIS — E04.1 RIGHT THYROID NODULE: ICD-10-CM

## 2024-08-08 DIAGNOSIS — N28.9 KIDNEY LESION: Primary | ICD-10-CM

## 2024-08-13 ENCOUNTER — HOSPITAL ENCOUNTER (OUTPATIENT)
Dept: RADIOLOGY | Facility: HOSPITAL | Age: 74
Discharge: HOME | End: 2024-08-13
Attending: FAMILY MEDICINE
Payer: MEDICARE

## 2024-08-13 DIAGNOSIS — N28.9 KIDNEY LESION: ICD-10-CM

## 2024-08-13 DIAGNOSIS — E04.1 RIGHT THYROID NODULE: ICD-10-CM

## 2024-08-13 PROCEDURE — 76536 US EXAM OF HEAD AND NECK: CPT | Mod: TC

## 2024-08-13 PROCEDURE — 76770 US EXAM ABDO BACK WALL COMP: CPT | Mod: TC

## 2024-08-13 NOTE — PROGRESS NOTES
Please inform patient of results.     1. Enlarged heterogeneous thyroid.  Repeat thyroid ultrasound in 6 months

## 2024-08-13 NOTE — PROGRESS NOTES
Please inform patient of results.     1. Right-sided hydronephrosis.  Patient to follow up with Dr. Christine MTZ

## 2024-08-14 ENCOUNTER — TELEPHONE (OUTPATIENT)
Dept: FAMILY MEDICINE | Facility: CLINIC | Age: 74
End: 2024-08-14
Payer: MEDICARE

## 2024-08-14 DIAGNOSIS — E04.1 RIGHT THYROID NODULE: Primary | ICD-10-CM

## 2024-08-14 NOTE — TELEPHONE ENCOUNTER
----- Message from Brock Brown MD sent at 8/13/2024  5:33 PM CDT -----  Please inform patient of results.     1. Enlarged heterogeneous thyroid.  Repeat thyroid ultrasound in 6 months

## 2024-08-26 DIAGNOSIS — N20.1 URETERAL STONE: Primary | ICD-10-CM

## 2024-08-27 ENCOUNTER — HOSPITAL ENCOUNTER (OUTPATIENT)
Dept: RADIOLOGY | Facility: HOSPITAL | Age: 74
Discharge: HOME | End: 2024-08-27
Attending: UROLOGY
Payer: MEDICARE

## 2024-09-06 ENCOUNTER — HOSPITAL ENCOUNTER (OUTPATIENT)
Dept: RADIOLOGY | Facility: HOSPITAL | Age: 74
Discharge: HOME | End: 2024-09-06
Attending: UROLOGY
Payer: MEDICARE

## 2024-09-06 DIAGNOSIS — N20.1 URETERAL STONE: ICD-10-CM

## 2024-09-06 PROCEDURE — 78707 K FLOW/FUNCT IMAGE W/O DRUG: CPT | Mod: TC

## 2024-09-06 PROCEDURE — A9562 TC99M MERTIATIDE: HCPCS | Performed by: UROLOGY

## 2024-09-06 RX ORDER — BETIATIDE 1 MG/1
8.4 INJECTION, POWDER, LYOPHILIZED, FOR SOLUTION INTRAVENOUS
Status: COMPLETED | OUTPATIENT
Start: 2024-09-06 | End: 2024-09-06

## 2024-09-06 RX ADMIN — BETIATIDE 8.4 MILLICURIE: 1 INJECTION, POWDER, LYOPHILIZED, FOR SOLUTION INTRAVENOUS at 01:09

## 2024-10-16 DIAGNOSIS — G47.00 INSOMNIA, UNSPECIFIED TYPE: ICD-10-CM

## 2024-10-16 RX ORDER — TEMAZEPAM 15 MG/1
15 CAPSULE ORAL NIGHTLY
Qty: 30 CAPSULE | Refills: 3 | Status: SHIPPED | OUTPATIENT
Start: 2024-10-16

## 2024-11-04 DIAGNOSIS — Z95.1 S/P CABG X 3: ICD-10-CM

## 2024-11-04 DIAGNOSIS — F32.A DEPRESSION, UNSPECIFIED DEPRESSION TYPE: ICD-10-CM

## 2024-11-04 DIAGNOSIS — E78.5 HYPERLIPIDEMIA, UNSPECIFIED HYPERLIPIDEMIA TYPE: ICD-10-CM

## 2024-11-04 DIAGNOSIS — G40.909 SEIZURE DISORDER: ICD-10-CM

## 2024-11-04 DIAGNOSIS — N40.0 BENIGN PROSTATIC HYPERPLASIA, UNSPECIFIED WHETHER LOWER URINARY TRACT SYMPTOMS PRESENT: ICD-10-CM

## 2024-11-04 RX ORDER — SERTRALINE HYDROCHLORIDE 50 MG/1
50 TABLET, FILM COATED ORAL DAILY
Qty: 90 TABLET | Refills: 1 | Status: SHIPPED | OUTPATIENT
Start: 2024-11-04

## 2024-11-04 RX ORDER — OXCARBAZEPINE 300 MG/1
900 TABLET, FILM COATED ORAL 2 TIMES DAILY
Qty: 180 TABLET | Refills: 3 | Status: SHIPPED | OUTPATIENT
Start: 2024-11-04

## 2024-11-04 RX ORDER — TAMSULOSIN HYDROCHLORIDE 0.4 MG/1
CAPSULE ORAL
Qty: 180 CAPSULE | Refills: 3 | Status: SHIPPED | OUTPATIENT
Start: 2024-11-04

## 2024-11-04 RX ORDER — LEVETIRACETAM 1000 MG/1
1500 TABLET ORAL 2 TIMES DAILY
Qty: 90 TABLET | Refills: 1 | Status: SHIPPED | OUTPATIENT
Start: 2024-11-04

## 2024-11-04 RX ORDER — ROSUVASTATIN CALCIUM 10 MG/1
10 TABLET, COATED ORAL NIGHTLY
Qty: 90 TABLET | Refills: 3 | Status: SHIPPED | OUTPATIENT
Start: 2024-11-04 | End: 2025-10-30

## 2024-12-09 DIAGNOSIS — G40.909 SEIZURE DISORDER: ICD-10-CM

## 2024-12-10 RX ORDER — LEVETIRACETAM 1000 MG/1
1500 TABLET ORAL 2 TIMES DAILY
Qty: 90 TABLET | Refills: 3 | Status: SHIPPED | OUTPATIENT
Start: 2024-12-10

## 2024-12-16 ENCOUNTER — OFFICE VISIT (OUTPATIENT)
Dept: FAMILY MEDICINE | Facility: CLINIC | Age: 74
End: 2024-12-16
Payer: MEDICARE

## 2024-12-16 VITALS
DIASTOLIC BLOOD PRESSURE: 68 MMHG | WEIGHT: 200 LBS | TEMPERATURE: 97 F | BODY MASS INDEX: 26.51 KG/M2 | OXYGEN SATURATION: 98 % | RESPIRATION RATE: 18 BRPM | HEIGHT: 73 IN | SYSTOLIC BLOOD PRESSURE: 130 MMHG | HEART RATE: 84 BPM

## 2024-12-16 DIAGNOSIS — Z23 FLU VACCINE NEED: ICD-10-CM

## 2024-12-16 DIAGNOSIS — F32.A DEPRESSION, UNSPECIFIED DEPRESSION TYPE: ICD-10-CM

## 2024-12-16 DIAGNOSIS — I10 HYPERTENSION, UNSPECIFIED TYPE: Primary | ICD-10-CM

## 2024-12-16 PROCEDURE — 3078F DIAST BP <80 MM HG: CPT | Mod: CPTII,,, | Performed by: FAMILY MEDICINE

## 2024-12-16 PROCEDURE — G0008 ADMIN INFLUENZA VIRUS VAC: HCPCS | Mod: ,,, | Performed by: FAMILY MEDICINE

## 2024-12-16 PROCEDURE — 90653 IIV ADJUVANT VACCINE IM: CPT | Mod: ,,, | Performed by: FAMILY MEDICINE

## 2024-12-16 PROCEDURE — 3075F SYST BP GE 130 - 139MM HG: CPT | Mod: CPTII,,, | Performed by: FAMILY MEDICINE

## 2024-12-16 PROCEDURE — 1159F MED LIST DOCD IN RCRD: CPT | Mod: CPTII,,, | Performed by: FAMILY MEDICINE

## 2024-12-16 PROCEDURE — 99214 OFFICE O/P EST MOD 30 MIN: CPT | Mod: 25,,, | Performed by: FAMILY MEDICINE

## 2024-12-16 PROCEDURE — 1126F AMNT PAIN NOTED NONE PRSNT: CPT | Mod: CPTII,,, | Performed by: FAMILY MEDICINE

## 2024-12-16 PROCEDURE — 1160F RVW MEDS BY RX/DR IN RCRD: CPT | Mod: CPTII,,, | Performed by: FAMILY MEDICINE

## 2024-12-16 PROCEDURE — 1157F ADVNC CARE PLAN IN RCRD: CPT | Mod: CPTII,,, | Performed by: FAMILY MEDICINE

## 2024-12-16 RX ORDER — TOPIRAMATE 100 MG/1
100 TABLET, FILM COATED ORAL 2 TIMES DAILY
COMMUNITY
Start: 2024-10-28

## 2024-12-16 RX ORDER — TORSEMIDE 20 MG/1
20 TABLET ORAL DAILY
COMMUNITY
Start: 2024-11-06

## 2024-12-16 NOTE — PROGRESS NOTES
"Subjective:      Patient ID: Nathen Morales is a 74 y.o. male.    Chief Complaint: 6 month f/u      Routine      Hypertension  - tolerating medication (no side effects)  - no headaches  - patient without any complaints    Review of Systems   Constitutional: Negative.    Respiratory: Negative.     Cardiovascular: Negative.    Gastrointestinal: Negative.    Psychiatric/Behavioral: Negative.          Depression:  Tolerating medication, medication working well, patient without any complaints           Objective:     /68   Pulse 84   Temp 97 °F (36.1 °C) (Temporal)   Resp 18   Ht 6' 0.84" (1.85 m)   Wt 90.7 kg (200 lb)   SpO2 98%   BMI 26.51 kg/m²    Physical Exam  Constitutional:       Appearance: Normal appearance.   Cardiovascular:      Rate and Rhythm: Normal rate and regular rhythm.      Heart sounds: Normal heart sounds.   Pulmonary:      Effort: Pulmonary effort is normal.      Breath sounds: Normal breath sounds.   Neurological:      Mental Status: He is alert.   Psychiatric:         Mood and Affect: Mood normal.         Behavior: Behavior normal.         Thought Content: Thought content normal.         Judgment: Judgment normal.             Assessment:     Problem List Items Addressed This Visit          Psychiatric    Depression       Cardiac/Vascular    Hypertension - Primary     Other Visit Diagnoses       Flu vaccine need        Relevant Medications    influenza (adjuvanted) (Fluad) 45 mcg/0.5 mL IM vaccine (> or = 66 yo) 0.5 mL (Completed)             Plan:   1. Hypertension, unspecified type  Controlled  Continue current Rx medication  Return to clinic with any concerns    2. Depression, unspecified depression type  Controlled  Continue current Rx medication  Return to clinic with any concerns    3. Flu vaccine need  -     influenza (adjuvanted) (Fluad) 45 mcg/0.5 mL IM vaccine (> or = 66 yo) 0.5 mL  Flu shot today       "

## 2024-12-17 ENCOUNTER — HOSPITAL ENCOUNTER (OUTPATIENT)
Facility: HOSPITAL | Age: 74
Discharge: HOME | End: 2024-12-18
Attending: STUDENT IN AN ORGANIZED HEALTH CARE EDUCATION/TRAINING PROGRAM | Admitting: INTERNAL MEDICINE
Payer: MEDICARE

## 2024-12-17 DIAGNOSIS — R53.1 WEAKNESS: ICD-10-CM

## 2024-12-17 DIAGNOSIS — N17.9 AKI (ACUTE KIDNEY INJURY): ICD-10-CM

## 2024-12-17 DIAGNOSIS — N30.00 ACUTE CYSTITIS WITHOUT HEMATURIA: Primary | ICD-10-CM

## 2024-12-17 PROBLEM — G93.40 ACUTE ENCEPHALOPATHY: Status: ACTIVE | Noted: 2024-12-17

## 2024-12-17 LAB
ALBUMIN SERPL-MCNC: 3.2 G/DL (ref 3.4–4.8)
ALBUMIN/GLOB SERPL: 1.1 RATIO (ref 1.1–2)
ALP SERPL-CCNC: 86 UNIT/L (ref 40–150)
ALT SERPL-CCNC: 13 UNIT/L (ref 0–55)
ANION GAP SERPL CALC-SCNC: 11 MEQ/L
AST SERPL-CCNC: 16 UNIT/L (ref 5–34)
BACTERIA #/AREA URNS AUTO: ABNORMAL /HPF
BASOPHILS # BLD AUTO: 0.05 X10(3)/MCL
BASOPHILS NFR BLD AUTO: 0.6 %
BILIRUB SERPL-MCNC: 0.4 MG/DL
BILIRUB UR QL STRIP.AUTO: NEGATIVE
BUN SERPL-MCNC: 47 MG/DL (ref 8.4–25.7)
CALCIUM SERPL-MCNC: 8.8 MG/DL (ref 8.8–10)
CHLORIDE SERPL-SCNC: 109 MMOL/L (ref 98–107)
CLARITY UR: ABNORMAL
CO2 SERPL-SCNC: 21 MMOL/L (ref 23–31)
COLOR UR AUTO: YELLOW
CREAT SERPL-MCNC: 1.82 MG/DL (ref 0.72–1.25)
CREAT/UREA NIT SERPL: 26
EOSINOPHIL # BLD AUTO: 0.09 X10(3)/MCL (ref 0–0.9)
EOSINOPHIL NFR BLD AUTO: 1 %
ERYTHROCYTE [DISTWIDTH] IN BLOOD BY AUTOMATED COUNT: 12.9 % (ref 11.5–17)
GFR SERPLBLD CREATININE-BSD FMLA CKD-EPI: 38 ML/MIN/1.73/M2
GLOBULIN SER-MCNC: 3 GM/DL (ref 2.4–3.5)
GLUCOSE SERPL-MCNC: 109 MG/DL (ref 82–115)
GLUCOSE UR QL STRIP: NEGATIVE
HCT VFR BLD AUTO: 33.7 % (ref 42–52)
HGB BLD-MCNC: 10.9 G/DL (ref 14–18)
HGB UR QL STRIP: ABNORMAL
IMM GRANULOCYTES # BLD AUTO: 0.03 X10(3)/MCL (ref 0–0.04)
IMM GRANULOCYTES NFR BLD AUTO: 0.3 %
KETONES UR QL STRIP: NEGATIVE
LEUKOCYTE ESTERASE UR QL STRIP: ABNORMAL
LIPASE SERPL-CCNC: 80 U/L
LYMPHOCYTES # BLD AUTO: 0.52 X10(3)/MCL (ref 0.6–4.6)
LYMPHOCYTES NFR BLD AUTO: 6 %
MCH RBC QN AUTO: 33.4 PG (ref 27–31)
MCHC RBC AUTO-ENTMCNC: 32.3 G/DL (ref 33–36)
MCV RBC AUTO: 103.4 FL (ref 80–94)
MONOCYTES # BLD AUTO: 1.09 X10(3)/MCL (ref 0.1–1.3)
MONOCYTES NFR BLD AUTO: 12.6 %
NEUTROPHILS # BLD AUTO: 6.86 X10(3)/MCL (ref 2.1–9.2)
NEUTROPHILS NFR BLD AUTO: 79.5 %
NITRITE UR QL STRIP: POSITIVE
NRBC BLD AUTO-RTO: 0 %
OHS QRS DURATION: 90 MS
OHS QTC CALCULATION: 433 MS
PH UR STRIP: 6 [PH]
PLATELET # BLD AUTO: 191 X10(3)/MCL (ref 130–400)
PMV BLD AUTO: 9.3 FL (ref 7.4–10.4)
POTASSIUM SERPL-SCNC: 3.6 MMOL/L (ref 3.5–5.1)
PROT SERPL-MCNC: 6.2 GM/DL (ref 5.8–7.6)
PROT UR QL STRIP: ABNORMAL
RBC # BLD AUTO: 3.26 X10(6)/MCL (ref 4.7–6.1)
RBC #/AREA URNS AUTO: >100 /HPF
SODIUM SERPL-SCNC: 141 MMOL/L (ref 136–145)
SP GR UR STRIP.AUTO: 1.02 (ref 1–1.03)
SQUAMOUS #/AREA URNS AUTO: ABNORMAL /HPF
UROBILINOGEN UR STRIP-ACNC: 0.2
WBC # BLD AUTO: 8.64 X10(3)/MCL (ref 4.5–11.5)
WBC #/AREA URNS AUTO: >100 /HPF

## 2024-12-17 PROCEDURE — 87077 CULTURE AEROBIC IDENTIFY: CPT | Performed by: STUDENT IN AN ORGANIZED HEALTH CARE EDUCATION/TRAINING PROGRAM

## 2024-12-17 PROCEDURE — 96374 THER/PROPH/DIAG INJ IV PUSH: CPT

## 2024-12-17 PROCEDURE — A4216 STERILE WATER/SALINE, 10 ML: HCPCS

## 2024-12-17 PROCEDURE — 87040 BLOOD CULTURE FOR BACTERIA: CPT | Performed by: FAMILY MEDICINE

## 2024-12-17 PROCEDURE — 93005 ELECTROCARDIOGRAM TRACING: CPT

## 2024-12-17 PROCEDURE — 81001 URINALYSIS AUTO W/SCOPE: CPT | Performed by: STUDENT IN AN ORGANIZED HEALTH CARE EDUCATION/TRAINING PROGRAM

## 2024-12-17 PROCEDURE — 99285 EMERGENCY DEPT VISIT HI MDM: CPT | Mod: 25

## 2024-12-17 PROCEDURE — G0378 HOSPITAL OBSERVATION PER HR: HCPCS

## 2024-12-17 PROCEDURE — 25000003 PHARM REV CODE 250: Performed by: STUDENT IN AN ORGANIZED HEALTH CARE EDUCATION/TRAINING PROGRAM

## 2024-12-17 PROCEDURE — 83690 ASSAY OF LIPASE: CPT | Performed by: STUDENT IN AN ORGANIZED HEALTH CARE EDUCATION/TRAINING PROGRAM

## 2024-12-17 PROCEDURE — 94761 N-INVAS EAR/PLS OXIMETRY MLT: CPT

## 2024-12-17 PROCEDURE — 85025 COMPLETE CBC W/AUTO DIFF WBC: CPT | Performed by: STUDENT IN AN ORGANIZED HEALTH CARE EDUCATION/TRAINING PROGRAM

## 2024-12-17 PROCEDURE — 25000003 PHARM REV CODE 250

## 2024-12-17 PROCEDURE — 63600175 PHARM REV CODE 636 W HCPCS: Performed by: STUDENT IN AN ORGANIZED HEALTH CARE EDUCATION/TRAINING PROGRAM

## 2024-12-17 PROCEDURE — 80053 COMPREHEN METABOLIC PANEL: CPT | Performed by: STUDENT IN AN ORGANIZED HEALTH CARE EDUCATION/TRAINING PROGRAM

## 2024-12-17 RX ORDER — SODIUM CHLORIDE 9 MG/ML
1000 INJECTION, SOLUTION INTRAVENOUS
Status: COMPLETED | OUTPATIENT
Start: 2024-12-17 | End: 2024-12-17

## 2024-12-17 RX ORDER — SODIUM CHLORIDE 0.9 % (FLUSH) 0.9 %
10 SYRINGE (ML) INJECTION
Status: DISCONTINUED | OUTPATIENT
Start: 2024-12-17 | End: 2024-12-18 | Stop reason: HOSPADM

## 2024-12-17 RX ORDER — CEFTRIAXONE 1 G/1
1 INJECTION, POWDER, FOR SOLUTION INTRAMUSCULAR; INTRAVENOUS
Status: DISCONTINUED | OUTPATIENT
Start: 2024-12-17 | End: 2024-12-18 | Stop reason: HOSPADM

## 2024-12-17 RX ORDER — CLONIDINE HYDROCHLORIDE 0.1 MG/1
0.1 TABLET ORAL 3 TIMES DAILY PRN
Status: DISCONTINUED | OUTPATIENT
Start: 2024-12-17 | End: 2024-12-18 | Stop reason: HOSPADM

## 2024-12-17 RX ORDER — WATER FOR INJECTION,STERILE
VIAL (ML) INJECTION
Status: COMPLETED
Start: 2024-12-17 | End: 2024-12-17

## 2024-12-17 RX ORDER — CIPROFLOXACIN 500 MG/1
500 TABLET ORAL 2 TIMES DAILY
Status: ON HOLD | COMMUNITY
Start: 2024-11-20 | End: 2024-12-18 | Stop reason: HOSPADM

## 2024-12-17 RX ORDER — HYOSCYAMINE SULFATE 0.12 MG/1
0.12 TABLET SUBLINGUAL EVERY 6 HOURS PRN
COMMUNITY
Start: 2024-11-20

## 2024-12-17 RX ORDER — CEFTRIAXONE 1 G/1
1 INJECTION, POWDER, FOR SOLUTION INTRAMUSCULAR; INTRAVENOUS
Status: COMPLETED | OUTPATIENT
Start: 2024-12-17 | End: 2024-12-17

## 2024-12-17 RX ADMIN — CEFTRIAXONE SODIUM 1 G: 1 INJECTION, POWDER, FOR SOLUTION INTRAMUSCULAR; INTRAVENOUS at 07:12

## 2024-12-17 RX ADMIN — WATER 10 ML: 1 INJECTION INTRAMUSCULAR; INTRAVENOUS; SUBCUTANEOUS at 07:12

## 2024-12-17 RX ADMIN — SODIUM CHLORIDE 1000 ML: 9 INJECTION, SOLUTION INTRAVENOUS at 07:12

## 2024-12-17 NOTE — SUBJECTIVE & OBJECTIVE
Past Medical History:   Diagnosis Date    BPH (benign prostatic hyperplasia)     Hypertension     Seizures        Past Surgical History:   Procedure Laterality Date    ADENOIDECTOMY      APPENDECTOMY      CARDIAC VALVE REPLACEMENT      CHOLECYSTECTOMY      COLONOSCOPY  04/21/2016    Dr Brock Brown    CORONARY ARTERY BYPASS GRAFT      EYE SURGERY      INCISION AND DRAINAGE OF ABSCESS Right 03/20/2024    Dr Amelia Carrasco    LITHOTRIPSY  11/20/2024    Dr Maurisio King    PROSTATE SURGERY      TONSILLECTOMY      TRANSURETHRAL RESECTION OF PROSTATE         Review of patient's allergies indicates:  No Known Allergies    No current facility-administered medications on file prior to encounter.     Current Outpatient Medications on File Prior to Encounter   Medication Sig    ciprofloxacin HCl (CIPRO) 500 MG tablet Take 500 mg by mouth 2 (two) times daily.    hyoscyamine 0.125 mg Subl 0.125 mg every 6 (six) hours as needed.    aspirin 81 mg Cap Take 81 mg by mouth Daily.    levETIRAcetam (KEPPRA) 1000 MG tablet TAKE 1 AND 1/2 TABLETS BY MOUTH  TWICE DAILY    multivitamin (THERAGRAN) tablet Take 1 tablet by mouth once daily.    OXcarbazepine (TRILEPTAL) 300 MG Tab Take 3 tablets (900 mg total) by mouth 2 (two) times daily.    rosuvastatin (CRESTOR) 10 MG tablet Take 1 tablet (10 mg total) by mouth every evening.    sertraline (ZOLOFT) 50 MG tablet Take 1 tablet (50 mg total) by mouth once daily.    tamsulosin (FLOMAX) 0.4 mg Cap TAKE 1 CAPSULE BY MOUTH TWICE DAILY (Patient taking differently: Take 0.4 mg by mouth once daily.)    temazepam (RESTORIL) 15 mg Cap TAKE ONE CAPSULE BY MOUTH AT BEDTIME    topiramate (TOPAMAX) 100 MG tablet Take 100 mg by mouth 2 (two) times daily.    torsemide (DEMADEX) 20 MG Tab Take 20 mg by mouth once daily.     Family History       Problem Relation (Age of Onset)    Hypertension Mother          Tobacco Use    Smoking status: Never    Smokeless tobacco: Never   Substance and Sexual  Activity    Alcohol use: Never    Drug use: Never    Sexual activity: Never     Review of Systems   Constitutional:  Positive for activity change and fatigue. Negative for appetite change and fever.   Respiratory:  Negative for shortness of breath and wheezing.    Cardiovascular:  Negative for chest pain and leg swelling.   Gastrointestinal:  Positive for abdominal pain (suprapubic upon palpation), diarrhea and nausea. Negative for abdominal distention and constipation.   Genitourinary:  Negative for difficulty urinating, dysuria and frequency.   Musculoskeletal:  Negative for arthralgias, joint swelling and myalgias.   Skin:  Negative for rash and wound.   Neurological:  Positive for weakness.   Psychiatric/Behavioral:  Negative for agitation, behavioral problems and confusion.      Objective:     Vital Signs (Most Recent):  Temp: 99.3 °F (37.4 °C) (12/17/24 1238)  Pulse: 96 (12/17/24 1238)  Resp: 18 (12/17/24 0840)  BP: (!) 171/82 (12/17/24 1238)  SpO2: 96 % (12/17/24 1238) Vital Signs (24h Range):  Temp:  [98.5 °F (36.9 °C)-99.3 °F (37.4 °C)] 99.3 °F (37.4 °C)  Pulse:  [82-96] 96  Resp:  [16-18] 18  SpO2:  [96 %-100 %] 96 %  BP: (127-171)/(62-82) 171/82     Weight: 89.2 kg (196 lb 10.4 oz)  Body mass index is 25.94 kg/m².     Physical Exam  Vitals reviewed.   Constitutional:       General: He is not in acute distress.     Appearance: Normal appearance. He is not toxic-appearing.   HENT:      Head: Normocephalic and atraumatic.      Nose: Nose normal.   Cardiovascular:      Rate and Rhythm: Normal rate and regular rhythm.      Pulses: Normal pulses.      Heart sounds: Normal heart sounds. No murmur heard.     No gallop.   Pulmonary:      Effort: Pulmonary effort is normal. No respiratory distress.      Breath sounds: No wheezing, rhonchi or rales.   Abdominal:      General: Abdomen is flat. Bowel sounds are normal. There is no distension.      Palpations: Abdomen is soft.      Tenderness: There is abdominal  tenderness (suprapubic ttp).   Musculoskeletal:         General: No swelling or deformity.      Cervical back: Normal range of motion.   Skin:     General: Skin is warm and dry.      Coloration: Skin is not jaundiced.      Findings: No rash.   Neurological:      General: No focal deficit present.      Mental Status: He is alert and oriented to person, place, and time.   Psychiatric:         Mood and Affect: Mood normal.         Behavior: Behavior normal.                Significant Labs: All pertinent labs within the past 24 hours have been reviewed.  Recent Lab Results         12/17/24  0620   12/17/24  0615   12/17/24  0539        Albumin/Globulin Ratio 1.1           Albumin 3.2           ALP 86           ALT 13           Anion Gap 11.0           Appearance, UA   Cloudy         AST 16           Bacteria, UA   Many         Baso # 0.05           Basophil % 0.6           Bilirubin (UA)   Negative         BILIRUBIN TOTAL 0.4           BUN 47.0           BUN/CREAT RATIO 26           Calcium 8.8           Chloride 109           CO2 21           Color, UA   Yellow         Creatinine 1.82           eGFR 38           Eos # 0.09           Eos % 1.0           Globulin, Total 3.0           Glucose 109           Glucose, UA   Negative         Hematocrit 33.7           Hemoglobin 10.9           Immature Grans (Abs) 0.03           Immature Granulocytes 0.3           Ketones, UA   Negative         Leukocyte Esterase, UA   3+         Lipase 80           Lymph # 0.52           LYMPH % 6.0           MCH 33.4           MCHC 32.3           .4           Mono # 1.09           Mono % 12.6           MPV 9.3           Neut # 6.86           Neut % 79.5           NITRITE UA   Positive         nRBC 0.0           Blood, UA   3+         QRS Duration     90       OHS QTC Calculation     433       pH, UA   6.0         Platelet Count 191           Potassium 3.6           PROTEIN TOTAL 6.2           Protein, UA   2+         RBC 3.26            RBC, UA   >100         RDW 12.9           Sodium 141           Specific Gravity,UA   1.020         Squam Epithel, UA   Few         Urobilinogen, UA   0.2         WBC, UA   >100         WBC 8.64                   Significant Imaging: I have reviewed all pertinent imaging results/findings within the past 24 hours.

## 2024-12-17 NOTE — ASSESSMENT & PLAN NOTE
Admit to med surg for observation.  Treating with rocephin.  OOB with walker for ambulation  Trending labs. Urine culture pending.  Home med rec will be done once meds are brought.  C diff ordered for if pt has another watery bm.   Vte ppx: SCDs.

## 2024-12-17 NOTE — H&P
"  [facility] - Medical Surgical Unit  [facility]  History & Physical    Patient Name: Nathen Morales  MRN: [id number]  Patient Class: OP- Observation  Admission Date: 12/17/2024  Attending Physician: Anabelle Rivas DO   Primary Care Provider: Brock Brown MD         Patient information was obtained from patient and ER records.     Subjective:     Principal Problem:Acute encephalopathy    Chief Complaint:   Chief Complaint   Patient presents with    Diarrhea     Arrived via [facility] after pressing his life alert button to get a ride to his doctor's appt in [place]. Once [facility] told pt that they couldn't bring him to [place], pt told them to just bring him [facility] [facility]. On arrival pt states he had 1 episode of diarrhea and felt weak for "a minute" around 0430 this morning. AAOx4. Denies any other symptoms.         HPI: 73 yo CM presented to the ER with confusion and complaints of diarrhea. He reported that he pressed his life alert button to get the ambulance to bring him to his urology appt. Workup in the ER revealed UTI and DORIS. Hospital medicine consulted for admission.     Upon my exam this morning, pt states that he pressed his life alert button because he had 4 loose bms at home and became weak. He called EMS to bring him to the hospital. He endorses mild gi upset at this time. But has not had any additional bm's since he has been here. He is feeling somewhat better. He has gotten up and gone to the bathroom. Denies dysuria or any other new issues. He does report having bladder stones that he was supposed to have removed today at his urology appt, but he is missing that appt.  Denies any cp or sob. Would like a walker to ambulate with, a blanket, and for us to have his niece bring his dentures so he can eat.     Past Medical History:   Diagnosis Date    BPH (benign prostatic hyperplasia)     Hypertension     Seizures        Past Surgical History:   Procedure Laterality Date    " ADENOIDECTOMY      APPENDECTOMY      CARDIAC VALVE REPLACEMENT      CHOLECYSTECTOMY      COLONOSCOPY  04/21/2016    Dr Brock Brown    CORONARY ARTERY BYPASS GRAFT      EYE SURGERY      INCISION AND DRAINAGE OF ABSCESS Right 03/20/2024    Dr Amelia Carrasco    LITHOTRIPSY  11/20/2024    Dr Maurisio King    PROSTATE SURGERY      TONSILLECTOMY      TRANSURETHRAL RESECTION OF PROSTATE         Review of patient's allergies indicates:  No Known Allergies    No current facility-administered medications on file prior to encounter.     Current Outpatient Medications on File Prior to Encounter   Medication Sig    ciprofloxacin HCl (CIPRO) 500 MG tablet Take 500 mg by mouth 2 (two) times daily.    hyoscyamine 0.125 mg Subl 0.125 mg every 6 (six) hours as needed.    aspirin 81 mg Cap Take 81 mg by mouth Daily.    levETIRAcetam (KEPPRA) 1000 MG tablet TAKE 1 AND 1/2 TABLETS BY MOUTH  TWICE DAILY    multivitamin (THERAGRAN) tablet Take 1 tablet by mouth once daily.    OXcarbazepine (TRILEPTAL) 300 MG Tab Take 3 tablets (900 mg total) by mouth 2 (two) times daily.    rosuvastatin (CRESTOR) 10 MG tablet Take 1 tablet (10 mg total) by mouth every evening.    sertraline (ZOLOFT) 50 MG tablet Take 1 tablet (50 mg total) by mouth once daily.    tamsulosin (FLOMAX) 0.4 mg Cap TAKE 1 CAPSULE BY MOUTH TWICE DAILY (Patient taking differently: Take 0.4 mg by mouth once daily.)    temazepam (RESTORIL) 15 mg Cap TAKE ONE CAPSULE BY MOUTH AT BEDTIME    topiramate (TOPAMAX) 100 MG tablet Take 100 mg by mouth 2 (two) times daily.    torsemide (DEMADEX) 20 MG Tab Take 20 mg by mouth once daily.     Family History       Problem Relation (Age of Onset)    Hypertension Mother          Tobacco Use    Smoking status: Never    Smokeless tobacco: Never   Substance and Sexual Activity    Alcohol use: Never    Drug use: Never    Sexual activity: Never     Review of Systems   Constitutional:  Positive for activity change and fatigue. Negative for  appetite change and fever.   Respiratory:  Negative for shortness of breath and wheezing.    Cardiovascular:  Negative for chest pain and leg swelling.   Gastrointestinal:  Positive for abdominal pain (suprapubic upon palpation), diarrhea and nausea. Negative for abdominal distention and constipation.   Genitourinary:  Negative for difficulty urinating, dysuria and frequency.   Musculoskeletal:  Negative for arthralgias, joint swelling and myalgias.   Skin:  Negative for rash and wound.   Neurological:  Positive for weakness.   Psychiatric/Behavioral:  Negative for agitation, behavioral problems and confusion.      Objective:     Vital Signs (Most Recent):  Temp: 99.3 °F (37.4 °C) (12/17/24 1238)  Pulse: 96 (12/17/24 1238)  Resp: 18 (12/17/24 0840)  BP: (!) 171/82 (12/17/24 1238)  SpO2: 96 % (12/17/24 1238) Vital Signs (24h Range):  Temp:  [98.5 °F (36.9 °C)-99.3 °F (37.4 °C)] 99.3 °F (37.4 °C)  Pulse:  [82-96] 96  Resp:  [16-18] 18  SpO2:  [96 %-100 %] 96 %  BP: (127-171)/(62-82) 171/82     Weight: 89.2 kg (196 lb 10.4 oz)  Body mass index is 25.94 kg/m².     Physical Exam  Vitals reviewed.   Constitutional:       General: He is not in acute distress.     Appearance: Normal appearance. He is not toxic-appearing.   HENT:      Head: Normocephalic and atraumatic.      Nose: Nose normal.   Cardiovascular:      Rate and Rhythm: Normal rate and regular rhythm.      Pulses: Normal pulses.      Heart sounds: Normal heart sounds. No murmur heard.     No gallop.   Pulmonary:      Effort: Pulmonary effort is normal. No respiratory distress.      Breath sounds: No wheezing, rhonchi or rales.   Abdominal:      General: Abdomen is flat. Bowel sounds are normal. There is no distension.      Palpations: Abdomen is soft.      Tenderness: There is abdominal tenderness (suprapubic ttp).   Musculoskeletal:         General: No swelling or deformity.      Cervical back: Normal range of motion.   Skin:     General: Skin is warm and dry.       Coloration: Skin is not jaundiced.      Findings: No rash.   Neurological:      General: No focal deficit present.      Mental Status: He is alert and oriented to person, place, and time.   Psychiatric:         Mood and Affect: Mood normal.         Behavior: Behavior normal.                Significant Labs: All pertinent labs within the past 24 hours have been reviewed.  Recent Lab Results         12/17/24  0620   12/17/24  0615   12/17/24  0539        Albumin/Globulin Ratio 1.1           Albumin 3.2           ALP 86           ALT 13           Anion Gap 11.0           Appearance, UA   Cloudy         AST 16           Bacteria, UA   Many         Baso # 0.05           Basophil % 0.6           Bilirubin (UA)   Negative         BILIRUBIN TOTAL 0.4           BUN 47.0           BUN/CREAT RATIO 26           Calcium 8.8           Chloride 109           CO2 21           Color, UA   Yellow         Creatinine 1.82           eGFR 38           Eos # 0.09           Eos % 1.0           Globulin, Total 3.0           Glucose 109           Glucose, UA   Negative         Hematocrit 33.7           Hemoglobin 10.9           Immature Grans (Abs) 0.03           Immature Granulocytes 0.3           Ketones, UA   Negative         Leukocyte Esterase, UA   3+         Lipase 80           Lymph # 0.52           LYMPH % 6.0           MCH 33.4           MCHC 32.3           .4           Mono # 1.09           Mono % 12.6           MPV 9.3           Neut # 6.86           Neut % 79.5           NITRITE UA   Positive         nRBC 0.0           Blood, UA   3+         QRS Duration     90       OHS QTC Calculation     433       pH, UA   6.0         Platelet Count 191           Potassium 3.6           PROTEIN TOTAL 6.2           Protein, UA   2+         RBC 3.26           RBC, UA   >100         RDW 12.9           Sodium 141           Specific Gravity,UA   1.020         Squam Epithel, UA   Few         Urobilinogen, UA   0.2         WBC, UA    >100         WBC 8.64                   Significant Imaging: I have reviewed all pertinent imaging results/findings within the past 24 hours.  Assessment/Plan:     * Acute encephalopathy  Admit to med surg for observation.  Treating with rocephin.  OOB with walker for ambulation  Trending labs. Urine culture pending.  Home med rec will be done once meds are brought.  C diff ordered for if pt has another watery bm.   Vte ppx: SCDs.    Acute cystitis with hematuria  Pt is on rocephin.       HTN   Waiting for home meds to do med rec. Prn clonidine.  VTE Risk Mitigation (From admission, onward)           Ordered     IP VTE HIGH RISK PATIENT  Once         12/17/24 0933     Place sequential compression device  Until discontinued         12/17/24 0933                       On 12/17/2024, patient should be placed in hospital observation services under my care.             ETHAN NORTH DO  Department of Hospital Medicine  [facility]

## 2024-12-17 NOTE — PLAN OF CARE
12/17/24 0900   Discharge Assessment   Assessment Type Discharge Planning Assessment   Confirmed/corrected address, phone number and insurance Yes   Confirmed Demographics Correct on Facesheet   Source of Information patient   When was your last doctors appointment? 12/16/24   Does patient/caregiver understand observation status Yes   Communicated DILSHAD with patient/caregiver Date not available/Unable to determine   Reason For Admission UTI   People in Home alone   Facility Arrived From: Home   Do you expect to return to your current living situation? Yes   Do you have help at home or someone to help you manage your care at home? No   Prior to hospitilization cognitive status: Alert/Oriented   Current cognitive status: Alert/Oriented   Walking or Climbing Stairs Difficulty no   Dressing/Bathing Difficulty no   Home Accessibility wheelchair accessible   Readmission within 30 days? No   Do you currently have service(s) that help you manage your care at home? No   Do you take prescription medications? Yes   Do you have prescription coverage? Yes   Coverage [facility]   Do you have any problems affording any of your prescribed medications? No   Is the patient taking medications as prescribed? yes   Who is going to help you get home at discharge? Brittany Parish (Healthcare Power of )  [phone] (Mobile)   How do you get to doctors appointments? family or friend will provide   Are you on dialysis? No   Do you take coumadin? No   Discharge Plan A Home   Discharge Plan B Home;Home Health   DME Needed Upon Discharge  none   Discharge Plan discussed with: Patient   Transition of Care Barriers Transportation   Physical Activity   On average, how many days per week do you engage in moderate to strenuous exercise (like a brisk walk)? 1 day   On average, how many minutes do you engage in exercise at this level? 30 min   Financial Resource Strain   How hard is it for you to pay for the very basics like food, housing,  medical care, and heating? Not very   Housing Stability   In the last 12 months, was there a time when you were not able to pay the mortgage or rent on time? N   At any time in the past 12 months, were you homeless or living in a shelter (including now)? N   Transportation Needs   Has the lack of transportation kept you from medical appointments, meetings, work or from getting things needed for daily living? Yes, it has kept me from medical appointments or from getting my medications.   Food Insecurity   Within the past 12 months, you worried that your food would run out before you got the money to buy more. Sometimes   Within the past 12 months, the food you bought just didn't last and you didn't have money to get more. Never true   Stress   Do you feel stress - tense, restless, nervous, or anxious, or unable to sleep at night because your mind is troubled all the time - these days? Only a littl   Social Isolation   How often do you feel lonely or isolated from those around you?  Never   Alcohol Use   Q1: How often do you have a drink containing alcohol? Never   Q2: How many drinks containing alcohol do you have on a typical day when you are drinking? None   Q3: How often do you have six or more drinks on one occasion? Never   Utilities   In the past 12 months has the electric, gas, oil, or water company threatened to shut off services in your home? No   Health Literacy   How often do you need to have someone help you when you read instructions, pamphlets, or other written material from your doctor or pharmacy? Often     Needs help with transportation needs.

## 2024-12-17 NOTE — ED PROVIDER NOTES
"Encounter Date: 12/17/2024       History     Chief Complaint   Patient presents with    Diarrhea     Arrived via [facility] after pressing his life alert button to get a ride to his doctor's appt in [place]. Once [facility] told pt that they couldn't bring him to [place], pt told them to just bring him [place] [facility]. On arrival pt states he had 1 episode of diarrhea and felt weak for "a minute" around 0430 this morning. AAOx4. Denies any other symptoms.      Is a 74-year-old white gentleman with a history she blue presented to the ER today via EMS.  Patient reports that he pressed his life Alert because he as a urology appointment for his enlarged prostate this morning.  EMS report that they are unable to take patient to his urology appointment that was previously scheduled S being a routine evaluation and thus brought him to the emergency room.  Patient's only complaint was that he had loose stool earlier this morning without hematochezia or melena.  He states while he was having the bowel movement he had some weakness which is chronic during that episode.  He states all symptoms resolved after the bowel movement.  He states he has loose stools from time to time.  He also elaborated stating he ate some Oreo cookies that he dipped in milk around 4:00 a.m..  He denies any diplopia, dysarthria, dysphagia, focal deficits, changes in sensation, chest pain, shortness of breath or abdominal pain.  He states he is completely asymptomatic at this time in his requesting that we contact his niece who came possibly bring him to his scheduled appointment.      Review of patient's allergies indicates:  No Known Allergies  Past Medical History:   Diagnosis Date    BPH (benign prostatic hyperplasia)     Hypertension     Seizures      Past Surgical History:   Procedure Laterality Date    ADENOIDECTOMY      APPENDECTOMY      CARDIAC VALVE REPLACEMENT      CHOLECYSTECTOMY      COLONOSCOPY  04/21/2016    Dr Brock Brown    CORONARY " ARTERY BYPASS GRAFT      EYE SURGERY      INCISION AND DRAINAGE OF ABSCESS Right 03/20/2024    Dr Amelia Carrasco    LITHOTRIPSY  11/20/2024    Dr Maurisio King    PROSTATE SURGERY      TONSILLECTOMY      TRANSURETHRAL RESECTION OF PROSTATE       Family History   Problem Relation Name Age of Onset    Hypertension Mother Jeramy Morales      Social History     Tobacco Use    Smoking status: Never    Smokeless tobacco: Never   Substance Use Topics    Alcohol use: Never    Drug use: Never     Review of Systems   Constitutional:  Negative for chills, fatigue and fever.   HENT:  Negative for congestion, sore throat and trouble swallowing.    Eyes:  Negative for pain and visual disturbance.   Respiratory:  Negative for cough, shortness of breath and wheezing.    Cardiovascular:  Negative for chest pain and palpitations.   Gastrointestinal:  Positive for diarrhea. Negative for abdominal pain, blood in stool, constipation, nausea and vomiting.   Genitourinary:  Negative for dysuria and hematuria.   Musculoskeletal:  Negative for back pain and myalgias.   Skin:  Negative for rash and wound.   Neurological:  Negative for seizures, syncope and headaches.   Psychiatric/Behavioral:  Negative for confusion. The patient is not nervous/anxious.        Physical Exam     Initial Vitals [12/17/24 0534]   BP Pulse Resp Temp SpO2   127/62 84 16 98.5 °F (36.9 °C) 100 %      MAP       --         Physical Exam    Nursing note and vitals reviewed.  Constitutional: He appears well-developed and well-nourished. No distress.   HENT:   Head: Normocephalic and atraumatic.   Eyes: Conjunctivae and EOM are normal. Right eye exhibits no discharge. Left eye exhibits no discharge. No scleral icterus.   Neck: No tracheal deviation present.   Normal range of motion.  Cardiovascular:  Normal rate, regular rhythm and normal heart sounds.     Exam reveals no gallop and no friction rub.       No murmur heard.  Pulmonary/Chest: Breath sounds normal. No  respiratory distress. He has no wheezes. He has no rhonchi. He has no rales.   Abdominal: Abdomen is soft. He exhibits no distension. There is no abdominal tenderness.   Negative for CVA tenderness bilaterally. There is no rebound and no guarding.   Musculoskeletal:         General: No edema. Normal range of motion.      Cervical back: Normal range of motion.     Neurological: He is alert and oriented to person, place, and time. He has normal strength. No cranial nerve deficit or sensory deficit. GCS score is 15. GCS eye subscore is 4. GCS verbal subscore is 5. GCS motor subscore is 6.   CN II-XII intact bilaterally, PERRLA, EOMI, AO, no facial asymmetry noted, no abnormalities of vision loss or peripheral vision loss, strength 5/5 x 4 extremities, sensation intact throughout, no focal neurological deficits noted on exam. Negative Pronator drift bilaterally.       Skin: Skin is warm and dry. No rash and no abscess noted. No erythema. No pallor.   Psychiatric: His behavior is normal. Judgment normal.         ED Course   Critical Care    Date/Time: 12/17/2024 6:58 AM    Performed by: Espinoza Denney MD  Authorized by: Anabelle Rivas DO  Direct patient critical care time: 10 minutes  Additional history critical care time: 10 minutes  Ordering / reviewing critical care time: 10 minutes  Documentation critical care time: 10 minutes  Consulting other physicians critical care time: 10 minutes  Total critical care time (exclusive of procedural time) : 50 minutes  Critical care was necessary to treat or prevent imminent or life-threatening deterioration of the following conditions: dehydration and renal failure.  Critical care was time spent personally by me on the following activities: blood draw for specimens, development of treatment plan with patient or surrogate, discussions with consultants, evaluation of patient's response to treatment, examination of patient, obtaining history from patient or surrogate, ordering  and performing treatments and interventions, ordering and review of laboratory studies, pulse oximetry, re-evaluation of patient's condition and review of old charts.        Labs Reviewed   COMPREHENSIVE METABOLIC PANEL - Abnormal       Result Value    Sodium 141      Potassium 3.6      Chloride 109 (*)     CO2 21 (*)     Glucose 109      Blood Urea Nitrogen 47.0 (*)     Creatinine 1.82 (*)     Calcium 8.8      Protein Total 6.2      Albumin 3.2 (*)     Globulin 3.0      Albumin/Globulin Ratio 1.1      Bilirubin Total 0.4      ALP 86      ALT 13      AST 16      eGFR 38      Anion Gap 11.0      BUN/Creatinine Ratio 26     LIPASE - Abnormal    Lipase Level 80 (*)    URINALYSIS, REFLEX TO URINE CULTURE - Abnormal    Color, UA Yellow      Appearance, UA Cloudy (*)     Specific Gravity, UA 1.020      pH, UA 6.0      Protein, UA 2+ (*)     Glucose, UA Negative      Ketones, UA Negative      Blood, UA 3+ (*)     Bilirubin, UA Negative      Urobilinogen, UA 0.2      Nitrites, UA Positive (*)     Leukocyte Esterase, UA 3+ (*)    CBC WITH DIFFERENTIAL - Abnormal    WBC 8.64      RBC 3.26 (*)     Hgb 10.9 (*)     Hct 33.7 (*)     .4 (*)     MCH 33.4 (*)     MCHC 32.3 (*)     RDW 12.9      Platelet 191      MPV 9.3      Neut % 79.5      Lymph % 6.0      Mono % 12.6      Eos % 1.0      Basophil % 0.6      Lymph # 0.52 (*)     Neut # 6.86      Mono # 1.09      Eos # 0.09      Baso # 0.05      IG# 0.03      IG% 0.3      NRBC% 0.0     URINALYSIS, MICROSCOPIC - Abnormal    Bacteria, UA Many (*)     RBC, UA >100 (*)     WBC, UA >100 (*)     Squamous Epithelial Cells, UA Few (*)    CULTURE, URINE   CBC W/ AUTO DIFFERENTIAL    Narrative:     The following orders were created for panel order CBC Auto Differential.  Procedure                               Abnormality         Status                     ---------                               -----------         ------                     CBC with Differential[[id number]]        Abnormal            Final result                 Please view results for these tests on the individual orders.     EKG Readings: (Independently Interpreted)   Initial Reading: No STEMI. Previous EKG: Compared with most recent EKG Rhythm: Normal Sinus Rhythm. Heart Rate: 80. Conduction: 1st Degree AV Block. ST Segments: Normal ST Segments. T Waves: Normal. Axis: Left Axis Deviation.   April/2024 EKG also showed first-degree AV block.     ECG Results              EKG 12-lead (In process)        Collection Time Result Time QRS Duration OHS QTC Calculation    12/17/24 05:39:13 12/17/24 06:58:18 90 433                     In process by Interface, Lab In [facility] (12/17/24 06:58:28)                   Narrative:    Test Reason : R53.1,    Vent. Rate :  80 BPM     Atrial Rate :  80 BPM     P-R Int : 234 ms          QRS Dur :  90 ms      QT Int : 376 ms       P-R-T Axes : -23 -26 -25 degrees    QTcB Int : 433 ms    Sinus rhythm with 1st degree A-V block  Minimal voltage criteria for LVH, may be normal variant  Borderline Abnormal ECG  When compared with ECG of 27-May-2024 12:52,  Nonspecific T wave abnormality now evident in Lateral leads    Referred By: SAMANTHA BARTHOLOMEW           Confirmed By:                                   Imaging Results    None          Medications   0.9% NaCl infusion (has no administration in time range)   cefTRIAXone injection 1 g (has no administration in time range)     Medical Decision Making  Differentials:  Chronic diarrhea, food poisoning, normal vagal response, CVA/TIA, dysrhythmia   History in his the patient   74-year-old well-appearing male with stable vital signs presents to the ER today as a result of wanting to go to his urologist appointment.  He is asymptomatic at this time that has requesting that has needs to be called to transport him via EMS.  He reports that he had some weakness during a bowel movement which he has not uncommon for him.  An EKG was done based on the report  of weakness although it just chronic.  EKG shows first-degree AV block with that appears chronic per chart review.  Neuro exam completely unremarkable.  Patient asymptomatic.  Basic labs obtained which showed no leukocytosis or notable electrolyte disturbance.  Creatinine function is above his baseline meeting criteria for acute kidney injury.  Normal saline ordered.  Urine studies are concerning for UTI and given this patient lives at home I did recommended admission.  Discussed with patient's power of  who agreed with that assessment.  Discussed the case with  who accepted for further management    Amount and/or Complexity of Data Reviewed  Labs: ordered. Decision-making details documented in ED Course.    Risk  Prescription drug management.                                      Clinical Impression:  Final diagnoses:  [N30.00] Acute cystitis without hematuria (Primary)  [N17.9] DORIS (acute kidney injury)  [R53.1] Weakness          ED Disposition Condition    Observation Stable                Espinoza Denney MD  12/17/24 0700

## 2024-12-17 NOTE — HPI
75 yo CM presented to the ER with confusion and complaints of diarrhea. He reported that he pressed his life alert button to get the ambulance to bring him to his urology appt. Workup in the ER revealed UTI and DORIS. Hospital medicine consulted for admission.     Upon my exam this morning, pt states that he pressed his life alert button because he had 4 loose bms at home and became weak. He called EMS to bring him to the hospital. He endorses mild gi upset at this time. But has not had any additional bm's since he has been here. He is feeling somewhat better. He has gotten up and gone to the bathroom. Denies dysuria or any other new issues. He does report having bladder stones that he was supposed to have removed today at his urology appt, but he is missing that appt.  Denies any cp or sob. Would like a walker to ambulate with, a blanket, and for us to have his niece bring his dentures so he can eat.

## 2024-12-18 VITALS
OXYGEN SATURATION: 98 % | HEIGHT: 73 IN | HEART RATE: 88 BPM | TEMPERATURE: 99 F | RESPIRATION RATE: 18 BRPM | WEIGHT: 196.63 LBS | BODY MASS INDEX: 26.06 KG/M2 | SYSTOLIC BLOOD PRESSURE: 143 MMHG | DIASTOLIC BLOOD PRESSURE: 82 MMHG

## 2024-12-18 DIAGNOSIS — Z95.1 S/P CABG X 3: ICD-10-CM

## 2024-12-18 PROBLEM — G93.40 ACUTE ENCEPHALOPATHY: Status: RESOLVED | Noted: 2024-12-17 | Resolved: 2024-12-18

## 2024-12-18 LAB
ANION GAP SERPL CALC-SCNC: 8 MEQ/L
BASOPHILS # BLD AUTO: 0.05 X10(3)/MCL
BASOPHILS NFR BLD AUTO: 0.7 %
BUN SERPL-MCNC: 35 MG/DL (ref 8.4–25.7)
CALCIUM SERPL-MCNC: 8.4 MG/DL (ref 8.8–10)
CHLORIDE SERPL-SCNC: 112 MMOL/L (ref 98–107)
CO2 SERPL-SCNC: 19 MMOL/L (ref 23–31)
CREAT SERPL-MCNC: 1.54 MG/DL (ref 0.72–1.25)
CREAT/UREA NIT SERPL: 23
EOSINOPHIL # BLD AUTO: 0.09 X10(3)/MCL (ref 0–0.9)
EOSINOPHIL NFR BLD AUTO: 1.3 %
ERYTHROCYTE [DISTWIDTH] IN BLOOD BY AUTOMATED COUNT: 12.6 % (ref 11.5–17)
GFR SERPLBLD CREATININE-BSD FMLA CKD-EPI: 46 ML/MIN/1.73/M2
GLUCOSE SERPL-MCNC: 102 MG/DL (ref 82–115)
HCT VFR BLD AUTO: 30.7 % (ref 42–52)
HGB BLD-MCNC: 9.8 G/DL (ref 14–18)
IMM GRANULOCYTES # BLD AUTO: 0.02 X10(3)/MCL (ref 0–0.04)
IMM GRANULOCYTES NFR BLD AUTO: 0.3 %
LYMPHOCYTES # BLD AUTO: 0.74 X10(3)/MCL (ref 0.6–4.6)
LYMPHOCYTES NFR BLD AUTO: 11 %
MCH RBC QN AUTO: 32.6 PG (ref 27–31)
MCHC RBC AUTO-ENTMCNC: 31.9 G/DL (ref 33–36)
MCV RBC AUTO: 102 FL (ref 80–94)
MONOCYTES # BLD AUTO: 1.38 X10(3)/MCL (ref 0.1–1.3)
MONOCYTES NFR BLD AUTO: 20.4 %
NEUTROPHILS # BLD AUTO: 4.47 X10(3)/MCL (ref 2.1–9.2)
NEUTROPHILS NFR BLD AUTO: 66.3 %
NRBC BLD AUTO-RTO: 0 %
PLATELET # BLD AUTO: 181 X10(3)/MCL (ref 130–400)
PMV BLD AUTO: 10 FL (ref 7.4–10.4)
POTASSIUM SERPL-SCNC: 3.7 MMOL/L (ref 3.5–5.1)
RBC # BLD AUTO: 3.01 X10(6)/MCL (ref 4.7–6.1)
SODIUM SERPL-SCNC: 139 MMOL/L (ref 136–145)
WBC # BLD AUTO: 6.75 X10(3)/MCL (ref 4.5–11.5)

## 2024-12-18 PROCEDURE — G0378 HOSPITAL OBSERVATION PER HR: HCPCS

## 2024-12-18 PROCEDURE — 85025 COMPLETE CBC W/AUTO DIFF WBC: CPT | Performed by: INTERNAL MEDICINE

## 2024-12-18 PROCEDURE — 96361 HYDRATE IV INFUSION ADD-ON: CPT

## 2024-12-18 PROCEDURE — 25000003 PHARM REV CODE 250

## 2024-12-18 PROCEDURE — A4216 STERILE WATER/SALINE, 10 ML: HCPCS

## 2024-12-18 PROCEDURE — 96376 TX/PRO/DX INJ SAME DRUG ADON: CPT

## 2024-12-18 PROCEDURE — 94761 N-INVAS EAR/PLS OXIMETRY MLT: CPT

## 2024-12-18 PROCEDURE — 80048 BASIC METABOLIC PNL TOTAL CA: CPT | Performed by: INTERNAL MEDICINE

## 2024-12-18 PROCEDURE — 36415 COLL VENOUS BLD VENIPUNCTURE: CPT | Performed by: INTERNAL MEDICINE

## 2024-12-18 PROCEDURE — 63600175 PHARM REV CODE 636 W HCPCS: Performed by: INTERNAL MEDICINE

## 2024-12-18 RX ORDER — OXCARBAZEPINE 300 MG/1
900 TABLET, FILM COATED ORAL 2 TIMES DAILY
Qty: 180 TABLET | Refills: 3 | Status: SHIPPED | OUTPATIENT
Start: 2024-12-18

## 2024-12-18 RX ORDER — WATER FOR INJECTION,STERILE
VIAL (ML) INJECTION
Status: COMPLETED
Start: 2024-12-18 | End: 2024-12-18

## 2024-12-18 RX ORDER — SULFAMETHOXAZOLE AND TRIMETHOPRIM 800; 160 MG/1; MG/1
1 TABLET ORAL 2 TIMES DAILY
Qty: 10 TABLET | Refills: 0 | Status: SHIPPED | OUTPATIENT
Start: 2024-12-18 | End: 2024-12-23

## 2024-12-18 RX ADMIN — CEFTRIAXONE SODIUM 1 G: 1 INJECTION, POWDER, FOR SOLUTION INTRAMUSCULAR; INTRAVENOUS at 09:12

## 2024-12-18 RX ADMIN — SODIUM CHLORIDE, POTASSIUM CHLORIDE, SODIUM LACTATE AND CALCIUM CHLORIDE 500 ML: 600; 310; 30; 20 INJECTION, SOLUTION INTRAVENOUS at 12:12

## 2024-12-18 RX ADMIN — WATER 10 ML: 1 INJECTION INTRAMUSCULAR; INTRAVENOUS; SUBCUTANEOUS at 09:12

## 2024-12-18 NOTE — HOSPITAL COURSE
12/18/24: Pt is sitting up in bed. He appears comfortable and more energetic today. He endorses chronic gas pains in his abdomen that resolved when he can walk around. He denies any issues with urination different from his chronic hesitancy which he reports is due to bladder stones which he was supposed to have removed 2 days ago. He will have his niece reschedule his urology appt with Dr. King due to she has to make the appt based on her schedule. His urine cx is growing gran negative rods. 5/2024 he grew klebsiella sensitive to bactrim. I will dc him on 5 days of bactrim and follow-up on culture sensitivities tomorrow. He received 2 days of rocephin here. Pt feels back to his baseline strength and ambulated in the liriano with a walker without issue.    PE:   General: alert and oriented.  HEENT: NC, AT.  Resp: CTAB.  Cardio: RRR, no m/r/g. No edema.  Abd: soft, NT, ND, + BS x 4

## 2024-12-18 NOTE — DISCHARGE SUMMARY
[facility]  Hospital Medicine  Discharge Summary      Patient Name: Nathen Morales  MRN: [id number]  TULIO: [id number]  Patient Class: OP- Observation  Admission Date: 12/17/2024  Hospital Length of Stay: 0 days  Discharge Date and Time:  12/18/2024 11:37 AM  Attending Physician: Anabelle Rivas DO   Discharging Provider: ANABELLE NORTH DO  Primary Care Provider: Brock Brown MD    Primary Care Team: Networked reference to record PCT     HPI:   75 yo CM presented to the ER with confusion and complaints of diarrhea. He reported that he pressed his life alert button to get the ambulance to bring him to his urology appt. Workup in the ER revealed UTI and DORIS. Hospital medicine consulted for admission.     Upon my exam this morning, pt states that he pressed his life alert button because he had 4 loose bms at home and became weak. He called EMS to bring him to the hospital. He endorses mild gi upset at this time. But has not had any additional bm's since he has been here. He is feeling somewhat better. He has gotten up and gone to the bathroom. Denies dysuria or any other new issues. He does report having bladder stones that he was supposed to have removed today at his urology appt, but he is missing that appt.  Denies any cp or sob. Would like a walker to ambulate with, a blanket, and for us to have his niece bring his dentures so he can eat.     * No surgery found *      Hospital Course:   12/18/24: Pt is sitting up in bed. He appears comfortable and more energetic today. He endorses chronic gas pains in his abdomen that resolved when he can walk around. He denies any issues with urination different from his chronic hesitancy which he reports is due to bladder stones which he was supposed to have removed 2 days ago. He will have his niece reschedule his urology appt with Dr. King due to she has to make the appt based on her schedule. His urine cx is growing gran  negative rods. 5/2024 he grew klebsiella sensitive to bactrim. I will dc him on 5 days of bactrim and follow-up on culture sensitivities tomorrow. He received 2 days of rocephin here. Pt feels back to his baseline strength and ambulated in the lir[place] with a walker without issue.    PE:   General: alert and oriented.  HEENT: NC, AT.  Resp: CTAB.  Cardio: RRR, no m/r/g. No edema.  Abd: soft, NT, ND, + BS x 4       Goals of Care Treatment Preferences:  Code Status: Full Code      SDOH Screening:  The patient was screened for food insecurity, housing instability, transportation needs, utility difficulties, and interpersonal safety. The social determinant(s) of health identified as a concern this admission are:  Transportation difficulties    The plan to address these concerns is: this is addressed by CM.    Social Drivers of Health with Concerns     Food Insecurity: No Food Insecurity (12/17/2024)   Recent Concern: Food Insecurity - Food Insecurity Present (12/17/2024)   Transportation Needs: Unmet Transportation Needs (12/17/2024)        Consults:     Acute cystitis with hematuria  Pt received 2 days of rocephin.   He will be dc'd home on 5 days of bactrim, and I will follow-up with culture results.      Hypertension  Patient's blood pressure range in the last 24 hours was: BP  Min: 126/67  Max: 171/82.The patient's inpatient anti-hypertensive regimen is listed below:  Current Antihypertensives  cloNIDine tablet 0.1 mg, 3 times daily PRN, Oral        Benign prostatic hyperplasia          Final Active Diagnoses:    Diagnosis Date Noted POA    Acute cystitis with hematuria [N30.01] 04/18/2024 Yes    Benign prostatic hyperplasia [N40.0] 05/31/2022 Yes    Hypertension [I10] 05/31/2022 Yes      Problems Resolved During this Admission:    Diagnosis Date Noted Date Resolved POA    PRINCIPAL PROBLEM:  Acute encephalopathy [G93.40] 12/17/2024 12/18/2024 Yes       Discharged Condition: good    Disposition: Home or Self  Care    Follow Up:   Follow-up Information       Brock Brown MD Follow up in 1 week(s).    Specialty: Family Medicine  Contact information:  [address]  [place] [id number] [phone]                           Patient Instructions:   No discharge procedures on file.    Significant Diagnostic Studies: Labs: All labs within the past 24 hours have been reviewed    Pending Diagnostic Studies:       None           Medications:  Reconciled Home Medications:      Medication List        START taking these medications      sulfamethoxazole-trimethoprim 800-160mg 800-160 mg Tab  Commonly known as: BACTRIM DS  Take 1 tablet by mouth 2 (two) times daily. for 5 days            CHANGE how you take these medications      tamsulosin 0.4 mg Cap  Commonly known as: FLOMAX  TAKE 1 CAPSULE BY MOUTH TWICE DAILY  What changed:   how much to take  how to take this  when to take this  additional instructions            CONTINUE taking these medications      aspirin 81 mg Cap  Take 81 mg by mouth Daily.     hyoscyamine 0.125 mg Subl  0.125 mg every 6 (six) hours as needed.     levETIRAcetam 1000 MG tablet  Commonly known as: KEPPRA  TAKE 1 AND 1/2 TABLETS BY MOUTH  TWICE DAILY     multivitamin tablet  Commonly known as: THERAGRAN  Take 1 tablet by mouth once daily.     OXcarbazepine 300 MG Tab  Commonly known as: TRILEPTAL  Take 3 tablets (900 mg total) by mouth 2 (two) times daily.     rosuvastatin 10 MG tablet  Commonly known as: CRESTOR  Take 1 tablet (10 mg total) by mouth every evening.     sertraline 50 MG tablet  Commonly known as: ZOLOFT  Take 1 tablet (50 mg total) by mouth once daily.     temazepam 15 mg Cap  Commonly known as: RESTORIL  TAKE ONE CAPSULE BY MOUTH AT BEDTIME     topiramate 100 MG tablet  Commonly known as: TOPAMAX  Take 100 mg by mouth 2 (two) times daily.     torsemide 20 MG Tab  Commonly known as: DEMADEX  Take 20 mg by mouth once daily.            STOP taking these medications      ciprofloxacin  HCl 500 MG tablet  Commonly known as: CIPRO              Indwelling Lines/Drains at time of discharge:   Lines/Drains/Airways       None                   Time spent on the discharge of patient: 45 minutes         ETHAN NORTH DO  Department of Hospital Medicine  [facility]

## 2024-12-18 NOTE — ASSESSMENT & PLAN NOTE
Patient's blood pressure range in the last 24 hours was: BP  Min: 126/67  Max: 171/82.The patient's inpatient anti-hypertensive regimen is listed below:  Current Antihypertensives  cloNIDine tablet 0.1 mg, 3 times daily PRN, Oral

## 2024-12-18 NOTE — ASSESSMENT & PLAN NOTE
Pt received 2 days of rocephin.   He will be dc'd home on 5 days of bactrim, and I will follow-up with culture results.

## 2024-12-18 NOTE — PLAN OF CARE
Problem: Adult Inpatient Plan of Care  Goal: Plan of Care Review  Outcome: Progressing  Goal: Patient-Specific Goal (Individualized)  Outcome: Progressing  Goal: Absence of Hospital-Acquired Illness or Injury  Outcome: Progressing  Goal: Optimal Comfort and Wellbeing  Outcome: Progressing  Goal: Readiness for Transition of Care  Outcome: Progressing     Problem: UTI (Urinary Tract Infection)  Goal: Improved Infection Symptoms  Outcome: Progressing     Problem: Fall Injury Risk  Goal: Absence of Fall and Fall-Related Injury  Outcome: Progressing     Problem: Diarrhea  Goal: Effective Diarrhea Management  Outcome: Progressing     Problem: Fatigue  Goal: Improved Activity Tolerance  Outcome: Progressing

## 2024-12-19 ENCOUNTER — PATIENT OUTREACH (OUTPATIENT)
Dept: ADMINISTRATIVE | Facility: CLINIC | Age: 74
End: 2024-12-19
Payer: MEDICARE

## 2024-12-19 LAB — BACTERIA UR CULT: ABNORMAL

## 2024-12-19 NOTE — PROGRESS NOTES
C3 nurse attempted to contact Nathen Morales for a TCC post hospital discharge follow up call. No answer. Left voicemail with callback information. The patient has a scheduled \[facility]\"" appointment with Brock Brown MD (Family Medicine) on 12/20/2024; @9:00am

## 2024-12-19 NOTE — PROGRESS NOTES
C3 nurse spoke with Nathen Rodriguez for a TCC post hospital discharge follow up call. The patient has a scheduled [facility] appointment with Brock Brown MD (Family Medicine) on 12/20/2024; @9:00am

## 2024-12-22 LAB
BACTERIA BLD CULT: NORMAL
BACTERIA BLD CULT: NORMAL

## 2025-01-14 DIAGNOSIS — G47.00 INSOMNIA, UNSPECIFIED TYPE: ICD-10-CM

## 2025-01-14 DIAGNOSIS — Z95.1 S/P CABG X 3: ICD-10-CM

## 2025-01-14 RX ORDER — OXCARBAZEPINE 300 MG/1
900 TABLET, FILM COATED ORAL 2 TIMES DAILY
Qty: 180 TABLET | Refills: 1 | Status: SHIPPED | OUTPATIENT
Start: 2025-01-14

## 2025-01-14 RX ORDER — TEMAZEPAM 15 MG/1
15 CAPSULE ORAL NIGHTLY
Qty: 30 CAPSULE | Refills: 3 | Status: SHIPPED | OUTPATIENT
Start: 2025-01-14

## 2025-01-30 DIAGNOSIS — G40.909 SEIZURE DISORDER: ICD-10-CM

## 2025-01-30 RX ORDER — LEVETIRACETAM 1000 MG/1
1500 TABLET ORAL 2 TIMES DAILY
Qty: 90 TABLET | Refills: 11 | Status: SHIPPED | OUTPATIENT
Start: 2025-01-30

## 2025-02-09 DIAGNOSIS — F32.A DEPRESSION, UNSPECIFIED DEPRESSION TYPE: ICD-10-CM

## 2025-02-10 RX ORDER — SERTRALINE HYDROCHLORIDE 50 MG/1
50 TABLET, FILM COATED ORAL
Qty: 90 TABLET | Refills: 3 | Status: SHIPPED | OUTPATIENT
Start: 2025-02-10

## 2025-02-20 ENCOUNTER — RESULTS FOLLOW-UP (OUTPATIENT)
Dept: FAMILY MEDICINE | Facility: CLINIC | Age: 75
End: 2025-02-20
Payer: MEDICARE

## 2025-02-20 ENCOUNTER — HOSPITAL ENCOUNTER (OUTPATIENT)
Dept: RADIOLOGY | Facility: HOSPITAL | Age: 75
Discharge: HOME | End: 2025-02-20
Attending: FAMILY MEDICINE
Payer: MEDICARE

## 2025-02-20 DIAGNOSIS — E04.1 RIGHT THYROID NODULE: ICD-10-CM

## 2025-02-20 PROCEDURE — 76536 US EXAM OF HEAD AND NECK: CPT | Mod: TC

## 2025-02-20 NOTE — TELEPHONE ENCOUNTER
----- Message from Brock Brown MD sent at 2/20/2025  3:33 PM CST -----  Please inform patient of results.     1. Multiple nodules noted.  Repeat thyroid ultrasound in 1 year      ----- Message -----  From: Interface, Rad Results In  Sent: 2/20/2025   2:37 PM CST  To: Brock Brown MD

## 2025-02-20 NOTE — PROGRESS NOTES
Please inform patient of results.     1. Multiple nodules noted.  Repeat thyroid ultrasound in 1 year

## 2025-03-11 ENCOUNTER — HOSPITAL ENCOUNTER (INPATIENT)
Facility: HOSPITAL | Age: 75
LOS: 10 days | DRG: 690 | End: 2025-03-21
Attending: FAMILY MEDICINE | Admitting: FAMILY MEDICINE
Payer: MEDICARE

## 2025-03-11 DIAGNOSIS — R53.1 WEAK: ICD-10-CM

## 2025-03-11 DIAGNOSIS — S09.90XA INJURY OF HEAD, INITIAL ENCOUNTER: ICD-10-CM

## 2025-03-11 DIAGNOSIS — T14.90XA TRAUMA: ICD-10-CM

## 2025-03-11 DIAGNOSIS — R00.0 TACHYCARDIA: ICD-10-CM

## 2025-03-11 DIAGNOSIS — N39.0 URINARY TRACT INFECTION WITHOUT HEMATURIA, SITE UNSPECIFIED: Primary | ICD-10-CM

## 2025-03-11 DIAGNOSIS — E86.0 DEHYDRATION: ICD-10-CM

## 2025-03-11 PROBLEM — R79.89 ELEVATED TROPONIN: Status: ACTIVE | Noted: 2025-03-11

## 2025-03-11 PROBLEM — R31.9 URINARY TRACT INFECTION WITH HEMATURIA: Status: ACTIVE | Noted: 2023-03-20

## 2025-03-11 LAB
ALBUMIN SERPL-MCNC: 2.5 G/DL (ref 3.4–4.8)
ALBUMIN/GLOB SERPL: 0.7 RATIO (ref 1.1–2)
ALLENS TEST: ABNORMAL
ALP SERPL-CCNC: 119 UNIT/L (ref 40–150)
ALT SERPL-CCNC: 16 UNIT/L (ref 0–55)
ANION GAP SERPL CALC-SCNC: 10 MEQ/L
ANION GAP SERPL CALC-SCNC: 16 MMOL/L (ref 8–16)
AST SERPL-CCNC: 22 UNIT/L (ref 5–34)
BACTERIA #/AREA URNS AUTO: ABNORMAL /HPF
BASOPHILS # BLD AUTO: 0.05 X10(3)/MCL
BASOPHILS NFR BLD AUTO: 0.5 %
BILIRUB SERPL-MCNC: 0.3 MG/DL
BILIRUB UR QL STRIP.AUTO: NEGATIVE
BUN SERPL-MCNC: 38 MG/DL (ref 6–30)
BUN SERPL-MCNC: 43.1 MG/DL (ref 8.4–25.7)
CALCIUM SERPL-MCNC: 8.2 MG/DL (ref 8.8–10)
CHLORIDE SERPL-SCNC: 105 MMOL/L (ref 95–110)
CHLORIDE SERPL-SCNC: 107 MMOL/L (ref 98–107)
CLARITY UR: ABNORMAL
CO2 SERPL-SCNC: 22 MMOL/L (ref 23–31)
COLOR UR AUTO: YELLOW
CREAT SERPL-MCNC: 1.87 MG/DL (ref 0.72–1.25)
CREAT SERPL-MCNC: 2.1 MG/DL (ref 0.5–1.4)
CREAT/UREA NIT SERPL: 23
EOSINOPHIL # BLD AUTO: 0.07 X10(3)/MCL (ref 0–0.9)
EOSINOPHIL NFR BLD AUTO: 0.7 %
ERYTHROCYTE [DISTWIDTH] IN BLOOD BY AUTOMATED COUNT: 13.9 % (ref 11.5–17)
FLUAV AG UPPER RESP QL IA.RAPID: NOT DETECTED
FLUBV AG UPPER RESP QL IA.RAPID: NOT DETECTED
GFR SERPLBLD CREATININE-BSD FMLA CKD-EPI: 37 ML/MIN/1.73/M2
GLOBULIN SER-MCNC: 3.4 GM/DL (ref 2.4–3.5)
GLUCOSE SERPL-MCNC: 127 MG/DL (ref 70–110)
GLUCOSE SERPL-MCNC: 130 MG/DL (ref 82–115)
GLUCOSE UR QL STRIP: NEGATIVE
HCT VFR BLD AUTO: 29.6 % (ref 42–52)
HCT VFR BLD CALC: 27 %PCV (ref 36–54)
HGB BLD-MCNC: 9 G/DL
HGB BLD-MCNC: 9.8 G/DL (ref 14–18)
HGB UR QL STRIP: ABNORMAL
IMM GRANULOCYTES # BLD AUTO: 0.03 X10(3)/MCL (ref 0–0.04)
IMM GRANULOCYTES NFR BLD AUTO: 0.3 %
KETONES UR QL STRIP: NEGATIVE
LEUKOCYTE ESTERASE UR QL STRIP: ABNORMAL
LYMPHOCYTES # BLD AUTO: 0.45 X10(3)/MCL (ref 0.6–4.6)
LYMPHOCYTES NFR BLD AUTO: 4.7 %
MCH RBC QN AUTO: 33.6 PG (ref 27–31)
MCHC RBC AUTO-ENTMCNC: 33.1 G/DL (ref 33–36)
MCV RBC AUTO: 101.4 FL (ref 80–94)
MONOCYTES # BLD AUTO: 1.21 X10(3)/MCL (ref 0.1–1.3)
MONOCYTES NFR BLD AUTO: 12.8 %
NEUTROPHILS # BLD AUTO: 7.67 X10(3)/MCL (ref 2.1–9.2)
NEUTROPHILS NFR BLD AUTO: 81 %
NITRITE UR QL STRIP: NEGATIVE
NRBC BLD AUTO-RTO: 0 %
OHS QRS DURATION: 102 MS
OHS QTC CALCULATION: 458 MS
PH UR STRIP: 6 [PH]
PLATELET # BLD AUTO: 198 X10(3)/MCL (ref 130–400)
PMV BLD AUTO: 10 FL (ref 7.4–10.4)
POC IONIZED CALCIUM: 1.11 MMOL/L (ref 1.06–1.42)
POC TCO2 (MEASURED): 22 MMOL/L (ref 23–29)
POTASSIUM BLD-SCNC: 3.9 MMOL/L (ref 3.5–5.1)
POTASSIUM SERPL-SCNC: 4 MMOL/L (ref 3.5–5.1)
PROT SERPL-MCNC: 5.9 GM/DL (ref 5.8–7.6)
PROT UR QL STRIP: ABNORMAL
RBC # BLD AUTO: 2.92 X10(6)/MCL (ref 4.7–6.1)
RBC #/AREA URNS AUTO: ABNORMAL /HPF
SAMPLE: ABNORMAL
SARS-COV-2 RNA RESP QL NAA+PROBE: NOT DETECTED
SITE: ABNORMAL
SODIUM BLD-SCNC: 138 MMOL/L (ref 136–145)
SODIUM SERPL-SCNC: 139 MMOL/L (ref 136–145)
SP GR UR STRIP.AUTO: 1.01 (ref 1–1.03)
SQUAMOUS #/AREA URNS AUTO: ABNORMAL /HPF
TROPONIN I SERPL-MCNC: 0.03 NG/ML (ref 0–0.04)
TROPONIN I SERPL-MCNC: 0.05 NG/ML (ref 0–0.04)
TROPONIN I SERPL-MCNC: 0.06 NG/ML (ref 0–0.04)
UROBILINOGEN UR STRIP-ACNC: 0.2
WBC # BLD AUTO: 9.48 X10(3)/MCL (ref 4.5–11.5)
WBC #/AREA URNS AUTO: ABNORMAL /HPF

## 2025-03-11 PROCEDURE — 25000003 PHARM REV CODE 250: Performed by: INTERNAL MEDICINE

## 2025-03-11 PROCEDURE — 94761 N-INVAS EAR/PLS OXIMETRY MLT: CPT

## 2025-03-11 PROCEDURE — 0240U COVID/FLU A&B PCR: CPT | Performed by: FAMILY MEDICINE

## 2025-03-11 PROCEDURE — 11000001 HC ACUTE MED/SURG PRIVATE ROOM

## 2025-03-11 PROCEDURE — 80177 DRUG SCRN QUAN LEVETIRACETAM: CPT | Performed by: FAMILY MEDICINE

## 2025-03-11 PROCEDURE — 84484 ASSAY OF TROPONIN QUANT: CPT | Performed by: INTERNAL MEDICINE

## 2025-03-11 PROCEDURE — 81003 URINALYSIS AUTO W/O SCOPE: CPT | Performed by: FAMILY MEDICINE

## 2025-03-11 PROCEDURE — 63600175 PHARM REV CODE 636 W HCPCS: Performed by: FAMILY MEDICINE

## 2025-03-11 PROCEDURE — 85025 COMPLETE CBC W/AUTO DIFF WBC: CPT | Performed by: FAMILY MEDICINE

## 2025-03-11 PROCEDURE — 87086 URINE CULTURE/COLONY COUNT: CPT | Performed by: FAMILY MEDICINE

## 2025-03-11 PROCEDURE — 93005 ELECTROCARDIOGRAM TRACING: CPT

## 2025-03-11 PROCEDURE — 84484 ASSAY OF TROPONIN QUANT: CPT | Performed by: FAMILY MEDICINE

## 2025-03-11 PROCEDURE — 25000003 PHARM REV CODE 250: Performed by: FAMILY MEDICINE

## 2025-03-11 PROCEDURE — 96375 TX/PRO/DX INJ NEW DRUG ADDON: CPT

## 2025-03-11 PROCEDURE — 36415 COLL VENOUS BLD VENIPUNCTURE: CPT | Performed by: INTERNAL MEDICINE

## 2025-03-11 PROCEDURE — 80053 COMPREHEN METABOLIC PANEL: CPT | Performed by: FAMILY MEDICINE

## 2025-03-11 PROCEDURE — 96374 THER/PROPH/DIAG INJ IV PUSH: CPT

## 2025-03-11 PROCEDURE — 99285 EMERGENCY DEPT VISIT HI MDM: CPT | Mod: 25

## 2025-03-11 RX ORDER — ACETAMINOPHEN 325 MG/1
650 TABLET ORAL EVERY 8 HOURS PRN
Status: DISCONTINUED | OUTPATIENT
Start: 2025-03-11 | End: 2025-03-11

## 2025-03-11 RX ORDER — NAPROXEN SODIUM 220 MG/1
81 TABLET, FILM COATED ORAL DAILY
Status: DISCONTINUED | OUTPATIENT
Start: 2025-03-12 | End: 2025-03-21 | Stop reason: HOSPADM

## 2025-03-11 RX ORDER — SERTRALINE HYDROCHLORIDE 50 MG/1
50 TABLET, FILM COATED ORAL DAILY
Status: DISCONTINUED | OUTPATIENT
Start: 2025-03-12 | End: 2025-03-21 | Stop reason: HOSPADM

## 2025-03-11 RX ORDER — ONDANSETRON HYDROCHLORIDE 2 MG/ML
4 INJECTION, SOLUTION INTRAVENOUS EVERY 6 HOURS PRN
Status: DISCONTINUED | OUTPATIENT
Start: 2025-03-11 | End: 2025-03-11

## 2025-03-11 RX ORDER — CEFTRIAXONE 1 G/1
1 INJECTION, POWDER, FOR SOLUTION INTRAMUSCULAR; INTRAVENOUS
Status: COMPLETED | OUTPATIENT
Start: 2025-03-11 | End: 2025-03-11

## 2025-03-11 RX ORDER — SODIUM CHLORIDE 9 MG/ML
1000 INJECTION, SOLUTION INTRAVENOUS CONTINUOUS
Status: ACTIVE | OUTPATIENT
Start: 2025-03-11 | End: 2025-03-12

## 2025-03-11 RX ORDER — ALUMINUM HYDROXIDE, MAGNESIUM HYDROXIDE, AND SIMETHICONE 1200; 120; 1200 MG/30ML; MG/30ML; MG/30ML
30 SUSPENSION ORAL EVERY 6 HOURS PRN
Status: DISCONTINUED | OUTPATIENT
Start: 2025-03-11 | End: 2025-03-21 | Stop reason: HOSPADM

## 2025-03-11 RX ORDER — ATORVASTATIN CALCIUM 40 MG/1
40 TABLET, FILM COATED ORAL NIGHTLY
Status: DISCONTINUED | OUTPATIENT
Start: 2025-03-11 | End: 2025-03-21 | Stop reason: HOSPADM

## 2025-03-11 RX ORDER — SODIUM CHLORIDE 9 MG/ML
1000 INJECTION, SOLUTION INTRAVENOUS
Status: DISCONTINUED | OUTPATIENT
Start: 2025-03-11 | End: 2025-03-11

## 2025-03-11 RX ORDER — ONDANSETRON 4 MG/1
4 TABLET, ORALLY DISINTEGRATING ORAL EVERY 6 HOURS PRN
Status: DISCONTINUED | OUTPATIENT
Start: 2025-03-11 | End: 2025-03-21 | Stop reason: HOSPADM

## 2025-03-11 RX ORDER — ACETAMINOPHEN 500 MG
1000 TABLET ORAL EVERY 6 HOURS PRN
Status: DISCONTINUED | OUTPATIENT
Start: 2025-03-11 | End: 2025-03-21 | Stop reason: HOSPADM

## 2025-03-11 RX ORDER — ACETAMINOPHEN 500 MG
500 TABLET ORAL EVERY 6 HOURS PRN
Status: DISCONTINUED | OUTPATIENT
Start: 2025-03-11 | End: 2025-03-21 | Stop reason: HOSPADM

## 2025-03-11 RX ORDER — TOPIRAMATE 100 MG/1
100 TABLET, FILM COATED ORAL 2 TIMES DAILY
Status: DISCONTINUED | OUTPATIENT
Start: 2025-03-11 | End: 2025-03-21 | Stop reason: HOSPADM

## 2025-03-11 RX ORDER — OXCARBAZEPINE 300 MG/1
900 TABLET, FILM COATED ORAL 2 TIMES DAILY
Status: DISCONTINUED | OUTPATIENT
Start: 2025-03-11 | End: 2025-03-21 | Stop reason: HOSPADM

## 2025-03-11 RX ORDER — TALC
6 POWDER (GRAM) TOPICAL NIGHTLY PRN
Status: DISCONTINUED | OUTPATIENT
Start: 2025-03-11 | End: 2025-03-11

## 2025-03-11 RX ORDER — TAMSULOSIN HYDROCHLORIDE 0.4 MG/1
0.4 CAPSULE ORAL NIGHTLY
Status: DISCONTINUED | OUTPATIENT
Start: 2025-03-11 | End: 2025-03-21 | Stop reason: HOSPADM

## 2025-03-11 RX ORDER — SODIUM CHLORIDE 9 MG/ML
1000 INJECTION, SOLUTION INTRAVENOUS
Status: COMPLETED | OUTPATIENT
Start: 2025-03-11 | End: 2025-03-11

## 2025-03-11 RX ORDER — ONDANSETRON HYDROCHLORIDE 2 MG/ML
4 INJECTION, SOLUTION INTRAVENOUS
Status: COMPLETED | OUTPATIENT
Start: 2025-03-11 | End: 2025-03-11

## 2025-03-11 RX ORDER — CEFTRIAXONE 1 G/1
1 INJECTION, POWDER, FOR SOLUTION INTRAMUSCULAR; INTRAVENOUS
Status: COMPLETED | OUTPATIENT
Start: 2025-03-12 | End: 2025-03-17

## 2025-03-11 RX ORDER — TALC
6 POWDER (GRAM) TOPICAL NIGHTLY PRN
Status: DISCONTINUED | OUTPATIENT
Start: 2025-03-11 | End: 2025-03-21 | Stop reason: HOSPADM

## 2025-03-11 RX ADMIN — SODIUM CHLORIDE 1000 ML: 9 INJECTION, SOLUTION INTRAVENOUS at 11:03

## 2025-03-11 RX ADMIN — LEVETIRACETAM 1250 MG: 500 TABLET, FILM COATED ORAL at 09:03

## 2025-03-11 RX ADMIN — SODIUM CHLORIDE 1000 ML: 9 INJECTION, SOLUTION INTRAVENOUS at 02:03

## 2025-03-11 RX ADMIN — OXCARBAZEPINE 900 MG: 300 TABLET, FILM COATED ORAL at 09:03

## 2025-03-11 RX ADMIN — TOPIRAMATE 100 MG: 100 TABLET, FILM COATED ORAL at 09:03

## 2025-03-11 RX ADMIN — SODIUM CHLORIDE 1000 ML: 9 INJECTION, SOLUTION INTRAVENOUS at 10:03

## 2025-03-11 RX ADMIN — ONDANSETRON 4 MG: 2 INJECTION INTRAMUSCULAR; INTRAVENOUS at 11:03

## 2025-03-11 RX ADMIN — CEFTRIAXONE SODIUM 1 G: 1 INJECTION, POWDER, FOR SOLUTION INTRAMUSCULAR; INTRAVENOUS at 12:03

## 2025-03-11 RX ADMIN — ATORVASTATIN CALCIUM 40 MG: 40 TABLET, FILM COATED ORAL at 09:03

## 2025-03-11 RX ADMIN — TAMSULOSIN HYDROCHLORIDE 0.4 MG: 0.4 CAPSULE ORAL at 09:03

## 2025-03-11 NOTE — PLAN OF CARE
Problem: Adult Inpatient Plan of Care  Goal: Plan of Care Review  Outcome: Progressing  Goal: Patient-Specific Goal (Individualized)  Outcome: Progressing  Goal: Absence of Hospital-Acquired Illness or Injury  Outcome: Progressing  Goal: Optimal Comfort and Wellbeing  Outcome: Progressing  Goal: Readiness for Transition of Care  Outcome: Progressing     Problem: Acute Kidney Injury/Impairment  Goal: Fluid and Electrolyte Balance  Outcome: Progressing  Goal: Improved Oral Intake  Outcome: Progressing  Goal: Effective Renal Function  Outcome: Progressing     Problem: Pain Acute  Goal: Optimal Pain Control and Function  Outcome: Progressing     Problem: Infection  Goal: Absence of Infection Signs and Symptoms  Outcome: Progressing     Problem: Fall Injury Risk  Goal: Absence of Fall and Fall-Related Injury  Outcome: Progressing     Problem: Fatigue  Goal: Improved Activity Tolerance  Outcome: Progressing     Problem: Fluid Volume Deficit  Goal: Fluid Balance  Outcome: Progressing     Problem: Comorbidity Management  Goal: Maintenance of Heart Failure Symptom Control  Outcome: Progressing  Goal: Blood Pressure in Desired Range  Outcome: Progressing  Goal: Bariatric Home Regimen Maintained  Outcome: Progressing  Goal: Maintenance of Seizure Control  Outcome: Progressing

## 2025-03-11 NOTE — H&P
[facility]  [facility] Medicine  History & Physical    Patient Name: Nathen Morales  MRN: [id number]  Patient Class: IP- Inpatient  Admission Date: 3/11/2025  Attending Physician: Anabelle Rivas DO   Primary Care Provider: Brock Brown MD         Patient information was obtained from ER records.     Subjective:     Principal Problem:Urinary tract infection with hematuria    Chief Complaint:   Chief Complaint   Patient presents with    Fall     Weakness x 2 days fell into wall, hit right side of head. On blood thinners. Also having right leg pain.         HPI: 73 yo CM with PMH CAD s/p CABG, chronic right and left hydronephrosis with chronic ureteral stones, BPH, HTN, and seizure disorder presented to the ED with 2 days of weakness and a fall in his bathroom, hitting his head on the toilet. He did not lose consciousness. Has no esme on his head. Workup revealed elevated creatinine at 2.1, baseline 1.3, elevated troponin 0.055, and UTI on catheter collected urine.  After IV hydration creatinine improved to 1.87, and troponin decreased to 0.048. CT head unremarkable. EKG with borderline LVH and 1st degree AV block. CT abd showed chronic  changes. Pt was given rocephin and hospital medicine was consulted for admission for IV hydration, antibiotics, and trending of labs/follow-up of urine culture.      Upon my examination in the ER, the pt denies any chest pain or sob. Pt lives alone, but his niece brings him groceries and fixes his medications once a week. He does not drive or cut his own grass. He does not remember the last time he fell prior to today. Reports his appetite has been normal and he has been drinking fluids as normal. Takes torsemide 20mg daily. Denies any trouble with urination.     Past Medical History:   Diagnosis Date    BPH (benign prostatic hyperplasia)     Hypertension     Seizures        Past Surgical History:   Procedure Laterality Date    ADENOIDECTOMY       APPENDECTOMY      CARDIAC VALVE REPLACEMENT      CHOLECYSTECTOMY      COLONOSCOPY  04/21/2016    Dr Brock Brown    CORONARY ARTERY BYPASS GRAFT      EYE SURGERY      INCISION AND DRAINAGE OF ABSCESS Right 03/20/2024    Dr Amelia Carrasco    LITHOTRIPSY  11/20/2024    Dr Maurisio King    PROSTATE SURGERY      TONSILLECTOMY      TRANSURETHRAL RESECTION OF PROSTATE         Review of patient's allergies indicates:  No Known Allergies    No current facility-administered medications on file prior to encounter.     Current Outpatient Medications on File Prior to Encounter   Medication Sig    aspirin 81 mg Cap Take 81 mg by mouth Daily.    levETIRAcetam (KEPPRA) 1000 MG tablet TAKE 1 AND 1/2 TABLETS BY MOUTH  TWICE DAILY (Patient taking differently: Take 1,250 mg by mouth 2 (two) times daily.)    multivitamin (THERAGRAN) tablet Take 1 tablet by mouth once daily.    OXcarbazepine (TRILEPTAL) 300 MG Tab Take 3 tablets (900 mg total) by mouth 2 (two) times daily.    rosuvastatin (CRESTOR) 10 MG tablet Take 1 tablet (10 mg total) by mouth every evening.    sertraline (ZOLOFT) 50 MG tablet TAKE 1 TABLET BY MOUTH ONCE  DAILY    tamsulosin (FLOMAX) 0.4 mg Cap TAKE 1 CAPSULE BY MOUTH TWICE DAILY (Patient taking differently: Take 0.4 mg by mouth nightly.)    temazepam (RESTORIL) 15 mg Cap TAKE ONE CAPSULE BY MOUTH AT BEDTIME    topiramate (TOPAMAX) 100 MG tablet Take 100 mg by mouth 2 (two) times daily.    torsemide (DEMADEX) 20 MG Tab Take 20 mg by mouth once daily.    hyoscyamine 0.125 mg Subl 0.125 mg every 6 (six) hours as needed. (Patient not taking: Reported on 12/19/2024)     Family History       Problem Relation (Age of Onset)    Hypertension Mother          Tobacco Use    Smoking status: Never    Smokeless tobacco: Never   Substance and Sexual Activity    Alcohol use: Never    Drug use: Never    Sexual activity: Never     Review of Systems   Constitutional:  Positive for activity change and fatigue. Negative for  appetite change and fever.   Respiratory:  Negative for shortness of breath and wheezing.    Cardiovascular:  Negative for chest pain and leg swelling.   Gastrointestinal:  Negative for abdominal distention, abdominal pain, constipation and nausea.   Genitourinary:  Negative for difficulty urinating, dysuria and frequency.   Musculoskeletal:  Positive for arthralgias (left hip). Negative for joint swelling and myalgias.   Skin:  Negative for rash and wound.   Neurological:  Positive for weakness.   Psychiatric/Behavioral:  Negative for agitation, behavioral problems and confusion.      Objective:     Vital Signs (Most Recent):  Temp: 98.5 °F (36.9 °C) (03/11/25 1431)  Pulse: 77 (03/11/25 1431)  Resp: 20 (03/11/25 1431)  BP: 138/70 (03/11/25 1431)  SpO2: 99 % (03/11/25 1431) Vital Signs (24h Range):  Temp:  [98.5 °F (36.9 °C)-98.7 °F (37.1 °C)] 98.5 °F (36.9 °C)  Pulse:  [77-97] 77  Resp:  [18-20] 20  SpO2:  [95 %-99 %] 99 %  BP: (136-169)/(70-88) 138/70     Weight: 92 kg (202 lb 13.2 oz)  Body mass index is 26.76 kg/m².     Physical Exam  Vitals reviewed.   Constitutional:       Appearance: Normal appearance.   HENT:      Head: Normocephalic and atraumatic.      Nose: Nose normal.   Cardiovascular:      Rate and Rhythm: Normal rate and regular rhythm.      Pulses: Normal pulses.      Heart sounds: Normal heart sounds. No murmur heard.     No gallop.   Pulmonary:      Effort: Pulmonary effort is normal. No respiratory distress.      Breath sounds: Normal breath sounds. No wheezing, rhonchi or rales.   Abdominal:      General: Bowel sounds are normal. There is no distension.      Palpations: Abdomen is soft.      Tenderness: There is no abdominal tenderness.   Musculoskeletal:         General: No swelling or deformity.      Cervical back: Normal range of motion.      Right lower leg: Edema (trace ankle edema) present.      Left lower leg: Edema (trace ankle edema) present.   Skin:     General: Skin is warm and dry.       Coloration: Skin is not jaundiced.      Findings: No rash.   Neurological:      Mental Status: He is alert.      Motor: Weakness present.      Gait: Gait abnormal.      Comments: Oriented to place and building. Not the president or the year.   Psychiatric:         Mood and Affect: Mood normal.         Behavior: Behavior normal.                Significant Labs: All pertinent labs within the past 24 hours have been reviewed.  Recent Lab Results  (Last 5 results in the past 24 hours)        03/11/25  1245   03/11/25  1143   03/11/25  1049   03/11/25  1035   03/11/25  1025        Influenza A, Molecular         Not Detected       Influenza B, Molecular         Not Detected       Albumin/Globulin Ratio       0.7         Albumin       2.5         ALP       119         Allens Test     N/A           ALT       16         Anion Gap       10.0         Appearance, UA   Slightly Cloudy             AST       22         Bacteria, UA   Moderate             Baso #       0.05         Basophil %       0.5         Bilirubin (UA)   Negative             BILIRUBIN TOTAL       0.3         Site     Other           BUN       43.1         BUN/CREAT RATIO       23         Calcium       8.2         Chloride       107         CO2       22         Color, UA   Yellow             Creatinine       1.87         eGFR       37  Comment: Estimated GFR calculated using the CKD-EPI creatinine (2021) equation.         Eos #       0.07         Eos %       0.7         Globulin, Total       3.4         Glucose       130         Glucose, UA   Negative             Hematocrit       29.6         Hemoglobin       9.8         Immature Grans (Abs)       0.03         Immature Granulocytes       0.3         Ketones, UA   Negative             Leukocyte Esterase, UA   2+             Lymph #       0.45         LYMPH %       4.7         MCH       33.6         MCHC       33.1         MCV       101.4         Mono #       1.21         Mono %       12.8         MPV        10.0         Neut #       7.67         Neut %       81.0         NITRITE UA   Negative             nRBC       0.0         Blood, UA   3+             pH, UA   6.0             Platelet Count       198         POC Anion Gap     16           POC BUN     38           POC Chloride     105           POC Creatinine     2.1           POC Glucose     127           POC Hematocrit     27           POC HEMOGLOBIN     9           POC Ionized Calcium     1.11           POC Potassium     3.9           POC Sodium     138           POC TCO2 (MEASURED)     22           Potassium       4.0         PROTEIN TOTAL       5.9         Protein, UA   Trace             RBC       2.92         RBC, UA   6-10             RDW       13.9         Sample     VENOUS           SARS-CoV2 (COVID-19) Qualitative PCR         Not Detected       Sodium       139         Specific Gravity,UA   1.015             Squam Epithel, UA   Rare             Troponin I 0.048       0.055         Urobilinogen, UA   0.2             WBC, UA   11-20             WBC       9.48                                Significant Imaging: I have reviewed all pertinent imaging results/findings within the past 24 hours.  Assessment/Plan:     * Urinary tract infection with hematuria  Admit for IVF, Rocephin.  Repeat troponin.  Follow-up of urine culture.  Home meds reconciled.  VTE ppx: SCDs        Elevated troponin  Will check 3rd troponin. Pt is asymptomatic.      DORIS (acute kidney injury)  DORIS is likely due to pre-renal azotemia due to dehydration. Baseline creatinine is  1.3 . Most recent creatinine and eGFR are listed below.  Recent Labs     03/11/25  1035   CREATININE 1.87*   EGFRNORACEVR 37      Plan  - DORIS is improving  - Avoid nephrotoxins and renally dose meds for GFR listed above  - Monitor urine output, serial BMP, and adjust therapy as needed      S/P CABG x 3  Home meds reconciled.    Seizure disorder  Home meds reconciled.       Hypertension  Patient's blood pressure range in  the last 24 hours was: BP  Min: 136/72  Max: 169/88.The patient's inpatient anti-hypertensive regimen is listed below:  Current Antihypertensives       Plan  - BP is uncontrolled, will adjust as follows: Holding torsemide due to DORIS. May need to change rx upon DC.     Benign prostatic hyperplasia  Home meds reconciled.       VTE Risk Mitigation (From admission, onward)           Ordered     IP VTE HIGH RISK PATIENT  Once         03/11/25 1430     Place sequential compression device  Until discontinued         03/11/25 1430                                    ETHAN NORTH DO  Department of Hospital Medicine  [facility]

## 2025-03-11 NOTE — ED NOTES
Patient had bowel movement, wearing a diaper. Moderate amount soft stool., brown in color, cleaned bottom, clean diaper on. Unable to urinate at this time. Radiology called to finish xrays.

## 2025-03-11 NOTE — ASSESSMENT & PLAN NOTE
Patient's blood pressure range in the last 24 hours was: BP  Min: 136/72  Max: 169/88.The patient's inpatient anti-hypertensive regimen is listed below:  Current Antihypertensives       Plan  - BP is uncontrolled, will adjust as follows: Holding torsemide due to DORIS. May need to change rx upon DC.

## 2025-03-11 NOTE — ASSESSMENT & PLAN NOTE
Admit for IVF, Rocephin.  Repeat troponin.  Follow-up of urine culture.  Home meds reconciled.  VTE ppx: SCDs

## 2025-03-11 NOTE — ED NOTES
Dr Pimentel visits patient in room 2, family is being notified of admission.    Lab Results   Component Value Date    HGBA1C 7 7 (H) 07/15/2022       No results for input(s): POCGLU in the last 72 hours      Blood Sugar Average: Last 72 hrs:  ·  about a month ago patient was started on insulin due to uncontrolled diabetes, per brother his blood sugars are in the 400s at home  · He is on Basaglar 20 units HS, aspart 30 units in the morning and 10 units in the evening  · Blood sugar in the ED on the lower side  · Will start Lantus 15 units HS, Humalog 5 units t i d  with meals and sliding scale

## 2025-03-11 NOTE — SUBJECTIVE & OBJECTIVE
Past Medical History:   Diagnosis Date    BPH (benign prostatic hyperplasia)     Hypertension     Seizures        Past Surgical History:   Procedure Laterality Date    ADENOIDECTOMY      APPENDECTOMY      CARDIAC VALVE REPLACEMENT      CHOLECYSTECTOMY      COLONOSCOPY  04/21/2016    Dr Brock Brown    CORONARY ARTERY BYPASS GRAFT      EYE SURGERY      INCISION AND DRAINAGE OF ABSCESS Right 03/20/2024    Dr Amelia Carrasco    LITHOTRIPSY  11/20/2024    Dr Maurisio King    PROSTATE SURGERY      TONSILLECTOMY      TRANSURETHRAL RESECTION OF PROSTATE         Review of patient's allergies indicates:  No Known Allergies    No current facility-administered medications on file prior to encounter.     Current Outpatient Medications on File Prior to Encounter   Medication Sig    aspirin 81 mg Cap Take 81 mg by mouth Daily.    levETIRAcetam (KEPPRA) 1000 MG tablet TAKE 1 AND 1/2 TABLETS BY MOUTH  TWICE DAILY (Patient taking differently: Take 1,250 mg by mouth 2 (two) times daily.)    multivitamin (THERAGRAN) tablet Take 1 tablet by mouth once daily.    OXcarbazepine (TRILEPTAL) 300 MG Tab Take 3 tablets (900 mg total) by mouth 2 (two) times daily.    rosuvastatin (CRESTOR) 10 MG tablet Take 1 tablet (10 mg total) by mouth every evening.    sertraline (ZOLOFT) 50 MG tablet TAKE 1 TABLET BY MOUTH ONCE  DAILY    tamsulosin (FLOMAX) 0.4 mg Cap TAKE 1 CAPSULE BY MOUTH TWICE DAILY (Patient taking differently: Take 0.4 mg by mouth nightly.)    temazepam (RESTORIL) 15 mg Cap TAKE ONE CAPSULE BY MOUTH AT BEDTIME    topiramate (TOPAMAX) 100 MG tablet Take 100 mg by mouth 2 (two) times daily.    torsemide (DEMADEX) 20 MG Tab Take 20 mg by mouth once daily.    hyoscyamine 0.125 mg Subl 0.125 mg every 6 (six) hours as needed. (Patient not taking: Reported on 12/19/2024)     Family History       Problem Relation (Age of Onset)    Hypertension Mother          Tobacco Use    Smoking status: Never    Smokeless tobacco: Never   Substance  and Sexual Activity    Alcohol use: Never    Drug use: Never    Sexual activity: Never     Review of Systems   Constitutional:  Positive for activity change and fatigue. Negative for appetite change and fever.   Respiratory:  Negative for shortness of breath and wheezing.    Cardiovascular:  Negative for chest pain and leg swelling.   Gastrointestinal:  Negative for abdominal distention, abdominal pain, constipation and nausea.   Genitourinary:  Negative for difficulty urinating, dysuria and frequency.   Musculoskeletal:  Positive for arthralgias (left hip). Negative for joint swelling and myalgias.   Skin:  Negative for rash and wound.   Neurological:  Positive for weakness.   Psychiatric/Behavioral:  Negative for agitation, behavioral problems and confusion.      Objective:     Vital Signs (Most Recent):  Temp: 98.5 °F (36.9 °C) (03/11/25 1431)  Pulse: 77 (03/11/25 1431)  Resp: 20 (03/11/25 1431)  BP: 138/70 (03/11/25 1431)  SpO2: 99 % (03/11/25 1431) Vital Signs (24h Range):  Temp:  [98.5 °F (36.9 °C)-98.7 °F (37.1 °C)] 98.5 °F (36.9 °C)  Pulse:  [77-97] 77  Resp:  [18-20] 20  SpO2:  [95 %-99 %] 99 %  BP: (136-169)/(70-88) 138/70     Weight: 92 kg (202 lb 13.2 oz)  Body mass index is 26.76 kg/m².     Physical Exam  Vitals reviewed.   Constitutional:       Appearance: Normal appearance.   HENT:      Head: Normocephalic and atraumatic.      Nose: Nose normal.   Cardiovascular:      Rate and Rhythm: Normal rate and regular rhythm.      Pulses: Normal pulses.      Heart sounds: Normal heart sounds. No murmur heard.     No gallop.   Pulmonary:      Effort: Pulmonary effort is normal. No respiratory distress.      Breath sounds: Normal breath sounds. No wheezing, rhonchi or rales.   Abdominal:      General: Bowel sounds are normal. There is no distension.      Palpations: Abdomen is soft.      Tenderness: There is no abdominal tenderness.   Musculoskeletal:         General: No swelling or deformity.      Cervical back:  Normal range of motion.      Right lower leg: Edema (trace ankle edema) present.      Left lower leg: Edema (trace ankle edema) present.   Skin:     General: Skin is warm and dry.      Coloration: Skin is not jaundiced.      Findings: No rash.   Neurological:      Mental Status: He is alert.      Motor: Weakness present.      Gait: Gait abnormal.      Comments: Oriented to place and building. Not the president or the year.   Psychiatric:         Mood and Affect: Mood normal.         Behavior: Behavior normal.                Significant Labs: All pertinent labs within the past 24 hours have been reviewed.  Recent Lab Results  (Last 5 results in the past 24 hours)        03/11/25  1245   03/11/25  1143   03/11/25  1049   03/11/25  1035   03/11/25  1025        Influenza A, Molecular         Not Detected       Influenza B, Molecular         Not Detected       Albumin/Globulin Ratio       0.7         Albumin       2.5         ALP       119         Allens Test     N/A           ALT       16         Anion Gap       10.0         Appearance, UA   Slightly Cloudy             AST       22         Bacteria, UA   Moderate             Baso #       0.05         Basophil %       0.5         Bilirubin (UA)   Negative             BILIRUBIN TOTAL       0.3         Site     Other           BUN       43.1         BUN/CREAT RATIO       23         Calcium       8.2         Chloride       107         CO2       22         Color, UA   Yellow             Creatinine       1.87         eGFR       37  Comment: Estimated GFR calculated using the CKD-EPI creatinine (2021) equation.         Eos #       0.07         Eos %       0.7         Globulin, Total       3.4         Glucose       130         Glucose, UA   Negative             Hematocrit       29.6         Hemoglobin       9.8         Immature Grans (Abs)       0.03         Immature Granulocytes       0.3         Ketones, UA   Negative             Leukocyte Esterase, UA   2+             Lymph  #       0.45         LYMPH %       4.7         MCH       33.6         MCHC       33.1         MCV       101.4         Mono #       1.21         Mono %       12.8         MPV       10.0         Neut #       7.67         Neut %       81.0         NITRITE UA   Negative             nRBC       0.0         Blood, UA   3+             pH, UA   6.0             Platelet Count       198         POC Anion Gap     16           POC BUN     38           POC Chloride     105           POC Creatinine     2.1           POC Glucose     127           POC Hematocrit     27           POC HEMOGLOBIN     9           POC Ionized Calcium     1.11           POC Potassium     3.9           POC Sodium     138           POC TCO2 (MEASURED)     22           Potassium       4.0         PROTEIN TOTAL       5.9         Protein, UA   Trace             RBC       2.92         RBC, UA   6-10             RDW       13.9         Sample     VENOUS           SARS-CoV2 (COVID-19) Qualitative PCR         Not Detected       Sodium       139         Specific Gravity,UA   1.015             Squam Epithel, UA   Rare             Troponin I 0.048       0.055         Urobilinogen, UA   0.2             WBC, UA   11-20             WBC       9.48                                Significant Imaging: I have reviewed all pertinent imaging results/findings within the past 24 hours.

## 2025-03-11 NOTE — ED PROVIDER NOTES
Encounter Date: 3/11/2025       History     Chief Complaint   Patient presents with    Fall     Weakness x 2 days fell into wall, hit right side of head. On blood thinners. Also having right leg pain.      Pt presents to ER via EMS transfer. He reports being very weak since yesterday, and fell into a wall at home. Reports hit his head. His right hip hurts as well, but he says it is chronic, just hurting worse since his fall this am. Denies any CP or LOC, no nausea. Pt here by himself, lives alone.    The history is provided by the patient and the EMS personnel.     Review of patient's allergies indicates:  No Known Allergies  Past Medical History:   Diagnosis Date    BPH (benign prostatic hyperplasia)     Hypertension     Seizures      Past Surgical History:   Procedure Laterality Date    ADENOIDECTOMY      APPENDECTOMY      CARDIAC VALVE REPLACEMENT      CHOLECYSTECTOMY      COLONOSCOPY  04/21/2016    Dr Brock Brown    CORONARY ARTERY BYPASS GRAFT      EYE SURGERY      INCISION AND DRAINAGE OF ABSCESS Right 03/20/2024    Dr Amelia Carrasco    LITHOTRIPSY  11/20/2024    Dr Maurisio King    PROSTATE SURGERY      TONSILLECTOMY      TRANSURETHRAL RESECTION OF PROSTATE       Family History   Problem Relation Name Age of Onset    Hypertension Mother Jeramy Morales      Social History[1]  Review of Systems   Constitutional:  Positive for fatigue. Negative for fever.   HENT:  Negative for sore throat.    Respiratory:  Negative for cough and shortness of breath.    Cardiovascular:  Negative for chest pain, palpitations and leg swelling.   Gastrointestinal:  Negative for nausea.   Genitourinary:  Negative for dysuria.   Musculoskeletal:  Negative for back pain.        Hip pain   Skin:  Negative for rash.   Neurological:  Positive for weakness and light-headedness. Negative for headaches.   Hematological:  Does not bruise/bleed easily.   All other systems reviewed and are negative.      Physical Exam     Initial  Vitals [03/11/25 1009]   BP Pulse Resp Temp SpO2   136/72 82 20 98.7 °F (37.1 °C) 98 %      MAP       --         Physical Exam    Nursing note and vitals reviewed.  Constitutional: Vital signs are normal. He is cooperative.  Non-toxic appearance. He does not appear ill.   Elderly pt, appears chronically ill, no distress. Alert and oriented x4   HENT:   Head: Normocephalic and atraumatic.   Eyes: Conjunctivae and lids are normal.   Neck: Trachea normal. Neck supple.   Cardiovascular:  Normal rate and regular rhythm.  No extrasystoles are present.          Pulmonary/Chest: Breath sounds normal.   Abdominal: Abdomen is soft. There is no abdominal tenderness.   Musculoskeletal:         General: Normal range of motion.      Cervical back: Neck supple.      Comments: Pt tender right anterior hip, not shortened nor externally rotated. Distal cap refill <2 sec, sensation intact.     Neurological: He is alert and oriented to person, place, and time. He has normal strength. No cranial nerve deficit or sensory deficit. He displays a negative Romberg sign.   Skin: Skin is warm, dry and intact. Capillary refill takes less than 2 seconds.   Psychiatric: He has a normal mood and affect. His speech is normal and behavior is normal. He is not actively hallucinating. He is attentive.         ED Course   Procedures  Labs Reviewed   TROPONIN I - Abnormal       Result Value    Troponin-I 0.055 (*)    COMPREHENSIVE METABOLIC PANEL - Abnormal    Sodium 139      Potassium 4.0      Chloride 107      CO2 22 (*)     Glucose 130 (*)     Blood Urea Nitrogen 43.1 (*)     Creatinine 1.87 (*)     Calcium 8.2 (*)     Protein Total 5.9      Albumin 2.5 (*)     Globulin 3.4      Albumin/Globulin Ratio 0.7 (*)     Bilirubin Total 0.3            ALT 16      AST 22      eGFR 37      Anion Gap 10.0      BUN/Creatinine Ratio 23     URINALYSIS, REFLEX TO URINE CULTURE - Abnormal    Color, UA Yellow      Appearance, UA Slightly Cloudy (*)      Specific Gravity, UA 1.015      pH, UA 6.0      Protein, UA Trace (*)     Glucose, UA Negative      Ketones, UA Negative      Blood, UA 3+ (*)     Bilirubin, UA Negative      Urobilinogen, UA 0.2      Nitrites, UA Negative      Leukocyte Esterase, UA 2+ (*)    CBC WITH DIFFERENTIAL - Abnormal    WBC 9.48      RBC 2.92 (*)     Hgb 9.8 (*)     Hct 29.6 (*)     .4 (*)     MCH 33.6 (*)     MCHC 33.1      RDW 13.9      Platelet 198      MPV 10.0      Neut % 81.0      Lymph % 4.7      Mono % 12.8      Eos % 0.7      Basophil % 0.5      Imm Grans % 0.3      Neut # 7.67      Lymph # 0.45 (*)     Mono # 1.21      Eos # 0.07      Baso # 0.05      Imm Gran # 0.03      NRBC% 0.0     TROPONIN I - Abnormal    Troponin-I 0.048 (*)    URINALYSIS, MICROSCOPIC - Abnormal    Bacteria, UA Moderate (*)     RBC, UA 6-10 (*)     WBC, UA 11-20 (*)     Squamous Epithelial Cells, UA Rare     ISTAT CHEM8 - Abnormal    POC Glucose 127 (*)     POC BUN 38 (*)     POC Creatinine 2.1 (*)     POC Sodium 138      POC Potassium 3.9      POC Chloride 105      POC TCO2 (MEASURED) 22 (*)     POC Anion Gap 16      POC Ionized Calcium 1.11      POC Hematocrit 27 (*)     POC HEMOGLOBIN 9      Sample VENOUS      Site Other      Allens Test N/A     COVID/FLU A&B PCR - Normal    Influenza A PCR Not Detected      Influenza B PCR Not Detected      SARS-CoV-2 PCR Not Detected      Narrative:     The Xpert Xpress SARS-CoV-2/FLU/RSV plus is a rapid, multiplexed real-time PCR test intended for the simultaneous qualitative detection and differentiation of SARS-CoV-2, Influenza A, Influenza B, and respiratory syncytial virus (RSV) viral RNA in either nasopharyngeal swab or nasal swab specimens.         CULTURE, URINE   CBC W/ AUTO DIFFERENTIAL    Narrative:     The following orders were created for panel order CBC Auto Differential.  Procedure                               Abnormality         Status                     ---------                                -----------         ------                     CBC with Differential[[id number]]       Abnormal            Final result                 Please view results for these tests on the individual orders.   LEVETIRACETAM  (KEPPRA) LEVEL     EKG Readings: (Independently Interpreted)   NSR, 1st avb, LAD, LVH, nl ST, nl T     ECG Results              EKG 12-lead (In process)        Collection Time Result Time QRS Duration OHS QTC Calculation    03/11/25 10:06:51 03/11/25 11:00:17 102 458                     In process by Interface, Lab In [facility] (03/11/25 11:00:22)                   Narrative:    Test Reason : R53.1,    Vent. Rate :  84 BPM     Atrial Rate :  84 BPM     P-R Int : 232 ms          QRS Dur : 102 ms      QT Int : 388 ms       P-R-T Axes :  41 -31  -4 degrees    QTcB Int : 458 ms    Sinus rhythm with 1st degree A-V block  Left axis deviation  Minimal voltage criteria for LVH, may be normal variant  Abnormal ECG  When compared with ECG of 17-Dec-2024 05:39,  No significant change was found    Referred By: AAAREFERRAL SELF           Confirmed By:                                   Imaging Results              CT Abdomen Pelvis  Without Contrast (Final result)  Result time 03/11/25 12:43:50      Final result by Kendy Paredes MD (03/11/25 12:43:50)                   Impression:      1. Mild left hydroureteronephrosis with tandem stones at the left ureterovesical junction.  Hydronephrosis appears similar to sonogram 08/13/2024  2. Chronic severe obstruction at the distal right ureter.  Note on prior renogram right kidney contributed less than 20% split renal function.  3. Hiatal hernia  4. No evidence of bowel obstruction.      Electronically signed by: Kendy Paredes  Date:    03/11/2025  Time:    12:43               Narrative:    EXAMINATION:  CT ABDOMEN PELVIS WITHOUT CONTRAST    CLINICAL HISTORY:  Bowel obstruction suspected;  no physician note available.  Per nursing notes, patient had a bowel movement,  soft stool.  Unable to urinate.  Vomiting after glass of water administered PO.    TECHNIQUE:  Helically acquired axial images, sagittal and coronal reformations were obtained from the lung bases to the pubic symphysis without the IV administration of contrast.    Automated tube current modulation, weight-based exposure dosing, and/or iterative reconstruction technique utilized to reach lowest reasonably achievable exposure rate.    DLP: 434 mGy*cm    COMPARISON:  Nuclear medicine renogram 09/06/2024, retroperitoneal sonogram 08/13/2024    FINDINGS:  HEART: There are coronary artery calcifications.    LUNG BASES: Basilar atelectasis versus scarring.    LIVER: Normal attenuation. No appreciable focal hepatic lesion.    BILIARY: Gallbladder is surgically absent.    PANCREAS: Atrophied    SPLEEN: Normal in size    ADRENALS: No mass.    KIDNEYS/URETERS: Chronic right hydronephrosis and hydroureter, similar to prior imaging.  There are calcifications at the distal ureter near the pelvic brim with decompressed distal ureter.  Mild left hydroureteronephrosis, similar to prior sonogram.  There are tandem stones at the left ureterovesical junction measuring 4-5 mm.  There are left renal caliceal calculi measuring up to 12 mm.  No perinephric stranding.  Bilateral renal cortical lesions, previously characterized as cysts on sonogram.    GI TRACT/MESENTERY: Evaluation of the bowel is limited without contrast.  Moderate sliding hiatal hernia.  There is a periampullary duodenal diverticulum.  No evidence of bowel obstruction.  Colonic diverticulosis without acute inflammatory change.    PERITONEUM: No free fluid.No free air.    LYMPH NODES: No enlarged lymph nodes by size criteria.    VASCULATURE: Aortoiliac atherosclerosis.    BLADDER: Nondistended bladder limits CT evaluation    REPRODUCTIVE ORGANS: Normal as visualized.    ABDOMINAL WALL: Fat containing subxiphoid hernia.    BONES: Chronic appearing compression deformity at  L1 with near vertebra plana.                                       CT Head Without Contrast (Final result)  Result time 03/11/25 11:24:35      Final result by Bree Webb MD (03/11/25 11:24:35)                   Impression:      1. No acute intracranial abnormality.  2. Chronic microvascular ischemic changes.      Electronically signed by: Bree Webb  Date:    03/11/2025  Time:    11:24               Narrative:    EXAMINATION:  CT HEAD WITHOUT CONTRAST    CLINICAL HISTORY:  Head trauma, minor (Age >= 65y);    TECHNIQUE:  Axial scans were obtained from skull base to the vertex.    Coronal and sagittal reconstructions obtained from the axial data.    Automatic exposure control was utilized to limit radiation dose.    Contrast: None    Radiation Dose:    Total DLP: 978 mGy*cm    COMPARISON:  CT head dated 03/18/2023    FINDINGS:  There is no acute intracranial hemorrhage or edema. The gray-white matter differentiation is preserved.  Scattered hypodensities in the subcortical and periventricular white matter likely represent chronic microvascular ischemic changes.    There is no mass effect or midline shift.  There is diffuse parenchymal volume loss.  The basal cisterns are patent. There is no abnormal extra-axial fluid collection.  Carotid and vertebral artery calcifications are noted.    The calvarium and skull base are intact.  There is mild scattered paranasal sinus mucosal thickening.                                       X-Ray Hip 2 or 3 views Right with Pelvis when performed (Final result)  Result time 03/11/25 13:33:13      Final result by Driss Suazo MD (03/11/25 13:33:13)                   Narrative:    EXAMINATION  XR HIP WITH PELVIS WHEN PERFORMED 2 OR 3 VIEWS RIGHT    CLINICAL HISTORY  trauma;  recent fall    TECHNIQUE  A total of 3 images submitted of the right hip/pelvis.    COMPARISON  None available at the time of initial interpretation.    FINDINGS  There are mild regional arthritic  changes.  No displaced fracture or dislocation is identified. The visualized pelvic ring structures and articular spaces are preserved. There is no suggestion of large joint effusion. No aggressive osseous lesion or periosteal reaction is appreciated. The trabecular pattern is unremarkable.    No acute soft tissue abnormality is identified.  Scattered vascular calcifications are present.    IMPRESSION  1. Degenerative osseous changes without convincing acute abnormality.  2. Incidental scattered vascular calcification.      Electronically signed by: Driss Suazo  Date:    03/11/2025  Time:    13:33                                     X-Ray Chest AP Portable (Final result)  Result time 03/11/25 12:55:11      Final result by Luiz Hirsch MD (03/11/25 12:55:11)                   Impression:      No acute pulmonary process identified.      Electronically signed by: Luiz Hirsch  Date:    03/11/2025  Time:    12:55               Narrative:    EXAMINATION:  XR CHEST AP PORTABLE    CLINICAL HISTORY:  Weakness    TECHNIQUE:  Frontal view(s) of the chest.    COMPARISON:  Radiography 05/27/2024    FINDINGS:  Previous sternotomy.  Heart size upper normal.  The lungs are well-inflated.  No consolidation identified.  No significant pleural effusion or discernible pneumothorax.                                       Medications   ondansetron injection 4 mg (4 mg Intravenous Given 3/11/25 1125)   0.9% NaCl infusion (1,000 mLs Intravenous New Bag 3/11/25 1153)   cefTRIAXone injection 1 g (1 g Intravenous Given 3/11/25 1253)     Medical Decision Making  Differential Dx: UTI, dehydration, ACS, TBI, anemia, viral infection, electrolyte abnormality    1115. Pt asked for water, drank it quickly, then threw it up. Denies abdominal pain. Abdomen remains non-tender to exam.     Chronic anemia (9.8/30.7 h/h on 12/18/24). Dehydration noted, elevated BUN/Cr 38/2.1 (was 35/1.5).    Dehydrated on labs, Cr 2.1. Will add gentle ivf and scan  abdomen. Trop 0.055. Follow serial trop in 2h. May be elevated due to CRI.    Repeat trop 0.048.  UTI treating with rocephin.     Amount and/or Complexity of Data Reviewed  Labs: ordered.     Details: Cbc h/h 9.8/29.6, CMP BUN/Cr 38/2.1, Trop 0.055, UTI noted.  Radiology: ordered.     Details: CT head no acute  Right Hip: no fx  CXR no acute  CT abd/pel:  1. Mild left hydroureteronephrosis with tandem stones at the left ureterovesical junction.  Hydronephrosis appears similar to sonogram 08/13/2024  2. Chronic severe obstruction at the distal right ureter.  Note on prior renogram right kidney contributed less than 20% split renal function.  3. Hiatal hernia  4. No evidence of bowel obstruction.      Risk  Prescription drug management.  Decision regarding hospitalization.                                      Clinical Impression:  Final diagnoses:  [R53.1] Weak  [T14.90XA] Trauma  [N39.0] Urinary tract infection without hematuria, site unspecified (Primary)  [E86.0] Dehydration  [S09.90XA] Injury of head, initial encounter          ED Disposition Condition    Admit Stable                    [1]   Social History  Tobacco Use    Smoking status: Never    Smokeless tobacco: Never   Substance Use Topics    Alcohol use: Never    Drug use: Never        Reggie Tom MD  03/11/25 8846

## 2025-03-11 NOTE — ED NOTES
Return from CT department, placed in room 2. Repositioned him. He asked to drink to get urine sample, approved by Dr Velásquez, patient drank 1 glass of water. 2 minutes after, he vomited fluid approximately  200 cc measured, some on his gown. Gown changed with cleaning up.

## 2025-03-11 NOTE — HPI
75 yo CM with PMH CAD s/p CABG, chronic right and left hydronephrosis with chronic stones, BPH, HTN, and seizure disorder presented to the ED with 2 days of weakness and a fall in his bathroom, hitting his head on the toilet. He did not lose consciousness. Has no esme on his head. Workup revealed elevated creatinine at 2.1, baseline 1.3, elevated troponin 0.055, and UTI on catheter collected urine.  After IV hydration creatinine improved to 1.87, and troponin decreased to 0.048. CT head unremarkable. EKG with borderline LVH and 1st degree AV block. CT abd showed chronic  changes. Pt was given rocephin and hospital medicine was consulted for admission for IV hydration, antibiotics, and trending of labs/follow-up of urine culture.      Upon my examination in the ER, the pt denies any chest pain or sob. Pt lives alone, but his niece brings him groceries and fixes his medications once a week. He does not drive or cut his own grass. He does not remember the last time he fell prior to today. Reports his appetite has been normal and he has been drinking fluids as normal. Takes torsemide 20mg daily. Denies any trouble with urination.

## 2025-03-11 NOTE — ASSESSMENT & PLAN NOTE
DORIS is likely due to pre-renal azotemia due to dehydration. Baseline creatinine is  1.3 . Most recent creatinine and eGFR are listed below.  Recent Labs     03/11/25  1035   CREATININE 1.87*   EGFRNORACEVR 37      Plan  - DORIS is improving  - Avoid nephrotoxins and renally dose meds for GFR listed above  - Monitor urine output, serial BMP, and adjust therapy as needed

## 2025-03-12 PROBLEM — E44.0 MODERATE MALNUTRITION: Status: ACTIVE | Noted: 2025-03-12
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
## 2025-03-12 LAB
ALBUMIN SERPL-MCNC: 2.3 G/DL (ref 3.4–4.8)
ALBUMIN/GLOB SERPL: 0.6 RATIO (ref 1.1–2)
ALP SERPL-CCNC: 106 UNIT/L (ref 40–150)
ALT SERPL-CCNC: 16 UNIT/L (ref 0–55)
ANION GAP SERPL CALC-SCNC: 10 MEQ/L
AST SERPL-CCNC: 17 UNIT/L (ref 5–34)
BASOPHILS # BLD AUTO: 0.04 X10(3)/MCL
BASOPHILS NFR BLD AUTO: 0.5 %
BILIRUB SERPL-MCNC: 0.2 MG/DL
BUN SERPL-MCNC: 44 MG/DL (ref 8.4–25.7)
CALCIUM SERPL-MCNC: 8.5 MG/DL (ref 8.8–10)
CHLORIDE SERPL-SCNC: 112 MMOL/L (ref 98–107)
CO2 SERPL-SCNC: 18 MMOL/L (ref 23–31)
CREAT SERPL-MCNC: 1.72 MG/DL (ref 0.72–1.25)
CREAT/UREA NIT SERPL: 26
EOSINOPHIL # BLD AUTO: 0.1 X10(3)/MCL (ref 0–0.9)
EOSINOPHIL NFR BLD AUTO: 1.1 %
ERYTHROCYTE [DISTWIDTH] IN BLOOD BY AUTOMATED COUNT: 13.5 % (ref 11.5–17)
GFR SERPLBLD CREATININE-BSD FMLA CKD-EPI: 41 ML/MIN/1.73/M2
GLOBULIN SER-MCNC: 3.6 GM/DL (ref 2.4–3.5)
GLUCOSE SERPL-MCNC: 104 MG/DL (ref 82–115)
HCT VFR BLD AUTO: 28.7 % (ref 42–52)
HGB BLD-MCNC: 9.2 G/DL (ref 14–18)
IMM GRANULOCYTES # BLD AUTO: 0.05 X10(3)/MCL (ref 0–0.04)
IMM GRANULOCYTES NFR BLD AUTO: 0.6 %
LYMPHOCYTES # BLD AUTO: 0.47 X10(3)/MCL (ref 0.6–4.6)
LYMPHOCYTES NFR BLD AUTO: 5.4 %
MCH RBC QN AUTO: 32.6 PG (ref 27–31)
MCHC RBC AUTO-ENTMCNC: 32.1 G/DL (ref 33–36)
MCV RBC AUTO: 101.8 FL (ref 80–94)
MONOCYTES # BLD AUTO: 1.05 X10(3)/MCL (ref 0.1–1.3)
MONOCYTES NFR BLD AUTO: 12 %
NEUTROPHILS # BLD AUTO: 7.01 X10(3)/MCL (ref 2.1–9.2)
NEUTROPHILS NFR BLD AUTO: 80.4 %
NRBC BLD AUTO-RTO: 0 %
PLATELET # BLD AUTO: 204 X10(3)/MCL (ref 130–400)
PMV BLD AUTO: 10.2 FL (ref 7.4–10.4)
POTASSIUM SERPL-SCNC: 3.9 MMOL/L (ref 3.5–5.1)
PROT SERPL-MCNC: 5.9 GM/DL (ref 5.8–7.6)
RBC # BLD AUTO: 2.82 X10(6)/MCL (ref 4.7–6.1)
SODIUM SERPL-SCNC: 140 MMOL/L (ref 136–145)
WBC # BLD AUTO: 8.72 X10(3)/MCL (ref 4.5–11.5)

## 2025-03-12 PROCEDURE — 63600175 PHARM REV CODE 636 W HCPCS: Performed by: FAMILY MEDICINE

## 2025-03-12 PROCEDURE — 36415 COLL VENOUS BLD VENIPUNCTURE: CPT | Performed by: FAMILY MEDICINE

## 2025-03-12 PROCEDURE — 25000003 PHARM REV CODE 250: Performed by: INTERNAL MEDICINE

## 2025-03-12 PROCEDURE — 80053 COMPREHEN METABOLIC PANEL: CPT | Performed by: FAMILY MEDICINE

## 2025-03-12 PROCEDURE — 11000001 HC ACUTE MED/SURG PRIVATE ROOM

## 2025-03-12 PROCEDURE — 94761 N-INVAS EAR/PLS OXIMETRY MLT: CPT

## 2025-03-12 PROCEDURE — 85025 COMPLETE CBC W/AUTO DIFF WBC: CPT | Performed by: FAMILY MEDICINE

## 2025-03-12 RX ORDER — SODIUM CHLORIDE 9 MG/ML
1000 INJECTION, SOLUTION INTRAVENOUS CONTINUOUS
Status: ACTIVE | OUTPATIENT
Start: 2025-03-12 | End: 2025-03-12

## 2025-03-12 RX ORDER — DICYCLOMINE HYDROCHLORIDE 10 MG/1
10 CAPSULE ORAL 4 TIMES DAILY PRN
Status: DISCONTINUED | OUTPATIENT
Start: 2025-03-12 | End: 2025-03-21 | Stop reason: HOSPADM

## 2025-03-12 RX ORDER — SODIUM CHLORIDE 9 MG/ML
INJECTION, SOLUTION INTRAVENOUS CONTINUOUS
Status: DISCONTINUED | OUTPATIENT
Start: 2025-03-12 | End: 2025-03-13

## 2025-03-12 RX ORDER — TRAMADOL HYDROCHLORIDE 50 MG/1
50 TABLET ORAL EVERY 6 HOURS PRN
Status: DISCONTINUED | OUTPATIENT
Start: 2025-03-12 | End: 2025-03-21 | Stop reason: HOSPADM

## 2025-03-12 RX ORDER — SELENIUM 50 MCG
1 TABLET ORAL
Status: DISCONTINUED | OUTPATIENT
Start: 2025-03-12 | End: 2025-03-20

## 2025-03-12 RX ADMIN — CEFTRIAXONE SODIUM 1 G: 1 INJECTION, POWDER, FOR SOLUTION INTRAMUSCULAR; INTRAVENOUS at 02:03

## 2025-03-12 RX ADMIN — SODIUM CHLORIDE: 9 INJECTION, SOLUTION INTRAVENOUS at 08:03

## 2025-03-12 RX ADMIN — LEVETIRACETAM 1250 MG: 500 TABLET, FILM COATED ORAL at 09:03

## 2025-03-12 RX ADMIN — ATORVASTATIN CALCIUM 40 MG: 40 TABLET, FILM COATED ORAL at 09:03

## 2025-03-12 RX ADMIN — OXCARBAZEPINE 900 MG: 300 TABLET, FILM COATED ORAL at 09:03

## 2025-03-12 RX ADMIN — Medication 1 CAPSULE: at 12:03

## 2025-03-12 RX ADMIN — TOPIRAMATE 100 MG: 100 TABLET, FILM COATED ORAL at 09:03

## 2025-03-12 RX ADMIN — TRAMADOL HYDROCHLORIDE 50 MG: 50 TABLET, COATED ORAL at 12:03

## 2025-03-12 RX ADMIN — SERTRALINE HYDROCHLORIDE 50 MG: 50 TABLET ORAL at 09:03

## 2025-03-12 RX ADMIN — ONDANSETRON 4 MG: 4 TABLET, ORALLY DISINTEGRATING ORAL at 10:03

## 2025-03-12 RX ADMIN — TAMSULOSIN HYDROCHLORIDE 0.4 MG: 0.4 CAPSULE ORAL at 09:03

## 2025-03-12 RX ADMIN — DICYCLOMINE HYDROCHLORIDE 10 MG: 10 CAPSULE ORAL at 12:03

## 2025-03-12 RX ADMIN — Medication 1 CAPSULE: at 04:03

## 2025-03-12 RX ADMIN — ASPIRIN 81 MG: 81 TABLET, CHEWABLE ORAL at 09:03

## 2025-03-12 RX ADMIN — ACETAMINOPHEN 1000 MG: 500 TABLET, FILM COATED ORAL at 09:03

## 2025-03-12 NOTE — PROGRESS NOTES
[facility]  [facility] Medicine  Progress Note    Patient Name: Nathen Morales  MRN: [id number]  Patient Class: IP- Inpatient   Admission Date: 3/11/2025  Length of Stay: 1 days  Attending Physician: Anabelle Rivas DO  Primary Care Provider: Brock Brown MD        Subjective     Principal Problem:Urinary tract infection with hematuria        HPI:  73 yo CM with PMH CAD s/p CABG, chronic right and left hydronephrosis with chronic stones, BPH, HTN, and seizure disorder presented to the ED with 2 days of weakness and a fall in his bathroom, hitting his head on the toilet. He did not lose consciousness. Has no esme on his head. Workup revealed elevated creatinine at 2.1, baseline 1.3, elevated troponin 0.055, and UTI on catheter collected urine.  After IV hydration creatinine improved to 1.87, and troponin decreased to 0.048. CT head unremarkable. EKG with borderline LVH and 1st degree AV block. CT abd showed chronic  changes. Pt was given rocephin and hospital medicine was consulted for admission for IV hydration, antibiotics, and trending of labs/follow-up of urine culture.      Upon my examination in the ER, the pt denies any chest pain or sob. Pt lives alone, but his niece brings him groceries and fixes his medications once a week. He does not drive or cut his own grass. He does not remember the last time he fell prior to today. Reports his appetite has been normal and he has been drinking fluids as normal. Takes torsemide 20mg daily. Denies any trouble with urination.     Overview/Hospital Course:  3/12/25: PT lying in bed. States he is not feeling good this morning. He is having trouble describing to me what is bothering him. He is complaining that his right femur is hurting since his fall. States that his femur was hurting yesterday at home and this contributed to him losing his balance and falling in the bathroom. Pt started vomiting during my exam. He is also  complaining of abdominal cramping. States he has a bm this morning that was normal. Also states that he is having no issues with urination. Denies any sob or cp.    Past Medical History:   Diagnosis Date    BPH (benign prostatic hyperplasia)     Hypertension     Seizures        Past Surgical History:   Procedure Laterality Date    ADENOIDECTOMY      APPENDECTOMY      CARDIAC VALVE REPLACEMENT      CHOLECYSTECTOMY      COLONOSCOPY  04/21/2016    Dr Brock Brown    CORONARY ARTERY BYPASS GRAFT      EYE SURGERY      INCISION AND DRAINAGE OF ABSCESS Right 03/20/2024    Dr Amelia Carrasco    LITHOTRIPSY  11/20/2024    Dr Maurisio King    PROSTATE SURGERY      TONSILLECTOMY      TRANSURETHRAL RESECTION OF PROSTATE         Review of patient's allergies indicates:  No Known Allergies    No current facility-administered medications on file prior to encounter.     Current Outpatient Medications on File Prior to Encounter   Medication Sig    aspirin 81 mg Cap Take 81 mg by mouth Daily.    levETIRAcetam (KEPPRA) 1000 MG tablet TAKE 1 AND 1/2 TABLETS BY MOUTH  TWICE DAILY (Patient taking differently: Take 1,250 mg by mouth 2 (two) times daily.)    multivitamin (THERAGRAN) tablet Take 1 tablet by mouth once daily.    OXcarbazepine (TRILEPTAL) 300 MG Tab Take 3 tablets (900 mg total) by mouth 2 (two) times daily.    rosuvastatin (CRESTOR) 10 MG tablet Take 1 tablet (10 mg total) by mouth every evening.    sertraline (ZOLOFT) 50 MG tablet TAKE 1 TABLET BY MOUTH ONCE  DAILY    tamsulosin (FLOMAX) 0.4 mg Cap TAKE 1 CAPSULE BY MOUTH TWICE DAILY (Patient taking differently: Take 0.4 mg by mouth nightly.)    temazepam (RESTORIL) 15 mg Cap TAKE ONE CAPSULE BY MOUTH AT BEDTIME    topiramate (TOPAMAX) 100 MG tablet Take 100 mg by mouth 2 (two) times daily.    torsemide (DEMADEX) 20 MG Tab Take 20 mg by mouth once daily.    hyoscyamine 0.125 mg Subl 0.125 mg every 6 (six) hours as needed. (Patient not taking: Reported on 12/19/2024)      Family History       Problem Relation (Age of Onset)    Hypertension Mother          Tobacco Use    Smoking status: Never    Smokeless tobacco: Never   Substance and Sexual Activity    Alcohol use: Never    Drug use: Never    Sexual activity: Never     Review of Systems   Constitutional:  Positive for activity change and fatigue. Negative for appetite change and fever.   Respiratory:  Negative for shortness of breath and wheezing.    Cardiovascular:  Negative for chest pain and leg swelling.   Gastrointestinal:  Positive for abdominal pain, nausea and vomiting. Negative for abdominal distention and constipation.   Genitourinary:  Negative for difficulty urinating, dysuria and frequency.   Musculoskeletal:  Positive for arthralgias (left hip) and gait problem. Negative for joint swelling and myalgias.   Skin:  Negative for rash and wound.   Neurological:  Positive for weakness.   Psychiatric/Behavioral:  Negative for agitation, behavioral problems and confusion.      Objective:     Vital Signs (Most Recent):  Temp: 98.7 °F (37.1 °C) (03/12/25 1131)  Pulse: 98 (03/12/25 1131)  Resp: 18 (03/12/25 1131)  BP: (!) 160/81 (03/12/25 1131)  SpO2: 95 % (03/12/25 1131) Vital Signs (24h Range):  Temp:  [97.9 °F (36.6 °C)-98.7 °F (37.1 °C)] 98.7 °F (37.1 °C)  Pulse:  [75-98] 98  Resp:  [18-20] 18  SpO2:  [95 %-99 %] 95 %  BP: (131-169)/(69-88) 160/81     Weight: 92 kg (202 lb 13.2 oz)  Body mass index is 26.76 kg/m².     Physical Exam  Vitals reviewed.   Constitutional:       Appearance: Normal appearance.   HENT:      Head: Normocephalic and atraumatic.      Nose: Nose normal.   Eyes:      Conjunctiva/sclera: Conjunctivae normal.   Cardiovascular:      Rate and Rhythm: Normal rate and regular rhythm.      Pulses: Normal pulses.      Heart sounds: Normal heart sounds. No murmur heard.     No gallop.   Pulmonary:      Effort: Pulmonary effort is normal. No respiratory distress.      Breath sounds: Normal breath sounds. No  wheezing, rhonchi or rales.   Abdominal:      General: Bowel sounds are normal. There is no distension.      Palpations: Abdomen is soft.   Musculoskeletal:         General: No swelling or deformity.      Cervical back: Normal range of motion and neck supple.      Right lower leg: No edema.      Left lower leg: No edema.   Skin:     General: Skin is warm and dry.      Coloration: Skin is not jaundiced.      Findings: No rash.   Neurological:      Mental Status: He is alert.      Motor: Weakness present.      Gait: Gait abnormal.      Comments: Oriented to place and building. Not the president or the year.   Psychiatric:         Mood and Affect: Mood normal.         Behavior: Behavior normal.                Significant Labs: All pertinent labs within the past 24 hours have been reviewed.  Recent Lab Results         03/12/25  0353   03/11/25  1900   03/11/25  1245   03/11/25  1143        Albumin/Globulin Ratio 0.6             Albumin 2.3                          ALT 16             Anion Gap 10.0             Appearance, UA       Slightly Cloudy       AST 17             Bacteria, UA       Moderate       Baso # 0.04             Basophil % 0.5             Bilirubin (UA)       Negative       BILIRUBIN TOTAL 0.2             BUN 44.0             BUN/CREAT RATIO 26             Calcium 8.5             Chloride 112             CO2 18             Color, UA       Yellow       Creatinine 1.72             eGFR 41  Comment: Estimated GFR calculated using the CKD-EPI creatinine (2021) equation.             Eos # 0.10             Eos % 1.1             Globulin, Total 3.6             Glucose 104             Glucose, UA       Negative       Hematocrit 28.7             Hemoglobin 9.2             Immature Grans (Abs) 0.05             Immature Granulocytes 0.6             Ketones, UA       Negative       Leukocyte Esterase, UA       2+       Lymph # 0.47             LYMPH % 5.4             MCH 32.6             MCHC 32.1              .8             Mono # 1.05             Mono % 12.0             MPV 10.2             Neut # 7.01             Neut % 80.4             NITRITE UA       Negative       nRBC 0.0             Blood, UA       3+       pH, UA       6.0       Platelet Count 204             Potassium 3.9             PROTEIN TOTAL 5.9             Protein, UA       Trace       RBC 2.82             RBC, UA       6-10       RDW 13.5             Sodium 140             Specific Gravity,UA       1.015       Squam Epithel, UA       Rare       Troponin I   0.027   0.048         Urine Culture       >/= 100,000 colonies/ml Gram-negative Rods  [P]       Urobilinogen, UA       0.2       WBC, UA       11-20       WBC 8.72                      [P] - Preliminary Result               Significant Imaging: I have reviewed all pertinent imaging results/findings within the past 24 hours.      Assessment & Plan  Urinary tract infection with hematuria  Admit for IVF, Rocephin.  Repeat troponin.  Follow-up of urine culture.  Home meds reconciled.  VTE ppx: SCDs    3/12/25: Urine culture with >100,000col/ml GNR.  Benign prostatic hyperplasia  Home meds reconciled.   Hypertension  Patient's blood pressure range in the last 24 hours was: BP  Min: 131/70  Max: 169/88.The patient's inpatient anti-hypertensive regimen is listed below:  Current Antihypertensives       Plan  - BP is uncontrolled, will adjust as follows: Holding torsemide due to DORIS. May need to change rx upon DC.   Seizure disorder  Home meds reconciled.     S/P CABG x 3  Home meds reconciled.  DORIS (acute kidney injury)  DORIS is likely due to pre-renal azotemia due to dehydration. Baseline creatinine is  1.3 . Most recent creatinine and eGFR are listed below.  Recent Labs     03/11/25  1035 03/12/25  0353   CREATININE 1.87* 1.72*   EGFRNORACEVR 37 41      Plan  - DORIS is improving  - Avoid nephrotoxins and renally dose meds for GFR listed above  - Monitor urine output, serial BMP, and adjust therapy as  needed    Elevated troponin  Will check 3rd troponin. Pt is asymptomatic.  3rd troponin 0.027 normal. Pt remained asymptomatic.    Moderate malnutrition  Nutrition consulted. Most recent weight and BMI monitored-     Measurements:  Wt Readings from Last 1 Encounters:   03/11/25 92 kg (202 lb 13.2 oz)   Body mass index is 26.76 kg/m².    Patient has been screened and assessed by RD.    Malnutrition Type:  Context: acute illness or injury  Level: moderate    Malnutrition Characteristic Summary:  Weight Loss (Malnutrition): other (see comments) (Does not meet criteria)  Subcutaneous Fat (Malnutrition): mild depletion  Muscle Mass (Malnutrition): mild depletion  Fluid Accumulation (Malnutrition): other (see comments) (Not present)  Hand  Strength, Right (Malnutrition): Unable to assess    Interventions/Recommendations (treatment strategy):       Fall at home  Right hip xray and CT head without acute findings in ER.   Pt continues with pain this morning in right femur. Will check femur xray.  VTE Risk Mitigation (From admission, onward)           Ordered     IP VTE HIGH RISK PATIENT  Once         03/11/25 1430     Place sequential compression device  Until discontinued         03/11/25 1430                    Discharge Planning   DILSHAD:      Code Status: Full Code   Medical Readiness for Discharge Date:   Discharge Plan A: Skilled Nursing Facility                        ETHAN NORTH DO  Department of Hospital Medicine   [facility]

## 2025-03-12 NOTE — PLAN OF CARE
Problem: Fall Injury Risk  Goal: Absence of Fall and Fall-Related Injury  Outcome: Progressing  Intervention: Identify and Manage Contributors  Flowsheets (Taken 3/12/2025 0313)  Self-Care Promotion:   independence encouraged   BADL personal objects within reach  Medication Review/Management: medications reviewed     Problem: Infection  Goal: Absence of Infection Signs and Symptoms  Outcome: Progressing  Intervention: Prevent or Manage Infection  Flowsheets (Taken 3/12/2025 0313)  Infection Management: aseptic technique maintained     Problem: Pain Acute  Goal: Optimal Pain Control and Function  Outcome: Progressing  Intervention: Develop Pain Management Plan  Flowsheets (Taken 3/12/2025 0313)  Pain Management Interventions:   quiet environment facilitated   care clustered  Intervention: Prevent or Manage Pain  Flowsheets (Taken 3/12/2025 0313)  Sleep/Rest Enhancement:   awakenings minimized   noise level reduced   regular sleep/rest pattern promoted   room darkened  Sensory Stimulation Regulation: quiet environment promoted  Bowel Elimination Promotion: adequate fluid intake promoted  Medication Review/Management: medications reviewed  Intervention: Optimize Psychosocial Wellbeing  Flowsheets (Taken 3/12/2025 0313)  Supportive Measures:   active listening utilized   self-care encouraged  Diversional Activities: television     Problem: Acute Kidney Injury/Impairment  Goal: Fluid and Electrolyte Balance  Outcome: Progressing  Intervention: Monitor and Manage Fluid and Electrolyte Balance  Flowsheets (Taken 3/12/2025 0313)  Fluid/Electrolyte Management: fluids provided  Goal: Improved Oral Intake  Outcome: Progressing  Goal: Effective Renal Function  Outcome: Progressing  Intervention: Monitor and Support Renal Function  Flowsheets (Taken 3/12/2025 0313)  Medication Review/Management: medications reviewed     Problem: Adult Inpatient Plan of Care  Goal: Plan of Care Review  Outcome: Progressing  Goal:  Patient-Specific Goal (Individualized)  Outcome: Progressing  Goal: Absence of Hospital-Acquired Illness or Injury  Outcome: Progressing  Intervention: Prevent Skin Injury  Flowsheets (Taken 3/12/2025 0313)  Skin Protection: incontinence pads utilized  Device Skin Pressure Protection:   absorbent pad utilized/changed   tubing/devices free from skin contact  Intervention: Prevent Infection  Flowsheets (Taken 3/12/2025 0313)  Infection Prevention:   environmental surveillance performed   hand hygiene promoted   rest/sleep promoted   single patient room provided  Goal: Optimal Comfort and Wellbeing  Outcome: Progressing  Intervention: Monitor Pain and Promote Comfort  Flowsheets (Taken 3/12/2025 0313)  Pain Management Interventions:   quiet environment facilitated   care clustered  Intervention: Provide Person-Centered Care  Flowsheets (Taken 3/12/2025 0313)  Trust Relationship/Rapport:   care explained   thoughts/feelings acknowledged  Goal: Readiness for Transition of Care  Outcome: Progressing

## 2025-03-12 NOTE — SUBJECTIVE & OBJECTIVE
Past Medical History:   Diagnosis Date    BPH (benign prostatic hyperplasia)     Hypertension     Seizures        Past Surgical History:   Procedure Laterality Date    ADENOIDECTOMY      APPENDECTOMY      CARDIAC VALVE REPLACEMENT      CHOLECYSTECTOMY      COLONOSCOPY  04/21/2016    Dr Brock Brown    CORONARY ARTERY BYPASS GRAFT      EYE SURGERY      INCISION AND DRAINAGE OF ABSCESS Right 03/20/2024    Dr Amelia Carrasco    LITHOTRIPSY  11/20/2024    Dr Maurisio King    PROSTATE SURGERY      TONSILLECTOMY      TRANSURETHRAL RESECTION OF PROSTATE         Review of patient's allergies indicates:  No Known Allergies    No current facility-administered medications on file prior to encounter.     Current Outpatient Medications on File Prior to Encounter   Medication Sig    aspirin 81 mg Cap Take 81 mg by mouth Daily.    levETIRAcetam (KEPPRA) 1000 MG tablet TAKE 1 AND 1/2 TABLETS BY MOUTH  TWICE DAILY (Patient taking differently: Take 1,250 mg by mouth 2 (two) times daily.)    multivitamin (THERAGRAN) tablet Take 1 tablet by mouth once daily.    OXcarbazepine (TRILEPTAL) 300 MG Tab Take 3 tablets (900 mg total) by mouth 2 (two) times daily.    rosuvastatin (CRESTOR) 10 MG tablet Take 1 tablet (10 mg total) by mouth every evening.    sertraline (ZOLOFT) 50 MG tablet TAKE 1 TABLET BY MOUTH ONCE  DAILY    tamsulosin (FLOMAX) 0.4 mg Cap TAKE 1 CAPSULE BY MOUTH TWICE DAILY (Patient taking differently: Take 0.4 mg by mouth nightly.)    temazepam (RESTORIL) 15 mg Cap TAKE ONE CAPSULE BY MOUTH AT BEDTIME    topiramate (TOPAMAX) 100 MG tablet Take 100 mg by mouth 2 (two) times daily.    torsemide (DEMADEX) 20 MG Tab Take 20 mg by mouth once daily.    hyoscyamine 0.125 mg Subl 0.125 mg every 6 (six) hours as needed. (Patient not taking: Reported on 12/19/2024)     Family History       Problem Relation (Age of Onset)    Hypertension Mother          Tobacco Use    Smoking status: Never    Smokeless tobacco: Never   Substance  and Sexual Activity    Alcohol use: Never    Drug use: Never    Sexual activity: Never     Review of Systems   Constitutional:  Positive for activity change and fatigue. Negative for appetite change and fever.   Respiratory:  Negative for shortness of breath and wheezing.    Cardiovascular:  Negative for chest pain and leg swelling.   Gastrointestinal:  Positive for abdominal pain, nausea and vomiting. Negative for abdominal distention and constipation.   Genitourinary:  Negative for difficulty urinating, dysuria and frequency.   Musculoskeletal:  Positive for arthralgias (left hip) and gait problem. Negative for joint swelling and myalgias.   Skin:  Negative for rash and wound.   Neurological:  Positive for weakness.   Psychiatric/Behavioral:  Negative for agitation, behavioral problems and confusion.      Objective:     Vital Signs (Most Recent):  Temp: 98.7 °F (37.1 °C) (03/12/25 1131)  Pulse: 98 (03/12/25 1131)  Resp: 18 (03/12/25 1131)  BP: (!) 160/81 (03/12/25 1131)  SpO2: 95 % (03/12/25 1131) Vital Signs (24h Range):  Temp:  [97.9 °F (36.6 °C)-98.7 °F (37.1 °C)] 98.7 °F (37.1 °C)  Pulse:  [75-98] 98  Resp:  [18-20] 18  SpO2:  [95 %-99 %] 95 %  BP: (131-169)/(69-88) 160/81     Weight: 92 kg (202 lb 13.2 oz)  Body mass index is 26.76 kg/m².     Physical Exam  Vitals reviewed.   Constitutional:       Appearance: Normal appearance.   HENT:      Head: Normocephalic and atraumatic.      Nose: Nose normal.   Eyes:      Conjunctiva/sclera: Conjunctivae normal.   Cardiovascular:      Rate and Rhythm: Normal rate and regular rhythm.      Pulses: Normal pulses.      Heart sounds: Normal heart sounds. No murmur heard.     No gallop.   Pulmonary:      Effort: Pulmonary effort is normal. No respiratory distress.      Breath sounds: Normal breath sounds. No wheezing, rhonchi or rales.   Abdominal:      General: Bowel sounds are normal. There is no distension.      Palpations: Abdomen is soft.   Musculoskeletal:          General: No swelling or deformity.      Cervical back: Normal range of motion and neck supple.      Right lower leg: No edema.      Left lower leg: No edema.   Skin:     General: Skin is warm and dry.      Coloration: Skin is not jaundiced.      Findings: No rash.   Neurological:      Mental Status: He is alert.      Motor: Weakness present.      Gait: Gait abnormal.      Comments: Oriented to place and building. Not the president or the year.   Psychiatric:         Mood and Affect: Mood normal.         Behavior: Behavior normal.                Significant Labs: All pertinent labs within the past 24 hours have been reviewed.  Recent Lab Results         03/12/25  0353   03/11/25  1900   03/11/25  1245   03/11/25  1143        Albumin/Globulin Ratio 0.6             Albumin 2.3                          ALT 16             Anion Gap 10.0             Appearance, UA       Slightly Cloudy       AST 17             Bacteria, UA       Moderate       Baso # 0.04             Basophil % 0.5             Bilirubin (UA)       Negative       BILIRUBIN TOTAL 0.2             BUN 44.0             BUN/CREAT RATIO 26             Calcium 8.5             Chloride 112             CO2 18             Color, UA       Yellow       Creatinine 1.72             eGFR 41  Comment: Estimated GFR calculated using the CKD-EPI creatinine (2021) equation.             Eos # 0.10             Eos % 1.1             Globulin, Total 3.6             Glucose 104             Glucose, UA       Negative       Hematocrit 28.7             Hemoglobin 9.2             Immature Grans (Abs) 0.05             Immature Granulocytes 0.6             Ketones, UA       Negative       Leukocyte Esterase, UA       2+       Lymph # 0.47             LYMPH % 5.4             MCH 32.6             MCHC 32.1             .8             Mono # 1.05             Mono % 12.0             MPV 10.2             Neut # 7.01             Neut % 80.4             NITRITE UA        Negative       nRBC 0.0             Blood, UA       3+       pH, UA       6.0       Platelet Count 204             Potassium 3.9             PROTEIN TOTAL 5.9             Protein, UA       Trace       RBC 2.82             RBC, UA       6-10       RDW 13.5             Sodium 140             Specific Gravity,UA       1.015       Squam Epithel, UA       Rare       Troponin I   0.027   0.048         Urine Culture       >/= 100,000 colonies/ml Gram-negative Rods  [P]       Urobilinogen, UA       0.2       WBC, UA       11-20       WBC 8.72                      [P] - Preliminary Result               Significant Imaging: I have reviewed all pertinent imaging results/findings within the past 24 hours.

## 2025-03-12 NOTE — ASSESSMENT & PLAN NOTE
Nutrition consulted. Most recent weight and BMI monitored-     Measurements:  Wt Readings from Last 1 Encounters:   03/11/25 92 kg (202 lb 13.2 oz)   Body mass index is 26.76 kg/m².    Patient has been screened and assessed by RD.    Malnutrition Type:  Context: acute illness or injury  Level: moderate    Malnutrition Characteristic Summary:  Weight Loss (Malnutrition): other (see comments) (Does not meet criteria)  Subcutaneous Fat (Malnutrition): mild depletion  Muscle Mass (Malnutrition): mild depletion  Fluid Accumulation (Malnutrition): other (see comments) (Not present)  Hand  Strength, Right (Malnutrition): Unable to assess    Interventions/Recommendations (treatment strategy):

## 2025-03-12 NOTE — ASSESSMENT & PLAN NOTE
Right hip xray and CT head without acute findings in ER.   Pt continues with pain this morning in right femur. Will check femur xray.

## 2025-03-12 NOTE — PLAN OF CARE
03/12/25 0748   Discharge Assessment   Assessment Type Discharge Planning Assessment   Confirmed/corrected address, phone number and insurance Yes   Confirmed Demographics Correct on Facesheet   Source of Information patient   Communicated DILSHAD with patient/caregiver Date not available/Unable to determine   Reason For Admission UTI   People in Home alone   Facility Arrived From: Home   Do you expect to return to your current living situation? Yes   Do you have help at home or someone to help you manage your care at home? No   Prior to hospitilization cognitive status: Alert/Oriented   Current cognitive status: Alert/Oriented   Walking or Climbing Stairs Difficulty yes   Walking or Climbing Stairs ambulation difficulty, requires equipment   Dressing/Bathing Difficulty yes   Dressing/Bathing bathing difficulty, assistance 1 person   Readmission within 30 days? No   Do you currently have service(s) that help you manage your care at home? No   Do you take prescription medications? Yes   Do you have prescription coverage? Yes   Coverage [facility]   Do you have any problems affording any of your prescribed medications? No   Is the patient taking medications as prescribed? yes   Who is going to help you get home at discharge? Michael Parish (Healthcare Power of )  [phone]   How do you get to doctors appointments? family or friend will provide   Are you on dialysis? No   Do you take coumadin? No   Discharge Plan A Skilled Nursing Facility   Discharge Plan B Home Health   DME Needed Upon Discharge  none   Discharge Plan discussed with: Patient   Transition of Care Barriers None   Physical Activity   On average, how many days per week do you engage in moderate to strenuous exercise (like a brisk walk)? 0 days   On average, how many minutes do you engage in exercise at this level? 0 min   Financial Resource Strain   How hard is it for you to pay for the very basics like food, housing, medical care, and heating? Not  hard   Housing Stability   In the last 12 months, was there a time when you were not able to pay the mortgage or rent on time? N   At any time in the past 12 months, were you homeless or living in a shelter (including now)? N   Transportation Needs   Has the lack of transportation kept you from medical appointments, meetings, work or from getting things needed for daily living? No   Food Insecurity   Within the past 12 months, you worried that your food would run out before you got the money to buy more. Never true   Within the past 12 months, the food you bought just didn't last and you didn't have money to get more. Never true   Stress   Do you feel stress - tense, restless, nervous, or anxious, or unable to sleep at night because your mind is troubled all the time - these days? To some exte   Social Isolation   How often do you feel lonely or isolated from those around you?  Never   Alcohol Use   Q1: How often do you have a drink containing alcohol? Never   Q2: How many drinks containing alcohol do you have on a typical day when you are drinking? None   Q3: How often do you have six or more drinks on one occasion? Never   Utilities   In the past 12 months has the electric, gas, oil, or water company threatened to shut off services in your home? No     Will defer to Dr. Rivas for further discharge plan.

## 2025-03-12 NOTE — ASSESSMENT & PLAN NOTE
Will check 3rd troponin. Pt is asymptomatic.  3rd troponin 0.027 normal. Pt remained asymptomatic.

## 2025-03-12 NOTE — ASSESSMENT & PLAN NOTE
Patient's blood pressure range in the last 24 hours was: BP  Min: 131/70  Max: 169/88.The patient's inpatient anti-hypertensive regimen is listed below:  Current Antihypertensives       Plan  - BP is uncontrolled, will adjust as follows: Holding torsemide due to DORIS. May need to change rx upon DC.

## 2025-03-12 NOTE — CONSULTS
Inpatient Nutrition Assessment    Admit Date: 3/11/2025   Total duration of encounter: 1 day   Patient Age: 74 y.o.    Nutrition Recommendation/Prescription     Heart Healthy Easy to Chew (IDDSI Level 7) diet.  Rec'd add Boost Plus w/ meals (provides 360 kcal and 14 g protein per serving).  RD to monitor wt, labs, and po intake.    Communication of Recommendations: reviewed with nurse and reviewed with patient    Nutrition Assessment     Malnutrition Assessment/Nutrition-Focused Physical Exam    Malnutrition Context: acute illness or injury (03/12/25 1109)  Malnutrition Level: moderate (03/12/25 1109)     Weight Loss (Malnutrition): other (see comments) (Does not meet criteria) (03/12/25 1109)  Subcutaneous Fat (Malnutrition): mild depletion (03/12/25 1109)  Orbital Region (Subcutaneous Fat Loss): mild depletion        Muscle Mass (Malnutrition): mild depletion (03/12/25 1109)     Clavicle Bone Region (Muscle Loss): mild depletion                    Fluid Accumulation (Malnutrition): other (see comments) (Not present) (03/12/25 1109)     Hand  Strength, Right (Malnutrition): Unable to assess (03/12/25 1109)  A minimum of two characteristics is recommended for diagnosis of either severe or non-severe malnutrition.    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/23/2023      DORIS (acute kidney injury) 4/18/2024      Elevated troponin 3/11/2025      Moderate malnutrition 3/12/2025       Relevant Medical History:    BPH (benign prostatic hyperplasia)      Hypertension      Seizures       "    Scheduled Medications:  aspirin, 81 mg, Daily  atorvastatin, 40 mg, QHS  cefTRIAXone (Rocephin) IV (PEDS and ADULTS), 1 g, Q24H  levETIRAcetam, 1,250 mg, BID  OXcarbazepine, 900 mg, BID  sertraline, 50 mg, Daily  tamsulosin, 0.4 mg, Nightly  topiramate, 100 mg, BID    Continuous Infusions:  0.9% NaCl, Last Rate: 100 mL/hr at 03/12/25 0441    PRN Medications:  acetaminophen, 1,000 mg, Q6H PRN  acetaminophen, 500 mg, Q6H PRN  aluminum-magnesium hydroxide-simethicone, 30 mL, Q6H PRN  melatonin, 6 mg, Nightly PRN  ondansetron, 4 mg, Q6H PRN    Calorie Containing IV Medications: no significant kcals from medications at this time    Recent Labs   Lab 03/11/25  1035 03/11/25  1049 03/12/25  0353     --  140   K 4.0  --  3.9   CALCIUM 8.2*  --  8.5*     --  112*   CO2 22*  --  18*   BUN 43.1*  --  44.0*   CREATININE 1.87*  --  1.72*   EGFRNORACEVR 37  --  41   GLUCOSE 130*  --  104   BILITOT 0.3  --  0.2   ALKPHOS 119  --  106   ALT 16  --  16   AST 22  --  17   ALBUMIN 2.5*  --  2.3*   WBC 9.48  --  8.72   HGB 9.8*  --  9.2*   HCT 29.6* 27* 28.7*     Nutrition Orders:  Diet Heart Healthy Easy to Chew (IDDSI Level 7)      Appetite/Oral Intake: good/% of meals  Factors Affecting Nutritional Intake: nausea and vomiting  Social Needs Impacting Access to Food: none identified  Food/Episcopalian/Cultural Preferences: none reported  Food Allergies: no known food allergies  Last Bowel Movement: 03/12/25  Wound(s):  n/a    Comments    3/12/25: Pt reports good appetite and intake for breakfast and dinner last night. Pt w/ n/v this morning when lunch arrived. States it happened out of no where and he is not hungry for lunch. Denies any other gi symptoms. Per NFPE, observed mild muscle and fat wasting. Pt denies recent wt loss; confirmed per EMR wt hx. Last BM today. Pt meets criteria for moderate malnutrition in the context of acute illness. Labs/meds reviewed.    Anthropometrics    Height: 6' 1" (185.4 cm), Height " Method: Stated  Last Weight: 92 kg (202 lb 13.2 oz) (03/11/25 1431), Weight Method: Bed Scale  BMI (Calculated): 26.8  BMI Classification: overweight (BMI 25-29.9)        Ideal Body Weight (IBW), Male: 184 lb     % Ideal Body Weight, Male (lb): 110.23 %                          Usual Weight Provided By: EMR weight history    Wt Readings from Last 5 Encounters:   03/11/25 92 kg (202 lb 13.2 oz)   12/17/24 89.2 kg (196 lb 10.4 oz)   12/16/24 90.7 kg (200 lb)   06/18/24 93.4 kg (206 lb)   05/27/24 90.7 kg (200 lb)     Weight Change(s) Since Admission:   3/11: 92 kg  Wt Readings from Last 1 Encounters:   03/11/25 1431 92 kg (202 lb 13.2 oz)   03/11/25 1009 93.6 kg (206 lb 4.8 oz)   Admit Weight: 93.6 kg (206 lb 4.8 oz) (03/11/25 1009), Weight Method: Bed Scale    Estimated Needs    Weight Used For Calorie Calculations: 92 kg (202 lb 13.2 oz)  Energy Calorie Requirements (kcal): 7022-1372 kcal (20-25 kcal/kg)  Energy Need Method: Kcal/kg  Weight Used For Protein Calculations: 92 kg (202 lb 13.2 oz)  Protein Requirements: 74-92 g (0.8-1.0 g/kg)  Fluid Requirements (mL): 1840 mL/d (1.0 mL/kcal)        Enteral Nutrition     Patient not receiving enteral nutrition at this time.    Parenteral Nutrition     Patient not receiving parenteral nutrition support at this time.    Evaluation of Received Nutrient Intake    Calories: meeting estimated needs  Protein: meeting estimated needs    Patient Education     Not applicable.    Nutrition Diagnosis     PES: Moderate acute disease or injury related malnutrition Related to acute illness As Evidenced by:  - muscle mass depletion: 1 area of mild muscle loss (Clavicle) - loss of subcutaneous fat: 2 areas of mild fat loss (Buccal, Infraorbital) new    Nutrition Interventions     Intervention(s): general/healthful diet and collaboration with other providers    Goal: Meet greater than 80% of nutritional needs by follow-up. (new)  Goal: Maintain weight throughout hospitalization.  (new)    Nutrition Goals & Monitoring     Dietitian will monitor: food and beverage intake, weight, electrolyte/renal panel, glucose/endocrine profile, and gastrointestinal profile  Discharge planning: resume home regimen  Nutrition Risk/Follow-Up: moderate (follow-up in 3-5 days)   Please consult if re-assessment needed sooner.

## 2025-03-12 NOTE — ASSESSMENT & PLAN NOTE
Admit for IVF, Rocephin.  Repeat troponin.  Follow-up of urine culture.  Home meds reconciled.  VTE ppx: SCDs    3/12/25: Urine culture with >100,000col/ml GNR.

## 2025-03-12 NOTE — ASSESSMENT & PLAN NOTE
DORIS is likely due to pre-renal azotemia due to dehydration. Baseline creatinine is 1.3. Most recent creatinine and eGFR are listed below.  Recent Labs     03/11/25  1035 03/12/25  0353   CREATININE 1.87* 1.72*   EGFRNORACEVR 37 41      Plan  - DORIS is improving  - Avoid nephrotoxins and renally dose meds for GFR listed above  - Monitor urine output, serial BMP, and adjust therapy as needed

## 2025-03-12 NOTE — PLAN OF CARE
Problem: Adult Inpatient Plan of Care  Goal: Plan of Care Review  Outcome: Progressing  Goal: Patient-Specific Goal (Individualized)  Outcome: Progressing  Goal: Absence of Hospital-Acquired Illness or Injury  Outcome: Progressing  Goal: Optimal Comfort and Wellbeing  Outcome: Progressing  Goal: Readiness for Transition of Care  Outcome: Progressing     Problem: Acute Kidney Injury/Impairment  Goal: Fluid and Electrolyte Balance  Outcome: Progressing  Goal: Improved Oral Intake  Outcome: Progressing  Goal: Effective Renal Function  Outcome: Progressing     Problem: Pain Acute  Goal: Optimal Pain Control and Function  Outcome: Progressing     Problem: Infection  Goal: Absence of Infection Signs and Symptoms  Outcome: Progressing     Problem: Fall Injury Risk  Goal: Absence of Fall and Fall-Related Injury  Outcome: Progressing     Problem: Fatigue  Goal: Improved Activity Tolerance  Outcome: Progressing     Problem: Fluid Volume Deficit  Goal: Fluid Balance  Outcome: Progressing     Problem: Comorbidity Management  Goal: Blood Pressure in Desired Range  Outcome: Progressing  Goal: Bariatric Home Regimen Maintained  Outcome: Progressing  Goal: Maintenance of Seizure Control  Outcome: Progressing  Goal: Maintenance of Heart Failure Symptom Control  Outcome: Progressing     Problem: UTI (Urinary Tract Infection)  Goal: Improved Infection Symptoms  Outcome: Progressing

## 2025-03-12 NOTE — HOSPITAL COURSE
3/12/25: PT lying in bed. States he is not feeling good this morning. He is having trouble describing to me what is bothering him. He is complaining that his right femur is hurting since his fall. States that his femur was hurting yesterday at home and this contributed to him losing his balance and falling in the bathroom. Pt started vomiting during my exam. He is also complaining of abdominal cramping. States he has a bm this morning that was normal. Also states that he is having no issues with urination. Denies any sob or cp.    3/13/25: Pt is lying comfortably in bed watching tv. He denies and n/v, dysuria, urinary hesitancy, abnormal bm's, cp, sob. He does endorse abd pain across his mid abdomen. Dicyclomine is helping. Appetite is normal. Urine culture pending.    3/14/25: Pt sitting up in his recliner. Denies issues with urination, sob, cp, increased weakness from baseline. Is still complaining of right thigh pain. States that this is why he fell. He understands that it is not safe for him to live at home alone. He is at risk for additional falls. Pt agrees to consider NH placement. He wants CM to contact his niece to discuss. Niece agrees with this plan. CM working on placement.     3/15/25: Pt lying comfortably in bed. Still complaining of right hip and knee pain. He got up and walked the halls with PT this morning. Denies sob, cp, dysuria, or bowel issues. Making jokes this morning.     3/16/25: Pt lying in bed sleeping. Early this morning he became sob, tachycardic, and hypertensive. Nurse reported bibasilar crackles. Later became febrile with temp of 101.2. WBC this morning elevated at 17.26. Pt remains on ceftrixone which urine culture showed klebsiella was sensitive to. Pt's SpO2 is 100% on room air at this time. He was given lasix 20mg IV this morning and urine output has been 1420cc.    3/17/25: Pt sitting up in his recliner at bedside. He denies any cp, sob, abd pain, bowel issues,  issues. Ate  breakfast without any n/v. Has been on room air. Blood cultures pending. Nurse overnight reported that pt now had a cellulitis on his right forearm. Upon my exam this morning, does not appear to be a cellulitis yet, but there is an area of irritation. Pt states that the bp cuff was left on his arm there overnight and it irritated his skin. WBC is decreasing this morning. No more fevers. Waiting on authorization for SNF.       3/18/25-No changes today.  Waiting for SNF placement for continued therapy.  Patient voices no complaints at this time.      3/19/25-Patient is doing well.  He is sitting up in the chair.  Waiting for SNF placement.    3/20/25-Patient is still very weak.  Insurance is wanting a peer to peer today.  The patient is not safe to be at home alone.  He needs additional therapy for his strengthening and his ADL's.  He is a fall risk as well.  3/21/25-Patient has been accepted to NH.  He will transfer there today in stable condition.  Exam(A&O, NAD, RRR, CTA, obese, BS +, NTTP, ROM I)

## 2025-03-13 LAB
ALBUMIN SERPL-MCNC: 2.2 G/DL (ref 3.4–4.8)
ALBUMIN/GLOB SERPL: 0.6 RATIO (ref 1.1–2)
ALP SERPL-CCNC: 108 UNIT/L (ref 40–150)
ALT SERPL-CCNC: 14 UNIT/L (ref 0–55)
ANION GAP SERPL CALC-SCNC: 8 MEQ/L
AST SERPL-CCNC: 16 UNIT/L (ref 5–34)
BILIRUB SERPL-MCNC: 0.2 MG/DL
BUN SERPL-MCNC: 40 MG/DL (ref 8.4–25.7)
CALCIUM SERPL-MCNC: 8.5 MG/DL (ref 8.8–10)
CHLORIDE SERPL-SCNC: 112 MMOL/L (ref 98–107)
CO2 SERPL-SCNC: 20 MMOL/L (ref 23–31)
CREAT SERPL-MCNC: 1.44 MG/DL (ref 0.72–1.25)
CREAT/UREA NIT SERPL: 28
GFR SERPLBLD CREATININE-BSD FMLA CKD-EPI: 50 ML/MIN/1.73/M2
GLOBULIN SER-MCNC: 3.8 GM/DL (ref 2.4–3.5)
GLUCOSE SERPL-MCNC: 95 MG/DL (ref 82–115)
LEVETIRACETAM SERPL-MCNC: 69.7 MCG/ML (ref 10–40)
POTASSIUM SERPL-SCNC: 3.9 MMOL/L (ref 3.5–5.1)
PROT SERPL-MCNC: 6 GM/DL (ref 5.8–7.6)
SODIUM SERPL-SCNC: 140 MMOL/L (ref 136–145)

## 2025-03-13 PROCEDURE — 63600175 PHARM REV CODE 636 W HCPCS: Performed by: FAMILY MEDICINE

## 2025-03-13 PROCEDURE — 11000001 HC ACUTE MED/SURG PRIVATE ROOM

## 2025-03-13 PROCEDURE — 36415 COLL VENOUS BLD VENIPUNCTURE: CPT | Performed by: INTERNAL MEDICINE

## 2025-03-13 PROCEDURE — 80053 COMPREHEN METABOLIC PANEL: CPT | Performed by: INTERNAL MEDICINE

## 2025-03-13 PROCEDURE — 94761 N-INVAS EAR/PLS OXIMETRY MLT: CPT

## 2025-03-13 PROCEDURE — 25000003 PHARM REV CODE 250: Performed by: INTERNAL MEDICINE

## 2025-03-13 RX ORDER — FUROSEMIDE 20 MG/1
20 TABLET ORAL DAILY
Status: DISCONTINUED | OUTPATIENT
Start: 2025-03-13 | End: 2025-03-16

## 2025-03-13 RX ADMIN — TOPIRAMATE 100 MG: 100 TABLET, FILM COATED ORAL at 08:03

## 2025-03-13 RX ADMIN — Medication 1 CAPSULE: at 01:03

## 2025-03-13 RX ADMIN — Medication 1 CAPSULE: at 05:03

## 2025-03-13 RX ADMIN — SERTRALINE HYDROCHLORIDE 50 MG: 50 TABLET ORAL at 08:03

## 2025-03-13 RX ADMIN — OXCARBAZEPINE 900 MG: 300 TABLET, FILM COATED ORAL at 08:03

## 2025-03-13 RX ADMIN — FUROSEMIDE 20 MG: 20 TABLET ORAL at 08:03

## 2025-03-13 RX ADMIN — ATORVASTATIN CALCIUM 40 MG: 40 TABLET, FILM COATED ORAL at 08:03

## 2025-03-13 RX ADMIN — CEFTRIAXONE SODIUM 1 G: 1 INJECTION, POWDER, FOR SOLUTION INTRAMUSCULAR; INTRAVENOUS at 01:03

## 2025-03-13 RX ADMIN — ACETAMINOPHEN 1000 MG: 500 TABLET, FILM COATED ORAL at 08:03

## 2025-03-13 RX ADMIN — ASPIRIN 81 MG: 81 TABLET, CHEWABLE ORAL at 08:03

## 2025-03-13 RX ADMIN — LEVETIRACETAM 1250 MG: 500 TABLET, FILM COATED ORAL at 08:03

## 2025-03-13 RX ADMIN — TAMSULOSIN HYDROCHLORIDE 0.4 MG: 0.4 CAPSULE ORAL at 08:03

## 2025-03-13 RX ADMIN — Medication 1 CAPSULE: at 08:03

## 2025-03-13 NOTE — PROGRESS NOTES
[facility]  [facility] Medicine  Progress Note    Patient Name: Nathen Morales  MRN: [id number]  Patient Class: IP- Inpatient   Admission Date: 3/11/2025  Length of Stay: 2 days  Attending Physician: Anabelle Rivas DO  Primary Care Provider: Brock Brown MD        Subjective     Principal Problem:Urinary tract infection with hematuria        HPI:  73 yo CM with PMH CAD s/p CABG, chronic right and left hydronephrosis with chronic stones, BPH, HTN, and seizure disorder presented to the ED with 2 days of weakness and a fall in his bathroom, hitting his head on the toilet. He did not lose consciousness. Has no esme on his head. Workup revealed elevated creatinine at 2.1, baseline 1.3, elevated troponin 0.055, and UTI on catheter collected urine.  After IV hydration creatinine improved to 1.87, and troponin decreased to 0.048. CT head unremarkable. EKG with borderline LVH and 1st degree AV block. CT abd showed chronic  changes. Pt was given rocephin and hospital medicine was consulted for admission for IV hydration, antibiotics, and trending of labs/follow-up of urine culture.      Upon my examination in the ER, the pt denies any chest pain or sob. Pt lives alone, but his niece brings him groceries and fixes his medications once a week. He does not drive or cut his own grass. He does not remember the last time he fell prior to today. Reports his appetite has been normal and he has been drinking fluids as normal. Takes torsemide 20mg daily. Denies any trouble with urination.     Overview/Hospital Course:  3/12/25: PT lying in bed. States he is not feeling good this morning. He is having trouble describing to me what is bothering him. He is complaining that his right femur is hurting since his fall. States that his femur was hurting yesterday at home and this contributed to him losing his balance and falling in the bathroom. Pt started vomiting during my exam. He is also  complaining of abdominal cramping. States he has a bm this morning that was normal. Also states that he is having no issues with urination. Denies any sob or cp.    3/13/25: Pt is lying comfortably in bed watching tv. He denies and n/v, dysuria, urinary hesitancy, abnormal bm's, cp, sob. He does endorse abd pain across his mid abdomen. Dicyclomine is helping. Appetite is normal. Urine culture pending.        Past Medical History:   Diagnosis Date    BPH (benign prostatic hyperplasia)     Hypertension     Seizures        Past Surgical History:   Procedure Laterality Date    ADENOIDECTOMY      APPENDECTOMY      CARDIAC VALVE REPLACEMENT      CHOLECYSTECTOMY      COLONOSCOPY  04/21/2016    Dr Brock Brown    CORONARY ARTERY BYPASS GRAFT      EYE SURGERY      INCISION AND DRAINAGE OF ABSCESS Right 03/20/2024    Dr Aemlia Carrasco    LITHOTRIPSY  11/20/2024    Dr Maurisio King    PROSTATE SURGERY      TONSILLECTOMY      TRANSURETHRAL RESECTION OF PROSTATE         Review of patient's allergies indicates:  No Known Allergies    No current facility-administered medications on file prior to encounter.     Current Outpatient Medications on File Prior to Encounter   Medication Sig    aspirin 81 mg Cap Take 81 mg by mouth Daily.    levETIRAcetam (KEPPRA) 1000 MG tablet TAKE 1 AND 1/2 TABLETS BY MOUTH  TWICE DAILY (Patient taking differently: Take 1,250 mg by mouth 2 (two) times daily.)    multivitamin (THERAGRAN) tablet Take 1 tablet by mouth once daily.    OXcarbazepine (TRILEPTAL) 300 MG Tab Take 3 tablets (900 mg total) by mouth 2 (two) times daily.    rosuvastatin (CRESTOR) 10 MG tablet Take 1 tablet (10 mg total) by mouth every evening.    sertraline (ZOLOFT) 50 MG tablet TAKE 1 TABLET BY MOUTH ONCE  DAILY    tamsulosin (FLOMAX) 0.4 mg Cap TAKE 1 CAPSULE BY MOUTH TWICE DAILY (Patient taking differently: Take 0.4 mg by mouth nightly.)    temazepam (RESTORIL) 15 mg Cap TAKE ONE CAPSULE BY MOUTH AT BEDTIME     topiramate (TOPAMAX) 100 MG tablet Take 100 mg by mouth 2 (two) times daily.    torsemide (DEMADEX) 20 MG Tab Take 20 mg by mouth once daily.    hyoscyamine 0.125 mg Subl 0.125 mg every 6 (six) hours as needed. (Patient not taking: Reported on 12/19/2024)     Family History       Problem Relation (Age of Onset)    Hypertension Mother          Tobacco Use    Smoking status: Never    Smokeless tobacco: Never   Substance and Sexual Activity    Alcohol use: Never    Drug use: Never    Sexual activity: Never     Review of Systems   Constitutional:  Positive for activity change and fatigue. Negative for appetite change and fever.   Respiratory:  Negative for shortness of breath and wheezing.    Cardiovascular:  Negative for chest pain and leg swelling.   Gastrointestinal:  Positive for abdominal pain. Negative for abdominal distention, constipation, nausea and vomiting.   Genitourinary:  Negative for difficulty urinating, dysuria and frequency.   Musculoskeletal:  Positive for gait problem. Negative for arthralgias, joint swelling and myalgias.   Skin:  Negative for rash and wound.   Neurological:  Positive for weakness.   Psychiatric/Behavioral:  Negative for agitation, behavioral problems and confusion.      Objective:     Vital Signs (Most Recent):  Temp: 98.5 °F (36.9 °C) (03/13/25 1147)  Pulse: 87 (03/13/25 1147)  Resp: (P) 20 (03/13/25 1147)  BP: (!) 165/72 (03/13/25 1147)  SpO2: 95 % (03/13/25 1147) Vital Signs (24h Range):  Temp:  [98 °F (36.7 °C)-98.5 °F (36.9 °C)] 98.5 °F (36.9 °C)  Pulse:  [] 87  Resp:  [18-20] (P) 20  SpO2:  [94 %-99 %] 95 %  BP: (130-170)/(71-93) 165/72     Weight: 96 kg (211 lb 10.3 oz)  Body mass index is 27.92 kg/m².     Physical Exam  Vitals reviewed.   Constitutional:       Appearance: Normal appearance.   HENT:      Head: Normocephalic and atraumatic.      Nose: Nose normal.   Eyes:      Conjunctiva/sclera: Conjunctivae normal.   Cardiovascular:      Rate and Rhythm: Normal rate  and regular rhythm.      Pulses: Normal pulses.      Heart sounds: Normal heart sounds. No murmur heard.     No gallop.   Pulmonary:      Effort: Pulmonary effort is normal. No respiratory distress.      Breath sounds: Normal breath sounds. No wheezing, rhonchi or rales.   Abdominal:      General: Bowel sounds are normal. There is no distension.      Palpations: Abdomen is soft.   Musculoskeletal:         General: No swelling or deformity.      Cervical back: Normal range of motion and neck supple.      Right lower leg: No edema.      Left lower leg: No edema.   Skin:     General: Skin is warm and dry.      Coloration: Skin is not jaundiced.      Findings: No rash.   Neurological:      Mental Status: He is alert. Mental status is at baseline.      Motor: Weakness present.      Gait: Gait abnormal.   Psychiatric:         Mood and Affect: Mood normal.         Behavior: Behavior normal.                Significant Labs: All pertinent labs within the past 24 hours have been reviewed.  Recent Lab Results         03/13/25  0518        Albumin/Globulin Ratio 0.6       Albumin 2.2              ALT 14       Anion Gap 8.0       AST 16       BILIRUBIN TOTAL 0.2       BUN 40.0       BUN/CREAT RATIO 28       Calcium 8.5       Chloride 112       CO2 20       Creatinine 1.44       eGFR 50  Comment: Estimated GFR calculated using the CKD-EPI creatinine (2021) equation.       Globulin, Total 3.8       Glucose 95       Potassium 3.9       PROTEIN TOTAL 6.0       Sodium 140               Significant Imaging: I have reviewed all pertinent imaging results/findings within the past 24 hours.      Assessment & Plan  Urinary tract infection with hematuria  Admit for IVF, Rocephin.  Repeat troponin.  Follow-up of urine culture.  Home meds reconciled.  VTE ppx: SCDs    3/12/25: Urine culture with >100,000col/ml GNR.  Benign prostatic hyperplasia  Home meds reconciled.   Hypertension  Patient's blood pressure range in the last 24 hours  was: BP  Min: 130/72  Max: 170/93.The patient's inpatient anti-hypertensive regimen is listed below:  Current Antihypertensives  furosemide tablet 20 mg, Daily, Oral    Plan  - BP is uncontrolled, will adjust as follows: Holding torsemide due to DORIS. May need to change rx upon DC.   Seizure disorder  Home meds reconciled.     S/P CABG x 3  Home meds reconciled.  DORIS (acute kidney injury)  DORIS is likely due to pre-renal azotemia due to dehydration. Baseline creatinine is  1.3 . Most recent creatinine and eGFR are listed below.  Recent Labs     03/11/25  1035 03/12/25  0353 03/13/25  0518   CREATININE 1.87* 1.72* 1.44*   EGFRNORACEVR 37 41 50      Plan  - DORIS is improving  - Avoid nephrotoxins and renally dose meds for GFR listed above  - Monitor urine output, serial BMP, and adjust therapy as needed    Moderate malnutrition  Nutrition consulted. Most recent weight and BMI monitored-     Measurements:  Wt Readings from Last 1 Encounters:   03/13/25 96 kg (211 lb 10.3 oz)   Body mass index is 27.92 kg/m².    Patient has been screened and assessed by RD.    Malnutrition Type:  Context: acute illness or injury  Level: moderate    Malnutrition Characteristic Summary:  Weight Loss (Malnutrition): other (see comments) (Does not meet criteria)  Subcutaneous Fat (Malnutrition): mild depletion  Muscle Mass (Malnutrition): mild depletion  Fluid Accumulation (Malnutrition): other (see comments) (Not present)  Hand  Strength, Right (Malnutrition): Unable to assess    Interventions/Recommendations (treatment strategy):       Fall at home  Right hip xray and CT head without acute findings in ER.   Pt continues with pain this morning in right femur. Will check femur xray.  VTE Risk Mitigation (From admission, onward)           Ordered     IP VTE HIGH RISK PATIENT  Once         03/11/25 1430     Place sequential compression device  Until discontinued         03/11/25 1430                    Discharge Planning   DILSHAD:      Code  Status: Full Code   Medical Readiness for Discharge Date:   Discharge Plan A: Skilled Nursing Facility                        ETHAN NORTH DO  Department of Hospital Medicine   [facility] - Medical Surgical Unit

## 2025-03-13 NOTE — ASSESSMENT & PLAN NOTE
DORIS is likely due to pre-renal azotemia due to dehydration. Baseline creatinine is 1.3. Most recent creatinine and eGFR are listed below.  Recent Labs     03/11/25  1035 03/12/25  0353 03/13/25  0518   CREATININE 1.87* 1.72* 1.44*   EGFRNORACEVR 37 41 50      Plan  - DORIS is improving  - Avoid nephrotoxins and renally dose meds for GFR listed above  - Monitor urine output, serial BMP, and adjust therapy as needed

## 2025-03-13 NOTE — ASSESSMENT & PLAN NOTE
Patient's blood pressure range in the last 24 hours was: BP  Min: 130/72  Max: 170/93.The patient's inpatient anti-hypertensive regimen is listed below:  Current Antihypertensives  furosemide tablet 20 mg, Daily, Oral    Plan  - BP is uncontrolled, will adjust as follows: Holding torsemide due to DORIS. May need to change rx upon DC.

## 2025-03-13 NOTE — PLAN OF CARE
Problem: Acute Kidney Injury/Impairment  Goal: Fluid and Electrolyte Balance  Outcome: Progressing  Intervention: Monitor and Manage Fluid and Electrolyte Balance  Flowsheets (Taken 3/13/2025 0240)  Fluid/Electrolyte Management: fluids provided  Goal: Improved Oral Intake  Outcome: Progressing  Goal: Effective Renal Function  Outcome: Progressing  Intervention: Monitor and Support Renal Function  Flowsheets (Taken 3/13/2025 0240)  Medication Review/Management: medications reviewed     Problem: Pain Acute  Goal: Optimal Pain Control and Function  Outcome: Progressing  Intervention: Develop Pain Management Plan  Flowsheets (Taken 3/13/2025 0240)  Pain Management Interventions:   quiet environment facilitated   care clustered  Intervention: Prevent or Manage Pain  Flowsheets (Taken 3/13/2025 0240)  Sleep/Rest Enhancement:   awakenings minimized   regular sleep/rest pattern promoted   room darkened  Sensory Stimulation Regulation:   quiet environment promoted   care clustered  Medication Review/Management: medications reviewed  Intervention: Optimize Psychosocial Wellbeing  Flowsheets (Taken 3/13/2025 0240)  Supportive Measures:   active listening utilized   self-care encouraged  Diversional Activities: television     Problem: Infection  Goal: Absence of Infection Signs and Symptoms  Outcome: Progressing  Intervention: Prevent or Manage Infection  Flowsheets (Taken 3/13/2025 0240)  Infection Management: aseptic technique maintained     Problem: Fall Injury Risk  Goal: Absence of Fall and Fall-Related Injury  Outcome: Progressing  Intervention: Identify and Manage Contributors  Flowsheets (Taken 3/13/2025 0240)  Self-Care Promotion:   independence encouraged   BADL personal objects within reach  Medication Review/Management: medications reviewed     Problem: Fatigue  Goal: Improved Activity Tolerance  Outcome: Progressing  Intervention: Promote Improved Energy  Flowsheets (Taken 3/13/2025 0240)  Fatigue Management:  paced activity encouraged  Sleep/Rest Enhancement:   awakenings minimized   regular sleep/rest pattern promoted   room darkened  Environmental Support:   environmental consistency promoted   rest periods encouraged     Problem: Fluid Volume Deficit  Goal: Fluid Balance  Outcome: Progressing  Intervention: Monitor and Manage Hypovolemia  Flowsheets (Taken 3/13/2025 0240)  Fluid/Electrolyte Management: fluids provided     Problem: Comorbidity Management  Goal: Blood Pressure in Desired Range  Outcome: Progressing  Intervention: Maintain Blood Pressure Management  Flowsheets (Taken 3/13/2025 0240)  Medication Review/Management: medications reviewed  Goal: Bariatric Home Regimen Maintained  Outcome: Progressing  Intervention: Maintain and Manage Postbariatric Surgery Care  Flowsheets (Taken 3/13/2025 0240)  Medication Review/Management: medications reviewed  Goal: Maintenance of Seizure Control  Outcome: Progressing  Intervention: Maintain Seizure Symptom Control  Flowsheets (Taken 3/13/2025 0240)  Seizure Precautions: activity supervised  Sensory Stimulation Regulation:   quiet environment promoted   care clustered  Medication Review/Management: medications reviewed  Goal: Maintenance of Heart Failure Symptom Control  Outcome: Progressing  Intervention: Maintain Heart Failure Management  Flowsheets (Taken 3/13/2025 0240)  Medication Review/Management: medications reviewed     Problem: UTI (Urinary Tract Infection)  Goal: Improved Infection Symptoms  Outcome: Progressing  Intervention: Prevent Infection Progression  Flowsheets (Taken 3/13/2025 0240)  Infection Management: aseptic technique maintained  Intervention: Facilitate Optimal Urinary Elimination  Flowsheets (Taken 3/13/2025 0240)  Urinary Elimination Promotion:   frequent voiding encouraged   toileting device within reach     Problem: Adult Inpatient Plan of Care  Goal: Plan of Care Review  Outcome: Progressing  Goal: Patient-Specific Goal  (Individualized)  Outcome: Progressing  Goal: Absence of Hospital-Acquired Illness or Injury  Outcome: Progressing  Goal: Optimal Comfort and Wellbeing  Outcome: Progressing  Intervention: Monitor Pain and Promote Comfort  Flowsheets (Taken 3/13/2025 0240)  Pain Management Interventions:   quiet environment facilitated   care clustered  Goal: Readiness for Transition of Care  Outcome: Progressing

## 2025-03-13 NOTE — ASSESSMENT & PLAN NOTE
Nutrition consulted. Most recent weight and BMI monitored-     Measurements:  Wt Readings from Last 1 Encounters:   03/13/25 96 kg (211 lb 10.3 oz)   Body mass index is 27.92 kg/m².    Patient has been screened and assessed by RD.    Malnutrition Type:  Context: acute illness or injury  Level: moderate    Malnutrition Characteristic Summary:  Weight Loss (Malnutrition): other (see comments) (Does not meet criteria)  Subcutaneous Fat (Malnutrition): mild depletion  Muscle Mass (Malnutrition): mild depletion  Fluid Accumulation (Malnutrition): other (see comments) (Not present)  Hand  Strength, Right (Malnutrition): Unable to assess    Interventions/Recommendations (treatment strategy):

## 2025-03-13 NOTE — PLAN OF CARE
Problem: Adult Inpatient Plan of Care  Goal: Plan of Care Review  Outcome: Progressing  Goal: Patient-Specific Goal (Individualized)  Outcome: Progressing  Goal: Absence of Hospital-Acquired Illness or Injury  Outcome: Progressing  Goal: Optimal Comfort and Wellbeing  Outcome: Progressing  Goal: Readiness for Transition of Care  Outcome: Progressing     Problem: Acute Kidney Injury/Impairment  Goal: Fluid and Electrolyte Balance  Outcome: Progressing  Goal: Improved Oral Intake  Outcome: Progressing  Goal: Effective Renal Function  Outcome: Progressing     Problem: Pain Acute  Goal: Optimal Pain Control and Function  Outcome: Progressing     Problem: Infection  Goal: Absence of Infection Signs and Symptoms  Outcome: Progressing     Problem: Fall Injury Risk  Goal: Absence of Fall and Fall-Related Injury  Outcome: Progressing     Problem: Fatigue  Goal: Improved Activity Tolerance  Outcome: Progressing     Problem: Fluid Volume Deficit  Goal: Fluid Balance  Outcome: Progressing     Problem: Comorbidity Management  Goal: Blood Pressure in Desired Range  Outcome: Progressing  Goal: Bariatric Home Regimen Maintained  Outcome: Progressing  Goal: Maintenance of Seizure Control  Outcome: Progressing  Goal: Maintenance of Heart Failure Symptom Control  Outcome: Progressing     Problem: UTI (Urinary Tract Infection)  Goal: Improved Infection Symptoms  Outcome: Progressing     Problem: Malnutrition  Goal: Improved Nutritional Intake  Outcome: Progressing

## 2025-03-13 NOTE — SUBJECTIVE & OBJECTIVE
Past Medical History:   Diagnosis Date    BPH (benign prostatic hyperplasia)     Hypertension     Seizures        Past Surgical History:   Procedure Laterality Date    ADENOIDECTOMY      APPENDECTOMY      CARDIAC VALVE REPLACEMENT      CHOLECYSTECTOMY      COLONOSCOPY  04/21/2016    Dr Brock Brown    CORONARY ARTERY BYPASS GRAFT      EYE SURGERY      INCISION AND DRAINAGE OF ABSCESS Right 03/20/2024    Dr Amelia Carrasco    LITHOTRIPSY  11/20/2024    Dr Maurisio King    PROSTATE SURGERY      TONSILLECTOMY      TRANSURETHRAL RESECTION OF PROSTATE         Review of patient's allergies indicates:  No Known Allergies    No current facility-administered medications on file prior to encounter.     Current Outpatient Medications on File Prior to Encounter   Medication Sig    aspirin 81 mg Cap Take 81 mg by mouth Daily.    levETIRAcetam (KEPPRA) 1000 MG tablet TAKE 1 AND 1/2 TABLETS BY MOUTH  TWICE DAILY (Patient taking differently: Take 1,250 mg by mouth 2 (two) times daily.)    multivitamin (THERAGRAN) tablet Take 1 tablet by mouth once daily.    OXcarbazepine (TRILEPTAL) 300 MG Tab Take 3 tablets (900 mg total) by mouth 2 (two) times daily.    rosuvastatin (CRESTOR) 10 MG tablet Take 1 tablet (10 mg total) by mouth every evening.    sertraline (ZOLOFT) 50 MG tablet TAKE 1 TABLET BY MOUTH ONCE  DAILY    tamsulosin (FLOMAX) 0.4 mg Cap TAKE 1 CAPSULE BY MOUTH TWICE DAILY (Patient taking differently: Take 0.4 mg by mouth nightly.)    temazepam (RESTORIL) 15 mg Cap TAKE ONE CAPSULE BY MOUTH AT BEDTIME    topiramate (TOPAMAX) 100 MG tablet Take 100 mg by mouth 2 (two) times daily.    torsemide (DEMADEX) 20 MG Tab Take 20 mg by mouth once daily.    hyoscyamine 0.125 mg Subl 0.125 mg every 6 (six) hours as needed. (Patient not taking: Reported on 12/19/2024)     Family History       Problem Relation (Age of Onset)    Hypertension Mother          Tobacco Use    Smoking status: Never    Smokeless tobacco: Never   Substance  and Sexual Activity    Alcohol use: Never    Drug use: Never    Sexual activity: Never     Review of Systems   Constitutional:  Positive for activity change and fatigue. Negative for appetite change and fever.   Respiratory:  Negative for shortness of breath and wheezing.    Cardiovascular:  Negative for chest pain and leg swelling.   Gastrointestinal:  Positive for abdominal pain. Negative for abdominal distention, constipation, nausea and vomiting.   Genitourinary:  Negative for difficulty urinating, dysuria and frequency.   Musculoskeletal:  Positive for gait problem. Negative for arthralgias, joint swelling and myalgias.   Skin:  Negative for rash and wound.   Neurological:  Positive for weakness.   Psychiatric/Behavioral:  Negative for agitation, behavioral problems and confusion.      Objective:     Vital Signs (Most Recent):  Temp: 98.5 °F (36.9 °C) (03/13/25 1147)  Pulse: 87 (03/13/25 1147)  Resp: (P) 20 (03/13/25 1147)  BP: (!) 165/72 (03/13/25 1147)  SpO2: 95 % (03/13/25 1147) Vital Signs (24h Range):  Temp:  [98 °F (36.7 °C)-98.5 °F (36.9 °C)] 98.5 °F (36.9 °C)  Pulse:  [] 87  Resp:  [18-20] (P) 20  SpO2:  [94 %-99 %] 95 %  BP: (130-170)/(71-93) 165/72     Weight: 96 kg (211 lb 10.3 oz)  Body mass index is 27.92 kg/m².     Physical Exam  Vitals reviewed.   Constitutional:       Appearance: Normal appearance.   HENT:      Head: Normocephalic and atraumatic.      Nose: Nose normal.   Eyes:      Conjunctiva/sclera: Conjunctivae normal.   Cardiovascular:      Rate and Rhythm: Normal rate and regular rhythm.      Pulses: Normal pulses.      Heart sounds: Normal heart sounds. No murmur heard.     No gallop.   Pulmonary:      Effort: Pulmonary effort is normal. No respiratory distress.      Breath sounds: Normal breath sounds. No wheezing, rhonchi or rales.   Abdominal:      General: Bowel sounds are normal. There is no distension.      Palpations: Abdomen is soft.   Musculoskeletal:         General: No  swelling or deformity.      Cervical back: Normal range of motion and neck supple.      Right lower leg: No edema.      Left lower leg: No edema.   Skin:     General: Skin is warm and dry.      Coloration: Skin is not jaundiced.      Findings: No rash.   Neurological:      Mental Status: He is alert. Mental status is at baseline.      Motor: Weakness present.      Gait: Gait abnormal.   Psychiatric:         Mood and Affect: Mood normal.         Behavior: Behavior normal.                Significant Labs: All pertinent labs within the past 24 hours have been reviewed.  Recent Lab Results         03/13/25  0518        Albumin/Globulin Ratio 0.6       Albumin 2.2              ALT 14       Anion Gap 8.0       AST 16       BILIRUBIN TOTAL 0.2       BUN 40.0       BUN/CREAT RATIO 28       Calcium 8.5       Chloride 112       CO2 20       Creatinine 1.44       eGFR 50  Comment: Estimated GFR calculated using the CKD-EPI creatinine (2021) equation.       Globulin, Total 3.8       Glucose 95       Potassium 3.9       PROTEIN TOTAL 6.0       Sodium 140               Significant Imaging: I have reviewed all pertinent imaging results/findings within the past 24 hours.

## 2025-03-14 PROBLEM — N17.9 AKI (ACUTE KIDNEY INJURY): Status: RESOLVED | Noted: 2024-04-18 | Resolved: 2025-03-14
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
## 2025-03-14 LAB — BACTERIA UR CULT: ABNORMAL

## 2025-03-14 PROCEDURE — 97162 PT EVAL MOD COMPLEX 30 MIN: CPT

## 2025-03-14 PROCEDURE — 25000003 PHARM REV CODE 250: Performed by: INTERNAL MEDICINE

## 2025-03-14 PROCEDURE — 11000001 HC ACUTE MED/SURG PRIVATE ROOM

## 2025-03-14 PROCEDURE — 94761 N-INVAS EAR/PLS OXIMETRY MLT: CPT

## 2025-03-14 PROCEDURE — 63600175 PHARM REV CODE 636 W HCPCS: Performed by: FAMILY MEDICINE

## 2025-03-14 RX ORDER — DICYCLOMINE HYDROCHLORIDE 10 MG/1
10 CAPSULE ORAL 4 TIMES DAILY PRN
Qty: 28 CAPSULE | Refills: 0 | Status: SHIPPED | OUTPATIENT
Start: 2025-03-14 | End: 2025-03-21

## 2025-03-14 RX ORDER — FUROSEMIDE 20 MG/1
20 TABLET ORAL DAILY
Qty: 30 TABLET | Refills: 0 | Status: SHIPPED | OUTPATIENT
Start: 2025-03-14 | End: 2026-03-14

## 2025-03-14 RX ORDER — SULFAMETHOXAZOLE AND TRIMETHOPRIM 800; 160 MG/1; MG/1
1 TABLET ORAL 2 TIMES DAILY
Qty: 8 TABLET | Refills: 0 | Status: SHIPPED | OUTPATIENT
Start: 2025-03-14 | End: 2025-03-18

## 2025-03-14 RX ADMIN — CEFTRIAXONE SODIUM 1 G: 1 INJECTION, POWDER, FOR SOLUTION INTRAMUSCULAR; INTRAVENOUS at 01:03

## 2025-03-14 RX ADMIN — TOPIRAMATE 100 MG: 100 TABLET, FILM COATED ORAL at 08:03

## 2025-03-14 RX ADMIN — LEVETIRACETAM 1250 MG: 500 TABLET, FILM COATED ORAL at 08:03

## 2025-03-14 RX ADMIN — SERTRALINE HYDROCHLORIDE 50 MG: 50 TABLET ORAL at 08:03

## 2025-03-14 RX ADMIN — Medication 1 CAPSULE: at 08:03

## 2025-03-14 RX ADMIN — Medication 6 MG: at 08:03

## 2025-03-14 RX ADMIN — Medication 1 CAPSULE: at 12:03

## 2025-03-14 RX ADMIN — FUROSEMIDE 20 MG: 20 TABLET ORAL at 08:03

## 2025-03-14 RX ADMIN — OXCARBAZEPINE 900 MG: 300 TABLET, FILM COATED ORAL at 08:03

## 2025-03-14 RX ADMIN — ATORVASTATIN CALCIUM 40 MG: 40 TABLET, FILM COATED ORAL at 08:03

## 2025-03-14 RX ADMIN — TAMSULOSIN HYDROCHLORIDE 0.4 MG: 0.4 CAPSULE ORAL at 08:03

## 2025-03-14 RX ADMIN — ASPIRIN 81 MG: 81 TABLET, CHEWABLE ORAL at 08:03

## 2025-03-14 RX ADMIN — Medication 1 CAPSULE: at 05:03

## 2025-03-14 NOTE — PROGRESS NOTES
[facility]  [facility] Medicine  Progress Note    Patient Name: Nathen Morales  MRN: [id number]  Patient Class: IP- Inpatient   Admission Date: 3/11/2025  Length of Stay: 3 days  Attending Physician: Anabelle Rivas DO  Primary Care Provider: Brock Brown MD        Subjective     Principal Problem:Urinary tract infection with hematuria        HPI:  75 yo CM with PMH CAD s/p CABG, chronic right and left hydronephrosis with chronic stones, BPH, HTN, and seizure disorder presented to the ED with 2 days of weakness and a fall in his bathroom, hitting his head on the toilet. He did not lose consciousness. Has no esme on his head. Workup revealed elevated creatinine at 2.1, baseline 1.3, elevated troponin 0.055, and UTI on catheter collected urine.  After IV hydration creatinine improved to 1.87, and troponin decreased to 0.048. CT head unremarkable. EKG with borderline LVH and 1st degree AV block. CT abd showed chronic  changes. Pt was given rocephin and hospital medicine was consulted for admission for IV hydration, antibiotics, and trending of labs/follow-up of urine culture.      Upon my examination in the ER, the pt denies any chest pain or sob. Pt lives alone, but his niece brings him groceries and fixes his medications once a week. He does not drive or cut his own grass. He does not remember the last time he fell prior to today. Reports his appetite has been normal and he has been drinking fluids as normal. Takes torsemide 20mg daily. Denies any trouble with urination.     Overview/Hospital Course:  3/12/25: PT lying in bed. States he is not feeling good this morning. He is having trouble describing to me what is bothering him. He is complaining that his right femur is hurting since his fall. States that his femur was hurting yesterday at home and this contributed to him losing his balance and falling in the bathroom. Pt started vomiting during my exam. He is also  complaining of abdominal cramping. States he has a bm this morning that was normal. Also states that he is having no issues with urination. Denies any sob or cp.    3/13/25: Pt is lying comfortably in bed watching tv. He denies and n/v, dysuria, urinary hesitancy, abnormal bm's, cp, sob. He does endorse abd pain across his mid abdomen. Dicyclomine is helping. Appetite is normal. Urine culture pending.    3/14/25: Pt sitting up in his recliner. Denies issues with urination, sob, cp, increased weakness from baseline. Is still complaining of right thigh pain. States that this is why he fell. He understands that it is not safe for him to live at home alone. He is at risk for additional falls. Pt agrees to consider NH placement. He wants CM to contact his niece to discuss. Niece agrees with this plan. CM working on placement.     Past Medical History:   Diagnosis Date    BPH (benign prostatic hyperplasia)     Hypertension     Seizures        Past Surgical History:   Procedure Laterality Date    ADENOIDECTOMY      APPENDECTOMY      CARDIAC VALVE REPLACEMENT      CHOLECYSTECTOMY      COLONOSCOPY  04/21/2016    Dr Brock Brown    CORONARY ARTERY BYPASS GRAFT      EYE SURGERY      INCISION AND DRAINAGE OF ABSCESS Right 03/20/2024    Dr Amelia Carrasco    LITHOTRIPSY  11/20/2024    Dr Maurisio King    PROSTATE SURGERY      TONSILLECTOMY      TRANSURETHRAL RESECTION OF PROSTATE         Review of patient's allergies indicates:  No Known Allergies    No current facility-administered medications on file prior to encounter.     Current Outpatient Medications on File Prior to Encounter   Medication Sig    aspirin 81 mg Cap Take 81 mg by mouth Daily.    levETIRAcetam (KEPPRA) 1000 MG tablet TAKE 1 AND 1/2 TABLETS BY MOUTH  TWICE DAILY (Patient taking differently: Take 1,250 mg by mouth 2 (two) times daily.)    multivitamin (THERAGRAN) tablet Take 1 tablet by mouth once daily.    OXcarbazepine (TRILEPTAL) 300 MG Tab Take 3  tablets (900 mg total) by mouth 2 (two) times daily.    rosuvastatin (CRESTOR) 10 MG tablet Take 1 tablet (10 mg total) by mouth every evening.    sertraline (ZOLOFT) 50 MG tablet TAKE 1 TABLET BY MOUTH ONCE  DAILY    tamsulosin (FLOMAX) 0.4 mg Cap TAKE 1 CAPSULE BY MOUTH TWICE DAILY (Patient taking differently: Take 0.4 mg by mouth nightly.)    temazepam (RESTORIL) 15 mg Cap TAKE ONE CAPSULE BY MOUTH AT BEDTIME    topiramate (TOPAMAX) 100 MG tablet Take 100 mg by mouth 2 (two) times daily.    [DISCONTINUED] torsemide (DEMADEX) 20 MG Tab Take 20 mg by mouth once daily.    hyoscyamine 0.125 mg Subl 0.125 mg every 6 (six) hours as needed. (Patient not taking: Reported on 12/19/2024)     Family History       Problem Relation (Age of Onset)    Hypertension Mother          Tobacco Use    Smoking status: Never    Smokeless tobacco: Never   Substance and Sexual Activity    Alcohol use: Never    Drug use: Never    Sexual activity: Never     Review of Systems   Constitutional:  Positive for activity change and fatigue. Negative for appetite change and fever.   Respiratory:  Negative for shortness of breath and wheezing.    Cardiovascular:  Negative for chest pain and leg swelling.   Gastrointestinal:  Positive for abdominal pain. Negative for abdominal distention, constipation, nausea and vomiting.   Genitourinary:  Negative for difficulty urinating, dysuria and frequency.   Musculoskeletal:  Positive for gait problem. Negative for arthralgias, joint swelling and myalgias.   Skin:  Negative for rash and wound.   Neurological:  Positive for weakness.   Psychiatric/Behavioral:  Negative for agitation, behavioral problems and confusion.      Objective:     Vital Signs (Most Recent):  Temp: 99.4 °F (37.4 °C) (03/14/25 0805)  Pulse: 83 (03/14/25 0805)  Resp: 20 (03/14/25 0600)  BP: (!) 149/74 (03/14/25 0805)  SpO2: 99 % (03/14/25 0805) Vital Signs (24h Range):  Temp:  [97.6 °F (36.4 °C)-99.6 °F (37.6 °C)] 99.4 °F (37.4  °C)  Pulse:  [] 83  Resp:  [18-20] 20  SpO2:  [95 %-99 %] 99 %  BP: (149-170)/(72-98) 149/74     Weight: 96.6 kg (212 lb 15.4 oz)  Body mass index is 28.1 kg/m².     Physical Exam  Vitals reviewed.   Constitutional:       Appearance: Normal appearance.   HENT:      Head: Normocephalic and atraumatic.      Nose: Nose normal.   Eyes:      Conjunctiva/sclera: Conjunctivae normal.   Cardiovascular:      Rate and Rhythm: Normal rate and regular rhythm.      Pulses: Normal pulses.      Heart sounds: Normal heart sounds. No murmur heard.     No gallop.   Pulmonary:      Effort: Pulmonary effort is normal. No respiratory distress.      Breath sounds: Normal breath sounds. No wheezing, rhonchi or rales.   Abdominal:      General: Bowel sounds are normal. There is no distension.      Palpations: Abdomen is soft.   Musculoskeletal:         General: No swelling or deformity.      Cervical back: Normal range of motion and neck supple.      Right lower leg: No edema.      Left lower leg: No edema.   Skin:     General: Skin is warm and dry.      Coloration: Skin is not jaundiced.      Findings: No rash.   Neurological:      Mental Status: He is alert. Mental status is at baseline.      Motor: Weakness present.      Gait: Gait abnormal.   Psychiatric:         Mood and Affect: Mood normal.         Behavior: Behavior normal.                Significant Labs: All pertinent labs within the past 24 hours have been reviewed.  Recent Lab Results       None            Significant Imaging: I have reviewed all pertinent imaging results/findings within the past 24 hours.      Assessment & Plan  Urinary tract infection with hematuria  Admit for IVF, Rocephin.  Repeat troponin.  Follow-up of urine culture.  Home meds reconciled.  VTE ppx: SCDs    3/12/25: Urine culture with >100,000col/ml GNR.  3/13/25: Klebsiella sensitive to rocephin and bactrim. Pt agrees on NH placement. CM working on placement.   Benign prostatic hyperplasia  Home  meds reconciled.   Hypertension  Patient's blood pressure range in the last 24 hours was: BP  Min: 149/74  Max: 170/98.The patient's inpatient anti-hypertensive regimen is listed below:  Current Antihypertensives  furosemide tablet 20 mg, Daily, Oral  furosemide (LASIX) tablet, Daily, Oral    Plan  - BP is uncontrolled, will adjust as follows: Holding torsemide due to DORIS. May need to change rx upon DC.   Seizure disorder  Home meds reconciled.     S/P CABG x 3  Home meds reconciled.  Moderate malnutrition  Nutrition consulted. Most recent weight and BMI monitored-     Measurements:  Wt Readings from Last 1 Encounters:   03/14/25 96.6 kg (212 lb 15.4 oz)   Body mass index is 28.1 kg/m².    Patient has been screened and assessed by RD.    Malnutrition Type:  Context: acute illness or injury  Level: moderate    Malnutrition Characteristic Summary:  Weight Loss (Malnutrition): other (see comments) (Does not meet criteria)  Subcutaneous Fat (Malnutrition): mild depletion  Muscle Mass (Malnutrition): mild depletion  Fluid Accumulation (Malnutrition): other (see comments) (Not present)  Hand  Strength, Right (Malnutrition): Unable to assess    Interventions/Recommendations (treatment strategy):       VTE Risk Mitigation (From admission, onward)           Ordered     IP VTE HIGH RISK PATIENT  Once         03/11/25 1430     Place sequential compression device  Until discontinued         03/11/25 1430                    Discharge Planning   DILSHAD: 3/14/2025     Code Status: Full Code   Medical Readiness for Discharge Date: 3/14/2025  Discharge Plan A: Skilled Nursing Facility                Please place Justification for DME        ETHAN NORTH DO  Department of Hospital Medicine   [facility] - [facility]

## 2025-03-14 NOTE — ASSESSMENT & PLAN NOTE
Admit for IVF, Rocephin.  Repeat troponin.  Follow-up of urine culture.  Home meds reconciled.  VTE ppx: SCDs    3/12/25: Urine culture with >100,000col/ml GNR.  3/13/25: Klebsiella sensitive to rocephin and bactrim. Pt agrees on NH placement. CM working on placement.

## 2025-03-14 NOTE — PT/OT/SLP EVAL
Physical Therapy Acute Care Evaluation    Patient Name:  Nathen Morlaes   MRN:  [id number]    History:     Per MD:  Principal Problem:Urinary tract infection with hematuria           HPI:  75 yo CM with PMH CAD s/p CABG, chronic right and left hydronephrosis with chronic stones, BPH, HTN, and seizure disorder presented to the ED with 2 days of weakness and a fall in his bathroom, hitting his head on the toilet. He did not lose consciousness. Has no esme on his head. Workup revealed elevated creatinine at 2.1, baseline 1.3, elevated troponin 0.055, and UTI on catheter collected urine.  After IV hydration creatinine improved to 1.87, and troponin decreased to 0.048. CT head unremarkable. EKG with borderline LVH and 1st degree AV block. CT abd showed chronic  changes. Pt was given rocephin and hospital medicine was consulted for admission for IV hydration, antibiotics, and trending of labs/follow-up of urine culture.      Upon my examination in the ER, the pt denies any chest pain or sob. Pt lives alone, but his niece brings him groceries and fixes his medications once a week. He does not drive or cut his own grass. He does not remember the last time he fell prior to today. Reports his appetite has been normal and he has been drinking fluids as normal. Takes torsemide 20mg daily. Denies any trouble with urination.     Past Medical History:   Diagnosis Date    BPH (benign prostatic hyperplasia)     Hypertension     Seizures        Past Surgical History:   Procedure Laterality Date    ADENOIDECTOMY      APPENDECTOMY      CARDIAC VALVE REPLACEMENT      CHOLECYSTECTOMY      COLONOSCOPY  04/21/2016    Dr Brock Brown    CORONARY ARTERY BYPASS GRAFT      EYE SURGERY      INCISION AND DRAINAGE OF ABSCESS Right 03/20/2024    Dr Amelia Carrasco    LITHOTRIPSY  11/20/2024    Dr Maurisio King    PROSTATE SURGERY      TONSILLECTOMY      TRANSURETHRAL RESECTION OF PROSTATE         Recent Surgery: * No surgery found *   "    Subjective     Chief Complaint: "my leg is still hurting from when I fell"  Patient/Family Comments/goals: "to go to the NH and get stronger"  Pain/Comfort:  Location - Side 1: Right  Location 1: leg      Living Environment:  Pt currently resides at home alone in a single level home with ramp entry.  Pt typically utilizes a rollator for mobility.  Pt performs his own ADLs.  Family assists with driving for appointments.    Equipment at home: rollator, bedside commode, shower chair, wheelchair, walker, rolling.      Objective:     Patient found up in chair  upon PT entry to room.    General Precautions: Standard, fall   Orthopedic Precautions:N/A   Braces: N/A  Respiratory Status: Room air        Functional Mobility:     Assist Level Assistive Device Comments    Bed Mobility      Sit to stand/Stand to sit Sup RW Sit to stand/stand to sit performed x multiple trials from recliner level.  Pt utilized the urinal in a seated position, but had to stand for readjustment of his briefs.  Once upright, PT noted a small smear of BM.  Pt returned to sitting for a clean brief to be threaded over his feet and stood once again to raise the briefs.     Transfers      Gait CGA RW Pt able to ambulate 70 ft with a step through gait pattern and slow gait speed.  Pt demonstrated decreased foot clearance and step length bilaterally. ER of BLE observed. Pt demonstrated no overt LOB, but PT did provide cueing on 2 occasions for maintenance of RW proximity.    Balance Training      Wheelchair Mobility      Stair Climbing      Car Transfer      Other:             Assessment:     Nathen Morales is a 74 y.o. male admitted with a medical diagnosis of Urinary tract infection with hematuria.  He presents with the following impairments/functional limitations:  weakness, impaired endurance, impaired self care skills, impaired functional mobility, gait instability, impaired balance, pain.    Rehab Prognosis: Fair; patient would benefit from " acute skilled PT services to address these deficits and reach maximum level of function.      Additional information: Pt tolerated evaluation fairly well and was agreeable to participate.  Pt has agreed to skilled placement in the NH.  Pt maintains the goal of returning home independently; however, PT has expressed concerns about pt's ability to live independently during several of his previous admissions.  Pt would likely benefit from long term jail placement.     Patient left up in chair with call button in reach.      Plan:     During this hospitalization, patient to be seen 6 x/week to address the identified rehab impairments via gait training, therapeutic activities, therapeutic exercises and progress toward the following goals:    GOALS:   Multidisciplinary Problems       Physical Therapy Goals          Problem: Physical Therapy    Goal Priority Disciplines Outcome Interventions   Physical Therapy Goal     PT, PT/OT     Description: Patient will increase functional independence with mobility by performin. Supine to sit with Modified [place]  2. Sit to supine with Modified [place]  3. Sit to stand transfer with Modified [place]  4. Gait  x 200 feet with Modified [place] using Rolling Walker.                          Recommendations:     Discharge Recommendations: SNF  Discharge Equipment Recommendations: none     Time Tracking:       PT Start Time: 1221     PT Stop Time: 1237  PT Total Time (min): 16 min     Billable Minutes: Evaluation 16      2025

## 2025-03-14 NOTE — SUBJECTIVE & OBJECTIVE
Past Medical History:   Diagnosis Date    BPH (benign prostatic hyperplasia)     Hypertension     Seizures        Past Surgical History:   Procedure Laterality Date    ADENOIDECTOMY      APPENDECTOMY      CARDIAC VALVE REPLACEMENT      CHOLECYSTECTOMY      COLONOSCOPY  04/21/2016    Dr Brock Brown    CORONARY ARTERY BYPASS GRAFT      EYE SURGERY      INCISION AND DRAINAGE OF ABSCESS Right 03/20/2024    Dr Amelia Carrasco    LITHOTRIPSY  11/20/2024    Dr Maurisio King    PROSTATE SURGERY      TONSILLECTOMY      TRANSURETHRAL RESECTION OF PROSTATE         Review of patient's allergies indicates:  No Known Allergies    No current facility-administered medications on file prior to encounter.     Current Outpatient Medications on File Prior to Encounter   Medication Sig    aspirin 81 mg Cap Take 81 mg by mouth Daily.    levETIRAcetam (KEPPRA) 1000 MG tablet TAKE 1 AND 1/2 TABLETS BY MOUTH  TWICE DAILY (Patient taking differently: Take 1,250 mg by mouth 2 (two) times daily.)    multivitamin (THERAGRAN) tablet Take 1 tablet by mouth once daily.    OXcarbazepine (TRILEPTAL) 300 MG Tab Take 3 tablets (900 mg total) by mouth 2 (two) times daily.    rosuvastatin (CRESTOR) 10 MG tablet Take 1 tablet (10 mg total) by mouth every evening.    sertraline (ZOLOFT) 50 MG tablet TAKE 1 TABLET BY MOUTH ONCE  DAILY    tamsulosin (FLOMAX) 0.4 mg Cap TAKE 1 CAPSULE BY MOUTH TWICE DAILY (Patient taking differently: Take 0.4 mg by mouth nightly.)    temazepam (RESTORIL) 15 mg Cap TAKE ONE CAPSULE BY MOUTH AT BEDTIME    topiramate (TOPAMAX) 100 MG tablet Take 100 mg by mouth 2 (two) times daily.    [DISCONTINUED] torsemide (DEMADEX) 20 MG Tab Take 20 mg by mouth once daily.    hyoscyamine 0.125 mg Subl 0.125 mg every 6 (six) hours as needed. (Patient not taking: Reported on 12/19/2024)     Family History       Problem Relation (Age of Onset)    Hypertension Mother          Tobacco Use    Smoking status: Never    Smokeless tobacco:  Never   Substance and Sexual Activity    Alcohol use: Never    Drug use: Never    Sexual activity: Never     Review of Systems   Constitutional:  Positive for activity change and fatigue. Negative for appetite change and fever.   Respiratory:  Negative for shortness of breath and wheezing.    Cardiovascular:  Negative for chest pain and leg swelling.   Gastrointestinal:  Positive for abdominal pain. Negative for abdominal distention, constipation, nausea and vomiting.   Genitourinary:  Negative for difficulty urinating, dysuria and frequency.   Musculoskeletal:  Positive for gait problem. Negative for arthralgias, joint swelling and myalgias.   Skin:  Negative for rash and wound.   Neurological:  Positive for weakness.   Psychiatric/Behavioral:  Negative for agitation, behavioral problems and confusion.      Objective:     Vital Signs (Most Recent):  Temp: 99.4 °F (37.4 °C) (03/14/25 0805)  Pulse: 83 (03/14/25 0805)  Resp: 20 (03/14/25 0600)  BP: (!) 149/74 (03/14/25 0805)  SpO2: 99 % (03/14/25 0805) Vital Signs (24h Range):  Temp:  [97.6 °F (36.4 °C)-99.6 °F (37.6 °C)] 99.4 °F (37.4 °C)  Pulse:  [] 83  Resp:  [18-20] 20  SpO2:  [95 %-99 %] 99 %  BP: (149-170)/(72-98) 149/74     Weight: 96.6 kg (212 lb 15.4 oz)  Body mass index is 28.1 kg/m².     Physical Exam  Vitals reviewed.   Constitutional:       Appearance: Normal appearance.   HENT:      Head: Normocephalic and atraumatic.      Nose: Nose normal.   Eyes:      Conjunctiva/sclera: Conjunctivae normal.   Cardiovascular:      Rate and Rhythm: Normal rate and regular rhythm.      Pulses: Normal pulses.      Heart sounds: Normal heart sounds. No murmur heard.     No gallop.   Pulmonary:      Effort: Pulmonary effort is normal. No respiratory distress.      Breath sounds: Normal breath sounds. No wheezing, rhonchi or rales.   Abdominal:      General: Bowel sounds are normal. There is no distension.      Palpations: Abdomen is soft.   Musculoskeletal:          General: No swelling or deformity.      Cervical back: Normal range of motion and neck supple.      Right lower leg: No edema.      Left lower leg: No edema.   Skin:     General: Skin is warm and dry.      Coloration: Skin is not jaundiced.      Findings: No rash.   Neurological:      Mental Status: He is alert. Mental status is at baseline.      Motor: Weakness present.      Gait: Gait abnormal.   Psychiatric:         Mood and Affect: Mood normal.         Behavior: Behavior normal.                Significant Labs: All pertinent labs within the past 24 hours have been reviewed.  Recent Lab Results       None            Significant Imaging: I have reviewed all pertinent imaging results/findings within the past 24 hours.

## 2025-03-14 NOTE — PLAN OF CARE
03/14/25 1211   Discharge Reassessment   Assessment Type Discharge Planning Reassessment   Did the patient's condition or plan change since previous assessment? Yes   Discharge Plan discussed with: Patient;POA   Name(s) and Number(s) Michael Parish (Healthcare Power of )  [phone]   Communicated DILSHAD with patient/caregiver Date not available/Unable to determine   Discharge Plan A Skilled Nursing Facility   Discharge Plan B Skilled Nursing Facility   DME Needed Upon Discharge  none   Transition of Care Barriers None   Why the patient remains in the hospital Requires continued medical care   Post-Acute Status   Post-Acute Authorization Placement   Post-Acute Placement Status Referrals Sent   Coverage [facility]   Hospital Resources/Appts/Education Provided Provided education on problems/symptoms using teachback   Discharge Delays None known at this time     Referral sent to [facility] per patient and emelia first choice.

## 2025-03-14 NOTE — ASSESSMENT & PLAN NOTE
Patient's blood pressure range in the last 24 hours was: BP  Min: 149/74  Max: 170/98.The patient's inpatient anti-hypertensive regimen is listed below:  Current Antihypertensives  furosemide tablet 20 mg, Daily, Oral  furosemide (LASIX) tablet, Daily, Oral    Plan  - BP is uncontrolled, will adjust as follows: Holding torsemide due to DORIS. May need to change rx upon DC.

## 2025-03-14 NOTE — ASSESSMENT & PLAN NOTE
Nutrition consulted. Most recent weight and BMI monitored-     Measurements:  Wt Readings from Last 1 Encounters:   03/14/25 96.6 kg (212 lb 15.4 oz)   Body mass index is 28.1 kg/m².    Patient has been screened and assessed by RD.    Malnutrition Type:  Context: acute illness or injury  Level: moderate    Malnutrition Characteristic Summary:  Weight Loss (Malnutrition): other (see comments) (Does not meet criteria)  Subcutaneous Fat (Malnutrition): mild depletion  Muscle Mass (Malnutrition): mild depletion  Fluid Accumulation (Malnutrition): other (see comments) (Not present)  Hand  Strength, Right (Malnutrition): Unable to assess    Interventions/Recommendations (treatment strategy):

## 2025-03-15 LAB
ALBUMIN SERPL-MCNC: 2 G/DL (ref 3.4–4.8)
ALBUMIN/GLOB SERPL: 0.5 RATIO (ref 1.1–2)
ALP SERPL-CCNC: 119 UNIT/L (ref 40–150)
ALT SERPL-CCNC: 22 UNIT/L (ref 0–55)
ANION GAP SERPL CALC-SCNC: 7 MEQ/L
AST SERPL-CCNC: 30 UNIT/L (ref 5–34)
BILIRUB SERPL-MCNC: 0.2 MG/DL
BUN SERPL-MCNC: 30 MG/DL (ref 8.4–25.7)
CALCIUM SERPL-MCNC: 8.5 MG/DL (ref 8.8–10)
CHLORIDE SERPL-SCNC: 114 MMOL/L (ref 98–107)
CO2 SERPL-SCNC: 19 MMOL/L (ref 23–31)
CREAT SERPL-MCNC: 1.15 MG/DL (ref 0.72–1.25)
CREAT/UREA NIT SERPL: 26
GFR SERPLBLD CREATININE-BSD FMLA CKD-EPI: >60 ML/MIN/1.73/M2
GLOBULIN SER-MCNC: 3.8 GM/DL (ref 2.4–3.5)
GLUCOSE SERPL-MCNC: 112 MG/DL (ref 82–115)
POTASSIUM SERPL-SCNC: 4.4 MMOL/L (ref 3.5–5.1)
PROT SERPL-MCNC: 5.8 GM/DL (ref 5.8–7.6)
SODIUM SERPL-SCNC: 140 MMOL/L (ref 136–145)

## 2025-03-15 PROCEDURE — 63600175 PHARM REV CODE 636 W HCPCS: Performed by: FAMILY MEDICINE

## 2025-03-15 PROCEDURE — 97116 GAIT TRAINING THERAPY: CPT

## 2025-03-15 PROCEDURE — 80053 COMPREHEN METABOLIC PANEL: CPT | Performed by: INTERNAL MEDICINE

## 2025-03-15 PROCEDURE — 36415 COLL VENOUS BLD VENIPUNCTURE: CPT | Performed by: INTERNAL MEDICINE

## 2025-03-15 PROCEDURE — 25000003 PHARM REV CODE 250: Performed by: INTERNAL MEDICINE

## 2025-03-15 PROCEDURE — 94761 N-INVAS EAR/PLS OXIMETRY MLT: CPT

## 2025-03-15 PROCEDURE — 11000001 HC ACUTE MED/SURG PRIVATE ROOM

## 2025-03-15 PROCEDURE — 97530 THERAPEUTIC ACTIVITIES: CPT

## 2025-03-15 RX ADMIN — FUROSEMIDE 20 MG: 20 TABLET ORAL at 08:03

## 2025-03-15 RX ADMIN — Medication 1 CAPSULE: at 04:03

## 2025-03-15 RX ADMIN — TOPIRAMATE 100 MG: 100 TABLET, FILM COATED ORAL at 08:03

## 2025-03-15 RX ADMIN — ATORVASTATIN CALCIUM 40 MG: 40 TABLET, FILM COATED ORAL at 08:03

## 2025-03-15 RX ADMIN — CEFTRIAXONE SODIUM 1 G: 1 INJECTION, POWDER, FOR SOLUTION INTRAMUSCULAR; INTRAVENOUS at 01:03

## 2025-03-15 RX ADMIN — Medication 1 CAPSULE: at 08:03

## 2025-03-15 RX ADMIN — OXCARBAZEPINE 900 MG: 300 TABLET, FILM COATED ORAL at 08:03

## 2025-03-15 RX ADMIN — ACETAMINOPHEN 1000 MG: 500 TABLET, FILM COATED ORAL at 08:03

## 2025-03-15 RX ADMIN — TAMSULOSIN HYDROCHLORIDE 0.4 MG: 0.4 CAPSULE ORAL at 08:03

## 2025-03-15 RX ADMIN — SERTRALINE HYDROCHLORIDE 50 MG: 50 TABLET ORAL at 08:03

## 2025-03-15 RX ADMIN — ALUMINUM HYDROXIDE, MAGNESIUM HYDROXIDE, AND DIMETHICONE 30 ML: 200; 20; 200 SUSPENSION ORAL at 04:03

## 2025-03-15 RX ADMIN — Medication 1 CAPSULE: at 12:03

## 2025-03-15 RX ADMIN — LEVETIRACETAM 1250 MG: 500 TABLET, FILM COATED ORAL at 08:03

## 2025-03-15 RX ADMIN — Medication 6 MG: at 08:03

## 2025-03-15 RX ADMIN — ASPIRIN 81 MG: 81 TABLET, CHEWABLE ORAL at 08:03

## 2025-03-15 NOTE — PROGRESS NOTES
[facility]  [facility] Medicine  Progress Note    Patient Name: Natehn Morales  MRN: [id number]  Patient Class: IP- Inpatient   Admission Date: 3/11/2025  Length of Stay: 4 days  Attending Physician: Anabelle Rivas DO  Primary Care Provider: Brock Brown MD        Subjective     Principal Problem:Urinary tract infection with hematuria        HPI:  73 yo CM with PMH CAD s/p CABG, chronic right and left hydronephrosis with chronic stones, BPH, HTN, and seizure disorder presented to the ED with 2 days of weakness and a fall in his bathroom, hitting his head on the toilet. He did not lose consciousness. Has no esme on his head. Workup revealed elevated creatinine at 2.1, baseline 1.3, elevated troponin 0.055, and UTI on catheter collected urine.  After IV hydration creatinine improved to 1.87, and troponin decreased to 0.048. CT head unremarkable. EKG with borderline LVH and 1st degree AV block. CT abd showed chronic  changes. Pt was given rocephin and hospital medicine was consulted for admission for IV hydration, antibiotics, and trending of labs/follow-up of urine culture.      Upon my examination in the ER, the pt denies any chest pain or sob. Pt lives alone, but his niece brings him groceries and fixes his medications once a week. He does not drive or cut his own grass. He does not remember the last time he fell prior to today. Reports his appetite has been normal and he has been drinking fluids as normal. Takes torsemide 20mg daily. Denies any trouble with urination.     Overview/Hospital Course:  3/12/25: PT lying in bed. States he is not feeling good this morning. He is having trouble describing to me what is bothering him. He is complaining that his right femur is hurting since his fall. States that his femur was hurting yesterday at home and this contributed to him losing his balance and falling in the bathroom. Pt started vomiting during my exam. He is also  complaining of abdominal cramping. States he has a bm this morning that was normal. Also states that he is having no issues with urination. Denies any sob or cp.    3/13/25: Pt is lying comfortably in bed watching tv. He denies and n/v, dysuria, urinary hesitancy, abnormal bm's, cp, sob. He does endorse abd pain across his mid abdomen. Dicyclomine is helping. Appetite is normal. Urine culture pending.    3/14/25: Pt sitting up in his recliner. Denies issues with urination, sob, cp, increased weakness from baseline. Is still complaining of right thigh pain. States that this is why he fell. He understands that it is not safe for him to live at home alone. He is at risk for additional falls. Pt agrees to consider NH placement. He wants CM to contact his niece to discuss. Niece agrees with this plan. CM working on placement.     3/15/25: Pt lying comfortably in bed. Still complaining of right hip and knee pain. He got up and walked the halls with PT this morning. Denies sob, cp, dysuria, or bowel issues. Making jokes this morning.     Past Medical History:   Diagnosis Date    BPH (benign prostatic hyperplasia)     Hypertension     Seizures        Past Surgical History:   Procedure Laterality Date    ADENOIDECTOMY      APPENDECTOMY      CARDIAC VALVE REPLACEMENT      CHOLECYSTECTOMY      COLONOSCOPY  04/21/2016    Dr Brock Brown    CORONARY ARTERY BYPASS GRAFT      EYE SURGERY      INCISION AND DRAINAGE OF ABSCESS Right 03/20/2024    Dr Amelia Carrasco    LITHOTRIPSY  11/20/2024    Dr Maurisio King    PROSTATE SURGERY      TONSILLECTOMY      TRANSURETHRAL RESECTION OF PROSTATE         Review of patient's allergies indicates:  No Known Allergies    No current facility-administered medications on file prior to encounter.     Current Outpatient Medications on File Prior to Encounter   Medication Sig    aspirin 81 mg Cap Take 81 mg by mouth Daily.    levETIRAcetam (KEPPRA) 1000 MG tablet TAKE 1 AND 1/2 TABLETS BY  MOUTH  TWICE DAILY (Patient taking differently: Take 1,250 mg by mouth 2 (two) times daily.)    multivitamin (THERAGRAN) tablet Take 1 tablet by mouth once daily.    OXcarbazepine (TRILEPTAL) 300 MG Tab Take 3 tablets (900 mg total) by mouth 2 (two) times daily.    rosuvastatin (CRESTOR) 10 MG tablet Take 1 tablet (10 mg total) by mouth every evening.    sertraline (ZOLOFT) 50 MG tablet TAKE 1 TABLET BY MOUTH ONCE  DAILY    tamsulosin (FLOMAX) 0.4 mg Cap TAKE 1 CAPSULE BY MOUTH TWICE DAILY (Patient taking differently: Take 0.4 mg by mouth nightly.)    temazepam (RESTORIL) 15 mg Cap TAKE ONE CAPSULE BY MOUTH AT BEDTIME    topiramate (TOPAMAX) 100 MG tablet Take 100 mg by mouth 2 (two) times daily.    hyoscyamine 0.125 mg Subl 0.125 mg every 6 (six) hours as needed. (Patient not taking: Reported on 12/19/2024)     Family History       Problem Relation (Age of Onset)    Hypertension Mother          Tobacco Use    Smoking status: Never    Smokeless tobacco: Never   Substance and Sexual Activity    Alcohol use: Never    Drug use: Never    Sexual activity: Never     Review of Systems   Constitutional:  Positive for activity change and fatigue. Negative for appetite change and fever.   Respiratory:  Negative for shortness of breath and wheezing.    Cardiovascular:  Negative for chest pain and leg swelling.   Gastrointestinal:  Negative for abdominal distention, abdominal pain, constipation, nausea and vomiting.   Genitourinary:  Negative for difficulty urinating, dysuria and frequency.   Musculoskeletal:  Positive for arthralgias and gait problem. Negative for joint swelling and myalgias.   Skin:  Negative for rash and wound.   Neurological:  Positive for weakness.   Psychiatric/Behavioral:  Negative for agitation, behavioral problems and confusion.      Objective:     Vital Signs (Most Recent):  Temp: 98.3 °F (36.8 °C) (03/15/25 0739)  Pulse: 85 (03/15/25 0739)  Resp: 20 (03/15/25 0739)  BP: (!) 140/73 (03/15/25  0739)  SpO2: 97 % (03/15/25 0739) Vital Signs (24h Range):  Temp:  [98.1 °F (36.7 °C)-98.3 °F (36.8 °C)] 98.3 °F (36.8 °C)  Pulse:  [72-85] 85  Resp:  [18-20] 20  SpO2:  [97 %-98 %] 97 %  BP: (140-156)/(62-73) 140/73     Weight: 93.9 kg (207 lb 0.2 oz)  Body mass index is 27.31 kg/m².     Physical Exam  Vitals reviewed.   Constitutional:       Appearance: Normal appearance.   HENT:      Head: Normocephalic and atraumatic.      Nose: Nose normal.   Eyes:      Conjunctiva/sclera: Conjunctivae normal.   Cardiovascular:      Rate and Rhythm: Normal rate and regular rhythm.      Pulses: Normal pulses.      Heart sounds: Normal heart sounds. No murmur heard.     No gallop.   Pulmonary:      Effort: Pulmonary effort is normal. No respiratory distress.      Breath sounds: Normal breath sounds. No wheezing, rhonchi or rales.   Abdominal:      General: Bowel sounds are normal. There is no distension.      Palpations: Abdomen is soft.   Musculoskeletal:         General: No swelling or deformity.      Cervical back: Normal range of motion and neck supple.      Right lower leg: No edema.      Left lower leg: No edema.   Skin:     General: Skin is warm and dry.      Coloration: Skin is not jaundiced.      Findings: No rash.   Neurological:      Mental Status: He is alert. Mental status is at baseline.      Motor: Weakness present.      Gait: Gait abnormal.   Psychiatric:         Mood and Affect: Mood normal.         Behavior: Behavior normal.                Significant Labs: All pertinent labs within the past 24 hours have been reviewed.  Recent Lab Results         03/15/25  0421        Albumin/Globulin Ratio 0.5       Albumin 2.0              ALT 22       Anion Gap 7.0       AST 30       BILIRUBIN TOTAL 0.2       BUN 30.0       BUN/CREAT RATIO 26       Calcium 8.5       Chloride 114       CO2 19       Creatinine 1.15       eGFR >60  Comment: Estimated GFR calculated using the CKD-EPI creatinine (2021) equation.        Globulin, Total 3.8       Glucose 112       Potassium 4.4       PROTEIN TOTAL 5.8       Sodium 140               Significant Imaging: I have reviewed all pertinent imaging results/findings within the past 24 hours.      Assessment & Plan  Urinary tract infection with hematuria  Admit for IVF, Rocephin.  Repeat troponin.  Follow-up of urine culture.  Home meds reconciled.  VTE ppx: SCDs    3/12/25: Urine culture with >100,000col/ml GNR.  3/13/25: Klebsiella sensitive to rocephin and bactrim. Pt agrees on NH placement. CM working on placement.   Benign prostatic hyperplasia  Home meds reconciled.   Hypertension  Patient's blood pressure range in the last 24 hours was: BP  Min: 140/73  Max: 156/65.The patient's inpatient anti-hypertensive regimen is listed below:  Current Antihypertensives  furosemide tablet 20 mg, Daily, Oral  furosemide (LASIX) tablet, Daily, Oral    Plan  - BP is uncontrolled, will adjust as follows: Holding torsemide due to DORIS. May need to change rx upon DC.   Seizure disorder  Home meds reconciled.     S/P CABG x 3  Home meds reconciled.  Moderate malnutrition  Nutrition consulted. Most recent weight and BMI monitored-     Measurements:  Wt Readings from Last 1 Encounters:   03/15/25 93.9 kg (207 lb 0.2 oz)   Body mass index is 27.31 kg/m².    Patient has been screened and assessed by RD.    Malnutrition Type:  Context: acute illness or injury  Level: moderate    Malnutrition Characteristic Summary:  Weight Loss (Malnutrition): other (see comments) (Does not meet criteria)  Subcutaneous Fat (Malnutrition): mild depletion  Muscle Mass (Malnutrition): mild depletion  Fluid Accumulation (Malnutrition): other (see comments) (Not present)  Hand  Strength, Right (Malnutrition): Unable to assess    Interventions/Recommendations (treatment strategy):       VTE Risk Mitigation (From admission, onward)           Ordered     IP VTE HIGH RISK PATIENT  Once         03/11/25 1430     Place sequential  compression device  Until discontinued         03/11/25 1430                    Discharge Planning   DILSHAD: 3/18/2025     Code Status: Full Code   Medical Readiness for Discharge Date: 3/14/2025  Discharge Plan A: Skilled Nursing Facility   Discharge Delays: None known at this time            Please place Justification for DME        ETHAN NORTH DO  Department of Hospital Medicine   [facility] - Medical Surgical Unit

## 2025-03-15 NOTE — SUBJECTIVE & OBJECTIVE
Past Medical History:   Diagnosis Date    BPH (benign prostatic hyperplasia)     Hypertension     Seizures        Past Surgical History:   Procedure Laterality Date    ADENOIDECTOMY      APPENDECTOMY      CARDIAC VALVE REPLACEMENT      CHOLECYSTECTOMY      COLONOSCOPY  04/21/2016    Dr Brock Brown    CORONARY ARTERY BYPASS GRAFT      EYE SURGERY      INCISION AND DRAINAGE OF ABSCESS Right 03/20/2024    Dr Amelia Carrasco    LITHOTRIPSY  11/20/2024    Dr Maurisio King    PROSTATE SURGERY      TONSILLECTOMY      TRANSURETHRAL RESECTION OF PROSTATE         Review of patient's allergies indicates:  No Known Allergies    No current facility-administered medications on file prior to encounter.     Current Outpatient Medications on File Prior to Encounter   Medication Sig    aspirin 81 mg Cap Take 81 mg by mouth Daily.    levETIRAcetam (KEPPRA) 1000 MG tablet TAKE 1 AND 1/2 TABLETS BY MOUTH  TWICE DAILY (Patient taking differently: Take 1,250 mg by mouth 2 (two) times daily.)    multivitamin (THERAGRAN) tablet Take 1 tablet by mouth once daily.    OXcarbazepine (TRILEPTAL) 300 MG Tab Take 3 tablets (900 mg total) by mouth 2 (two) times daily.    rosuvastatin (CRESTOR) 10 MG tablet Take 1 tablet (10 mg total) by mouth every evening.    sertraline (ZOLOFT) 50 MG tablet TAKE 1 TABLET BY MOUTH ONCE  DAILY    tamsulosin (FLOMAX) 0.4 mg Cap TAKE 1 CAPSULE BY MOUTH TWICE DAILY (Patient taking differently: Take 0.4 mg by mouth nightly.)    temazepam (RESTORIL) 15 mg Cap TAKE ONE CAPSULE BY MOUTH AT BEDTIME    topiramate (TOPAMAX) 100 MG tablet Take 100 mg by mouth 2 (two) times daily.    hyoscyamine 0.125 mg Subl 0.125 mg every 6 (six) hours as needed. (Patient not taking: Reported on 12/19/2024)     Family History       Problem Relation (Age of Onset)    Hypertension Mother          Tobacco Use    Smoking status: Never    Smokeless tobacco: Never   Substance and Sexual Activity    Alcohol use: Never    Drug use: Never     Sexual activity: Never     Review of Systems   Constitutional:  Positive for activity change and fatigue. Negative for appetite change and fever.   Respiratory:  Negative for shortness of breath and wheezing.    Cardiovascular:  Negative for chest pain and leg swelling.   Gastrointestinal:  Negative for abdominal distention, abdominal pain, constipation, nausea and vomiting.   Genitourinary:  Negative for difficulty urinating, dysuria and frequency.   Musculoskeletal:  Positive for arthralgias and gait problem. Negative for joint swelling and myalgias.   Skin:  Negative for rash and wound.   Neurological:  Positive for weakness.   Psychiatric/Behavioral:  Negative for agitation, behavioral problems and confusion.      Objective:     Vital Signs (Most Recent):  Temp: 98.3 °F (36.8 °C) (03/15/25 0739)  Pulse: 85 (03/15/25 0739)  Resp: 20 (03/15/25 0739)  BP: (!) 140/73 (03/15/25 0739)  SpO2: 97 % (03/15/25 0739) Vital Signs (24h Range):  Temp:  [98.1 °F (36.7 °C)-98.3 °F (36.8 °C)] 98.3 °F (36.8 °C)  Pulse:  [72-85] 85  Resp:  [18-20] 20  SpO2:  [97 %-98 %] 97 %  BP: (140-156)/(62-73) 140/73     Weight: 93.9 kg (207 lb 0.2 oz)  Body mass index is 27.31 kg/m².     Physical Exam  Vitals reviewed.   Constitutional:       Appearance: Normal appearance.   HENT:      Head: Normocephalic and atraumatic.      Nose: Nose normal.   Eyes:      Conjunctiva/sclera: Conjunctivae normal.   Cardiovascular:      Rate and Rhythm: Normal rate and regular rhythm.      Pulses: Normal pulses.      Heart sounds: Normal heart sounds. No murmur heard.     No gallop.   Pulmonary:      Effort: Pulmonary effort is normal. No respiratory distress.      Breath sounds: Normal breath sounds. No wheezing, rhonchi or rales.   Abdominal:      General: Bowel sounds are normal. There is no distension.      Palpations: Abdomen is soft.   Musculoskeletal:         General: No swelling or deformity.      Cervical back: Normal range of motion and neck supple.       Right lower leg: No edema.      Left lower leg: No edema.   Skin:     General: Skin is warm and dry.      Coloration: Skin is not jaundiced.      Findings: No rash.   Neurological:      Mental Status: He is alert. Mental status is at baseline.      Motor: Weakness present.      Gait: Gait abnormal.   Psychiatric:         Mood and Affect: Mood normal.         Behavior: Behavior normal.                Significant Labs: All pertinent labs within the past 24 hours have been reviewed.  Recent Lab Results         03/15/25  0421        Albumin/Globulin Ratio 0.5       Albumin 2.0              ALT 22       Anion Gap 7.0       AST 30       BILIRUBIN TOTAL 0.2       BUN 30.0       BUN/CREAT RATIO 26       Calcium 8.5       Chloride 114       CO2 19       Creatinine 1.15       eGFR >60  Comment: Estimated GFR calculated using the CKD-EPI creatinine (2021) equation.       Globulin, Total 3.8       Glucose 112       Potassium 4.4       PROTEIN TOTAL 5.8       Sodium 140               Significant Imaging: I have reviewed all pertinent imaging results/findings within the past 24 hours.

## 2025-03-15 NOTE — ASSESSMENT & PLAN NOTE
Patient's blood pressure range in the last 24 hours was: BP  Min: 140/73  Max: 156/65.The patient's inpatient anti-hypertensive regimen is listed below:  Current Antihypertensives  furosemide tablet 20 mg, Daily, Oral  furosemide (LASIX) tablet, Daily, Oral    Plan  - BP is uncontrolled, will adjust as follows: Holding torsemide due to DORIS. May need to change rx upon DC.

## 2025-03-15 NOTE — ASSESSMENT & PLAN NOTE
Nutrition consulted. Most recent weight and BMI monitored-     Measurements:  Wt Readings from Last 1 Encounters:   03/15/25 93.9 kg (207 lb 0.2 oz)   Body mass index is 27.31 kg/m².    Patient has been screened and assessed by RD.    Malnutrition Type:  Context: acute illness or injury  Level: moderate    Malnutrition Characteristic Summary:  Weight Loss (Malnutrition): other (see comments) (Does not meet criteria)  Subcutaneous Fat (Malnutrition): mild depletion  Muscle Mass (Malnutrition): mild depletion  Fluid Accumulation (Malnutrition): other (see comments) (Not present)  Hand  Strength, Right (Malnutrition): Unable to assess    Interventions/Recommendations (treatment strategy):

## 2025-03-15 NOTE — PT/OT/SLP PROGRESS
Physical Therapy Treatment Note           Name: Nathen Morales    : 1950 (74 y.o.)  MRN: [id number]             Subjective Assessment:     No complaints x Lethargic   x Awake, alert, cooperative  Uncooperative    Agitated x c/o pain    Appropriate  c/o fatigue    Confused  Treated at bedside     Emotionally labile  Treated in gym/dept.    Impulsive  Other:    Flat affect       Pain/Comfort:    Pain Rating 1: 4/10  Location - Side 1: Right  Location 1: leg  Pain Addressed 1: Reposition    Therapy Precautions:      Cognitive deficits  Spinal precautions    Collar - hard  Sternal precautions    Collar - soft   TLSO   x Fall risk  LSO    Hip precautions - posterior  Knee immobilizer    Hip precautions - anterior  WBAT    Impaired communication  Partial weightbearing    Oxygen  TTWB    PEG tube  NWB    Visual deficits  Other:    Hearing deficits        Vital Signs:     Vitals Value    Room air      Oxygen (L)     Blood pressure     Heart rate          Mobility Training:     Assist level Comments    Bed mobility CGA  Sit to supine, supine to sit needs extended time and requires cueing for technique    Sit to stand/stand to sit SBA  Sit to stand x 5 reps from EOB.     Transfers BSC to bed  Patient completed bed to BSC transfer with SBA and verbal cues for safety.  Patient able to lower garments in order to use restroom(BM).  Patient able to put new under garments on with SBA.     Gait CGA Wide EVITA with forward flexed posture, steps short with poor foot clearance.  Patient able to ambulate with CGA for 2 x 90 feet.    Balance training     Wheelchair mobility     Car transfer     Other:          Therapeutic Exercise:     Exercise Sets Reps Comments                               Additional Treatment:        Assessment:     Patient tolerated session well.    PT Plan:     Continue with current POC.     GOALS:   Multidisciplinary Problems       Physical Therapy Goals          Problem: Physical Therapy    Goal  Priority Disciplines Outcome Interventions   Physical Therapy Goal     PT, PT/OT Progressing    Description: Patient will increase functional independence with mobility by performin. Supine to sit with Modified [place]  2. Sit to supine with Modified [place]  3. Sit to stand transfer with Modified [place]  4. Gait  x 200 feet with Modified [place] using Rolling Walker.                            Discharge Recommendations:          Discharge Equipment Recommendations:     none     At end of treatment, patient remained:      Up in chair    x In bed   x With alarm activated    Bed rails up   x Call bell in reach      Family/friends present     Restraints secured properly     In bathroom with CNA/RN notified     In gym with PT/PTA/tech   x Nurse aware     Other:           PT Start Time: 0800     PT Stop Time: 0830  PT Total Time (min): 30 min     Billable Minutes: Gait Training 20 and Therapeutic Activity 15      03/15/2025

## 2025-03-16 LAB
ALBUMIN SERPL-MCNC: 2 G/DL (ref 3.4–4.8)
ALBUMIN/GLOB SERPL: 0.5 RATIO (ref 1.1–2)
ALP SERPL-CCNC: 220 UNIT/L (ref 40–150)
ALT SERPL-CCNC: 39 UNIT/L (ref 0–55)
ANION GAP SERPL CALC-SCNC: 9 MEQ/L
AST SERPL-CCNC: 69 UNIT/L (ref 5–34)
BASOPHILS # BLD AUTO: 0.07 X10(3)/MCL
BASOPHILS NFR BLD AUTO: 0.4 %
BILIRUB SERPL-MCNC: 0.2 MG/DL
BUN SERPL-MCNC: 30 MG/DL (ref 8.4–25.7)
CALCIUM SERPL-MCNC: 8.4 MG/DL (ref 8.8–10)
CHLORIDE SERPL-SCNC: 112 MMOL/L (ref 98–107)
CO2 SERPL-SCNC: 20 MMOL/L (ref 23–31)
CREAT SERPL-MCNC: 1.37 MG/DL (ref 0.72–1.25)
CREAT/UREA NIT SERPL: 22
EOSINOPHIL # BLD AUTO: 0.02 X10(3)/MCL (ref 0–0.9)
EOSINOPHIL NFR BLD AUTO: 0.1 %
ERYTHROCYTE [DISTWIDTH] IN BLOOD BY AUTOMATED COUNT: 14.1 % (ref 11.5–17)
GFR SERPLBLD CREATININE-BSD FMLA CKD-EPI: 53 ML/MIN/1.73/M2
GLOBULIN SER-MCNC: 3.7 GM/DL (ref 2.4–3.5)
GLUCOSE SERPL-MCNC: 130 MG/DL (ref 82–115)
HCT VFR BLD AUTO: 29.7 % (ref 42–52)
HGB BLD-MCNC: 9.5 G/DL (ref 14–18)
IMM GRANULOCYTES # BLD AUTO: 0.47 X10(3)/MCL (ref 0–0.04)
IMM GRANULOCYTES NFR BLD AUTO: 2.7 %
LYMPHOCYTES # BLD AUTO: 0.43 X10(3)/MCL (ref 0.6–4.6)
LYMPHOCYTES NFR BLD AUTO: 2.5 %
MCH RBC QN AUTO: 32.5 PG (ref 27–31)
MCHC RBC AUTO-ENTMCNC: 32 G/DL (ref 33–36)
MCV RBC AUTO: 101.7 FL (ref 80–94)
MONOCYTES # BLD AUTO: 1.12 X10(3)/MCL (ref 0.1–1.3)
MONOCYTES NFR BLD AUTO: 6.5 %
NEUTROPHILS # BLD AUTO: 15.15 X10(3)/MCL (ref 2.1–9.2)
NEUTROPHILS NFR BLD AUTO: 87.8 %
NRBC BLD AUTO-RTO: 0 %
OHS QRS DURATION: 90 MS
OHS QTC CALCULATION: 413 MS
PLATELET # BLD AUTO: 330 X10(3)/MCL (ref 130–400)
PMV BLD AUTO: 9 FL (ref 7.4–10.4)
POTASSIUM SERPL-SCNC: 3.6 MMOL/L (ref 3.5–5.1)
PROT SERPL-MCNC: 5.7 GM/DL (ref 5.8–7.6)
RBC # BLD AUTO: 2.92 X10(6)/MCL (ref 4.7–6.1)
SODIUM SERPL-SCNC: 141 MMOL/L (ref 136–145)
WBC # BLD AUTO: 17.26 X10(3)/MCL (ref 4.5–11.5)

## 2025-03-16 PROCEDURE — 93005 ELECTROCARDIOGRAM TRACING: CPT

## 2025-03-16 PROCEDURE — 94761 N-INVAS EAR/PLS OXIMETRY MLT: CPT

## 2025-03-16 PROCEDURE — 36415 COLL VENOUS BLD VENIPUNCTURE: CPT | Performed by: INTERNAL MEDICINE

## 2025-03-16 PROCEDURE — 11000001 HC ACUTE MED/SURG PRIVATE ROOM

## 2025-03-16 PROCEDURE — 80053 COMPREHEN METABOLIC PANEL: CPT | Performed by: INTERNAL MEDICINE

## 2025-03-16 PROCEDURE — 63600175 PHARM REV CODE 636 W HCPCS: Performed by: FAMILY MEDICINE

## 2025-03-16 PROCEDURE — 85025 COMPLETE CBC W/AUTO DIFF WBC: CPT | Performed by: INTERNAL MEDICINE

## 2025-03-16 PROCEDURE — 63600175 PHARM REV CODE 636 W HCPCS: Performed by: INTERNAL MEDICINE

## 2025-03-16 PROCEDURE — 27000221 HC OXYGEN, UP TO 24 HOURS

## 2025-03-16 PROCEDURE — 25000003 PHARM REV CODE 250: Performed by: INTERNAL MEDICINE

## 2025-03-16 PROCEDURE — 87040 BLOOD CULTURE FOR BACTERIA: CPT | Performed by: INTERNAL MEDICINE

## 2025-03-16 RX ORDER — FUROSEMIDE 40 MG/1
40 TABLET ORAL DAILY
Status: DISCONTINUED | OUTPATIENT
Start: 2025-03-17 | End: 2025-03-21 | Stop reason: HOSPADM

## 2025-03-16 RX ORDER — METOPROLOL TARTRATE 1 MG/ML
5 INJECTION, SOLUTION INTRAVENOUS EVERY 4 HOURS PRN
Status: DISCONTINUED | OUTPATIENT
Start: 2025-03-16 | End: 2025-03-21 | Stop reason: HOSPADM

## 2025-03-16 RX ORDER — FUROSEMIDE 10 MG/ML
20 INJECTION INTRAMUSCULAR; INTRAVENOUS ONCE
Status: COMPLETED | OUTPATIENT
Start: 2025-03-16 | End: 2025-03-16

## 2025-03-16 RX ADMIN — OXCARBAZEPINE 900 MG: 300 TABLET, FILM COATED ORAL at 09:03

## 2025-03-16 RX ADMIN — ATORVASTATIN CALCIUM 40 MG: 40 TABLET, FILM COATED ORAL at 08:03

## 2025-03-16 RX ADMIN — LEVETIRACETAM 1250 MG: 500 TABLET, FILM COATED ORAL at 09:03

## 2025-03-16 RX ADMIN — TOPIRAMATE 100 MG: 100 TABLET, FILM COATED ORAL at 09:03

## 2025-03-16 RX ADMIN — FUROSEMIDE 20 MG: 10 INJECTION, SOLUTION INTRAMUSCULAR; INTRAVENOUS at 03:03

## 2025-03-16 RX ADMIN — LEVETIRACETAM 1250 MG: 500 TABLET, FILM COATED ORAL at 08:03

## 2025-03-16 RX ADMIN — TOPIRAMATE 100 MG: 100 TABLET, FILM COATED ORAL at 08:03

## 2025-03-16 RX ADMIN — Medication 1 CAPSULE: at 09:03

## 2025-03-16 RX ADMIN — ACETAMINOPHEN 1000 MG: 500 TABLET, FILM COATED ORAL at 06:03

## 2025-03-16 RX ADMIN — SERTRALINE HYDROCHLORIDE 50 MG: 50 TABLET ORAL at 09:03

## 2025-03-16 RX ADMIN — METOPROLOL TARTRATE 5 MG: 1 INJECTION, SOLUTION INTRAVENOUS at 07:03

## 2025-03-16 RX ADMIN — Medication 1 CAPSULE: at 05:03

## 2025-03-16 RX ADMIN — ASPIRIN 81 MG: 81 TABLET, CHEWABLE ORAL at 09:03

## 2025-03-16 RX ADMIN — CEFTRIAXONE SODIUM 1 G: 1 INJECTION, POWDER, FOR SOLUTION INTRAMUSCULAR; INTRAVENOUS at 02:03

## 2025-03-16 RX ADMIN — FUROSEMIDE 20 MG: 20 TABLET ORAL at 09:03

## 2025-03-16 RX ADMIN — TAMSULOSIN HYDROCHLORIDE 0.4 MG: 0.4 CAPSULE ORAL at 08:03

## 2025-03-16 RX ADMIN — OXCARBAZEPINE 900 MG: 300 TABLET, FILM COATED ORAL at 08:03

## 2025-03-16 NOTE — ASSESSMENT & PLAN NOTE
Patient's blood pressure range in the last 24 hours was: BP  Min: 118/83  Max: 180/102.The patient's inpatient anti-hypertensive regimen is listed below:  Current Antihypertensives  furosemide (LASIX) tablet, Daily, Oral  metoprolol injection 5 mg, Every 4 hours PRN, Intravenous  furosemide tablet 40 mg, Daily, Oral    Plan  - BP is uncontrolled, will adjust as follows: Holding torsemide due to DORIS. May need to change rx upon DC.

## 2025-03-16 NOTE — PROGRESS NOTES
[facility]  [facility] Medicine  Progress Note    Patient Name: Nathen Morales  MRN: [id number]  Patient Class: IP- Inpatient   Admission Date: 3/11/2025  Length of Stay: 5 days  Attending Physician: Anabelle Rivas DO  Primary Care Provider: Brock Brown MD        Subjective     Principal Problem:Urinary tract infection with hematuria        HPI:  73 yo CM with PMH CAD s/p CABG, chronic right and left hydronephrosis with chronic stones, BPH, HTN, and seizure disorder presented to the ED with 2 days of weakness and a fall in his bathroom, hitting his head on the toilet. He did not lose consciousness. Has no esme on his head. Workup revealed elevated creatinine at 2.1, baseline 1.3, elevated troponin 0.055, and UTI on catheter collected urine.  After IV hydration creatinine improved to 1.87, and troponin decreased to 0.048. CT head unremarkable. EKG with borderline LVH and 1st degree AV block. CT abd showed chronic  changes. Pt was given rocephin and hospital medicine was consulted for admission for IV hydration, antibiotics, and trending of labs/follow-up of urine culture.      Upon my examination in the ER, the pt denies any chest pain or sob. Pt lives alone, but his niece brings him groceries and fixes his medications once a week. He does not drive or cut his own grass. He does not remember the last time he fell prior to today. Reports his appetite has been normal and he has been drinking fluids as normal. Takes torsemide 20mg daily. Denies any trouble with urination.     Overview/Hospital Course:  3/12/25: PT lying in bed. States he is not feeling good this morning. He is having trouble describing to me what is bothering him. He is complaining that his right femur is hurting since his fall. States that his femur was hurting yesterday at home and this contributed to him losing his balance and falling in the bathroom. Pt started vomiting during my exam. He is also  complaining of abdominal cramping. States he has a bm this morning that was normal. Also states that he is having no issues with urination. Denies any sob or cp.    3/13/25: Pt is lying comfortably in bed watching tv. He denies and n/v, dysuria, urinary hesitancy, abnormal bm's, cp, sob. He does endorse abd pain across his mid abdomen. Dicyclomine is helping. Appetite is normal. Urine culture pending.    3/14/25: Pt sitting up in his recliner. Denies issues with urination, sob, cp, increased weakness from baseline. Is still complaining of right thigh pain. States that this is why he fell. He understands that it is not safe for him to live at home alone. He is at risk for additional falls. Pt agrees to consider NH placement. He wants CM to contact his niece to discuss. Niece agrees with this plan. CM working on placement.     3/15/25: Pt lying comfortably in bed. Still complaining of right hip and knee pain. He got up and walked the halls with PT this morning. Denies sob, cp, dysuria, or bowel issues. Making jokes this morning.     3/16/25: Pt lying in bed sleeping. Early this morning he became sob, tachycardic, and hypertensive. Nurse reported bibasilar crackles. Later became febrile with temp of 101.2. WBC this morning elevated at 17.26. Pt remains on ceftrixone which urine culture showed klebsiella was sensitive to. Pt's SpO2 is 100% on room air at this time. He was given lasix 20mg IV this morning and urine output has been 1420cc.    Past Medical History:   Diagnosis Date    BPH (benign prostatic hyperplasia)     Hypertension     Seizures        Past Surgical History:   Procedure Laterality Date    ADENOIDECTOMY      APPENDECTOMY      CARDIAC VALVE REPLACEMENT      CHOLECYSTECTOMY      COLONOSCOPY  04/21/2016    Dr Brock Brown    CORONARY ARTERY BYPASS GRAFT      EYE SURGERY      INCISION AND DRAINAGE OF ABSCESS Right 03/20/2024    Dr Amelia Carrasco    LITHOTRIPSY  11/20/2024    Dr Maurisio King     PROSTATE SURGERY      TONSILLECTOMY      TRANSURETHRAL RESECTION OF PROSTATE         Review of patient's allergies indicates:  No Known Allergies    No current facility-administered medications on file prior to encounter.     Current Outpatient Medications on File Prior to Encounter   Medication Sig    aspirin 81 mg Cap Take 81 mg by mouth Daily.    levETIRAcetam (KEPPRA) 1000 MG tablet TAKE 1 AND 1/2 TABLETS BY MOUTH  TWICE DAILY (Patient taking differently: Take 1,250 mg by mouth 2 (two) times daily.)    multivitamin (THERAGRAN) tablet Take 1 tablet by mouth once daily.    OXcarbazepine (TRILEPTAL) 300 MG Tab Take 3 tablets (900 mg total) by mouth 2 (two) times daily.    rosuvastatin (CRESTOR) 10 MG tablet Take 1 tablet (10 mg total) by mouth every evening.    sertraline (ZOLOFT) 50 MG tablet TAKE 1 TABLET BY MOUTH ONCE  DAILY    tamsulosin (FLOMAX) 0.4 mg Cap TAKE 1 CAPSULE BY MOUTH TWICE DAILY (Patient taking differently: Take 0.4 mg by mouth nightly.)    temazepam (RESTORIL) 15 mg Cap TAKE ONE CAPSULE BY MOUTH AT BEDTIME    topiramate (TOPAMAX) 100 MG tablet Take 100 mg by mouth 2 (two) times daily.    hyoscyamine 0.125 mg Subl 0.125 mg every 6 (six) hours as needed. (Patient not taking: Reported on 12/19/2024)     Family History       Problem Relation (Age of Onset)    Hypertension Mother          Tobacco Use    Smoking status: Never    Smokeless tobacco: Never   Substance and Sexual Activity    Alcohol use: Never    Drug use: Never    Sexual activity: Never     Review of Systems   Constitutional:  Positive for activity change, fatigue and fever. Negative for appetite change.   Respiratory:  Positive for shortness of breath. Negative for wheezing.    Cardiovascular:  Negative for chest pain and leg swelling.   Gastrointestinal:  Negative for abdominal distention, abdominal pain, constipation, nausea and vomiting.   Genitourinary:  Negative for difficulty urinating, dysuria and frequency.   Musculoskeletal:   Positive for arthralgias and gait problem. Negative for joint swelling and myalgias.   Skin:  Negative for rash and wound.   Neurological:  Positive for weakness.   Psychiatric/Behavioral:  Negative for agitation, behavioral problems and confusion.      Objective:     Vital Signs (Most Recent):  Temp: 97.9 °F (36.6 °C) (03/16/25 1115)  Pulse: 103 (03/16/25 1115)  Resp: 19 (03/16/25 1115)  BP: 118/83 (03/16/25 1115)  SpO2: 99 % (03/16/25 1115) Vital Signs (24h Range):  Temp:  [97.9 °F (36.6 °C)-101.2 °F (38.4 °C)] 97.9 °F (36.6 °C)  Pulse:  [] 103  Resp:  [18-26] 19  SpO2:  [93 %-100 %] 99 %  BP: (118-180)/() 118/83     Weight: 93.4 kg (205 lb 14.6 oz)  Body mass index is 27.17 kg/m².     Physical Exam  Vitals reviewed.   Constitutional:       General: He is not in acute distress.     Appearance: Normal appearance.      Comments: Sleeping when I entered the room. I was able to easily wake him. He was alert.   HENT:      Head: Normocephalic and atraumatic.      Nose: Nose normal.   Eyes:      Conjunctiva/sclera: Conjunctivae normal.   Cardiovascular:      Rate and Rhythm: Regular rhythm. Tachycardia present.      Pulses: Normal pulses.      Heart sounds: Normal heart sounds. No murmur heard.     No gallop.   Pulmonary:      Effort: Pulmonary effort is normal. No respiratory distress.      Breath sounds: Normal breath sounds. No wheezing, rhonchi or rales.   Abdominal:      General: Bowel sounds are normal. There is no distension.      Palpations: Abdomen is soft.   Musculoskeletal:         General: No swelling or deformity.      Cervical back: Normal range of motion and neck supple.      Right lower leg: No edema.      Left lower leg: No edema.   Skin:     General: Skin is warm and dry.      Coloration: Skin is not jaundiced.      Findings: No rash.   Neurological:      Mental Status: Mental status is at baseline.      Motor: Weakness present.      Gait: Gait abnormal.   Psychiatric:         Mood and  Affect: Mood normal.         Behavior: Behavior normal.                Significant Labs: All pertinent labs within the past 24 hours have been reviewed.  Recent Lab Results         03/16/25  1015        Albumin/Globulin Ratio 0.5       Albumin 2.0              ALT 39       Anion Gap 9.0       AST 69       Baso # 0.07       Basophil % 0.4       BILIRUBIN TOTAL 0.2       BUN 30.0       BUN/CREAT RATIO 22       Calcium 8.4       Chloride 112       CO2 20       Creatinine 1.37       eGFR 53  Comment: Estimated GFR calculated using the CKD-EPI creatinine (2021) equation.       Eos # 0.02       Eos % 0.1       Globulin, Total 3.7       Glucose 130       Hematocrit 29.7       Hemoglobin 9.5       Immature Grans (Abs) 0.47       Immature Granulocytes 2.7       Lymph # 0.43       LYMPH % 2.5       MCH 32.5       MCHC 32.0       .7       Mono # 1.12       Mono % 6.5       MPV 9.0       Neut # 15.15       Neut % 87.8       nRBC 0.0       Platelet Count 330       Potassium 3.6       PROTEIN TOTAL 5.7       RBC 2.92       RDW 14.1       Sodium 141       WBC 17.26               Significant Imaging: I have reviewed all pertinent imaging results/findings within the past 24 hours.      Assessment & Plan  Urinary tract infection with hematuria  Admit for IVF, Rocephin.  Repeat troponin.  Follow-up of urine culture.  Home meds reconciled.  VTE ppx: SCDs    3/12/25: Urine culture with >100,000col/ml GNR.  3/13/25: Klebsiella sensitive to rocephin and bactrim. Pt agrees on NH placement. CM working on placement.   3/16/25: Pt remains on ceftriaxone. Blood cultures drawn this morning due to fever.  Home diuretic was torsemide 20mg, but pt came in dehydrated, so I decreased to lasix 20mg. It seems that that was not enough. So he received lasix iv 20mg this morning and had good urine output. I will increase lasix PO to 40mg for tomorrow and continue to monitor. EKG this morning sinus rhythum with 1st degree av block with pac  left axis deviation.   Benign prostatic hyperplasia  Home meds reconciled.   Hypertension  Patient's blood pressure range in the last 24 hours was: BP  Min: 118/83  Max: 180/102.The patient's inpatient anti-hypertensive regimen is listed below:  Current Antihypertensives  furosemide (LASIX) tablet, Daily, Oral  metoprolol injection 5 mg, Every 4 hours PRN, Intravenous  furosemide tablet 40 mg, Daily, Oral    Plan  - BP is uncontrolled, will adjust as follows: Holding torsemide due to DORIS. May need to change rx upon DC.   Seizure disorder  Home meds reconciled.     S/P CABG x 3  Home meds reconciled.  Moderate malnutrition  Nutrition consulted. Most recent weight and BMI monitored-     Measurements:  Wt Readings from Last 1 Encounters:   03/16/25 93.4 kg (205 lb 14.6 oz)   Body mass index is 27.17 kg/m².    Patient has been screened and assessed by RD.    Malnutrition Type:  Context: acute illness or injury  Level: moderate    Malnutrition Characteristic Summary:  Weight Loss (Malnutrition): other (see comments) (Does not meet criteria)  Subcutaneous Fat (Malnutrition): mild depletion  Muscle Mass (Malnutrition): mild depletion  Fluid Accumulation (Malnutrition): other (see comments) (Not present)  Hand  Strength, Right (Malnutrition): Unable to assess    Interventions/Recommendations (treatment strategy):       VTE Risk Mitigation (From admission, onward)           Ordered     IP VTE HIGH RISK PATIENT  Once         03/11/25 1430     Place sequential compression device  Until discontinued         03/11/25 1430                    Discharge Planning   DILSHAD: 3/18/2025     Code Status: Full Code   Medical Readiness for Discharge Date: 3/14/2025  Discharge Plan A: Skilled Nursing Facility   Discharge Delays: None known at this time            Please place Justification for DME        ETHAN NORTH DO  Department of Hospital Medicine   [facility] - Medical Surgical Unit

## 2025-03-16 NOTE — NURSING
"Patient called for nurse. Upon entering, heard audible wheezing and breathing was more rapid than normal. Patient voiced, Im cold "help Me".Patient was shaking. A warm blanket was secured from ED and was wrapped around patient. Vital signs were taken. Charge nurse was called in room to advise and assist. Vital signs were not wdl. Elevated B/P of 180/102 115 pulse and temp of 99.2. Rapid breathing exceeding 20 bpm. Patient stated he didn't feel well and was scared. Charge nurse called Dr. Rivas and received new orders, which was carried out. Patient calmed down and breathing returned to normal, color was a bit pale, eyes were red. Color began to return as he calmed down. No other abnormalities noted.  "

## 2025-03-16 NOTE — ASSESSMENT & PLAN NOTE
Nutrition consulted. Most recent weight and BMI monitored-     Measurements:  Wt Readings from Last 1 Encounters:   03/16/25 93.4 kg (205 lb 14.6 oz)   Body mass index is 27.17 kg/m².    Patient has been screened and assessed by RD.    Malnutrition Type:  Context: acute illness or injury  Level: moderate    Malnutrition Characteristic Summary:  Weight Loss (Malnutrition): other (see comments) (Does not meet criteria)  Subcutaneous Fat (Malnutrition): mild depletion  Muscle Mass (Malnutrition): mild depletion  Fluid Accumulation (Malnutrition): other (see comments) (Not present)  Hand  Strength, Right (Malnutrition): Unable to assess    Interventions/Recommendations (treatment strategy):

## 2025-03-16 NOTE — ASSESSMENT & PLAN NOTE
Admit for IVF, Rocephin.  Repeat troponin.  Follow-up of urine culture.  Home meds reconciled.  VTE ppx: SCDs    3/12/25: Urine culture with >100,000col/ml GNR.  3/13/25: Klebsiella sensitive to rocephin and bactrim. Pt agrees on NH placement. CM working on placement.   3/16/25: Pt remains on ceftriaxone. Blood cultures drawn this morning due to fever.  Home diuretic was torsemide 20mg, but pt came in dehydrated, so I decreased to lasix 20mg. It seems that that was not enough. So he received lasix iv 20mg this morning and had good urine output. I will increase lasix PO to 40mg for tomorrow and continue to monitor. EKG this morning sinus rhythum with 1st degree av block with pac left axis deviation.

## 2025-03-16 NOTE — PLAN OF CARE
Problem: Adult Inpatient Plan of Care  Goal: Plan of Care Review  3/16/2025 1204 by Carolina Hirsch LPN  Outcome: Progressing  3/16/2025 1157 by Carolina Hirsch LPN  Outcome: Progressing  Goal: Patient-Specific Goal (Individualized)  3/16/2025 1204 by Carolina Hirsch LPN  Outcome: Progressing  3/16/2025 1157 by Carolina Hirsch LPN  Outcome: Progressing  Goal: Absence of Hospital-Acquired Illness or Injury  3/16/2025 1204 by Carolina Hirsch LPN  Outcome: Progressing  3/16/2025 1157 by Carolina Hirsch LPN  Outcome: Progressing  Goal: Optimal Comfort and Wellbeing  3/16/2025 1204 by Carolina Hirsch LPN  Outcome: Progressing  3/16/2025 1157 by Carolina Hirsch LPN  Outcome: Progressing  Goal: Readiness for Transition of Care  3/16/2025 1204 by Carolina Hirsch LPN  Outcome: Progressing  3/16/2025 1157 by Carolina Hirsch LPN  Outcome: Progressing     Problem: Acute Kidney Injury/Impairment  Goal: Fluid and Electrolyte Balance  3/16/2025 1204 by Carolina Hirsch LPN  Outcome: Progressing  3/16/2025 1157 by Carolina Hirsch LPN  Outcome: Progressing  Goal: Improved Oral Intake  3/16/2025 1204 by Carolina Hirsch LPN  Outcome: Progressing  3/16/2025 1157 by Carolina Hirsch LPN  Outcome: Progressing  Goal: Effective Renal Function  3/16/2025 1204 by Carolina Hirsch LPN  Outcome: Progressing  3/16/2025 1157 by Carolina Hirsch LPN  Outcome: Progressing     Problem: Pain Acute  Goal: Optimal Pain Control and Function  3/16/2025 1204 by Carolina Hirsch LPN  Outcome: Progressing  3/16/2025 1157 by Carolina Hirsch LPN  Outcome: Progressing     Problem: Infection  Goal: Absence of Infection Signs and Symptoms  3/16/2025 1204 by Carolina Hirsch LPN  Outcome: Progressing  3/16/2025 1157 by Carolina Hirsch LPN  Outcome: Progressing     Problem: Fall Injury Risk  Goal: Absence of Fall and Fall-Related Injury  3/16/2025 1204 by Carolina Hirsch LPN  Outcome: Progressing  3/16/2025 1157 by Carolina Hirsch LPN  Outcome: Progressing     Problem: Fatigue  Goal:  Improved Activity Tolerance  3/16/2025 1204 by Carolina Hirsch LPN  Outcome: Progressing  3/16/2025 1157 by Carolina Hirsch LPN  Outcome: Progressing     Problem: Fluid Volume Deficit  Goal: Fluid Balance  3/16/2025 1204 by Carolina Hirsch LPN  Outcome: Progressing  3/16/2025 1157 by Carolina Hirsch LPN  Outcome: Progressing     Problem: Comorbidity Management  Goal: Blood Pressure in Desired Range  3/16/2025 1204 by Carolina Hirsch LPN  Outcome: Progressing  3/16/2025 1157 by Carolina Hrisch LPN  Outcome: Progressing  Goal: Bariatric Home Regimen Maintained  3/16/2025 1204 by Carolina Hirsch LPN  Outcome: Progressing  3/16/2025 1157 by Carolina Hirsch LPN  Outcome: Progressing  Goal: Maintenance of Seizure Control  3/16/2025 1204 by Carolina Hirsch LPN  Outcome: Progressing  3/16/2025 1157 by Carolina Hirsch LPN  Outcome: Progressing  Goal: Maintenance of Heart Failure Symptom Control  3/16/2025 1204 by Carolina Hirsch LPN  Outcome: Progressing  3/16/2025 1157 by Carolina Hirsch LPN  Outcome: Progressing     Problem: UTI (Urinary Tract Infection)  Goal: Improved Infection Symptoms  3/16/2025 1204 by Carolina Hirsch LPN  Outcome: Progressing  3/16/2025 1157 by Carolina Hirsch LPN  Outcome: Progressing     Problem: Malnutrition  Goal: Improved Nutritional Intake  3/16/2025 1204 by Carolina Hirsch LPN  Outcome: Progressing  3/16/2025 1157 by Carolina Hrisch LPN  Outcome: Progressing     Problem: Fever with Neutropenia  Goal: Baseline Body Temperature  Outcome: Progressing  Goal: Absence of Infection  Outcome: Progressing

## 2025-03-16 NOTE — PLAN OF CARE
Problem: Adult Inpatient Plan of Care  Goal: Plan of Care Review  Outcome: Progressing  Goal: Patient-Specific Goal (Individualized)  Outcome: Progressing  Goal: Absence of Hospital-Acquired Illness or Injury  Outcome: Progressing  Goal: Optimal Comfort and Wellbeing  Outcome: Progressing  Goal: Readiness for Transition of Care  Outcome: Progressing     Problem: Acute Kidney Injury/Impairment  Goal: Fluid and Electrolyte Balance  Outcome: Progressing  Goal: Improved Oral Intake  Outcome: Progressing  Goal: Effective Renal Function  Outcome: Progressing     Problem: Pain Acute  Goal: Optimal Pain Control and Function  Outcome: Progressing     Problem: Infection  Goal: Absence of Infection Signs and Symptoms  Outcome: Progressing     Problem: Fall Injury Risk  Goal: Absence of Fall and Fall-Related Injury  Outcome: Progressing     Problem: Fatigue  Goal: Improved Activity Tolerance  Outcome: Progressing     Problem: Fluid Volume Deficit  Goal: Fluid Balance  Outcome: Progressing     Problem: Comorbidity Management  Goal: Blood Pressure in Desired Range  Outcome: Progressing  Goal: Bariatric Home Regimen Maintained  Outcome: Progressing  Goal: Maintenance of Seizure Control  Outcome: Progressing  Goal: Maintenance of Heart Failure Symptom Control  Outcome: Progressing     Problem: UTI (Urinary Tract Infection)  Goal: Improved Infection Symptoms  Outcome: Progressing     Problem: Malnutrition  Goal: Improved Nutritional Intake  Outcome: Progressing      Clinical Pharmacy Note: Pharmacy to Dose Vancomycin    Tammie Gold is a 77 y.o. female started on Vancomycin for cellulitis/wound infection; consult received from Dr. Carissa Dumont to manage therapy. Also receiving the following antibiotics: cefepime/clindamycin. Goal Trough Level: 15 mcg/mL    Assessment/Plan:  Initiate vancomycin 1000 mg IV every 24 hours. A vancomycin trough has been ordered for 8/10 @1000. Changes in regimen will be determined based on culture results, renal function, and clinical response. Pharmacy will continue to monitor and adjust regimen as necessary. Allergies:  Seroquel [quetiapine]         Recent Labs     08/07/20  0745 08/07/20  1139   CREATININE 2.3* 2.2*       Recent Labs     08/07/20  0745   WBC 18.8*       Ht Readings from Last 1 Encounters:   08/07/20 5' 9\" (1.753 m)        Wt Readings from Last 1 Encounters:   08/07/20 247 lb 9.6 oz (112.3 kg)         Estimated Creatinine Clearance: 32 mL/min (A) (based on SCr of 2.2 mg/dL (H)).       Thank you for the consult,    Sera Hernadez, PharmD, [address].   [id number]

## 2025-03-16 NOTE — SUBJECTIVE & OBJECTIVE
Past Medical History:   Diagnosis Date    BPH (benign prostatic hyperplasia)     Hypertension     Seizures        Past Surgical History:   Procedure Laterality Date    ADENOIDECTOMY      APPENDECTOMY      CARDIAC VALVE REPLACEMENT      CHOLECYSTECTOMY      COLONOSCOPY  04/21/2016    Dr Brock Brown    CORONARY ARTERY BYPASS GRAFT      EYE SURGERY      INCISION AND DRAINAGE OF ABSCESS Right 03/20/2024    Dr Amelia Carrasco    LITHOTRIPSY  11/20/2024    Dr Maurisio King    PROSTATE SURGERY      TONSILLECTOMY      TRANSURETHRAL RESECTION OF PROSTATE         Review of patient's allergies indicates:  No Known Allergies    No current facility-administered medications on file prior to encounter.     Current Outpatient Medications on File Prior to Encounter   Medication Sig    aspirin 81 mg Cap Take 81 mg by mouth Daily.    levETIRAcetam (KEPPRA) 1000 MG tablet TAKE 1 AND 1/2 TABLETS BY MOUTH  TWICE DAILY (Patient taking differently: Take 1,250 mg by mouth 2 (two) times daily.)    multivitamin (THERAGRAN) tablet Take 1 tablet by mouth once daily.    OXcarbazepine (TRILEPTAL) 300 MG Tab Take 3 tablets (900 mg total) by mouth 2 (two) times daily.    rosuvastatin (CRESTOR) 10 MG tablet Take 1 tablet (10 mg total) by mouth every evening.    sertraline (ZOLOFT) 50 MG tablet TAKE 1 TABLET BY MOUTH ONCE  DAILY    tamsulosin (FLOMAX) 0.4 mg Cap TAKE 1 CAPSULE BY MOUTH TWICE DAILY (Patient taking differently: Take 0.4 mg by mouth nightly.)    temazepam (RESTORIL) 15 mg Cap TAKE ONE CAPSULE BY MOUTH AT BEDTIME    topiramate (TOPAMAX) 100 MG tablet Take 100 mg by mouth 2 (two) times daily.    hyoscyamine 0.125 mg Subl 0.125 mg every 6 (six) hours as needed. (Patient not taking: Reported on 12/19/2024)     Family History       Problem Relation (Age of Onset)    Hypertension Mother          Tobacco Use    Smoking status: Never    Smokeless tobacco: Never   Substance and Sexual Activity    Alcohol use: Never    Drug use: Never     Sexual activity: Never     Review of Systems   Constitutional:  Positive for activity change, fatigue and fever. Negative for appetite change.   Respiratory:  Positive for shortness of breath. Negative for wheezing.    Cardiovascular:  Negative for chest pain and leg swelling.   Gastrointestinal:  Negative for abdominal distention, abdominal pain, constipation, nausea and vomiting.   Genitourinary:  Negative for difficulty urinating, dysuria and frequency.   Musculoskeletal:  Positive for arthralgias and gait problem. Negative for joint swelling and myalgias.   Skin:  Negative for rash and wound.   Neurological:  Positive for weakness.   Psychiatric/Behavioral:  Negative for agitation, behavioral problems and confusion.      Objective:     Vital Signs (Most Recent):  Temp: 97.9 °F (36.6 °C) (03/16/25 1115)  Pulse: 103 (03/16/25 1115)  Resp: 19 (03/16/25 1115)  BP: 118/83 (03/16/25 1115)  SpO2: 99 % (03/16/25 1115) Vital Signs (24h Range):  Temp:  [97.9 °F (36.6 °C)-101.2 °F (38.4 °C)] 97.9 °F (36.6 °C)  Pulse:  [] 103  Resp:  [18-26] 19  SpO2:  [93 %-100 %] 99 %  BP: (118-180)/() 118/83     Weight: 93.4 kg (205 lb 14.6 oz)  Body mass index is 27.17 kg/m².     Physical Exam  Vitals reviewed.   Constitutional:       General: He is not in acute distress.     Appearance: Normal appearance.      Comments: Sleeping when I entered the room. I was able to easily wake him. He was alert.   HENT:      Head: Normocephalic and atraumatic.      Nose: Nose normal.   Eyes:      Conjunctiva/sclera: Conjunctivae normal.   Cardiovascular:      Rate and Rhythm: Regular rhythm. Tachycardia present.      Pulses: Normal pulses.      Heart sounds: Normal heart sounds. No murmur heard.     No gallop.   Pulmonary:      Effort: Pulmonary effort is normal. No respiratory distress.      Breath sounds: Normal breath sounds. No wheezing, rhonchi or rales.   Abdominal:      General: Bowel sounds are normal. There is no distension.       Palpations: Abdomen is soft.   Musculoskeletal:         General: No swelling or deformity.      Cervical back: Normal range of motion and neck supple.      Right lower leg: No edema.      Left lower leg: No edema.   Skin:     General: Skin is warm and dry.      Coloration: Skin is not jaundiced.      Findings: No rash.   Neurological:      Mental Status: Mental status is at baseline.      Motor: Weakness present.      Gait: Gait abnormal.   Psychiatric:         Mood and Affect: Mood normal.         Behavior: Behavior normal.                Significant Labs: All pertinent labs within the past 24 hours have been reviewed.  Recent Lab Results         03/16/25  1015        Albumin/Globulin Ratio 0.5       Albumin 2.0              ALT 39       Anion Gap 9.0       AST 69       Baso # 0.07       Basophil % 0.4       BILIRUBIN TOTAL 0.2       BUN 30.0       BUN/CREAT RATIO 22       Calcium 8.4       Chloride 112       CO2 20       Creatinine 1.37       eGFR 53  Comment: Estimated GFR calculated using the CKD-EPI creatinine (2021) equation.       Eos # 0.02       Eos % 0.1       Globulin, Total 3.7       Glucose 130       Hematocrit 29.7       Hemoglobin 9.5       Immature Grans (Abs) 0.47       Immature Granulocytes 2.7       Lymph # 0.43       LYMPH % 2.5       MCH 32.5       MCHC 32.0       .7       Mono # 1.12       Mono % 6.5       MPV 9.0       Neut # 15.15       Neut % 87.8       nRBC 0.0       Platelet Count 330       Potassium 3.6       PROTEIN TOTAL 5.7       RBC 2.92       RDW 14.1       Sodium 141       WBC 17.26               Significant Imaging: I have reviewed all pertinent imaging results/findings within the past 24 hours.

## 2025-03-17 LAB
ALBUMIN SERPL-MCNC: 2.2 G/DL (ref 3.4–4.8)
ALBUMIN/GLOB SERPL: 0.5 RATIO (ref 1.1–2)
ALP SERPL-CCNC: 188 UNIT/L (ref 40–150)
ALT SERPL-CCNC: 34 UNIT/L (ref 0–55)
ANION GAP SERPL CALC-SCNC: 9 MEQ/L
AST SERPL-CCNC: 34 UNIT/L (ref 5–34)
BASOPHILS # BLD AUTO: 0.09 X10(3)/MCL
BASOPHILS NFR BLD AUTO: 0.7 %
BILIRUB SERPL-MCNC: 0.2 MG/DL
BUN SERPL-MCNC: 32 MG/DL (ref 8.4–25.7)
CALCIUM SERPL-MCNC: 8.7 MG/DL (ref 8.8–10)
CHLORIDE SERPL-SCNC: 110 MMOL/L (ref 98–107)
CO2 SERPL-SCNC: 24 MMOL/L (ref 23–31)
CREAT SERPL-MCNC: 1.44 MG/DL (ref 0.72–1.25)
CREAT/UREA NIT SERPL: 22
EOSINOPHIL # BLD AUTO: 0.48 X10(3)/MCL (ref 0–0.9)
EOSINOPHIL NFR BLD AUTO: 3.7 %
ERYTHROCYTE [DISTWIDTH] IN BLOOD BY AUTOMATED COUNT: 14.3 % (ref 11.5–17)
GFR SERPLBLD CREATININE-BSD FMLA CKD-EPI: 50 ML/MIN/1.73/M2
GLOBULIN SER-MCNC: 4.2 GM/DL (ref 2.4–3.5)
GLUCOSE SERPL-MCNC: 134 MG/DL (ref 82–115)
HCT VFR BLD AUTO: 30.9 % (ref 42–52)
HGB BLD-MCNC: 9.9 G/DL (ref 14–18)
IMM GRANULOCYTES # BLD AUTO: 0.13 X10(3)/MCL (ref 0–0.04)
IMM GRANULOCYTES NFR BLD AUTO: 1 %
LYMPHOCYTES # BLD AUTO: 0.7 X10(3)/MCL (ref 0.6–4.6)
LYMPHOCYTES NFR BLD AUTO: 5.4 %
MCH RBC QN AUTO: 33 PG (ref 27–31)
MCHC RBC AUTO-ENTMCNC: 32 G/DL (ref 33–36)
MCV RBC AUTO: 103 FL (ref 80–94)
MONOCYTES # BLD AUTO: 0.77 X10(3)/MCL (ref 0.1–1.3)
MONOCYTES NFR BLD AUTO: 6 %
NEUTROPHILS # BLD AUTO: 10.72 X10(3)/MCL (ref 2.1–9.2)
NEUTROPHILS NFR BLD AUTO: 83.2 %
NRBC BLD AUTO-RTO: 0 %
PLATELET # BLD AUTO: 339 X10(3)/MCL (ref 130–400)
PMV BLD AUTO: 9 FL (ref 7.4–10.4)
POTASSIUM SERPL-SCNC: 3.7 MMOL/L (ref 3.5–5.1)
PROT SERPL-MCNC: 6.4 GM/DL (ref 5.8–7.6)
RBC # BLD AUTO: 3 X10(6)/MCL (ref 4.7–6.1)
SODIUM SERPL-SCNC: 143 MMOL/L (ref 136–145)
WBC # BLD AUTO: 12.89 X10(3)/MCL (ref 4.5–11.5)

## 2025-03-17 PROCEDURE — 94761 N-INVAS EAR/PLS OXIMETRY MLT: CPT

## 2025-03-17 PROCEDURE — 85025 COMPLETE CBC W/AUTO DIFF WBC: CPT | Performed by: INTERNAL MEDICINE

## 2025-03-17 PROCEDURE — 11000001 HC ACUTE MED/SURG PRIVATE ROOM

## 2025-03-17 PROCEDURE — 63600175 PHARM REV CODE 636 W HCPCS: Performed by: INTERNAL MEDICINE

## 2025-03-17 PROCEDURE — 25000242 PHARM REV CODE 250 ALT 637 W/ HCPCS: Performed by: INTERNAL MEDICINE

## 2025-03-17 PROCEDURE — 25000003 PHARM REV CODE 250: Performed by: INTERNAL MEDICINE

## 2025-03-17 PROCEDURE — 36415 COLL VENOUS BLD VENIPUNCTURE: CPT | Performed by: INTERNAL MEDICINE

## 2025-03-17 PROCEDURE — 97166 OT EVAL MOD COMPLEX 45 MIN: CPT

## 2025-03-17 PROCEDURE — 97116 GAIT TRAINING THERAPY: CPT

## 2025-03-17 PROCEDURE — 80053 COMPREHEN METABOLIC PANEL: CPT | Performed by: INTERNAL MEDICINE

## 2025-03-17 RX ORDER — FLUTICASONE PROPIONATE 50 MCG
2 SPRAY, SUSPENSION (ML) NASAL DAILY PRN
Status: DISCONTINUED | OUTPATIENT
Start: 2025-03-17 | End: 2025-03-21 | Stop reason: HOSPADM

## 2025-03-17 RX ADMIN — OXCARBAZEPINE 900 MG: 300 TABLET, FILM COATED ORAL at 09:03

## 2025-03-17 RX ADMIN — LEVETIRACETAM 1250 MG: 500 TABLET, FILM COATED ORAL at 09:03

## 2025-03-17 RX ADMIN — TOPIRAMATE 100 MG: 100 TABLET, FILM COATED ORAL at 08:03

## 2025-03-17 RX ADMIN — SERTRALINE HYDROCHLORIDE 50 MG: 50 TABLET ORAL at 08:03

## 2025-03-17 RX ADMIN — FLUTICASONE PROPIONATE 100 MCG: 50 SPRAY, METERED NASAL at 10:03

## 2025-03-17 RX ADMIN — CEFTRIAXONE SODIUM 1 G: 1 INJECTION, POWDER, FOR SOLUTION INTRAMUSCULAR; INTRAVENOUS at 01:03

## 2025-03-17 RX ADMIN — Medication 1 CAPSULE: at 05:03

## 2025-03-17 RX ADMIN — FUROSEMIDE 40 MG: 40 TABLET ORAL at 08:03

## 2025-03-17 RX ADMIN — LEVETIRACETAM 1250 MG: 500 TABLET, FILM COATED ORAL at 08:03

## 2025-03-17 RX ADMIN — TAMSULOSIN HYDROCHLORIDE 0.4 MG: 0.4 CAPSULE ORAL at 09:03

## 2025-03-17 RX ADMIN — TOPIRAMATE 100 MG: 100 TABLET, FILM COATED ORAL at 09:03

## 2025-03-17 RX ADMIN — Medication 1 CAPSULE: at 12:03

## 2025-03-17 RX ADMIN — Medication 1 CAPSULE: at 07:03

## 2025-03-17 RX ADMIN — OXCARBAZEPINE 900 MG: 300 TABLET, FILM COATED ORAL at 08:03

## 2025-03-17 RX ADMIN — ATORVASTATIN CALCIUM 40 MG: 40 TABLET, FILM COATED ORAL at 09:03

## 2025-03-17 RX ADMIN — ASPIRIN 81 MG: 81 TABLET, CHEWABLE ORAL at 08:03

## 2025-03-17 NOTE — SUBJECTIVE & OBJECTIVE
Past Medical History:   Diagnosis Date    BPH (benign prostatic hyperplasia)     Hypertension     Seizures        Past Surgical History:   Procedure Laterality Date    ADENOIDECTOMY      APPENDECTOMY      CARDIAC VALVE REPLACEMENT      CHOLECYSTECTOMY      COLONOSCOPY  04/21/2016    Dr Brock Brown    CORONARY ARTERY BYPASS GRAFT      EYE SURGERY      INCISION AND DRAINAGE OF ABSCESS Right 03/20/2024    Dr Amelia Carrasco    LITHOTRIPSY  11/20/2024    Dr Maurisio King    PROSTATE SURGERY      TONSILLECTOMY      TRANSURETHRAL RESECTION OF PROSTATE         Review of patient's allergies indicates:  No Known Allergies    No current facility-administered medications on file prior to encounter.     Current Outpatient Medications on File Prior to Encounter   Medication Sig    aspirin 81 mg Cap Take 81 mg by mouth Daily.    levETIRAcetam (KEPPRA) 1000 MG tablet TAKE 1 AND 1/2 TABLETS BY MOUTH  TWICE DAILY (Patient taking differently: Take 1,250 mg by mouth 2 (two) times daily.)    multivitamin (THERAGRAN) tablet Take 1 tablet by mouth once daily.    OXcarbazepine (TRILEPTAL) 300 MG Tab Take 3 tablets (900 mg total) by mouth 2 (two) times daily.    rosuvastatin (CRESTOR) 10 MG tablet Take 1 tablet (10 mg total) by mouth every evening.    sertraline (ZOLOFT) 50 MG tablet TAKE 1 TABLET BY MOUTH ONCE  DAILY    tamsulosin (FLOMAX) 0.4 mg Cap TAKE 1 CAPSULE BY MOUTH TWICE DAILY (Patient taking differently: Take 0.4 mg by mouth nightly.)    temazepam (RESTORIL) 15 mg Cap TAKE ONE CAPSULE BY MOUTH AT BEDTIME    topiramate (TOPAMAX) 100 MG tablet Take 100 mg by mouth 2 (two) times daily.    hyoscyamine 0.125 mg Subl 0.125 mg every 6 (six) hours as needed. (Patient not taking: Reported on 12/19/2024)     Family History       Problem Relation (Age of Onset)    Hypertension Mother          Tobacco Use    Smoking status: Never    Smokeless tobacco: Never   Substance and Sexual Activity    Alcohol use: Never    Drug use: Never     Sexual activity: Never     Review of Systems   Constitutional:  Positive for activity change and fatigue. Negative for appetite change and fever.   Respiratory:  Negative for shortness of breath and wheezing.    Cardiovascular:  Negative for chest pain and leg swelling.   Gastrointestinal:  Negative for abdominal distention, abdominal pain, constipation, nausea and vomiting.   Genitourinary:  Negative for difficulty urinating, dysuria and frequency.   Musculoskeletal:  Positive for gait problem. Negative for arthralgias, joint swelling and myalgias.   Skin:  Negative for rash and wound.        Mild swelling of right forearm   Neurological:  Positive for weakness.   Psychiatric/Behavioral:  Negative for agitation, behavioral problems and confusion.      Objective:     Vital Signs (Most Recent):  Temp: 98.2 °F (36.8 °C) (03/17/25 0805)  Pulse: 105 (03/17/25 0805)  Resp: 18 (03/17/25 0805)  BP: 119/69 (03/17/25 0805)  SpO2: 95 % (03/17/25 0805) Vital Signs (24h Range):  Temp:  [98.2 °F (36.8 °C)-99.6 °F (37.6 °C)] 98.2 °F (36.8 °C)  Pulse:  [] 105  Resp:  [18-26] 18  SpO2:  [95 %-99 %] 95 %  BP: (118-160)/(60-72) 119/69     Weight: 93.5 kg (206 lb 2.1 oz)  Body mass index is 27.2 kg/m².     Physical Exam  Vitals reviewed.   Constitutional:       General: He is not in acute distress.     Appearance: Normal appearance.      Comments: Sleeping when I entered the room. I was able to easily wake him. He was alert.   HENT:      Head: Normocephalic and atraumatic.      Nose: Nose normal.   Eyes:      Conjunctiva/sclera: Conjunctivae normal.   Cardiovascular:      Rate and Rhythm: Normal rate and regular rhythm.      Pulses: Normal pulses.      Heart sounds: Normal heart sounds. No murmur heard.     No gallop.   Pulmonary:      Effort: Pulmonary effort is normal. No respiratory distress.      Breath sounds: Normal breath sounds. No wheezing, rhonchi or rales.   Abdominal:      General: Bowel sounds are normal. There is  no distension.      Palpations: Abdomen is soft.   Musculoskeletal:         General: Swelling (mild swelling of right forearm) present. No tenderness or deformity.      Cervical back: Normal range of motion and neck supple.      Right lower leg: No edema.      Left lower leg: No edema.   Skin:     General: Skin is warm and dry.      Coloration: Skin is not jaundiced.      Findings: No rash.   Neurological:      Mental Status: Mental status is at baseline.      Motor: Weakness present.      Gait: Gait abnormal.   Psychiatric:         Mood and Affect: Mood normal.         Behavior: Behavior normal.                Significant Labs: All pertinent labs within the past 24 hours have been reviewed.  Recent Lab Results         03/17/25  0915   03/16/25  1153        Albumin/Globulin Ratio 0.5         Albumin 2.2                  ALT 34         Anion Gap 9.0         AST 34         Baso # 0.09         Basophil % 0.7         BILIRUBIN TOTAL 0.2         BUN 32.0         BUN/CREAT RATIO 22         Calcium 8.7         Chloride 110         CO2 24         Creatinine 1.44         eGFR 50  Comment: Estimated GFR calculated using the CKD-EPI creatinine (2021) equation.         Eos # 0.48         Eos % 3.7         Globulin, Total 4.2         Glucose 134         Hematocrit 30.9         Hemoglobin 9.9         Immature Grans (Abs) 0.13         Immature Granulocytes 1.0         Lymph # 0.70         LYMPH % 5.4         MCH 33.0         MCHC 32.0         .0         Mono # 0.77         Mono % 6.0         MPV 9.0         Neut # 10.72         Neut % 83.2         nRBC 0.0         QRS Duration   90       [facility] QTC Calculation   413       Platelet Count 339         Potassium 3.7         PROTEIN TOTAL 6.4         RBC 3.00         RDW 14.3         Sodium 143         WBC 12.89                 Significant Imaging: I have reviewed all pertinent imaging results/findings within the past 24 hours.

## 2025-03-17 NOTE — NURSING
Swelling and redness noted in right upper extrimity. Charge nurse notified and assessed. Photo taken, arm elevated. Monitoring.

## 2025-03-17 NOTE — ASSESSMENT & PLAN NOTE
Patient's blood pressure range in the last 24 hours was: BP  Min: 118/60  Max: 160/69.The patient's inpatient anti-hypertensive regimen is listed below:  Current Antihypertensives  furosemide (LASIX) tablet, Daily, Oral  metoprolol injection 5 mg, Every 4 hours PRN, Intravenous  furosemide tablet 40 mg, Daily, Oral    Plan  - BP is uncontrolled, will adjust as follows: Holding torsemide due to DORIS. May need to change rx upon DC.

## 2025-03-17 NOTE — PROGRESS NOTES
Inpatient Nutrition Assessment    Admit Date: 3/11/2025   Total duration of encounter: 6 days   Patient Age: 74 y.o.    Nutrition Recommendation/Prescription     Heart Healthy Easy to Chew (IDDSI Level 7) diet.  Boost Plus w/ meals (provides 360 kcal and 14 g protein per serving).  RD to monitor wt, labs, and po intake.    Communication of Recommendations: reviewed with patient    Nutrition Assessment     Malnutrition Assessment/Nutrition-Focused Physical Exam    Malnutrition Context: acute illness or injury (03/17/25 [facility])  Malnutrition Level: moderate (03/17/25 [facility])     Weight Loss (Malnutrition): other (see comments) (Does not meet criteria) (03/17/25 [facility])  Subcutaneous Fat (Malnutrition): mild depletion (03/17/25 [facility])  Orbital Region (Subcutaneous Fat Loss): mild depletion        Muscle Mass (Malnutrition): mild depletion (03/17/25 [facility])     Clavicle Bone Region (Muscle Loss): mild depletion                    Fluid Accumulation (Malnutrition): other (see comments) (Not present) (03/17/25 [facility])     Hand  Strength, Right (Malnutrition): Unable to assess (03/17/25 [facility])  A minimum of two characteristics is recommended for diagnosis of either severe or non-severe malnutrition.    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      DORIS (acute kidney injury) 4/18/2024      Elevated troponin 3/11/2025      Moderate malnutrition 3/12/2025       Relevant Medical History:    BPH (benign prostatic hyperplasia)      Hypertension      Seizures          Scheduled Medications:  aspirin, 81 mg, Daily  atorvastatin, 40 mg, QHS  furosemide, 40 mg,  Daily  Lactobacillus acidophilus, 1 capsule, TID WM  levETIRAcetam, 1,250 mg, BID  OXcarbazepine, 900 mg, BID  sertraline, 50 mg, Daily  tamsulosin, 0.4 mg, Nightly  topiramate, 100 mg, BID    Continuous Infusions:     PRN Medications:  acetaminophen, 1,000 mg, Q6H PRN  acetaminophen, 500 mg, Q6H PRN  aluminum-magnesium hydroxide-simethicone, 30 mL, Q6H PRN  dicyclomine, 10 mg, QID PRN  fluticasone propionate, 2 spray, Daily PRN  melatonin, 6 mg, Nightly PRN  metoprolol, 5 mg, Q4H PRN  ondansetron, 4 mg, Q6H PRN  traMADoL, 50 mg, Q6H PRN    Calorie Containing IV Medications: no significant kcals from medications at this time    Recent Labs   Lab 03/11/25  1035 03/11/25  1049 03/12/25  0353 03/13/25  0518 03/15/25  0421 03/16/25  1015 03/17/25  0915     --  140 140 140 141 143   K 4.0  --  3.9 3.9 4.4 3.6 3.7   CALCIUM 8.2*  --  8.5* 8.5* 8.5* 8.4* 8.7*     --  112* 112* 114* 112* 110*   CO2 22*  --  18* 20* 19* 20* 24   BUN 43.1*  --  44.0* 40.0* 30.0* 30.0* 32.0*   CREATININE 1.87*  --  1.72* 1.44* 1.15 1.37* 1.44*   EGFRNORACEVR 37  --  41 50 >60 53 50   GLUCOSE 130*  --  104 95 112 130* 134*   BILITOT 0.3  --  0.2 0.2 0.2 0.2 0.2   ALKPHOS 119  --  106 108 119 220* 188*   ALT 16  --  16 14 22 39 34   AST 22  --  17 16 30 69* 34   ALBUMIN 2.5*  --  2.3* 2.2* 2.0* 2.0* 2.2*   WBC 9.48  --  8.72  --   --  17.26* 12.89*   HGB 9.8*  --  9.2*  --   --  9.5* 9.9*   HCT 29.6* 27* 28.7*  --   --  29.7* 30.9*     Nutrition Orders:  Diet Heart Healthy Easy to Chew (IDDSI Level 7)  Dietary nutrition supplements All Meals; Boost Plus Nutritional Drink - Any flavor    Appetite/Oral Intake: good/% of meals  Factors Affecting Nutritional Intake:  acute illness  Social Needs Impacting Access to Food: none identified  Food/Scientology/Cultural Preferences: none reported  Food Allergies: no known food allergies  Last Bowel Movement: 03/16/25  Wound(s):  n/a    Comments    3/12/25: Pt reports good appetite and intake  "for breakfast and dinner last night. Pt w/ n/v this morning when lunch arrived. States it happened out of no where and he is not hungry for lunch. Denies any other gi symptoms. Per NFPE, observed mild muscle and fat wasting. Pt denies recent wt loss; confirmed per EMR wt hx. Last BM today. Pt meets criteria for moderate malnutrition in the context of acute illness. Labs/meds reviewed.    3/17/25: Pt w/ overall good appetite and intake. No gi symptoms noted, last BM yesterday. Plan for d/c tmrw.     Anthropometrics    Height: 6' 1" (185.4 cm), Height Method: Stated  Last Weight: 93.5 kg (206 lb 2.1 oz) (03/17/25 0413), Weight Method: Bed Scale  BMI (Calculated): 27.2  BMI Classification: overweight (BMI 25-29.9)        Ideal Body Weight (IBW), Male: 184 lb     % Ideal Body Weight, Male (lb): 110.23 %                          Usual Weight Provided By: EMR weight history    Wt Readings from Last 5 Encounters:   03/17/25 93.5 kg (206 lb 2.1 oz)   12/17/24 89.2 kg (196 lb 10.4 oz)   12/16/24 90.7 kg (200 lb)   06/18/24 93.4 kg (206 lb)   05/27/24 90.7 kg (200 lb)     Weight Change(s) Since Admission:   3/11: 92 kg  3/17: 93.5 kg (1.9% wt gain since 3/11)  Wt Readings from Last 1 Encounters:   03/17/25 0413 93.5 kg (206 lb 2.1 oz)   03/16/25 0749 93.4 kg (205 lb 14.6 oz)   03/15/25 0543 93.9 kg (207 lb 0.2 oz)   03/14/25 0615 96.6 kg (212 lb 15.4 oz)   03/13/25 0641 96 kg (211 lb 10.3 oz)   03/11/25 1431 92 kg (202 lb 13.2 oz)   03/11/25 1009 93.6 kg (206 lb 4.8 oz)   Admit Weight: 93.6 kg (206 lb 4.8 oz) (03/11/25 1009), Weight Method: Bed Scale    Estimated Needs    Weight Used For Calorie Calculations: 92 kg (202 lb 13.2 oz)  Energy Calorie Requirements (kcal): 8208-2468 kcal (20-25 kcal/kg)  Energy Need Method: Kcal/kg  Weight Used For Protein Calculations: 92 kg (202 lb 13.2 oz)  Protein Requirements: 74-92 g (0.8-1.0 g/kg)  Fluid Requirements (mL): 1840 mL/d (1.0 mL/kcal)        Enteral Nutrition     Patient not " receiving enteral nutrition at this time.    Parenteral Nutrition     Patient not receiving parenteral nutrition support at this time.    Evaluation of Received Nutrient Intake    Calories: meeting estimated needs  Protein: meeting estimated needs    Patient Education     Not applicable.    Nutrition Diagnosis     PES: Moderate acute disease or injury related malnutrition Related to acute illness As Evidenced by:  - muscle mass depletion: 1 area of mild muscle loss (Clavicle) - loss of subcutaneous fat: 2 areas of mild fat loss (Buccal, Infraorbital) active    Nutrition Interventions     Intervention(s): general/healthful diet and collaboration with other providers    Goal: Meet greater than 80% of nutritional needs by follow-up. (goal progressing)  Goal: Maintain weight throughout hospitalization. (goal progressing)    Nutrition Goals & Monitoring     Dietitian will monitor: food and beverage intake, weight, electrolyte/renal panel, glucose/endocrine profile, and gastrointestinal profile  Discharge planning: resume home regimen  Nutrition Risk/Follow-Up: moderate (follow-up in 3-5 days)   Please consult if re-assessment needed sooner.

## 2025-03-17 NOTE — PLAN OF CARE
03/17/25 1121   Discharge Reassessment   Assessment Type Discharge Planning Reassessment   Did the patient's condition or plan change since previous assessment? No   Discharge Plan discussed with: Patient;POA   Name(s) and Number(s) Michael Parish (Healthcare Power of )  [phone]   Communicated DILSHAD with patient/caregiver Date not available/Unable to determine   Discharge Plan A Skilled Nursing Facility   Discharge Plan B Skilled Nursing Facility   DME Needed Upon Discharge  none   Transition of Care Barriers None   Why the patient remains in the hospital Requires continued medical care   Post-Acute Status   Post-Acute Authorization Placement   Post-Acute Placement Status Pending payor review/awaiting authorization (if required)   Discharge Delays None known at this time     Awaiting auth for SNF.

## 2025-03-17 NOTE — PLAN OF CARE
Problem: Adult Inpatient Plan of Care  Goal: Plan of Care Review  Outcome: Progressing  Goal: Patient-Specific Goal (Individualized)  Outcome: Progressing  Goal: Absence of Hospital-Acquired Illness or Injury  Outcome: Progressing  Goal: Optimal Comfort and Wellbeing  Outcome: Progressing  Goal: Readiness for Transition of Care  Outcome: Progressing     Problem: Acute Kidney Injury/Impairment  Goal: Fluid and Electrolyte Balance  Outcome: Progressing  Goal: Improved Oral Intake  Outcome: Progressing  Goal: Effective Renal Function  Outcome: Progressing     Problem: Pain Acute  Goal: Optimal Pain Control and Function  Outcome: Progressing     Problem: Infection  Goal: Absence of Infection Signs and Symptoms  Outcome: Progressing     Problem: Fall Injury Risk  Goal: Absence of Fall and Fall-Related Injury  Outcome: Progressing     Problem: Fatigue  Goal: Improved Activity Tolerance  Outcome: Progressing     Problem: Fluid Volume Deficit  Goal: Fluid Balance  Outcome: Progressing     Problem: Comorbidity Management  Goal: Blood Pressure in Desired Range  Outcome: Progressing  Goal: Bariatric Home Regimen Maintained  Outcome: Progressing  Goal: Maintenance of Seizure Control  Outcome: Progressing  Goal: Maintenance of Heart Failure Symptom Control  Outcome: Progressing     Problem: UTI (Urinary Tract Infection)  Goal: Improved Infection Symptoms  Outcome: Progressing     Problem: Malnutrition  Goal: Improved Nutritional Intake  Outcome: Progressing     Problem: Fever with Neutropenia  Goal: Baseline Body Temperature  Outcome: Progressing  Goal: Absence of Infection  Outcome: Progressing

## 2025-03-17 NOTE — PT/OT/SLP EVAL
Occupational Therapy   Acute Care Evaluation    Name: Nathen Morales  MRN: [id number]  Admitting Diagnosis:  Urinary tract infection with hematuria      History:     Principal Problem:Urinary tract infection with hematuria           HPI:  73 yo CM with PMH CAD s/p CABG, chronic right and left hydronephrosis with chronic stones, BPH, HTN, and seizure disorder presented to the ED with 2 days of weakness and a fall in his bathroom, hitting his head on the toilet. He did not lose consciousness. Has no esme on his head. Workup revealed elevated creatinine at 2.1, baseline 1.3, elevated troponin 0.055, and UTI on catheter collected urine.  After IV hydration creatinine improved to 1.87, and troponin decreased to 0.048. CT head unremarkable. EKG with borderline LVH and 1st degree AV block. CT abd showed chronic  changes. Pt was given rocephin and hospital medicine was consulted for admission for IV hydration, antibiotics, and trending of labs/follow-up of urine culture.      Upon my examination in the ER, the pt denies any chest pain or sob. Pt lives alone, but his niece brings him groceries and fixes his medications once a week. He does not drive or cut his own grass. He does not remember the last time he fell prior to today. Reports his appetite has been normal and he has been drinking fluids as normal. Takes torsemide 20mg daily. Denies any trouble with urination.        Past Medical History:   Diagnosis Date    BPH (benign prostatic hyperplasia)     Hypertension     Seizures          Past Surgical History:   Procedure Laterality Date    ADENOIDECTOMY      APPENDECTOMY      CARDIAC VALVE REPLACEMENT      CHOLECYSTECTOMY      COLONOSCOPY  04/21/2016    Dr Brock Brown    CORONARY ARTERY BYPASS GRAFT      EYE SURGERY      INCISION AND DRAINAGE OF ABSCESS Right 03/20/2024    Dr Amelia Carrasco    LITHOTRIPSY  11/20/2024    Dr Maurisio King    PROSTATE SURGERY      TONSILLECTOMY      TRANSURETHRAL RESECTION OF  PROSTATE           Subjective     Chief Complaint: Weakness and SOB with activity  Patient/Family Comments/goals: Get stronger.    Occupational Profile:  Pt currently resides at home alone in a single level home with ramp entry. Pt typically utilizes a rollator for mobility. Pt performs his own ADLs. Family assists with driving for appointments.   Equipment Used at Home:  rollator, bedside commode, shower chair, wheelchair, walker, rolling      Objective:     Communicated with: PT prior to session.  Patient found supine with bed alarm, telemetry, pulse ox (continuous) upon OT entry to room.    General Precautions: Standard, fall   Orthopedic Precautions:Full weight bearing   Braces: N/A  Respiratory Status: Room air    Occupational Performance:    Mobility  Assist level Comments    Bed mobility Min Transfer training semi-supine to sit min assist with upper trunk using bed rail.   Balance training     Transfer Min Transfer training bed to recliner min assist with RW.   Functional mobility     Sit to stand transitions Min Transfer training sit to stand min assist with forward weight shift.   Other:         Activities of Daily Living Assist level Comments    Feeding     Grooming/hygiene Min ADL grooming assessment.  After setup, pt able to perform oral care, face washing and hair grooming.  As per pt request, performed shaving with safety razor max assist.   Bathing     Upper body dressing     Lower body dressing     Toileting     Toilet transfer     Adaptive equipment training     Other:       Upper Extremity Strength:  Right Upper Extremity: WFL except proximal weakness strength 4/5 grossly throughout  Left Upper Extremity: WFL except proximal weakness strength 4/5 grossly throughout    Cognitive/Visual Perceptual:  Cognitive/Psychosocial Skills:     -       Oriented to: Person, Place, Time, and Situation   -       Follows Commands/attention:Follows two-step commands  -       Communication: clear/fluent  -        Memory: Poor immediate recall  -       Safety awareness/insight to disability: intact   -       Mood/Affect/Coping skills/emotional control: Cooperative, Flat affect, and Pleasant      Treatment & Education:  Discussed scope and goals of OT and POC.  Educated pt on call bell function and call before you fall.      Assessment:     Nathen Morales is a 74 y.o. male with a medical diagnosis of Urinary tract infection with hematuria. Performance deficits affecting function: weakness, impaired self care skills, impaired balance, impaired endurance, impaired functional mobility, gait instability, pain.  Presently, pt requiring assistance with functional transfers and most ADLs.  Recommend 24-hour care.    Rehab Prognosis: Fair; patient would benefit from acute skilled OT services to address these deficits and reach maximum level of function.     Pain/Comfort:  Pain Rating 1: 4/10  Location - Side 1: Right  Location 1: leg  Pain Addressed 1: Reposition, Distraction    Blood Pressure:        Plan:     Patient to be seen 5 x/week to address the above listed problems via self-care/home management, therapeutic activities, therapeutic exercises  Plan of Care Reviewed with: patient      GOALS:   Multidisciplinary Problems       Occupational Therapy Goals          Problem: Occupational Therapy    Goal Priority Disciplines Outcome Interventions   Occupational Therapy Goal     OT, PT/OT     Description:   Patient will increase functional independence with ADLs by performing:    LE Dressing with Minimal Assistance.  Grooming while seated at sink with Set-up Assistance.  Toileting from bedside commode with Contact Guard Assistance for hygiene and clothing management.   Toilet transfer to bedside commode with Contact Guard Assistance.                             Patient left up in chair with all lines intact, call button in reach, and chair alarm on      Recommendations:     Discharge Recommendations:  24 hour care, SNF  Discharge  Equipment Recommendations:  none  Barriers to discharge:  Decreased caregiver support      Time Tracking:     OT Date of Treatment: 03/17/25  OT Start Time: 0820  OT Stop Time: 0845  OT Total Time (min): 25 min    Billable Minutes:Evaluation 25      3/17/2025

## 2025-03-17 NOTE — ASSESSMENT & PLAN NOTE
Admit for IVF, Rocephin.  Repeat troponin.  Follow-up of urine culture.  Home meds reconciled.  VTE ppx: SCDs    3/12/25: Urine culture with >100,000col/ml GNR.  3/13/25: Klebsiella sensitive to rocephin and bactrim. Pt agrees on NH placement. CM working on placement.   3/16/25: Pt remains on ceftriaxone. Blood cultures drawn this morning due to fever.  Home diuretic was torsemide 20mg, but pt came in dehydrated, so I decreased to lasix 20mg. It seems that that was not enough. So he received lasix iv 20mg this morning and had good urine output. I will increase lasix PO to 40mg for tomorrow and continue to monitor. EKG this morning sinus rhythum with 1st degree av block with pac left axis deviation.   3/17/25: Today is day 7 and his last day of rocephin. Waiting on authorization from insurance for pt to go to SNF.

## 2025-03-17 NOTE — ASSESSMENT & PLAN NOTE
Nutrition consulted. Most recent weight and BMI monitored-     Measurements:  Wt Readings from Last 1 Encounters:   03/17/25 93.5 kg (206 lb 2.1 oz)   Body mass index is 27.2 kg/m².    Patient has been screened and assessed by RD.    Malnutrition Type:  Context: acute illness or injury  Level: moderate    Malnutrition Characteristic Summary:  Weight Loss (Malnutrition): other (see comments) (Does not meet criteria)  Subcutaneous Fat (Malnutrition): mild depletion  Muscle Mass (Malnutrition): mild depletion  Fluid Accumulation (Malnutrition): other (see comments) (Not present)  Hand  Strength, Right (Malnutrition): Unable to assess    Interventions/Recommendations (treatment strategy):

## 2025-03-17 NOTE — PROGRESS NOTES
[facility]  [facility] Medicine  Progress Note    Patient Name: Nathen Morales  MRN: [id number]  Patient Class: IP- Inpatient   Admission Date: 3/11/2025  Length of Stay: 6 days  Attending Physician: Anabelle Rivas DO  Primary Care Provider: Brock Brown MD        Subjective     Principal Problem:Urinary tract infection with hematuria        HPI:  75 yo CM with PMH CAD s/p CABG, chronic right and left hydronephrosis with chronic stones, BPH, HTN, and seizure disorder presented to the ED with 2 days of weakness and a fall in his bathroom, hitting his head on the toilet. He did not lose consciousness. Has no esme on his head. Workup revealed elevated creatinine at 2.1, baseline 1.3, elevated troponin 0.055, and UTI on catheter collected urine.  After IV hydration creatinine improved to 1.87, and troponin decreased to 0.048. CT head unremarkable. EKG with borderline LVH and 1st degree AV block. CT abd showed chronic  changes. Pt was given rocephin and hospital medicine was consulted for admission for IV hydration, antibiotics, and trending of labs/follow-up of urine culture.      Upon my examination in the ER, the pt denies any chest pain or sob. Pt lives alone, but his niece brings him groceries and fixes his medications once a week. He does not drive or cut his own grass. He does not remember the last time he fell prior to today. Reports his appetite has been normal and he has been drinking fluids as normal. Takes torsemide 20mg daily. Denies any trouble with urination.     Overview/Hospital Course:  3/12/25: PT lying in bed. States he is not feeling good this morning. He is having trouble describing to me what is bothering him. He is complaining that his right femur is hurting since his fall. States that his femur was hurting yesterday at home and this contributed to him losing his balance and falling in the bathroom. Pt started vomiting during my exam. He is also  complaining of abdominal cramping. States he has a bm this morning that was normal. Also states that he is having no issues with urination. Denies any sob or cp.    3/13/25: Pt is lying comfortably in bed watching tv. He denies and n/v, dysuria, urinary hesitancy, abnormal bm's, cp, sob. He does endorse abd pain across his mid abdomen. Dicyclomine is helping. Appetite is normal. Urine culture pending.    3/14/25: Pt sitting up in his recliner. Denies issues with urination, sob, cp, increased weakness from baseline. Is still complaining of right thigh pain. States that this is why he fell. He understands that it is not safe for him to live at home alone. He is at risk for additional falls. Pt agrees to consider NH placement. He wants CM to contact his niece to discuss. Niece agrees with this plan. CM working on placement.     3/15/25: Pt lying comfortably in bed. Still complaining of right hip and knee pain. He got up and walked the halls with PT this morning. Denies sob, cp, dysuria, or bowel issues. Making jokes this morning.     3/16/25: Pt lying in bed sleeping. Early this morning he became sob, tachycardic, and hypertensive. Nurse reported bibasilar crackles. Later became febrile with temp of 101.2. WBC this morning elevated at 17.26. Pt remains on ceftrixone which urine culture showed klebsiella was sensitive to. Pt's SpO2 is 100% on room air at this time. He was given lasix 20mg IV this morning and urine output has been 1420cc.    3/17/25: Pt sitting up in his recliner at bedside. He denies any cp, sob, abd pain, bowel issues,  issues. Ate breakfast without any n/v. Has been on room air. Blood cultures pending. Nurse overnight reported that pt now had a cellulitis on his right forearm. Upon my exam this morning, does not appear to be a cellulitis yet, but there is an area of irritation. Pt states that the bp cuff was left on his arm there overnight and it irritated his skin. WBC is decreasing this morning.  No more fevers. Waiting on authorization for SNF.       Past Medical History:   Diagnosis Date    BPH (benign prostatic hyperplasia)     Hypertension     Seizures        Past Surgical History:   Procedure Laterality Date    ADENOIDECTOMY      APPENDECTOMY      CARDIAC VALVE REPLACEMENT      CHOLECYSTECTOMY      COLONOSCOPY  04/21/2016    Dr Brock Brown    CORONARY ARTERY BYPASS GRAFT      EYE SURGERY      INCISION AND DRAINAGE OF ABSCESS Right 03/20/2024    Dr Amelia Carrasco    LITHOTRIPSY  11/20/2024    Dr Maurisio King    PROSTATE SURGERY      TONSILLECTOMY      TRANSURETHRAL RESECTION OF PROSTATE         Review of patient's allergies indicates:  No Known Allergies    No current facility-administered medications on file prior to encounter.     Current Outpatient Medications on File Prior to Encounter   Medication Sig    aspirin 81 mg Cap Take 81 mg by mouth Daily.    levETIRAcetam (KEPPRA) 1000 MG tablet TAKE 1 AND 1/2 TABLETS BY MOUTH  TWICE DAILY (Patient taking differently: Take 1,250 mg by mouth 2 (two) times daily.)    multivitamin (THERAGRAN) tablet Take 1 tablet by mouth once daily.    OXcarbazepine (TRILEPTAL) 300 MG Tab Take 3 tablets (900 mg total) by mouth 2 (two) times daily.    rosuvastatin (CRESTOR) 10 MG tablet Take 1 tablet (10 mg total) by mouth every evening.    sertraline (ZOLOFT) 50 MG tablet TAKE 1 TABLET BY MOUTH ONCE  DAILY    tamsulosin (FLOMAX) 0.4 mg Cap TAKE 1 CAPSULE BY MOUTH TWICE DAILY (Patient taking differently: Take 0.4 mg by mouth nightly.)    temazepam (RESTORIL) 15 mg Cap TAKE ONE CAPSULE BY MOUTH AT BEDTIME    topiramate (TOPAMAX) 100 MG tablet Take 100 mg by mouth 2 (two) times daily.    hyoscyamine 0.125 mg Subl 0.125 mg every 6 (six) hours as needed. (Patient not taking: Reported on 12/19/2024)     Family History       Problem Relation (Age of Onset)    Hypertension Mother          Tobacco Use    Smoking status: Never    Smokeless tobacco: Never   Substance and  Sexual Activity    Alcohol use: Never    Drug use: Never    Sexual activity: Never     Review of Systems   Constitutional:  Positive for activity change and fatigue. Negative for appetite change and fever.   Respiratory:  Negative for shortness of breath and wheezing.    Cardiovascular:  Negative for chest pain and leg swelling.   Gastrointestinal:  Negative for abdominal distention, abdominal pain, constipation, nausea and vomiting.   Genitourinary:  Negative for difficulty urinating, dysuria and frequency.   Musculoskeletal:  Positive for gait problem. Negative for arthralgias, joint swelling and myalgias.   Skin:  Negative for rash and wound.        Mild swelling of right forearm   Neurological:  Positive for weakness.   Psychiatric/Behavioral:  Negative for agitation, behavioral problems and confusion.      Objective:     Vital Signs (Most Recent):  Temp: 98.2 °F (36.8 °C) (03/17/25 0805)  Pulse: 105 (03/17/25 0805)  Resp: 18 (03/17/25 0805)  BP: 119/69 (03/17/25 0805)  SpO2: 95 % (03/17/25 0805) Vital Signs (24h Range):  Temp:  [98.2 °F (36.8 °C)-99.6 °F (37.6 °C)] 98.2 °F (36.8 °C)  Pulse:  [] 105  Resp:  [18-26] 18  SpO2:  [95 %-99 %] 95 %  BP: (118-160)/(60-72) 119/69     Weight: 93.5 kg (206 lb 2.1 oz)  Body mass index is 27.2 kg/m².     Physical Exam  Vitals reviewed.   Constitutional:       General: He is not in acute distress.     Appearance: Normal appearance.      Comments: Sleeping when I entered the room. I was able to easily wake him. He was alert.   HENT:      Head: Normocephalic and atraumatic.      Nose: Nose normal.   Eyes:      Conjunctiva/sclera: Conjunctivae normal.   Cardiovascular:      Rate and Rhythm: Normal rate and regular rhythm.      Pulses: Normal pulses.      Heart sounds: Normal heart sounds. No murmur heard.     No gallop.   Pulmonary:      Effort: Pulmonary effort is normal. No respiratory distress.      Breath sounds: Normal breath sounds. No wheezing, rhonchi or rales.    Abdominal:      General: Bowel sounds are normal. There is no distension.      Palpations: Abdomen is soft.   Musculoskeletal:         General: Swelling (mild swelling of right forearm) present. No tenderness or deformity.      Cervical back: Normal range of motion and neck supple.      Right lower leg: No edema.      Left lower leg: No edema.   Skin:     General: Skin is warm and dry.      Coloration: Skin is not jaundiced.      Findings: No rash.   Neurological:      Mental Status: Mental status is at baseline.      Motor: Weakness present.      Gait: Gait abnormal.   Psychiatric:         Mood and Affect: Mood normal.         Behavior: Behavior normal.                Significant Labs: All pertinent labs within the past 24 hours have been reviewed.  Recent Lab Results         03/17/25  0915   03/16/25  1153        Albumin/Globulin Ratio 0.5         Albumin 2.2                  ALT 34         Anion Gap 9.0         AST 34         Baso # 0.09         Basophil % 0.7         BILIRUBIN TOTAL 0.2         BUN 32.0         BUN/CREAT RATIO 22         Calcium 8.7         Chloride 110         CO2 24         Creatinine 1.44         eGFR 50  Comment: Estimated GFR calculated using the CKD-EPI creatinine (2021) equation.         Eos # 0.48         Eos % 3.7         Globulin, Total 4.2         Glucose 134         Hematocrit 30.9         Hemoglobin 9.9         Immature Grans (Abs) 0.13         Immature Granulocytes 1.0         Lymph # 0.70         LYMPH % 5.4         MCH 33.0         MCHC 32.0         .0         Mono # 0.77         Mono % 6.0         MPV 9.0         Neut # 10.72         Neut % 83.2         nRBC 0.0         QRS Duration   90       [facility] QTC Calculation   413       Platelet Count 339         Potassium 3.7         PROTEIN TOTAL 6.4         RBC 3.00         RDW 14.3         Sodium 143         WBC 12.89                 Significant Imaging: I have reviewed all pertinent imaging results/findings within the  past 24 hours.      Assessment & Plan  Urinary tract infection with hematuria  Admit for IVF, Rocephin.  Repeat troponin.  Follow-up of urine culture.  Home meds reconciled.  VTE ppx: SCDs    3/12/25: Urine culture with >100,000col/ml GNR.  3/13/25: Klebsiella sensitive to rocephin and bactrim. Pt agrees on NH placement. CM working on placement.   3/16/25: Pt remains on ceftriaxone. Blood cultures drawn this morning due to fever.  Home diuretic was torsemide 20mg, but pt came in dehydrated, so I decreased to lasix 20mg. It seems that that was not enough. So he received lasix iv 20mg this morning and had good urine output. I will increase lasix PO to 40mg for tomorrow and continue to monitor. EKG this morning sinus rhythum with 1st degree av block with pac left axis deviation.   3/17/25: Today is day 7 and his last day of rocephin. Waiting on authorization from insurance for pt to go to SNF.  Benign prostatic hyperplasia  Home meds reconciled.   Hypertension  Patient's blood pressure range in the last 24 hours was: BP  Min: 118/60  Max: 160/69.The patient's inpatient anti-hypertensive regimen is listed below:  Current Antihypertensives  furosemide (LASIX) tablet, Daily, Oral  metoprolol injection 5 mg, Every 4 hours PRN, Intravenous  furosemide tablet 40 mg, Daily, Oral    Plan  - BP is uncontrolled, will adjust as follows: Holding torsemide due to DORIS. May need to change rx upon DC.   Seizure disorder  Home meds reconciled.     S/P CABG x 3  Home meds reconciled.  Moderate malnutrition  Nutrition consulted. Most recent weight and BMI monitored-     Measurements:  Wt Readings from Last 1 Encounters:   03/17/25 93.5 kg (206 lb 2.1 oz)   Body mass index is 27.2 kg/m².    Patient has been screened and assessed by RD.    Malnutrition Type:  Context: acute illness or injury  Level: moderate    Malnutrition Characteristic Summary:  Weight Loss (Malnutrition): other (see comments) (Does not meet criteria)  Subcutaneous Fat  (Malnutrition): mild depletion  Muscle Mass (Malnutrition): mild depletion  Fluid Accumulation (Malnutrition): other (see comments) (Not present)  Hand  Strength, Right (Malnutrition): Unable to assess    Interventions/Recommendations (treatment strategy):       VTE Risk Mitigation (From admission, onward)           Ordered     IP VTE HIGH RISK PATIENT  Once         03/11/25 1430     Place sequential compression device  Until discontinued         03/11/25 1430                    Discharge Planning   DILSHAD: 3/18/2025     Code Status: Full Code   Medical Readiness for Discharge Date: 3/14/2025  Discharge Plan A: Skilled Nursing Facility   Discharge Delays: None known at this time            Please place Justification for DME        ETHAN NORTH DO  Department of Hospital Medicine   [facility] - Medical Surgical Unit

## 2025-03-17 NOTE — PT/OT/SLP PROGRESS
Physical Therapy Treatment Note           Name: Nathen Morales    : 1950 (74 y.o.)  MRN: [id number]             Subjective Assessment:     No complaints  Lethargic   X Awake, alert, cooperative  Uncooperative    Agitated  c/o pain    Appropriate X c/o fatigue    Confused  Treated at bedside     Emotionally labile  Treated in gym/dept.    Impulsive  Other:    Flat affect       Pain/Comfort:    Pain Rating 1: 0/10    Therapy Precautions:      Cognitive deficits  Spinal precautions    Collar - hard  Sternal precautions    Collar - soft   TLSO   X Fall risk  LSO    Hip precautions - posterior  Knee immobilizer    Hip precautions - anterior  WBAT    Impaired communication  Partial weightbearing    Oxygen  TTWB    PEG tube  NWB    Visual deficits  Other:    Hearing deficits             Mobility Training:     Assist Level Assistive Device Comments    Bed Mobility Sup  Semi-supine to sitting at EOB, EOB to supine   Sit to stand/Stand to sit Sup RW Sit to stand/stand to sit performed from EOB.  Pt stood at the EOB for several moments to use the urinal.  Pt performed his own brief management while standing.    Transfers      Gait Sup RW Pt able to ambulate 150 ft with a step through gait pattern and slow gait speed.  Pt demonstrated limited foot clearance and step length.  Flexed posture observed throughout.  No LOB noted.     Balance Training      Wheelchair Mobility      Stair Climbing      Car Transfer      Other:           Assessment:     Patient tolerated session fairly well and was agreeable to ambulation in the hallway.  Following activity, PT offered for pt to sit up in the chair, but pt requested to return to bed to rest.  Awaiting SNF placement at this time.        GOALS:   Multidisciplinary Problems       Physical Therapy Goals          Problem: Physical Therapy    Goal Priority Disciplines Outcome Interventions   Physical Therapy Goal     PT, PT/OT Progressing    Description: Patient will increase  functional independence with mobility by performin. Supine to sit with Modified [place]  2. Sit to supine with Modified [place]  3. Sit to stand transfer with Modified [place]  4. Gait  x 200 feet with Modified [place] using Rolling Walker.                            Discharge Recommendations:      SNF    Discharge Equipment Recommendations:     none     At end of treatment, patient remained:      Up in chair    X In bed   X With alarm activated    Bed rails up   X Call bell in reach      Family/friends present     Restraints secured properly     In bathroom with CNA/RN notified     In gym with PT/PTA/tech     Nurse aware     Other:           PT Start Time: 1418     PT Stop Time: 1432  PT Total Time (min): 14 min     Billable Minutes: Gait Training 2025

## 2025-03-18 PROCEDURE — 94761 N-INVAS EAR/PLS OXIMETRY MLT: CPT

## 2025-03-18 PROCEDURE — 11000001 HC ACUTE MED/SURG PRIVATE ROOM

## 2025-03-18 PROCEDURE — 25000003 PHARM REV CODE 250: Performed by: INTERNAL MEDICINE

## 2025-03-18 PROCEDURE — 97116 GAIT TRAINING THERAPY: CPT

## 2025-03-18 PROCEDURE — 97535 SELF CARE MNGMENT TRAINING: CPT

## 2025-03-18 RX ADMIN — ATORVASTATIN CALCIUM 40 MG: 40 TABLET, FILM COATED ORAL at 09:03

## 2025-03-18 RX ADMIN — OXCARBAZEPINE 900 MG: 300 TABLET, FILM COATED ORAL at 08:03

## 2025-03-18 RX ADMIN — FUROSEMIDE 40 MG: 40 TABLET ORAL at 08:03

## 2025-03-18 RX ADMIN — TAMSULOSIN HYDROCHLORIDE 0.4 MG: 0.4 CAPSULE ORAL at 09:03

## 2025-03-18 RX ADMIN — OXCARBAZEPINE 900 MG: 300 TABLET, FILM COATED ORAL at 09:03

## 2025-03-18 RX ADMIN — TOPIRAMATE 100 MG: 100 TABLET, FILM COATED ORAL at 08:03

## 2025-03-18 RX ADMIN — TOPIRAMATE 100 MG: 100 TABLET, FILM COATED ORAL at 09:03

## 2025-03-18 RX ADMIN — Medication 1 CAPSULE: at 12:03

## 2025-03-18 RX ADMIN — SERTRALINE HYDROCHLORIDE 50 MG: 50 TABLET ORAL at 08:03

## 2025-03-18 RX ADMIN — Medication 1 CAPSULE: at 08:03

## 2025-03-18 RX ADMIN — LEVETIRACETAM 1250 MG: 500 TABLET, FILM COATED ORAL at 09:03

## 2025-03-18 RX ADMIN — LEVETIRACETAM 1250 MG: 500 TABLET, FILM COATED ORAL at 08:03

## 2025-03-18 RX ADMIN — Medication 1 CAPSULE: at 05:03

## 2025-03-18 RX ADMIN — ASPIRIN 81 MG: 81 TABLET, CHEWABLE ORAL at 08:03

## 2025-03-18 NOTE — PLAN OF CARE
Problem: Adult Inpatient Plan of Care  Goal: Plan of Care Review  Outcome: Progressing  Goal: Patient-Specific Goal (Individualized)  Outcome: Progressing  Goal: Absence of Hospital-Acquired Illness or Injury  Outcome: Progressing  Goal: Optimal Comfort and Wellbeing  Outcome: Progressing  Goal: Readiness for Transition of Care  Outcome: Progressing     Problem: Acute Kidney Injury/Impairment  Goal: Fluid and Electrolyte Balance  Outcome: Progressing  Goal: Improved Oral Intake  Outcome: Progressing  Goal: Effective Renal Function  Outcome: Progressing     Problem: Pain Acute  Goal: Optimal Pain Control and Function  Outcome: Progressing     Problem: Infection  Goal: Absence of Infection Signs and Symptoms  Outcome: Progressing     Problem: Fall Injury Risk  Goal: Absence of Fall and Fall-Related Injury  Outcome: Progressing     Problem: Fluid Volume Deficit  Goal: Fluid Balance  Outcome: Progressing     Problem: Comorbidity Management  Goal: Blood Pressure in Desired Range  Outcome: Progressing     Problem: Comorbidity Management  Goal: Blood Pressure in Desired Range  Outcome: Progressing  Goal: Bariatric Home Regimen Maintained  Outcome: Progressing  Goal: Maintenance of Seizure Control  Outcome: Progressing  Goal: Maintenance of Heart Failure Symptom Control  Outcome: Progressing     Problem: UTI (Urinary Tract Infection)  Goal: Improved Infection Symptoms  Outcome: Progressing     Problem: Malnutrition  Goal: Improved Nutritional Intake  Outcome: Progressing     Problem: Fever with Neutropenia  Goal: Baseline Body Temperature  Outcome: Progressing  Goal: Absence of Infection  Outcome: Progressing     Problem: Fluid Volume Excess  Goal: Fluid Balance  Outcome: Progressing

## 2025-03-18 NOTE — PT/OT/SLP PROGRESS
Physical Therapy Treatment Note           Name: Nathen Morales    : 1950 (74 y.o.)  MRN: [id number]             Subjective Assessment:     No complaints  Lethargic   X Awake, alert, cooperative  Uncooperative    Agitated  c/o pain    Appropriate  c/o fatigue    Confused  Treated at bedside     Emotionally labile  Treated in gym/dept.    Impulsive  Other:    Flat affect       Pain/Comfort:    Location - Side 1: Right  Location 1: leg    Therapy Precautions:      Cognitive deficits  Spinal precautions    Collar - hard  Sternal precautions    Collar - soft   TLSO   X Fall risk  LSO    Hip precautions - posterior  Knee immobilizer    Hip precautions - anterior  WBAT    Impaired communication  Partial weightbearing    Oxygen  TTWB    PEG tube  NWB    Visual deficits  Other:    Hearing deficits               Mobility Training:     Assist Level Assistive Device Comments    Bed Mobility Sup  Semi-supine to sitting at EOB, EOB to semi-supine.     Sit to stand/Stand to sit Sup RW Sit to stand/stand to sit performed from EOB. Pt stood for several moments while utilizing the urinal. Pt able to maintain static standing without UE support.     Transfers      Gait SBA RW Pt able to ambulate 155 ft with a step through gait pattern and slow gait speed. Some instability noted in stance, but no LOB was observed. Pt continues to c/o R knee and hip soreness from the fall he experienced prior to admission.  Reduced foot clearance and step length noted, along with ER of BLE.     Balance Training      Wheelchair Mobility      Stair Climbing      Car Transfer      Other:           Assessment:     Patient tolerated session fairly well and was agreeable to walk in the hallway.  At conclusion of session, pt again requested to return to bed but was agreeable to sit up again later today for his meal. Pt sat up through much of the morning.  Continuing to await SNF placement at this time.  Pt remains unsafe to return home  independently and will likely benefit from a SNF stay with a transition to long term care.          GOALS:   Multidisciplinary Problems       Physical Therapy Goals          Problem: Physical Therapy    Goal Priority Disciplines Outcome Interventions   Physical Therapy Goal     PT, PT/OT Progressing    Description: Patient will increase functional independence with mobility by performin. Supine to sit with Modified [place]  2. Sit to supine with Modified [place]  3. Sit to stand transfer with Modified [place]  4. Gait  x 200 feet with Modified [place] using Rolling Walker.                            Discharge Recommendations:      SNF    Discharge Equipment Recommendations:     none     At end of treatment, patient remained:      Up in chair    X In bed   X With alarm activated    Bed rails up   X Call bell in reach      Family/friends present     Restraints secured properly     In bathroom with CNA/RN notified     In gym with PT/PTA/tech     Nurse aware     Other:           PT Start Time: 1442     PT Stop Time: 1457  PT Total Time (min): 15 min     Billable Minutes: Gait Training 15      2025

## 2025-03-18 NOTE — ASSESSMENT & PLAN NOTE
Nutrition consulted. Most recent weight and BMI monitored-     Measurements:  Wt Readings from Last 1 Encounters:   03/18/25 93.4 kg (205 lb 14.6 oz)   Body mass index is 27.17 kg/m².    Patient has been screened and assessed by RD.    Malnutrition Type:  Context: acute illness or injury  Level: moderate    Malnutrition Characteristic Summary:  Weight Loss (Malnutrition): other (see comments) (Does not meet criteria)  Subcutaneous Fat (Malnutrition): mild depletion  Muscle Mass (Malnutrition): mild depletion  Fluid Accumulation (Malnutrition): other (see comments) (Not present)  Hand  Strength, Right (Malnutrition): Unable to assess    Interventions/Recommendations (treatment strategy):

## 2025-03-18 NOTE — PT/OT/SLP PROGRESS
Occupational Therapy  Treatment    Name: Nathen Morales    : 1950 (74 y.o.)  MRN: [id number]          TREATMENT SUMMARY AND RECOMMENDATIONS:      Subjective Assessment:   No complaints  Lethargic   x Awake, alert, cooperative  Uncooperative    Agitated  Flat affect   x Appropriate  c/o fatigue    Confused x Treated at bedside     Emotionally liable  Treated in gym/dept.    Impulsive x Other: Pt reported sleeping well last night       Pain/Comfort:  Pain Rating 1: 3/10  Location - Side 1: Right  Location 1: knee  Pain Addressed 1: Reposition, Distraction      Therapy Precautions:    Cognitive deficits  Spinal precautions    Collar - hard  Sternal precautions    Collar - soft   TLSO   x Fall risk  LSO    Hip precautions - posterior  Knee immobilizer    Hip precautions - anterior  WBAT    Impaired communication  Partial weightbearing    Oxygen  TTWB    PEG tube  NWB    Visual deficits  Other:    Hearing deficits           Vitals Value   x Room air      Oxygen (L)     Blood pressure     Heart rate         Treatment Objectives:     Mobility Training:    Mobility task Assist level Comments    Bed mobility     Balance training SBA Dynamic standing bal/saman SBA with UE on RW.  Pt tolerated standing up to 1 min.   Transfer     Functional mobility     Sit to stand transitions SBA Transfer training recliner <> RW SBA 3x's during session.   Other:       ADL Training:    ADL Assist level Comments    Feeding     Grooming/hygiene Setup/SBA ADL grooming training performed sitting in recliner.  After setup, pt able to perform face washing, oral care, and hair grooming with 2 verbal cues to initiate.    Bathing Max ADL bathing training performed sitting in recliner for sponge bath.  After setup, pt able to bathe trunk and BUEs with verbal cues.  Assist required with remaining.   Upper body dressing Mod  Doff/royer hospital gown   Lower body dressing Max Doff/don slipper socks.   Toileting     Toilet transfer     Adaptive  equipment training     Other: x Lotion applied to back and BLEs after bath secondary to dry skin.       Additional Treatment:  Reinforced call bell function and call before you fall.    Assessment: Patient tolerated session fair.  Noted pt required less assist with sit to stands.  Pt continues to require assist with most ADLs.  CM in process of arranging SNF transfer.    OT Plan: OT to focus next treatment session on transfer/ADL training.      GOALS:   Multidisciplinary Problems       Occupational Therapy Goals          Problem: Occupational Therapy    Goal Priority Disciplines Outcome Interventions   Occupational Therapy Goal     OT, PT/OT Progressing    Description:   Patient will increase functional independence with ADLs by performing:    LE Dressing with Minimal Assistance.  Grooming while seated at sink with Set-up Assistance.  Toileting from bedside commode with Contact Guard Assistance for hygiene and clothing management.   Toilet transfer to bedside commode with Contact Guard Assistance.                         Communication with Treatment Team:     Discharge Recommendations:    Discharge Equipment Recommendations:  none  Barriers to discharge:  Decreased caregiver support      At end of treatment, patient remained:  x Up in chair     In bed   x With alarm activated    Bed rails up   x Call bell in reach     Family/friends present    Restraints secured properly    In bathroom with CNA/RN notified    In gym with PT/PTA/tech   x Nurse aware    Other:         OT Date of Treatment: 03/18/25  OT Start Time: 0855  OT Stop Time: 0928  OT Total Time (min): 33 min    Billable Minutes:Self Care/Home Management 33      3/18/2025

## 2025-03-18 NOTE — SUBJECTIVE & OBJECTIVE
Interval History:     Review of Systems   Constitutional:  Positive for activity change and fatigue.   HENT: Negative.     Eyes: Negative.    Respiratory: Negative.     Cardiovascular:  Positive for leg swelling.   Gastrointestinal: Negative.    Endocrine: Negative.    Genitourinary: Negative.    Musculoskeletal: Negative.    Skin: Negative.    Allergic/Immunologic: Negative.    Neurological: Negative.    Hematological: Negative.    Psychiatric/Behavioral: Negative.       Objective:     Vital Signs (Most Recent):  Temp: 98.6 °F (37 °C) (03/18/25 0819)  Pulse: 84 (03/18/25 0819)  Resp: 20 (03/18/25 0819)  BP: (!) 150/75 (03/18/25 0830)  SpO2: 98 % (03/18/25 0819) Vital Signs (24h Range):  Temp:  [97.6 °F (36.4 °C)-99.1 °F (37.3 °C)] 98.6 °F (37 °C)  Pulse:  [] 84  Resp:  [18-24] 20  SpO2:  [96 %-100 %] 98 %  BP: (112-150)/(70-76) 150/75     Weight: 93.4 kg (205 lb 14.6 oz)  Body mass index is 27.17 kg/m².    Intake/Output Summary (Last 24 hours) at 3/18/2025 1036  Last data filed at 3/18/2025 0800  Gross per 24 hour   Intake 480 ml   Output 2150 ml   Net -1670 ml         Physical Exam  Constitutional:       Appearance: Normal appearance. He is obese.   HENT:      Head: Normocephalic and atraumatic.      Nose: Nose normal.      Mouth/Throat:      Mouth: Mucous membranes are moist.      Pharynx: Oropharynx is clear.   Eyes:      Extraocular Movements: Extraocular movements intact.      Conjunctiva/sclera: Conjunctivae normal.      Pupils: Pupils are equal, round, and reactive to light.   Cardiovascular:      Rate and Rhythm: Normal rate and regular rhythm.      Pulses: Normal pulses.      Heart sounds: Normal heart sounds.   Pulmonary:      Effort: Pulmonary effort is normal.      Breath sounds: Normal breath sounds.   Abdominal:      General: Bowel sounds are normal.      Palpations: Abdomen is soft.   Musculoskeletal:         General: Normal range of motion.      Cervical back: Normal range of motion and neck  "supple.      Right lower leg: Edema present.      Left lower leg: Edema present.   Skin:     General: Skin is warm and dry.      Capillary Refill: Capillary refill takes 2 to 3 seconds.   Neurological:      General: No focal deficit present.      Mental Status: He is alert. Mental status is at baseline.   Psychiatric:         Mood and Affect: Mood normal.         Thought Content: Thought content normal.               Significant Labs: All pertinent labs within the past 24 hours have been reviewed.  BMP:   Recent Labs   Lab 03/17/25  0915      K 3.7   *   CO2 24   BUN 32.0*   CREATININE 1.44*   CALCIUM 8.7*     CBC:   Recent Labs   Lab 03/17/25 0915   WBC 12.89*   HGB 9.9*   HCT 30.9*        CMP:   Recent Labs   Lab 03/17/25  0915      K 3.7   *   CO2 24   BUN 32.0*   CREATININE 1.44*   CALCIUM 8.7*   ALBUMIN 2.2*   BILITOT 0.2   ALKPHOS 188*   AST 34   ALT 34     Magnesium: No results for input(s): "MG" in the last 48 hours.    Significant Imaging: I have reviewed all pertinent imaging results/findings within the past 24 hours.  "

## 2025-03-18 NOTE — PLAN OF CARE
Problem: Adult Inpatient Plan of Care  Goal: Plan of Care Review  Outcome: Progressing  Goal: Patient-Specific Goal (Individualized)  Outcome: Progressing  Goal: Absence of Hospital-Acquired Illness or Injury  Outcome: Progressing  Goal: Optimal Comfort and Wellbeing  Outcome: Progressing  Goal: Readiness for Transition of Care  Outcome: Progressing     Problem: Acute Kidney Injury/Impairment  Goal: Fluid and Electrolyte Balance  Outcome: Progressing  Goal: Improved Oral Intake  Outcome: Progressing  Goal: Effective Renal Function  Outcome: Progressing     Problem: Pain Acute  Goal: Optimal Pain Control and Function  Outcome: Progressing     Problem: Infection  Goal: Absence of Infection Signs and Symptoms  Outcome: Progressing     Problem: Fall Injury Risk  Goal: Absence of Fall and Fall-Related Injury  Outcome: Progressing     Problem: Fatigue  Goal: Improved Activity Tolerance  Outcome: Progressing     Problem: Fluid Volume Deficit  Goal: Fluid Balance  Outcome: Progressing     Problem: Comorbidity Management  Goal: Blood Pressure in Desired Range  Outcome: Progressing  Goal: Bariatric Home Regimen Maintained  Outcome: Progressing  Goal: Maintenance of Seizure Control  Outcome: Progressing  Goal: Maintenance of Heart Failure Symptom Control  Outcome: Progressing     Problem: UTI (Urinary Tract Infection)  Goal: Improved Infection Symptoms  Outcome: Progressing     Problem: Malnutrition  Goal: Improved Nutritional Intake  Outcome: Progressing     Problem: Fever with Neutropenia  Goal: Baseline Body Temperature  Outcome: Progressing  Goal: Absence of Infection  Outcome: Progressing     Problem: Fluid Volume Excess  Goal: Fluid Balance  Outcome: Progressing

## 2025-03-18 NOTE — PROGRESS NOTES
[facility]  [facility] Medicine  Progress Note    Patient Name: Nathen Morales  MRN: [id number]  Patient Class: IP- Inpatient   Admission Date: 3/11/2025  Length of Stay: 7 days  Attending Physician: Vaughn Qureshi MD  Primary Care Provider: Brock Brown MD        Subjective     Principal Problem:Urinary tract infection with hematuria        HPI:  75 yo CM with PMH CAD s/p CABG, chronic right and left hydronephrosis with chronic stones, BPH, HTN, and seizure disorder presented to the ED with 2 days of weakness and a fall in his bathroom, hitting his head on the toilet. He did not lose consciousness. Has no esme on his head. Workup revealed elevated creatinine at 2.1, baseline 1.3, elevated troponin 0.055, and UTI on catheter collected urine.  After IV hydration creatinine improved to 1.87, and troponin decreased to 0.048. CT head unremarkable. EKG with borderline LVH and 1st degree AV block. CT abd showed chronic  changes. Pt was given rocephin and hospital medicine was consulted for admission for IV hydration, antibiotics, and trending of labs/follow-up of urine culture.      Upon my examination in the ER, the pt denies any chest pain or sob. Pt lives alone, but his niece brings him groceries and fixes his medications once a week. He does not drive or cut his own grass. He does not remember the last time he fell prior to today. Reports his appetite has been normal and he has been drinking fluids as normal. Takes torsemide 20mg daily. Denies any trouble with urination.     Overview/Hospital Course:  3/12/25: PT lying in bed. States he is not feeling good this morning. He is having trouble describing to me what is bothering him. He is complaining that his right femur is hurting since his fall. States that his femur was hurting yesterday at home and this contributed to him losing his balance and falling in the bathroom. Pt started vomiting during my exam. He is also  complaining of abdominal cramping. States he has a bm this morning that was normal. Also states that he is having no issues with urination. Denies any sob or cp.    3/13/25: Pt is lying comfortably in bed watching tv. He denies and n/v, dysuria, urinary hesitancy, abnormal bm's, cp, sob. He does endorse abd pain across his mid abdomen. Dicyclomine is helping. Appetite is normal. Urine culture pending.    3/14/25: Pt sitting up in his recliner. Denies issues with urination, sob, cp, increased weakness from baseline. Is still complaining of right thigh pain. States that this is why he fell. He understands that it is not safe for him to live at home alone. He is at risk for additional falls. Pt agrees to consider NH placement. He wants CM to contact his niece to discuss. Niece agrees with this plan. CM working on placement.     3/15/25: Pt lying comfortably in bed. Still complaining of right hip and knee pain. He got up and walked the halls with PT this morning. Denies sob, cp, dysuria, or bowel issues. Making jokes this morning.     3/16/25: Pt lying in bed sleeping. Early this morning he became sob, tachycardic, and hypertensive. Nurse reported bibasilar crackles. Later became febrile with temp of 101.2. WBC this morning elevated at 17.26. Pt remains on ceftrixone which urine culture showed klebsiella was sensitive to. Pt's SpO2 is 100% on room air at this time. He was given lasix 20mg IV this morning and urine output has been 1420cc.    3/17/25: Pt sitting up in his recliner at bedside. He denies any cp, sob, abd pain, bowel issues,  issues. Ate breakfast without any n/v. Has been on room air. Blood cultures pending. Nurse overnight reported that pt now had a cellulitis on his right forearm. Upon my exam this morning, does not appear to be a cellulitis yet, but there is an area of irritation. Pt states that the bp cuff was left on his arm there overnight and it irritated his skin. WBC is decreasing this morning.  No more fevers. Waiting on authorization for SNF.       3/18/25-No changes today.  Waiting for SNF placement for continued therapy.  Patient voices no complaints at this time.      Interval History:     Review of Systems   Constitutional:  Positive for activity change and fatigue.   HENT: Negative.     Eyes: Negative.    Respiratory: Negative.     Cardiovascular:  Positive for leg swelling.   Gastrointestinal: Negative.    Endocrine: Negative.    Genitourinary: Negative.    Musculoskeletal: Negative.    Skin: Negative.    Allergic/Immunologic: Negative.    Neurological: Negative.    Hematological: Negative.    Psychiatric/Behavioral: Negative.       Objective:     Vital Signs (Most Recent):  Temp: 98.6 °F (37 °C) (03/18/25 0819)  Pulse: 84 (03/18/25 0819)  Resp: 20 (03/18/25 0819)  BP: (!) 150/75 (03/18/25 0830)  SpO2: 98 % (03/18/25 0819) Vital Signs (24h Range):  Temp:  [97.6 °F (36.4 °C)-99.1 °F (37.3 °C)] 98.6 °F (37 °C)  Pulse:  [] 84  Resp:  [18-24] 20  SpO2:  [96 %-100 %] 98 %  BP: (112-150)/(70-76) 150/75     Weight: 93.4 kg (205 lb 14.6 oz)  Body mass index is 27.17 kg/m².    Intake/Output Summary (Last 24 hours) at 3/18/2025 1036  Last data filed at 3/18/2025 0800  Gross per 24 hour   Intake 480 ml   Output 2150 ml   Net -1670 ml         Physical Exam  Constitutional:       Appearance: Normal appearance. He is obese.   HENT:      Head: Normocephalic and atraumatic.      Nose: Nose normal.      Mouth/Throat:      Mouth: Mucous membranes are moist.      Pharynx: Oropharynx is clear.   Eyes:      Extraocular Movements: Extraocular movements intact.      Conjunctiva/sclera: Conjunctivae normal.      Pupils: Pupils are equal, round, and reactive to light.   Cardiovascular:      Rate and Rhythm: Normal rate and regular rhythm.      Pulses: Normal pulses.      Heart sounds: Normal heart sounds.   Pulmonary:      Effort: Pulmonary effort is normal.      Breath sounds: Normal breath sounds.   Abdominal:       "General: Bowel sounds are normal.      Palpations: Abdomen is soft.   Musculoskeletal:         General: Normal range of motion.      Cervical back: Normal range of motion and neck supple.      Right lower leg: Edema present.      Left lower leg: Edema present.   Skin:     General: Skin is warm and dry.      Capillary Refill: Capillary refill takes 2 to 3 seconds.   Neurological:      General: No focal deficit present.      Mental Status: He is alert. Mental status is at baseline.   Psychiatric:         Mood and Affect: Mood normal.         Thought Content: Thought content normal.               Significant Labs: All pertinent labs within the past 24 hours have been reviewed.  BMP:   Recent Labs   Lab 03/17/25  0915      K 3.7   *   CO2 24   BUN 32.0*   CREATININE 1.44*   CALCIUM 8.7*     CBC:   Recent Labs   Lab 03/17/25 0915   WBC 12.89*   HGB 9.9*   HCT 30.9*        CMP:   Recent Labs   Lab 03/17/25  0915      K 3.7   *   CO2 24   BUN 32.0*   CREATININE 1.44*   CALCIUM 8.7*   ALBUMIN 2.2*   BILITOT 0.2   ALKPHOS 188*   AST 34   ALT 34     Magnesium: No results for input(s): "MG" in the last 48 hours.    Significant Imaging: I have reviewed all pertinent imaging results/findings within the past 24 hours.      Assessment & Plan  Urinary tract infection with hematuria  Admit for IVF, Rocephin.  Repeat troponin.  Follow-up of urine culture.  Home meds reconciled.  VTE ppx: SCDs    3/12/25: Urine culture with >100,000col/ml GNR.  3/13/25: Klebsiella sensitive to rocephin and bactrim. Pt agrees on NH placement. CM working on placement.   3/16/25: Pt remains on ceftriaxone. Blood cultures drawn this morning due to fever.  Home diuretic was torsemide 20mg, but pt came in dehydrated, so I decreased to lasix 20mg. It seems that that was not enough. So he received lasix iv 20mg this morning and had good urine output. I will increase lasix PO to 40mg for tomorrow and continue to monitor. EKG " this morning sinus rhythum with 1st degree av block with pac left axis deviation.   3/17/25: Today is day 7 and his last day of rocephin. Waiting on authorization from insurance for pt to go to SNF.  Benign prostatic hyperplasia  Home meds reconciled.   Hypertension  Patient's blood pressure range in the last 24 hours was: BP  Min: 112/70  Max: 150/75.The patient's inpatient anti-hypertensive regimen is listed below:  Current Antihypertensives  furosemide (LASIX) tablet, Daily, Oral  metoprolol injection 5 mg, Every 4 hours PRN, Intravenous  furosemide tablet 40 mg, Daily, Oral    Plan  - BP is uncontrolled, will adjust as follows: Holding torsemide due to DORIS. May need to change rx upon DC.   Seizure disorder  Home meds reconciled.     S/P CABG x 3  Home meds reconciled.  Moderate malnutrition  Nutrition consulted. Most recent weight and BMI monitored-     Measurements:  Wt Readings from Last 1 Encounters:   03/18/25 93.4 kg (205 lb 14.6 oz)   Body mass index is 27.17 kg/m².    Patient has been screened and assessed by RD.    Malnutrition Type:  Context: acute illness or injury  Level: moderate    Malnutrition Characteristic Summary:  Weight Loss (Malnutrition): other (see comments) (Does not meet criteria)  Subcutaneous Fat (Malnutrition): mild depletion  Muscle Mass (Malnutrition): mild depletion  Fluid Accumulation (Malnutrition): other (see comments) (Not present)  Hand  Strength, Right (Malnutrition): Unable to assess    Interventions/Recommendations (treatment strategy):       VTE Risk Mitigation (From admission, onward)           Ordered     IP VTE HIGH RISK PATIENT  Once         03/11/25 1430     Place sequential compression device  Until discontinued         03/11/25 1430                  DVT prophylaxis  OOB  Therapy  Resume current meds  Discharge Planning   DILSHAD: 3/18/2025     Code Status: Full Code   Medical Readiness for Discharge Date: 3/14/2025  Discharge Plan A: Skilled Nursing Facility    Discharge Delays: None known at this time            Please place Justification for DME        Vaughn Qureshi MD  Department of Hospital Medicine   [facility] - [facility]

## 2025-03-18 NOTE — ASSESSMENT & PLAN NOTE
Patient's blood pressure range in the last 24 hours was: BP  Min: 112/70  Max: 150/75.The patient's inpatient anti-hypertensive regimen is listed below:  Current Antihypertensives  furosemide (LASIX) tablet, Daily, Oral  metoprolol injection 5 mg, Every 4 hours PRN, Intravenous  furosemide tablet 40 mg, Daily, Oral    Plan  - BP is uncontrolled, will adjust as follows: Holding torsemide due to DORIS. May need to change rx upon DC.

## 2025-03-19 LAB
ALBUMIN SERPL-MCNC: 2.1 G/DL (ref 3.4–4.8)
ALBUMIN/GLOB SERPL: 0.6 RATIO (ref 1.1–2)
ALP SERPL-CCNC: 145 UNIT/L (ref 40–150)
ALT SERPL-CCNC: 30 UNIT/L (ref 0–55)
ANION GAP SERPL CALC-SCNC: 6 MEQ/L
AST SERPL-CCNC: 27 UNIT/L (ref 5–34)
BASOPHILS # BLD AUTO: 0.05 X10(3)/MCL
BASOPHILS NFR BLD AUTO: 0.6 %
BILIRUB SERPL-MCNC: 0.2 MG/DL
BUN SERPL-MCNC: 32 MG/DL (ref 8.4–25.7)
CALCIUM SERPL-MCNC: 8.4 MG/DL (ref 8.8–10)
CHLORIDE SERPL-SCNC: 110 MMOL/L (ref 98–107)
CO2 SERPL-SCNC: 22 MMOL/L (ref 23–31)
CREAT SERPL-MCNC: 1.24 MG/DL (ref 0.72–1.25)
CREAT/UREA NIT SERPL: 26
EOSINOPHIL # BLD AUTO: 0.28 X10(3)/MCL (ref 0–0.9)
EOSINOPHIL NFR BLD AUTO: 3.2 %
ERYTHROCYTE [DISTWIDTH] IN BLOOD BY AUTOMATED COUNT: 13.8 % (ref 11.5–17)
GFR SERPLBLD CREATININE-BSD FMLA CKD-EPI: 60 ML/MIN/1.73/M2
GLOBULIN SER-MCNC: 3.7 GM/DL (ref 2.4–3.5)
GLUCOSE SERPL-MCNC: 126 MG/DL (ref 82–115)
HCT VFR BLD AUTO: 29 % (ref 42–52)
HGB BLD-MCNC: 9.2 G/DL (ref 14–18)
IMM GRANULOCYTES # BLD AUTO: 0.07 X10(3)/MCL (ref 0–0.04)
IMM GRANULOCYTES NFR BLD AUTO: 0.8 %
LYMPHOCYTES # BLD AUTO: 0.77 X10(3)/MCL (ref 0.6–4.6)
LYMPHOCYTES NFR BLD AUTO: 8.9 %
MAGNESIUM SERPL-MCNC: 2.3 MG/DL (ref 1.6–2.6)
MCH RBC QN AUTO: 31.9 PG (ref 27–31)
MCHC RBC AUTO-ENTMCNC: 31.7 G/DL (ref 33–36)
MCV RBC AUTO: 100.7 FL (ref 80–94)
MONOCYTES # BLD AUTO: 0.76 X10(3)/MCL (ref 0.1–1.3)
MONOCYTES NFR BLD AUTO: 8.8 %
NEUTROPHILS # BLD AUTO: 6.73 X10(3)/MCL (ref 2.1–9.2)
NEUTROPHILS NFR BLD AUTO: 77.7 %
NRBC BLD AUTO-RTO: 0 %
PLATELET # BLD AUTO: 362 X10(3)/MCL (ref 130–400)
PMV BLD AUTO: 9.4 FL (ref 7.4–10.4)
POTASSIUM SERPL-SCNC: 3.9 MMOL/L (ref 3.5–5.1)
PROT SERPL-MCNC: 5.8 GM/DL (ref 5.8–7.6)
RBC # BLD AUTO: 2.88 X10(6)/MCL (ref 4.7–6.1)
SODIUM SERPL-SCNC: 138 MMOL/L (ref 136–145)
WBC # BLD AUTO: 8.66 X10(3)/MCL (ref 4.5–11.5)

## 2025-03-19 PROCEDURE — 25000003 PHARM REV CODE 250: Performed by: INTERNAL MEDICINE

## 2025-03-19 PROCEDURE — 11000001 HC ACUTE MED/SURG PRIVATE ROOM

## 2025-03-19 PROCEDURE — 97535 SELF CARE MNGMENT TRAINING: CPT

## 2025-03-19 PROCEDURE — 80053 COMPREHEN METABOLIC PANEL: CPT | Performed by: INTERNAL MEDICINE

## 2025-03-19 PROCEDURE — 97116 GAIT TRAINING THERAPY: CPT

## 2025-03-19 PROCEDURE — 94761 N-INVAS EAR/PLS OXIMETRY MLT: CPT

## 2025-03-19 PROCEDURE — 97530 THERAPEUTIC ACTIVITIES: CPT

## 2025-03-19 PROCEDURE — 85025 COMPLETE CBC W/AUTO DIFF WBC: CPT | Performed by: INTERNAL MEDICINE

## 2025-03-19 PROCEDURE — 36415 COLL VENOUS BLD VENIPUNCTURE: CPT | Performed by: INTERNAL MEDICINE

## 2025-03-19 PROCEDURE — 83735 ASSAY OF MAGNESIUM: CPT | Performed by: INTERNAL MEDICINE

## 2025-03-19 RX ADMIN — OXCARBAZEPINE 900 MG: 300 TABLET, FILM COATED ORAL at 08:03

## 2025-03-19 RX ADMIN — LEVETIRACETAM 1250 MG: 500 TABLET, FILM COATED ORAL at 09:03

## 2025-03-19 RX ADMIN — TAMSULOSIN HYDROCHLORIDE 0.4 MG: 0.4 CAPSULE ORAL at 09:03

## 2025-03-19 RX ADMIN — Medication 1 CAPSULE: at 07:03

## 2025-03-19 RX ADMIN — OXCARBAZEPINE 900 MG: 300 TABLET, FILM COATED ORAL at 09:03

## 2025-03-19 RX ADMIN — Medication 1 CAPSULE: at 12:03

## 2025-03-19 RX ADMIN — TOPIRAMATE 100 MG: 100 TABLET, FILM COATED ORAL at 09:03

## 2025-03-19 RX ADMIN — ATORVASTATIN CALCIUM 40 MG: 40 TABLET, FILM COATED ORAL at 09:03

## 2025-03-19 RX ADMIN — LEVETIRACETAM 1250 MG: 500 TABLET, FILM COATED ORAL at 08:03

## 2025-03-19 RX ADMIN — Medication 1 CAPSULE: at 04:03

## 2025-03-19 RX ADMIN — FUROSEMIDE 40 MG: 40 TABLET ORAL at 08:03

## 2025-03-19 RX ADMIN — SERTRALINE HYDROCHLORIDE 50 MG: 50 TABLET ORAL at 08:03

## 2025-03-19 RX ADMIN — TOPIRAMATE 100 MG: 100 TABLET, FILM COATED ORAL at 08:03

## 2025-03-19 RX ADMIN — ASPIRIN 81 MG: 81 TABLET, CHEWABLE ORAL at 08:03

## 2025-03-19 NOTE — PROGRESS NOTES
[facility]  [facility] Medicine  Progress Note    Patient Name: Nathen Morales  MRN: [id number]  Patient Class: IP- Inpatient   Admission Date: 3/11/2025  Length of Stay: 8 days  Attending Physician: Vaughn Qureshi MD  Primary Care Provider: Brock Brown MD        Subjective     Principal Problem:Urinary tract infection with hematuria        HPI:  75 yo CM with PMH CAD s/p CABG, chronic right and left hydronephrosis with chronic stones, BPH, HTN, and seizure disorder presented to the ED with 2 days of weakness and a fall in his bathroom, hitting his head on the toilet. He did not lose consciousness. Has no esme on his head. Workup revealed elevated creatinine at 2.1, baseline 1.3, elevated troponin 0.055, and UTI on catheter collected urine.  After IV hydration creatinine improved to 1.87, and troponin decreased to 0.048. CT head unremarkable. EKG with borderline LVH and 1st degree AV block. CT abd showed chronic  changes. Pt was given rocephin and hospital medicine was consulted for admission for IV hydration, antibiotics, and trending of labs/follow-up of urine culture.      Upon my examination in the ER, the pt denies any chest pain or sob. Pt lives alone, but his niece brings him groceries and fixes his medications once a week. He does not drive or cut his own grass. He does not remember the last time he fell prior to today. Reports his appetite has been normal and he has been drinking fluids as normal. Takes torsemide 20mg daily. Denies any trouble with urination.     Overview/Hospital Course:  3/12/25: PT lying in bed. States he is not feeling good this morning. He is having trouble describing to me what is bothering him. He is complaining that his right femur is hurting since his fall. States that his femur was hurting yesterday at home and this contributed to him losing his balance and falling in the bathroom. Pt started vomiting during my exam. He is also  complaining of abdominal cramping. States he has a bm this morning that was normal. Also states that he is having no issues with urination. Denies any sob or cp.    3/13/25: Pt is lying comfortably in bed watching tv. He denies and n/v, dysuria, urinary hesitancy, abnormal bm's, cp, sob. He does endorse abd pain across his mid abdomen. Dicyclomine is helping. Appetite is normal. Urine culture pending.    3/14/25: Pt sitting up in his recliner. Denies issues with urination, sob, cp, increased weakness from baseline. Is still complaining of right thigh pain. States that this is why he fell. He understands that it is not safe for him to live at home alone. He is at risk for additional falls. Pt agrees to consider NH placement. He wants CM to contact his niece to discuss. Niece agrees with this plan. CM working on placement.     3/15/25: Pt lying comfortably in bed. Still complaining of right hip and knee pain. He got up and walked the halls with PT this morning. Denies sob, cp, dysuria, or bowel issues. Making jokes this morning.     3/16/25: Pt lying in bed sleeping. Early this morning he became sob, tachycardic, and hypertensive. Nurse reported bibasilar crackles. Later became febrile with temp of 101.2. WBC this morning elevated at 17.26. Pt remains on ceftrixone which urine culture showed klebsiella was sensitive to. Pt's SpO2 is 100% on room air at this time. He was given lasix 20mg IV this morning and urine output has been 1420cc.    3/17/25: Pt sitting up in his recliner at bedside. He denies any cp, sob, abd pain, bowel issues,  issues. Ate breakfast without any n/v. Has been on room air. Blood cultures pending. Nurse overnight reported that pt now had a cellulitis on his right forearm. Upon my exam this morning, does not appear to be a cellulitis yet, but there is an area of irritation. Pt states that the bp cuff was left on his arm there overnight and it irritated his skin. WBC is decreasing this morning.  No more fevers. Waiting on authorization for SNF.       3/18/25-No changes today.  Waiting for SNF placement for continued therapy.  Patient voices no complaints at this time.      3/19/25-Patient is doing well.  He is sitting up in the chair.  Waiting for SNF placement.    Interval History:     Review of Systems   Constitutional:  Positive for activity change and fatigue.   HENT: Negative.     Eyes: Negative.    Respiratory: Negative.     Cardiovascular: Negative.    Gastrointestinal: Negative.    Endocrine: Negative.    Genitourinary: Negative.    Musculoskeletal: Negative.    Skin: Negative.    Allergic/Immunologic: Negative.    Neurological: Negative.    Hematological: Negative.    Psychiatric/Behavioral: Negative.       Objective:     Vital Signs (Most Recent):  Temp: 97.8 °F (36.6 °C) (03/19/25 0759)  Pulse: 83 (03/19/25 0759)  Resp: 18 (03/19/25 0759)  BP: (!) 143/71 (03/19/25 0809)  SpO2: 98 % (03/19/25 0759) Vital Signs (24h Range):  Temp:  [97.6 °F (36.4 °C)-99.5 °F (37.5 °C)] 97.8 °F (36.6 °C)  Pulse:  [] 83  Resp:  [18-20] 18  SpO2:  [95 %-99 %] 98 %  BP: (123-165)/(67-90) 143/71     Weight: 89.7 kg (197 lb 12 oz)  Body mass index is 26.09 kg/m².    Intake/Output Summary (Last 24 hours) at 3/19/2025 1017  Last data filed at 3/19/2025 0800  Gross per 24 hour   Intake 630 ml   Output 1900 ml   Net -1270 ml         Physical Exam  Constitutional:       Appearance: Normal appearance. He is obese.   HENT:      Head: Normocephalic and atraumatic.      Nose: Nose normal.      Mouth/Throat:      Mouth: Mucous membranes are moist.      Pharynx: Oropharynx is clear.   Eyes:      Extraocular Movements: Extraocular movements intact.      Conjunctiva/sclera: Conjunctivae normal.      Pupils: Pupils are equal, round, and reactive to light.   Cardiovascular:      Rate and Rhythm: Normal rate and regular rhythm.      Pulses: Normal pulses.      Heart sounds: Normal heart sounds.   Pulmonary:      Effort: Pulmonary  effort is normal.      Breath sounds: Normal breath sounds.   Abdominal:      General: Bowel sounds are normal.      Palpations: Abdomen is soft.   Musculoskeletal:         General: Normal range of motion.      Cervical back: Normal range of motion and neck supple.   Skin:     General: Skin is warm and dry.      Capillary Refill: Capillary refill takes 2 to 3 seconds.   Neurological:      General: No focal deficit present.      Mental Status: He is alert. Mental status is at baseline.   Psychiatric:         Behavior: Behavior normal.         Thought Content: Thought content normal.               Significant Labs: All pertinent labs within the past 24 hours have been reviewed.  BMP:   Recent Labs   Lab 03/19/25  0444      K 3.9   *   CO2 22*   BUN 32.0*   CREATININE 1.24   CALCIUM 8.4*   MG 2.30     CBC:   Recent Labs   Lab 03/19/25  0444   WBC 8.66   HGB 9.2*   HCT 29.0*        CMP:   Recent Labs   Lab 03/19/25  0444      K 3.9   *   CO2 22*   BUN 32.0*   CREATININE 1.24   CALCIUM 8.4*   ALBUMIN 2.1*   BILITOT 0.2   ALKPHOS 145   AST 27   ALT 30     Magnesium:   Recent Labs   Lab 03/19/25  0444   MG 2.30       Significant Imaging: I have reviewed all pertinent imaging results/findings within the past 24 hours.      Assessment & Plan  Urinary tract infection with hematuria  Admit for IVF, Rocephin.  Repeat troponin.  Follow-up of urine culture.  Home meds reconciled.  VTE ppx: SCDs    3/12/25: Urine culture with >100,000col/ml GNR.  3/13/25: Klebsiella sensitive to rocephin and bactrim. Pt agrees on NH placement. CM working on placement.   3/16/25: Pt remains on ceftriaxone. Blood cultures drawn this morning due to fever.  Home diuretic was torsemide 20mg, but pt came in dehydrated, so I decreased to lasix 20mg. It seems that that was not enough. So he received lasix iv 20mg this morning and had good urine output. I will increase lasix PO to 40mg for tomorrow and continue to monitor.  EKG this morning sinus rhythum with 1st degree av block with pac left axis deviation.   3/17/25: Today is day 7 and his last day of rocephin. Waiting on authorization from insurance for pt to go to SNF.  Benign prostatic hyperplasia  Home meds reconciled.   Hypertension  Patient's blood pressure range in the last 24 hours was: BP  Min: 123/71  Max: 165/76.The patient's inpatient anti-hypertensive regimen is listed below:  Current Antihypertensives  furosemide (LASIX) tablet, Daily, Oral  metoprolol injection 5 mg, Every 4 hours PRN, Intravenous  furosemide tablet 40 mg, Daily, Oral    Plan  - BP is uncontrolled, will adjust as follows: Holding torsemide due to DORIS. May need to change rx upon DC.   Seizure disorder  Home meds reconciled.     S/P CABG x 3  Home meds reconciled.  Moderate malnutrition  Nutrition consulted. Most recent weight and BMI monitored-     Measurements:  Wt Readings from Last 1 Encounters:   03/19/25 89.7 kg (197 lb 12 oz)   Body mass index is 26.09 kg/m².    Patient has been screened and assessed by RD.    Malnutrition Type:  Context: acute illness or injury  Level: moderate    Malnutrition Characteristic Summary:  Weight Loss (Malnutrition): other (see comments) (Does not meet criteria)  Subcutaneous Fat (Malnutrition): mild depletion  Muscle Mass (Malnutrition): mild depletion  Fluid Accumulation (Malnutrition): other (see comments) (Not present)  Hand  Strength, Right (Malnutrition): Unable to assess    Interventions/Recommendations (treatment strategy):       VTE Risk Mitigation (From admission, onward)           Ordered     IP VTE HIGH RISK PATIENT  Once         03/11/25 1430     Place sequential compression device  Until discontinued         03/11/25 1430                  Therapy  OOB  Waiting for placement  Discharge Planning   DILSHAD: 3/19/2025     Code Status: Full Code   Medical Readiness for Discharge Date: 3/19/2025  Discharge Plan A: Skilled Nursing Facility   Discharge Delays:  None known at this time            Please place Justification for DME        Vaughn Qureshi MD  Department of Hospital Medicine   [facility] - Medical Surgical Unit

## 2025-03-19 NOTE — PT/OT/SLP PROGRESS
Occupational Therapy  Treatment    Name: Nathen Morales    : 1950 (74 y.o.)  MRN: [id number]          TREATMENT SUMMARY AND RECOMMENDATIONS:      Subjective Assessment:   No complaints  Lethargic   x Awake, alert, cooperative  Uncooperative    Agitated x Flat affect    Appropriate  c/o fatigue    Confused  Treated at bedside     Emotionally liable  Treated in gym/dept.    Impulsive x Other: Pt reported feeling OK today without any pain reports.       Pain/Comfort:  Pain Rating 1: 0/10      Therapy Precautions:    Cognitive deficits  Spinal precautions    Collar - hard  Sternal precautions    Collar - soft   TLSO   x Fall risk  LSO    Hip precautions - posterior  Knee immobilizer    Hip precautions - anterior  WBAT    Impaired communication  Partial weightbearing    Oxygen  TTWB    PEG tube  NWB    Visual deficits  Other:    Hearing deficits           Vitals Value   x Room air      Oxygen (L)     Blood pressure     Heart rate         Treatment Objectives:     Mobility Training:    Mobility task Assist level Comments    Bed mobility SBA Transfer training semi-supine to sit SBA using bed rail   Balance training SBA Dynamic standing bal/saman training performed during ADL activities.  Pt stood 3xs up to 1.5 mins with UE support on grab bar or RW.   Transfer CGA Transfer training performed bed>wc>TTB>wc>recliner CGA with RW and grab bars.  Pt required verbal cues for proper hand placement and sequencing.   Functional mobility CGA Functional mobility training performed with RW in<>out shower room, 6 feet CGA   Sit to stand transitions     Other:       ADL Training:    ADL Assist level Comments    Feeding     Grooming/hygiene SBA ADL grooming activity performed standing at sink for hair grooming with SBA and 1 UE support on walker.    Bathing Min ADL bathing training performed with shower activity sitting on TTB using  shower.  After setup, pt able to wash/dry hair, bathe/dry trunk, BUEs, BLEs to ankles, and  perineal area.  Pt required assist with back, bottom, and feet.   Upper body dressing Min ADL UE dressing training performed.  Pt require 1 tactile assist to adjust back of pullover t-shirt after donning.   Lower body dressing Max ADL LE dressing training performed.  Pt required max assist to don bilateral socks and tennis shoes.  After threading pull-up and pants over feet pt required assist to raise and adjust.  Pt able to fasten pants and zipper.   Toileting     Toilet transfer     Adaptive equipment training     Other:         Additional Treatment:  Reinforced call bell function and call before you fall.    Assessment: Patient tolerated session fair.  Noted pt performing functional transfers with CGA with RW today.  Session focused on ADL showering/dressing.  Pt able to perform showering with minimal assistance, but required max assist with LE dressing.  CM working on SNF transfer.    OT Plan: OT to focus next session on transfer/ADL training.      GOALS:   Multidisciplinary Problems       Occupational Therapy Goals          Problem: Occupational Therapy    Goal Priority Disciplines Outcome Interventions   Occupational Therapy Goal     OT, PT/OT Progressing    Description:   Patient will increase functional independence with ADLs by performing:    LE Dressing with Minimal Assistance.  Grooming while seated at sink with Set-up Assistance.(Goal Met)  Toileting from bedside commode with Contact Guard Assistance for hygiene and clothing management.   Toilet transfer to bedside commode with Contact Guard Assistance.(Goal Met)                         Communication with Treatment Team:     Discharge Recommendations:  24-hour care.  Discharge Equipment Recommendations:  none  Barriers to discharge:  Decreased caregiver support      At end of treatment, patient remained:  x Up in chair     In bed    With alarm activated    Bed rails up   x Call bell in reach     Family/friends present    Restraints secured properly    In  bathroom with CNA/RN notified    In gym with PT/PTA/tech   x Nurse aware    Other:         OT Date of Treatment: 03/19/25  OT Start Time: 0829  OT Stop Time: 0916  OT Total Time (min): 47 min    Billable Minutes:Self Care/Home Management 32  Therapeutic Activity 15      3/19/2025

## 2025-03-19 NOTE — ASSESSMENT & PLAN NOTE
Patient's blood pressure range in the last 24 hours was: BP  Min: 123/71  Max: 165/76.The patient's inpatient anti-hypertensive regimen is listed below:  Current Antihypertensives  furosemide (LASIX) tablet, Daily, Oral  metoprolol injection 5 mg, Every 4 hours PRN, Intravenous  furosemide tablet 40 mg, Daily, Oral    Plan  - BP is uncontrolled, will adjust as follows: Holding torsemide due to DORIS. May need to change rx upon DC.

## 2025-03-19 NOTE — PLAN OF CARE
Problem: Adult Inpatient Plan of Care  Goal: Plan of Care Review  Outcome: Progressing  Goal: Patient-Specific Goal (Individualized)  Outcome: Progressing  Goal: Absence of Hospital-Acquired Illness or Injury  Outcome: Progressing  Intervention: Prevent Skin Injury  Flowsheets (Taken 3/19/2025 0053)  Skin Protection: incontinence pads utilized  Device Skin Pressure Protection: absorbent pad utilized/changed  Intervention: Prevent Infection  Flowsheets (Taken 3/19/2025 0053)  Infection Prevention:   environmental surveillance performed   hand hygiene promoted   rest/sleep promoted   single patient room provided  Goal: Optimal Comfort and Wellbeing  Outcome: Progressing  Intervention: Monitor Pain and Promote Comfort  Flowsheets (Taken 3/19/2025 0053)  Pain Management Interventions:   quiet environment facilitated   care clustered  Goal: Readiness for Transition of Care  Outcome: Progressing     Problem: Acute Kidney Injury/Impairment  Goal: Fluid and Electrolyte Balance  Outcome: Progressing  Goal: Improved Oral Intake  Outcome: Progressing  Goal: Effective Renal Function  Outcome: Progressing  Intervention: Monitor and Support Renal Function  Flowsheets (Taken 3/19/2025 0053)  Medication Review/Management: medications reviewed     Problem: Pain Acute  Goal: Optimal Pain Control and Function  Outcome: Progressing  Intervention: Develop Pain Management Plan  Flowsheets (Taken 3/19/2025 0053)  Pain Management Interventions:   quiet environment facilitated   care clustered  Intervention: Prevent or Manage Pain  Flowsheets (Taken 3/19/2025 0053)  Sleep/Rest Enhancement:   awakenings minimized   noise level reduced   room darkened   regular sleep/rest pattern promoted  Sensory Stimulation Regulation:   care clustered   quiet environment promoted  Bowel Elimination Promotion:   adequate fluid intake promoted   ambulation promoted  Medication Review/Management: medications reviewed  Intervention: Optimize Psychosocial  Wellbeing  Flowsheets (Taken 3/19/2025 0053)  Supportive Measures:   active listening utilized   self-care encouraged   verbalization of feelings encouraged  Diversional Activities: television     Problem: Infection  Goal: Absence of Infection Signs and Symptoms  Outcome: Progressing  Intervention: Prevent or Manage Infection  Flowsheets (Taken 3/19/2025 0053)  Infection Management: aseptic technique maintained     Problem: Fall Injury Risk  Goal: Absence of Fall and Fall-Related Injury  Outcome: Progressing  Intervention: Identify and Manage Contributors  Flowsheets (Taken 3/19/2025 0053)  Self-Care Promotion:   independence encouraged   BADL personal objects within reach  Medication Review/Management: medications reviewed     Problem: Fatigue  Goal: Improved Activity Tolerance  Outcome: Progressing  Intervention: Promote Improved Energy  Flowsheets (Taken 3/19/2025 0053)  Fatigue Management:   paced activity encouraged   activity assistance provided  Sleep/Rest Enhancement:   awakenings minimized   noise level reduced   room darkened   regular sleep/rest pattern promoted  Environmental Support:   calm environment promoted   environmental consistency promoted   rest periods encouraged     Problem: Comorbidity Management  Goal: Blood Pressure in Desired Range  Outcome: Progressing  Intervention: Maintain Blood Pressure Management  Flowsheets (Taken 3/19/2025 0053)  Medication Review/Management: medications reviewed  Goal: Bariatric Home Regimen Maintained  Outcome: Progressing  Goal: Maintenance of Seizure Control  Outcome: Progressing  Goal: Maintenance of Heart Failure Symptom Control  Outcome: Progressing     Problem: UTI (Urinary Tract Infection)  Goal: Improved Infection Symptoms  Outcome: Progressing  Intervention: Prevent Infection Progression  Flowsheets (Taken 3/19/2025 0053)  Infection Management: aseptic technique maintained  Intervention: Facilitate Optimal Urinary Elimination  Flowsheets (Taken  3/19/2025 0053)  Urinary Elimination Promotion:   absorbent pad/diaper use encouraged   frequent voiding encouraged   toileting device within reach     Problem: Fluid Volume Excess  Goal: Fluid Balance  Outcome: Progressing  Intervention: Monitor and Manage Hypervolemia  Flowsheets (Taken 3/19/2025 0053)  Skin Protection: incontinence pads utilized

## 2025-03-19 NOTE — PT/OT/SLP PROGRESS
Physical Therapy Treatment Note           Name: Nathen Morales    : 1950 (74 y.o.)  MRN: [id number]             Subjective Assessment:     No complaints  Lethargic   X Awake, alert, cooperative  Uncooperative    Agitated  c/o pain    Appropriate  c/o fatigue    Confused  Treated at bedside     Emotionally labile  Treated in gym/dept.    Impulsive  Other:    Flat affect       Pain/Comfort:    Pain Rating 1: 0/10    Therapy Precautions:      Cognitive deficits  Spinal precautions    Collar - hard  Sternal precautions    Collar - soft   TLSO   X Fall risk  LSO    Hip precautions - posterior  Knee immobilizer    Hip precautions - anterior  WBAT    Impaired communication  Partial weightbearing    Oxygen  TTWB    PEG tube  NWB    Visual deficits  Other:    Hearing deficits             Mobility Training:     Assist Level Assistive Device Comments    Bed Mobility      Sit to stand/Stand to sit Sup-CGA RW Sit to stand/stand to sit. Pt stood from recliner level and maintained standing to utilize the urinal.  PT provided CGA due to a slight posterior lean upon standing.     Transfers      Gait Sup-CGA RW Pt able to ambulate 170 ft with a step through gait pattern and slow gait speed. Pt had his tennis shoes donned throughout ambulation.  While turning, the toe of pt's shoe caught the floor and led to a very slight LOB.  Pt able to self-recover, but PT provided CGA for safety.     Balance Training      Wheelchair Mobility      Stair Climbing      Car Transfer      Other:           Assessment:     Patient tolerated session fairly well and agreed to a quick walk in the hallway prior to the arrival of his lunch. Pt was already seated in the recliner after having returned from the shower with OT. Pt continues to await SNF placement at this time.  Pt remains unsafe for a return to an independent living environment.  SNF with the possibility of a transition to long term care remains PT's recommendation.        GOALS:    Multidisciplinary Problems       Physical Therapy Goals          Problem: Physical Therapy    Goal Priority Disciplines Outcome Interventions   Physical Therapy Goal     PT, PT/OT Progressing    Description: Patient will increase functional independence with mobility by performin. Supine to sit with Modified [place]  2. Sit to supine with Modified [place]  3. Sit to stand transfer with Modified [place]  4. Gait  x 200 feet with Modified [place] using Rolling Walker.                            Discharge Recommendations:      SNF    Discharge Equipment Recommendations:     none     At end of treatment, patient remained:     X Up in chair     In bed    With alarm activated    Bed rails up   X Call bell in reach      Family/friends present     Restraints secured properly     In bathroom with CNA/RN notified     In gym with PT/PTA/tech     Nurse aware     Other:           PT Start Time: 1037     PT Stop Time: 1047  PT Total Time (min): 10 min     Billable Minutes: Gait Training 10      2025

## 2025-03-19 NOTE — SUBJECTIVE & OBJECTIVE
Interval History:     Review of Systems   Constitutional:  Positive for activity change and fatigue.   HENT: Negative.     Eyes: Negative.    Respiratory: Negative.     Cardiovascular: Negative.    Gastrointestinal: Negative.    Endocrine: Negative.    Genitourinary: Negative.    Musculoskeletal: Negative.    Skin: Negative.    Allergic/Immunologic: Negative.    Neurological: Negative.    Hematological: Negative.    Psychiatric/Behavioral: Negative.       Objective:     Vital Signs (Most Recent):  Temp: 97.8 °F (36.6 °C) (03/19/25 0759)  Pulse: 83 (03/19/25 0759)  Resp: 18 (03/19/25 0759)  BP: (!) 143/71 (03/19/25 0809)  SpO2: 98 % (03/19/25 0759) Vital Signs (24h Range):  Temp:  [97.6 °F (36.4 °C)-99.5 °F (37.5 °C)] 97.8 °F (36.6 °C)  Pulse:  [] 83  Resp:  [18-20] 18  SpO2:  [95 %-99 %] 98 %  BP: (123-165)/(67-90) 143/71     Weight: 89.7 kg (197 lb 12 oz)  Body mass index is 26.09 kg/m².    Intake/Output Summary (Last 24 hours) at 3/19/2025 1017  Last data filed at 3/19/2025 0800  Gross per 24 hour   Intake 630 ml   Output 1900 ml   Net -1270 ml         Physical Exam  Constitutional:       Appearance: Normal appearance. He is obese.   HENT:      Head: Normocephalic and atraumatic.      Nose: Nose normal.      Mouth/Throat:      Mouth: Mucous membranes are moist.      Pharynx: Oropharynx is clear.   Eyes:      Extraocular Movements: Extraocular movements intact.      Conjunctiva/sclera: Conjunctivae normal.      Pupils: Pupils are equal, round, and reactive to light.   Cardiovascular:      Rate and Rhythm: Normal rate and regular rhythm.      Pulses: Normal pulses.      Heart sounds: Normal heart sounds.   Pulmonary:      Effort: Pulmonary effort is normal.      Breath sounds: Normal breath sounds.   Abdominal:      General: Bowel sounds are normal.      Palpations: Abdomen is soft.   Musculoskeletal:         General: Normal range of motion.      Cervical back: Normal range of motion and neck supple.   Skin:      General: Skin is warm and dry.      Capillary Refill: Capillary refill takes 2 to 3 seconds.   Neurological:      General: No focal deficit present.      Mental Status: He is alert. Mental status is at baseline.   Psychiatric:         Behavior: Behavior normal.         Thought Content: Thought content normal.               Significant Labs: All pertinent labs within the past 24 hours have been reviewed.  BMP:   Recent Labs   Lab 03/19/25  0444      K 3.9   *   CO2 22*   BUN 32.0*   CREATININE 1.24   CALCIUM 8.4*   MG 2.30     CBC:   Recent Labs   Lab 03/19/25  0444   WBC 8.66   HGB 9.2*   HCT 29.0*        CMP:   Recent Labs   Lab 03/19/25  0444      K 3.9   *   CO2 22*   BUN 32.0*   CREATININE 1.24   CALCIUM 8.4*   ALBUMIN 2.1*   BILITOT 0.2   ALKPHOS 145   AST 27   ALT 30     Magnesium:   Recent Labs   Lab 03/19/25  0444   MG 2.30       Significant Imaging: I have reviewed all pertinent imaging results/findings within the past 24 hours.

## 2025-03-19 NOTE — PLAN OF CARE
Problem: Adult Inpatient Plan of Care  Goal: Plan of Care Review  Outcome: Progressing  Goal: Patient-Specific Goal (Individualized)  Outcome: Progressing  Goal: Absence of Hospital-Acquired Illness or Injury  Outcome: Progressing  Goal: Optimal Comfort and Wellbeing  Outcome: Progressing  Goal: Readiness for Transition of Care  Outcome: Progressing     Problem: Acute Kidney Injury/Impairment  Goal: Fluid and Electrolyte Balance  Outcome: Progressing  Goal: Improved Oral Intake  Outcome: Progressing  Intervention: Promote and Optimize Oral Intake  Flowsheets (Taken 3/19/2025 0800)  Nutrition Interventions:   supplemental drinks provided   meal set-up provided   food preferences provided  Goal: Effective Renal Function  Outcome: Progressing     Problem: Acute Kidney Injury/Impairment  Goal: Effective Renal Function  Outcome: Progressing     Problem: Pain Acute  Goal: Optimal Pain Control and Function  Outcome: Progressing     Problem: Infection  Goal: Absence of Infection Signs and Symptoms  Outcome: Progressing     Problem: Fall Injury Risk  Goal: Absence of Fall and Fall-Related Injury  Outcome: Progressing  Intervention: Identify and Manage Contributors  Flowsheets (Taken 3/19/2025 1047)  Self-Care Promotion:   BADL personal objects within reach   meal set-up provided   adaptive equipment use encouraged   independence encouraged  Intervention: Promote Injury-Free Environment  Flowsheets (Taken 3/19/2025 1047)  Safety Promotion/Fall Prevention:   assistive device/personal item within reach   commode/urinal/bedpan at bedside   Fall Risk reviewed with patient/family   Supervised toileting - stay within arms reach   lighting adjusted   nonskid shoes/socks when out of bed   instructed to call staff for mobility     Problem: Fatigue  Goal: Improved Activity Tolerance  Outcome: Progressing     Problem: Fluid Volume Deficit  Goal: Fluid Balance  Outcome: Progressing     Problem: Comorbidity Management  Goal: Blood  Pressure in Desired Range  Outcome: Progressing  Goal: Bariatric Home Regimen Maintained  Outcome: Progressing  Goal: Maintenance of Seizure Control  Outcome: Progressing  Goal: Maintenance of Heart Failure Symptom Control  Outcome: Progressing     Problem: UTI (Urinary Tract Infection)  Goal: Improved Infection Symptoms  Outcome: Progressing  Intervention: Prevent Infection Progression  Flowsheets (Taken 3/19/2025 1049)  Fever Reduction/Comfort Measures: lightweight clothing  Infection Management: aseptic technique maintained     Problem: Malnutrition  Goal: Improved Nutritional Intake  Outcome: Progressing     Problem: Fever with Neutropenia  Goal: Baseline Body Temperature  Outcome: Progressing  Goal: Absence of Infection  Outcome: Progressing  Intervention: Prevent Infection and Maximize Resistance  Flowsheets (Taken 3/19/2025 104)  Infection Prevention:   environmental surveillance performed   hand hygiene promoted   single patient room provided  Bathing/Skin Care: (earlier per Elias with OT)   dressed/undressed   bath, complete  Oral Care: dental appliance cleaned     Problem: Fluid Volume Excess  Goal: Fluid Balance  Outcome: Progressing

## 2025-03-19 NOTE — ASSESSMENT & PLAN NOTE
Nutrition consulted. Most recent weight and BMI monitored-     Measurements:  Wt Readings from Last 1 Encounters:   03/19/25 89.7 kg (197 lb 12 oz)   Body mass index is 26.09 kg/m².    Patient has been screened and assessed by RD.    Malnutrition Type:  Context: acute illness or injury  Level: moderate    Malnutrition Characteristic Summary:  Weight Loss (Malnutrition): other (see comments) (Does not meet criteria)  Subcutaneous Fat (Malnutrition): mild depletion  Muscle Mass (Malnutrition): mild depletion  Fluid Accumulation (Malnutrition): other (see comments) (Not present)  Hand  Strength, Right (Malnutrition): Unable to assess    Interventions/Recommendations (treatment strategy):

## 2025-03-20 PROCEDURE — 25000003 PHARM REV CODE 250: Performed by: INTERNAL MEDICINE

## 2025-03-20 PROCEDURE — 97535 SELF CARE MNGMENT TRAINING: CPT

## 2025-03-20 PROCEDURE — 97116 GAIT TRAINING THERAPY: CPT

## 2025-03-20 PROCEDURE — 11000001 HC ACUTE MED/SURG PRIVATE ROOM

## 2025-03-20 PROCEDURE — 94761 N-INVAS EAR/PLS OXIMETRY MLT: CPT

## 2025-03-20 RX ORDER — SELENIUM 50 MCG
1 TABLET ORAL DAILY
Status: DISCONTINUED | OUTPATIENT
Start: 2025-03-21 | End: 2025-03-21 | Stop reason: HOSPADM

## 2025-03-20 RX ADMIN — ASPIRIN 81 MG: 81 TABLET, CHEWABLE ORAL at 08:03

## 2025-03-20 RX ADMIN — TOPIRAMATE 100 MG: 100 TABLET, FILM COATED ORAL at 08:03

## 2025-03-20 RX ADMIN — SERTRALINE HYDROCHLORIDE 50 MG: 50 TABLET ORAL at 08:03

## 2025-03-20 RX ADMIN — TAMSULOSIN HYDROCHLORIDE 0.4 MG: 0.4 CAPSULE ORAL at 08:03

## 2025-03-20 RX ADMIN — ATORVASTATIN CALCIUM 40 MG: 40 TABLET, FILM COATED ORAL at 08:03

## 2025-03-20 RX ADMIN — Medication 1 CAPSULE: at 11:03

## 2025-03-20 RX ADMIN — OXCARBAZEPINE 900 MG: 300 TABLET, FILM COATED ORAL at 08:03

## 2025-03-20 RX ADMIN — LEVETIRACETAM 1250 MG: 500 TABLET, FILM COATED ORAL at 08:03

## 2025-03-20 RX ADMIN — FUROSEMIDE 40 MG: 40 TABLET ORAL at 08:03

## 2025-03-20 RX ADMIN — Medication 1 CAPSULE: at 07:03

## 2025-03-20 NOTE — ASSESSMENT & PLAN NOTE
Nutrition consulted. Most recent weight and BMI monitored-     Measurements:  Wt Readings from Last 1 Encounters:   03/20/25 89.1 kg (196 lb 6.9 oz)   Body mass index is 25.92 kg/m².    Patient has been screened and assessed by RD.    Malnutrition Type:  Context: acute illness or injury  Level: moderate    Malnutrition Characteristic Summary:  Weight Loss (Malnutrition): other (see comments) (Does not meet criteria)  Subcutaneous Fat (Malnutrition): mild depletion  Muscle Mass (Malnutrition): mild depletion  Fluid Accumulation (Malnutrition): other (see comments) (Not present)  Hand  Strength, Right (Malnutrition): Unable to assess    Interventions/Recommendations (treatment strategy):

## 2025-03-20 NOTE — PT/OT/SLP PROGRESS
Physical Therapy Treatment Note           Name: Nathen Morales    : 1950 (74 y.o.)  MRN: [id number]             Subjective Assessment:     No complaints  Lethargic   X Awake, alert, cooperative  Uncooperative    Agitated  c/o pain    Appropriate  c/o fatigue    Confused  Treated at bedside     Emotionally labile  Treated in gym/dept.    Impulsive  Other:    Flat affect       Pain/Comfort:    Pain Rating 1: 0/10    Therapy Precautions:      Cognitive deficits  Spinal precautions    Collar - hard  Sternal precautions    Collar - soft   TLSO   X Fall risk  LSO    Hip precautions - posterior  Knee immobilizer    Hip precautions - anterior  WBAT    Impaired communication  Partial weightbearing    Oxygen  TTWB    PEG tube  NWB    Visual deficits  Other:    Hearing deficits             Mobility Training:     Assist Level Assistive Device Comments    Bed Mobility      Sit to stand/Stand to sit Sup RW Sit to stand/stand to sit performed from recliner level.  Once standing, pt was able to utilize the urinal with PT providing supervision.   Transfers      Gait SBA RW Pt able to ambulate with a step through gait pattern and slightly quicker gait speed. Pt ambulated 160 ft.  No LOB noted today and pt appeared more confident in his mobility.     Balance Training      Wheelchair Mobility      Stair Climbing      Car Transfer      Other:           Assessment:     Patient tolerated session fairly well and seemed to be in good spirits.  Unfortunately, pt's insurance has denied SNF placement.  Pt has elected to pursue [facility] care.  Per CM, pt will be transitioned to [facility] tomorrow.        GOALS:   Multidisciplinary Problems       Physical Therapy Goals          Problem: Physical Therapy    Goal Priority Disciplines Outcome Interventions   Physical Therapy Goal     PT, PT/OT Progressing    Description: Patient will increase functional independence with mobility by performin. Supine to sit with  Modified Wise  2. Sit to supine with Modified Wise  3. Sit to stand transfer with Modified Wise  4. Gait  x 200 feet with Modified Wise using Rolling Walker.                            Discharge Recommendations:      [facility]    Discharge Equipment Recommendations:     none     At end of treatment, patient remained:     X Up in chair     In bed    With alarm activated    Bed rails up   X Call bell in reach      Family/friends present     Restraints secured properly     In bathroom with CNA/RN notified     In gym with PT/PTA/tech     Nurse aware     Other:           PT Start Time: 1300     PT Stop Time: 1315  PT Total Time (min): 15 min     Billable Minutes: Gait Training 15      03/20/2025

## 2025-03-20 NOTE — SUBJECTIVE & OBJECTIVE
Interval History:     Review of Systems   Constitutional:  Positive for activity change and fatigue.   HENT: Negative.     Eyes: Negative.    Respiratory: Negative.     Cardiovascular: Negative.    Gastrointestinal: Negative.    Endocrine: Negative.    Genitourinary: Negative.    Musculoskeletal:  Positive for arthralgias and gait problem.   Skin: Negative.    Allergic/Immunologic: Negative.    Hematological: Negative.    Psychiatric/Behavioral: Negative.       Objective:     Vital Signs (Most Recent):  Temp: 97.8 °F (36.6 °C) (03/20/25 0739)  Pulse: 78 (03/20/25 0739)  Resp: 17 (03/20/25 0739)  BP: (!) 153/68 (03/20/25 0805)  SpO2: 98 % (03/20/25 0739) Vital Signs (24h Range):  Temp:  [97.8 °F (36.6 °C)-98.4 °F (36.9 °C)] 97.8 °F (36.6 °C)  Pulse:  [74-98] 78  Resp:  [17-20] 17  SpO2:  [95 %-99 %] 98 %  BP: (153-157)/(68-88) 153/68     Weight: 89.1 kg (196 lb 6.9 oz)  Body mass index is 25.92 kg/m².    Intake/Output Summary (Last 24 hours) at 3/20/2025 1138  Last data filed at 3/20/2025 0745  Gross per 24 hour   Intake 900 ml   Output 1520 ml   Net -620 ml         Physical Exam  Constitutional:       Appearance: Normal appearance. He is obese.   HENT:      Head: Normocephalic and atraumatic.      Nose: Nose normal.      Mouth/Throat:      Mouth: Mucous membranes are moist.      Pharynx: Oropharynx is clear.   Eyes:      Extraocular Movements: Extraocular movements intact.      Conjunctiva/sclera: Conjunctivae normal.      Pupils: Pupils are equal, round, and reactive to light.   Cardiovascular:      Rate and Rhythm: Normal rate and regular rhythm.      Pulses: Normal pulses.      Heart sounds: Normal heart sounds.   Pulmonary:      Effort: Pulmonary effort is normal.      Breath sounds: Normal breath sounds.   Abdominal:      General: Bowel sounds are normal.      Palpations: Abdomen is soft.   Musculoskeletal:         General: Normal range of motion.      Cervical back: Normal range of motion and neck supple.    Skin:     General: Skin is warm and dry.      Capillary Refill: Capillary refill takes 2 to 3 seconds.   Neurological:      General: No focal deficit present.      Mental Status: He is alert. Mental status is at baseline.   Psychiatric:         Mood and Affect: Mood normal.         Behavior: Behavior normal.         Thought Content: Thought content normal.               Significant Labs: All pertinent labs within the past 24 hours have been reviewed.  BMP:   Recent Labs   Lab 03/19/25  0444      K 3.9   *   CO2 22*   BUN 32.0*   CREATININE 1.24   CALCIUM 8.4*   MG 2.30     CBC:   Recent Labs   Lab 03/19/25  0444   WBC 8.66   HGB 9.2*   HCT 29.0*        CMP:   Recent Labs   Lab 03/19/25  0444      K 3.9   *   CO2 22*   BUN 32.0*   CREATININE 1.24   CALCIUM 8.4*   ALBUMIN 2.1*   BILITOT 0.2   ALKPHOS 145   AST 27   ALT 30     Magnesium:   Recent Labs   Lab 03/19/25  0444   MG 2.30       Significant Imaging: I have reviewed all pertinent imaging results/findings within the past 24 hours.

## 2025-03-20 NOTE — PLAN OF CARE
Problem: Adult Inpatient Plan of Care  Goal: Plan of Care Review  Outcome: Progressing  Goal: Patient-Specific Goal (Individualized)  Outcome: Progressing  Goal: Absence of Hospital-Acquired Illness or Injury  Outcome: Progressing  Goal: Optimal Comfort and Wellbeing  Outcome: Progressing  Goal: Readiness for Transition of Care  Outcome: Progressing     Problem: Acute Kidney Injury/Impairment  Goal: Fluid and Electrolyte Balance  Outcome: Progressing  Goal: Improved Oral Intake  Outcome: Progressing  Goal: Effective Renal Function  Outcome: Progressing     Problem: Pain Acute  Goal: Optimal Pain Control and Function  Outcome: Progressing     Problem: Infection  Goal: Absence of Infection Signs and Symptoms  Outcome: Progressing     Problem: Fall Injury Risk  Goal: Absence of Fall and Fall-Related Injury  Outcome: Progressing     Problem: Fatigue  Goal: Improved Activity Tolerance  Outcome: Progressing  Intervention: Promote Improved Energy  Flowsheets (Taken 3/20/2025 8652)  Fatigue Management: activity assistance provided  Activity Management:   Ambulated -L4   Up in chair - L3   Ambulated in room - L4   Step side-to-side along edge of bed - L3   Standing - L3  Environmental Support:   distractions minimized   environmental consistency promoted     Problem: Fluid Volume Deficit  Goal: Fluid Balance  Outcome: Progressing     Problem: Comorbidity Management  Goal: Blood Pressure in Desired Range  Outcome: Progressing  Goal: Bariatric Home Regimen Maintained  Outcome: Progressing  Goal: Maintenance of Seizure Control  Outcome: Progressing  Goal: Maintenance of Heart Failure Symptom Control  Outcome: Progressing     Problem: UTI (Urinary Tract Infection)  Goal: Improved Infection Symptoms  Outcome: Progressing     Problem: Malnutrition  Goal: Improved Nutritional Intake  Outcome: Progressing     Problem: Fever with Neutropenia  Goal: Baseline Body Temperature  Outcome: Progressing  Goal: Absence of  Infection  Outcome: Progressing     Problem: Fever with Neutropenia  Goal: Absence of Infection  Outcome: Progressing     Problem: Fluid Volume Excess  Goal: Fluid Balance  Outcome: Progressing

## 2025-03-20 NOTE — PT/OT/SLP PROGRESS
Occupational Therapy  Treatment    Name: Nathen Morales    : 1950 (74 y.o.)  MRN: [id number]          TREATMENT SUMMARY AND RECOMMENDATIONS:      Subjective Assessment:   No complaints  Lethargic   x Awake, alert, cooperative  Uncooperative    Agitated x Flat affect   x Appropriate  c/o fatigue    Confused x Treated at bedside     Emotionally liable  Treated in gym/dept.    Impulsive x Other: Pt reported feeling OK today without any pain reports.       Pain/Comfort:  Pain Rating 1: 0/10      Therapy Precautions:    Cognitive deficits  Spinal precautions    Collar - hard  Sternal precautions    Collar - soft   TLSO   x Fall risk  LSO    Hip precautions - posterior  Knee immobilizer    Hip precautions - anterior  WBAT    Impaired communication  Partial weightbearing    Oxygen  TTWB    PEG tube  NWB    Visual deficits  Other:    Hearing deficits           Vitals Value   x Room air      Oxygen (L)     Blood pressure     Heart rate         Treatment Objectives:     Mobility Training:    Mobility task Assist level Comments    Bed mobility     Balance training SBA Dynamic standing bal/saman during ADL activities. Pt tolerated standing up to 1 min SBA with and without UE support.  Noted 1 posterior LOB while pt fastening pants, but at able to self recover.   Transfer     Functional mobility     Sit to stand transitions SBA Transfer training recliner<>RW 3xs SBA during session.  Noted pt required 2 attempts to perform each stand using a rocking method.          ADL Training:    ADL Assist level Comments    Feeding     Grooming/hygiene Setup ADL training performed sitting in recliner.  After setup, pt able to perform face washing, oral care, shaving, and hair grooming SBA.   Bathing MIn ADL bathing training performed with sponge bath.  After setup, pt able to bathe/dry trunk, BUEs, BLEs to ankles.  Assist required with back and feet.   Upper body dressing Setup ADL UE dressing performed.  Pt able to doff/don t-shirt and  long sleeve button down shirt with setup.   Lower body dressing Mod  ADL LE dressing training performed.  Belt applied to long pants.  Pt able to thread pants over feet while sitting, assistance required to royer socks and shoes, pt able to tie using figure 4 method, pt stood and was able to fasten/buckle pants with SBA.   Toileting SBA ADL toileting training performed.  While standing, pt able to manage pull-up and urinate using urinal SBA.   Toilet transfer     Adaptive equipment training     Other:         Additional Treatment:  Reinforced call bell function and call before you fall.    Assessment: Patient tolerated session well.  Noted pt continues to increase overall endurance and standing bal/saman with activity.  Pt does continue to require assist with ADL bathing and dressing but improving.  Cont to recommend 24-hour care.  CM waiting on insurance approval for transfer to SNF.    OT Plan: OT to focus next treatment session on transfer/ADL training.      GOALS:   Multidisciplinary Problems       Occupational Therapy Goals          Problem: Occupational Therapy    Goal Priority Disciplines Outcome Interventions   Occupational Therapy Goal     OT, PT/OT Progressing    Description:   Patient will increase functional independence with ADLs by performing:    LE Dressing with Minimal Assistance.  Grooming while seated at sink with Set-up Assistance.(Goal Met)  Toileting from bedside commode with Contact Guard Assistance for hygiene and clothing management.   Toilet transfer to bedside commode with Contact Guard Assistance.(Goal Met)                         Communication with Treatment Team:     Discharge Recommendations:    Discharge Equipment Recommendations:  none  Barriers to discharge:  Decreased caregiver support      At end of treatment, patient remained:  x Up in chair     In bed    With alarm activated    Bed rails up   x Call bell in reach     Family/friends present    Restraints secured properly    In  bathroom with CNA/RN notified    In gym with PT/PTA/tech   x Nurse aware    Other:         OT Date of Treatment: 03/20/25  OT Start Time: 0855  OT Stop Time: 0920  OT Total Time (min): 25 min    Billable Minutes:Self Care/Home Management 25      3/20/2025

## 2025-03-20 NOTE — PROGRESS NOTES
[facility]  [facility] Medicine  Progress Note    Patient Name: Nathen Morales  MRN: [id number]  Patient Class: IP- Inpatient   Admission Date: 3/11/2025  Length of Stay: 9 days  Attending Physician: Vaughn Qureshi MD  Primary Care Provider: Brock Brown MD        Subjective     Principal Problem:Urinary tract infection with hematuria        HPI:  75 yo CM with PMH CAD s/p CABG, chronic right and left hydronephrosis with chronic stones, BPH, HTN, and seizure disorder presented to the ED with 2 days of weakness and a fall in his bathroom, hitting his head on the toilet. He did not lose consciousness. Has no esme on his head. Workup revealed elevated creatinine at 2.1, baseline 1.3, elevated troponin 0.055, and UTI on catheter collected urine.  After IV hydration creatinine improved to 1.87, and troponin decreased to 0.048. CT head unremarkable. EKG with borderline LVH and 1st degree AV block. CT abd showed chronic  changes. Pt was given rocephin and hospital medicine was consulted for admission for IV hydration, antibiotics, and trending of labs/follow-up of urine culture.      Upon my examination in the ER, the pt denies any chest pain or sob. Pt lives alone, but his niece brings him groceries and fixes his medications once a week. He does not drive or cut his own grass. He does not remember the last time he fell prior to today. Reports his appetite has been normal and he has been drinking fluids as normal. Takes torsemide 20mg daily. Denies any trouble with urination.     Overview/Hospital Course:  3/12/25: PT lying in bed. States he is not feeling good this morning. He is having trouble describing to me what is bothering him. He is complaining that his right femur is hurting since his fall. States that his femur was hurting yesterday at home and this contributed to him losing his balance and falling in the bathroom. Pt started vomiting during my exam. He is also  complaining of abdominal cramping. States he has a bm this morning that was normal. Also states that he is having no issues with urination. Denies any sob or cp.    3/13/25: Pt is lying comfortably in bed watching tv. He denies and n/v, dysuria, urinary hesitancy, abnormal bm's, cp, sob. He does endorse abd pain across his mid abdomen. Dicyclomine is helping. Appetite is normal. Urine culture pending.    3/14/25: Pt sitting up in his recliner. Denies issues with urination, sob, cp, increased weakness from baseline. Is still complaining of right thigh pain. States that this is why he fell. He understands that it is not safe for him to live at home alone. He is at risk for additional falls. Pt agrees to consider NH placement. He wants CM to contact his niece to discuss. Niece agrees with this plan. CM working on placement.     3/15/25: Pt lying comfortably in bed. Still complaining of right hip and knee pain. He got up and walked the halls with PT this morning. Denies sob, cp, dysuria, or bowel issues. Making jokes this morning.     3/16/25: Pt lying in bed sleeping. Early this morning he became sob, tachycardic, and hypertensive. Nurse reported bibasilar crackles. Later became febrile with temp of 101.2. WBC this morning elevated at 17.26. Pt remains on ceftrixone which urine culture showed klebsiella was sensitive to. Pt's SpO2 is 100% on room air at this time. He was given lasix 20mg IV this morning and urine output has been 1420cc.    3/17/25: Pt sitting up in his recliner at bedside. He denies any cp, sob, abd pain, bowel issues,  issues. Ate breakfast without any n/v. Has been on room air. Blood cultures pending. Nurse overnight reported that pt now had a cellulitis on his right forearm. Upon my exam this morning, does not appear to be a cellulitis yet, but there is an area of irritation. Pt states that the bp cuff was left on his arm there overnight and it irritated his skin. WBC is decreasing this morning.  No more fevers. Waiting on authorization for SNF.       3/18/25-No changes today.  Waiting for SNF placement for continued therapy.  Patient voices no complaints at this time.      3/19/25-Patient is doing well.  He is sitting up in the chair.  Waiting for SNF placement.    3/20/25-Patient is still very weak.  Insurance is wanting a peer to peer today.  The patient is not safe to be at home alone.  He needs additional therapy for his strengthening and his ADL's.  He is a fall risk as well.    Interval History:     Review of Systems   Constitutional:  Positive for activity change and fatigue.   HENT: Negative.     Eyes: Negative.    Respiratory: Negative.     Cardiovascular: Negative.    Gastrointestinal: Negative.    Endocrine: Negative.    Genitourinary: Negative.    Musculoskeletal:  Positive for arthralgias and gait problem.   Skin: Negative.    Allergic/Immunologic: Negative.    Hematological: Negative.    Psychiatric/Behavioral: Negative.       Objective:     Vital Signs (Most Recent):  Temp: 97.8 °F (36.6 °C) (03/20/25 0739)  Pulse: 78 (03/20/25 0739)  Resp: 17 (03/20/25 0739)  BP: (!) 153/68 (03/20/25 0805)  SpO2: 98 % (03/20/25 0739) Vital Signs (24h Range):  Temp:  [97.8 °F (36.6 °C)-98.4 °F (36.9 °C)] 97.8 °F (36.6 °C)  Pulse:  [74-98] 78  Resp:  [17-20] 17  SpO2:  [95 %-99 %] 98 %  BP: (153-157)/(68-88) 153/68     Weight: 89.1 kg (196 lb 6.9 oz)  Body mass index is 25.92 kg/m².    Intake/Output Summary (Last 24 hours) at 3/20/2025 1138  Last data filed at 3/20/2025 0745  Gross per 24 hour   Intake 900 ml   Output 1520 ml   Net -620 ml         Physical Exam  Constitutional:       Appearance: Normal appearance. He is obese.   HENT:      Head: Normocephalic and atraumatic.      Nose: Nose normal.      Mouth/Throat:      Mouth: Mucous membranes are moist.      Pharynx: Oropharynx is clear.   Eyes:      Extraocular Movements: Extraocular movements intact.      Conjunctiva/sclera: Conjunctivae normal.       Pupils: Pupils are equal, round, and reactive to light.   Cardiovascular:      Rate and Rhythm: Normal rate and regular rhythm.      Pulses: Normal pulses.      Heart sounds: Normal heart sounds.   Pulmonary:      Effort: Pulmonary effort is normal.      Breath sounds: Normal breath sounds.   Abdominal:      General: Bowel sounds are normal.      Palpations: Abdomen is soft.   Musculoskeletal:         General: Normal range of motion.      Cervical back: Normal range of motion and neck supple.   Skin:     General: Skin is warm and dry.      Capillary Refill: Capillary refill takes 2 to 3 seconds.   Neurological:      General: No focal deficit present.      Mental Status: He is alert. Mental status is at baseline.   Psychiatric:         Mood and Affect: Mood normal.         Behavior: Behavior normal.         Thought Content: Thought content normal.               Significant Labs: All pertinent labs within the past 24 hours have been reviewed.  BMP:   Recent Labs   Lab 03/19/25  0444      K 3.9   *   CO2 22*   BUN 32.0*   CREATININE 1.24   CALCIUM 8.4*   MG 2.30     CBC:   Recent Labs   Lab 03/19/25  0444   WBC 8.66   HGB 9.2*   HCT 29.0*        CMP:   Recent Labs   Lab 03/19/25  0444      K 3.9   *   CO2 22*   BUN 32.0*   CREATININE 1.24   CALCIUM 8.4*   ALBUMIN 2.1*   BILITOT 0.2   ALKPHOS 145   AST 27   ALT 30     Magnesium:   Recent Labs   Lab 03/19/25  0444   MG 2.30       Significant Imaging: I have reviewed all pertinent imaging results/findings within the past 24 hours.      Assessment & Plan  Urinary tract infection with hematuria  Admit for IVF, Rocephin.  Repeat troponin.  Follow-up of urine culture.  Home meds reconciled.  VTE ppx: SCDs    3/12/25: Urine culture with >100,000col/ml GNR.  3/13/25: Klebsiella sensitive to rocephin and bactrim. Pt agrees on NH placement. CM working on placement.   3/16/25: Pt remains on ceftriaxone. Blood cultures drawn this morning due to fever.   Home diuretic was torsemide 20mg, but pt came in dehydrated, so I decreased to lasix 20mg. It seems that that was not enough. So he received lasix iv 20mg this morning and had good urine output. I will increase lasix PO to 40mg for tomorrow and continue to monitor. EKG this morning sinus rhythum with 1st degree av block with pac left axis deviation.   3/17/25: Today is day 7 and his last day of rocephin. Waiting on authorization from insurance for pt to go to SNF.  Benign prostatic hyperplasia  Home meds reconciled.   Hypertension  Patient's blood pressure range in the last 24 hours was: BP  Min: 153/68  Max: 157/88.The patient's inpatient anti-hypertensive regimen is listed below:  Current Antihypertensives  furosemide (LASIX) tablet, Daily, Oral  metoprolol injection 5 mg, Every 4 hours PRN, Intravenous  furosemide tablet 40 mg, Daily, Oral    Plan  - BP is uncontrolled, will adjust as follows: Holding torsemide due to DORIS. May need to change rx upon DC.   Seizure disorder  Home meds reconciled.     S/P CABG x 3  Home meds reconciled.  Moderate malnutrition  Nutrition consulted. Most recent weight and BMI monitored-     Measurements:  Wt Readings from Last 1 Encounters:   03/20/25 89.1 kg (196 lb 6.9 oz)   Body mass index is 25.92 kg/m².    Patient has been screened and assessed by RD.    Malnutrition Type:  Context: acute illness or injury  Level: moderate    Malnutrition Characteristic Summary:  Weight Loss (Malnutrition): other (see comments) (Does not meet criteria)  Subcutaneous Fat (Malnutrition): mild depletion  Muscle Mass (Malnutrition): mild depletion  Fluid Accumulation (Malnutrition): other (see comments) (Not present)  Hand  Strength, Right (Malnutrition): Unable to assess    Interventions/Recommendations (treatment strategy):       VTE Risk Mitigation (From admission, onward)           Ordered     IP VTE HIGH RISK PATIENT  Once         03/11/25 1430     Place sequential compression device  Until  discontinued         03/11/25 1430                Therapy  OOB  Needs 24 hour care  Waiting for SNF placement-he is not safe for discharge home alone    Discharge Planning   DILSHAD: 3/19/2025     Code Status: Full Code   Medical Readiness for Discharge Date: 3/19/2025  Discharge Plan A: Skilled Nursing Facility   Discharge Delays: None known at this time            Please place Justification for DME        Vaughn Qureshi MD  Department of Hospital Medicine   [facility] - Medical Surgical Unit

## 2025-03-20 NOTE — ASSESSMENT & PLAN NOTE
Patient's blood pressure range in the last 24 hours was: BP  Min: 153/68  Max: 157/88.The patient's inpatient anti-hypertensive regimen is listed below:  Current Antihypertensives  furosemide (LASIX) tablet, Daily, Oral  metoprolol injection 5 mg, Every 4 hours PRN, Intravenous  furosemide tablet 40 mg, Daily, Oral    Plan  - BP is uncontrolled, will adjust as follows: Holding torsemide due to DORIS. May need to change rx upon DC.

## 2025-03-21 VITALS
HEART RATE: 76 BPM | DIASTOLIC BLOOD PRESSURE: 82 MMHG | TEMPERATURE: 98 F | WEIGHT: 196.44 LBS | HEIGHT: 73 IN | SYSTOLIC BLOOD PRESSURE: 129 MMHG | RESPIRATION RATE: 18 BRPM | BODY MASS INDEX: 26.03 KG/M2 | OXYGEN SATURATION: 100 %

## 2025-03-21 LAB
BACTERIA BLD CULT: NORMAL
BACTERIA BLD CULT: NORMAL

## 2025-03-21 PROCEDURE — 25000003 PHARM REV CODE 250: Performed by: INTERNAL MEDICINE

## 2025-03-21 PROCEDURE — 94761 N-INVAS EAR/PLS OXIMETRY MLT: CPT

## 2025-03-21 PROCEDURE — 97535 SELF CARE MNGMENT TRAINING: CPT

## 2025-03-21 RX ADMIN — OXCARBAZEPINE 900 MG: 300 TABLET, FILM COATED ORAL at 09:03

## 2025-03-21 RX ADMIN — Medication 1 CAPSULE: at 09:03

## 2025-03-21 RX ADMIN — SERTRALINE HYDROCHLORIDE 50 MG: 50 TABLET ORAL at 09:03

## 2025-03-21 RX ADMIN — LEVETIRACETAM 1250 MG: 500 TABLET, FILM COATED ORAL at 09:03

## 2025-03-21 RX ADMIN — FUROSEMIDE 40 MG: 40 TABLET ORAL at 09:03

## 2025-03-21 RX ADMIN — TOPIRAMATE 100 MG: 100 TABLET, FILM COATED ORAL at 09:03

## 2025-03-21 RX ADMIN — ASPIRIN 81 MG: 81 TABLET, CHEWABLE ORAL at 09:03

## 2025-03-21 NOTE — PT/OT/SLP PROGRESS
Name: Nathen Morales    : 1950 (74 y.o.)  MRN: [id number]           Patient Not Seen      Patient will be transitioning to the NH today. D/C orders placed.  Will hold treatment and allow pt to rest and prepare for D/C.

## 2025-03-21 NOTE — DISCHARGE SUMMARY
[facility]  Hospital Medicine  Discharge Summary      Patient Name: Nathen Morales  MRN: [id number]  TULIO: [id number]  Patient Class: IP- Inpatient  Admission Date: 3/11/2025  Hospital Length of Stay: 10 days  Discharge Date and Time:  03/21/2025 9:29 AM  Attending Physician: Vaughn Qureshi MD   Discharging Provider: Vaughn Qureshi MD  Primary Care Provider: Brock Brown MD    Primary Care Team: Networked reference to record PCT     HPI:   73 yo CM with PMH CAD s/p CABG, chronic right and left hydronephrosis with chronic stones, BPH, HTN, and seizure disorder presented to the ED with 2 days of weakness and a fall in his bathroom, hitting his head on the toilet. He did not lose consciousness. Has no esme on his head. Workup revealed elevated creatinine at 2.1, baseline 1.3, elevated troponin 0.055, and UTI on catheter collected urine.  After IV hydration creatinine improved to 1.87, and troponin decreased to 0.048. CT head unremarkable. EKG with borderline LVH and 1st degree AV block. CT abd showed chronic  changes. Pt was given rocephin and hospital medicine was consulted for admission for IV hydration, antibiotics, and trending of labs/follow-up of urine culture.      Upon my examination in the ER, the pt denies any chest pain or sob. Pt lives alone, but his niece brings him groceries and fixes his medications once a week. He does not drive or cut his own grass. He does not remember the last time he fell prior to today. Reports his appetite has been normal and he has been drinking fluids as normal. Takes torsemide 20mg daily. Denies any trouble with urination.     * No surgery found *      Hospital Course:   3/12/25: PT lying in bed. States he is not feeling good this morning. He is having trouble describing to me what is bothering him. He is complaining that his right femur is hurting since his fall. States that his femur was hurting yesterday at home and this  contributed to him losing his balance and falling in the bathroom. Pt started vomiting during my exam. He is also complaining of abdominal cramping. States he has a bm this morning that was normal. Also states that he is having no issues with urination. Denies any sob or cp.    3/13/25: Pt is lying comfortably in bed watching tv. He denies and n/v, dysuria, urinary hesitancy, abnormal bm's, cp, sob. He does endorse abd pain across his mid abdomen. Dicyclomine is helping. Appetite is normal. Urine culture pending.    3/14/25: Pt sitting up in his recliner. Denies issues with urination, sob, cp, increased weakness from baseline. Is still complaining of right thigh pain. States that this is why he fell. He understands that it is not safe for him to live at home alone. He is at risk for additional falls. Pt agrees to consider NH placement. He wants CM to contact his niece to discuss. Niece agrees with this plan. CM working on placement.     3/15/25: Pt lying comfortably in bed. Still complaining of right hip and knee pain. He got up and walked the halls with PT this morning. Denies sob, cp, dysuria, or bowel issues. Making jokes this morning.     3/16/25: Pt lying in bed sleeping. Early this morning he became sob, tachycardic, and hypertensive. Nurse reported bibasilar crackles. Later became febrile with temp of 101.2. WBC this morning elevated at 17.26. Pt remains on ceftrixone which urine culture showed klebsiella was sensitive to. Pt's SpO2 is 100% on room air at this time. He was given lasix 20mg IV this morning and urine output has been 1420cc.    3/17/25: Pt sitting up in his recliner at bedside. He denies any cp, sob, abd pain, bowel issues,  issues. Ate breakfast without any n/v. Has been on room air. Blood cultures pending. Nurse overnight reported that pt now had a cellulitis on his right forearm. Upon my exam this morning, does not appear to be a cellulitis yet, but there is an area of irritation. Pt states  that the bp cuff was left on his arm there overnight and it irritated his skin. WBC is decreasing this morning. No more fevers. Waiting on authorization for SNF.       3/18/25-No changes today.  Waiting for SNF placement for continued therapy.  Patient voices no complaints at this time.      3/19/25-Patient is doing well.  He is sitting up in the chair.  Waiting for SNF placement.    3/20/25-Patient is still very weak.  Insurance is wanting a peer to peer today.  The patient is not safe to be at home alone.  He needs additional therapy for his strengthening and his ADL's.  He is a fall risk as well.  3/21/25-Patient has been accepted to NH.  He will transfer there today in stable condition.  Exam(A&O, NAD, RRR, CTA, obese, BS +, NTTP, ROM I)     Goals of Care Treatment Preferences:  Code Status: Full Code      SDOH Screening:  The patient was screened for utility difficulties, food insecurity, transport difficulties, housing insecurity, and interpersonal safety and there were no concerns identified this admission.     Consults:   Consults (From admission, onward)          Status Ordering Provider     Inpatient consult to Registered Dietitian/Nutritionist  Once        Provider:  (Not yet assigned)    Completed ETHAN TANG            Assessment & Plan  Urinary tract infection with hematuria  Admit for IVF, Rocephin.  Repeat troponin.  Follow-up of urine culture.  Home meds reconciled.  VTE ppx: SCDs    3/12/25: Urine culture with >100,000col/ml GNR.  3/13/25: Klebsiella sensitive to rocephin and bactrim. Pt agrees on NH placement. CM working on placement.   3/16/25: Pt remains on ceftriaxone. Blood cultures drawn this morning due to fever.  Home diuretic was torsemide 20mg, but pt came in dehydrated, so I decreased to lasix 20mg. It seems that that was not enough. So he received lasix iv 20mg this morning and had good urine output. I will increase lasix PO to 40mg for tomorrow and continue to monitor. EKG this  morning sinus rhythum with 1st degree av block with pac left axis deviation.   3/17/25: Today is day 7 and his last day of rocephin. Waiting on authorization from insurance for pt to go to SNF.  Benign prostatic hyperplasia  Home meds reconciled.   Hypertension  Patient's blood pressure range in the last 24 hours was: BP  Min: 124/76  Max: 154/63.The patient's inpatient anti-hypertensive regimen is listed below:  Current Antihypertensives  furosemide (LASIX) tablet, Daily, Oral  metoprolol injection 5 mg, Every 4 hours PRN, Intravenous  furosemide tablet 40 mg, Daily, Oral    Plan  - BP is uncontrolled, will adjust as follows: Holding torsemide due to DORIS. May need to change rx upon DC.   Seizure disorder  Home meds reconciled.     S/P CABG x 3  Home meds reconciled.  Moderate malnutrition  Nutrition consulted. Most recent weight and BMI monitored-     Measurements:  Wt Readings from Last 1 Encounters:   03/20/25 89.1 kg (196 lb 6.9 oz)   Body mass index is 25.92 kg/m².    Patient has been screened and assessed by RD.    Malnutrition Type:  Context: acute illness or injury  Level: moderate    Malnutrition Characteristic Summary:  Weight Loss (Malnutrition): other (see comments) (Does not meet criteria)  Subcutaneous Fat (Malnutrition): mild depletion  Muscle Mass (Malnutrition): mild depletion  Fluid Accumulation (Malnutrition): other (see comments) (Not present)  Hand  Strength, Right (Malnutrition): Unable to assess    Interventions/Recommendations (treatment strategy):       Physical deconditioning  Needs SNF placement    Final Active Diagnoses:    Diagnosis Date Noted POA    PRINCIPAL PROBLEM:  Urinary tract infection with hematuria [N39.0, R31.9] 03/20/2023 Yes    Moderate malnutrition [E44.0] 03/12/2025 Yes    Physical deconditioning [R53.81] 04/18/2024 Yes    S/P CABG x 3 [Z95.1] 10/23/2023 Not Applicable    Seizure disorder [G40.909] 05/31/2022 Yes    Hypertension [I10] 05/31/2022 Yes    Benign prostatic  "hyperplasia [N40.0] 05/31/2022 Yes      Problems Resolved During this Admission:    Diagnosis Date Noted Date Resolved POA    Fall at home [W19.XXXA, Y92.009] 03/12/2025 03/14/2025 Not Applicable    DORIS (acute kidney injury) [N17.9] 04/18/2024 03/14/2025 Yes       Discharged Condition: stable    Disposition: prison Nursing Facili*    Follow Up:   Contact information for follow-up providers       Brock Brown MD. Go on 3/20/2025.    Specialty: Family Medicine  Why: @ 10:30am follow up appt  Contact information:  [address]  [facility] [id number] [phone]                       Contact information for after-discharge care       Destination       [facility] .    Service: Nursing Home  Contact information:  [address] [facility] [id number] [phone]                                 Patient Instructions:   No discharge procedures on file.    Significant Diagnostic Studies: Labs: BMP: No results for input(s): "GLU", "NA", "K", "CL", "CO2", "BUN", "CREATININE", "CALCIUM", "MG" in the last 48 hours., CMP No results for input(s): "NA", "K", "CL", "CO2", "GLU", "BUN", "CREATININE", "CALCIUM", "PROT", "ALBUMIN", "BILITOT", "ALKPHOS", "AST", "ALT", "ANIONGAP", "ESTGFRAFRICA", "EGFRNONAA" in the last 48 hours., and CBC No results for input(s): "WBC", "HGB", "HCT", "PLT" in the last 48 hours.    Pending Diagnostic Studies:       None           Medications:  Reconciled Home Medications:      Medication List        START taking these medications      dicyclomine 10 MG capsule  Commonly known as: BENTYL  Take 1 capsule (10 mg total) by mouth 4 (four) times daily as needed (abdominal cramping).     furosemide 20 MG tablet  Commonly known as: LASIX  Take 1 tablet (20 mg total) by mouth once daily.            CHANGE how you take these medications      tamsulosin 0.4 mg Cap  Commonly known as: FLOMAX  TAKE 1 CAPSULE BY MOUTH TWICE DAILY  What changed:   how much to take  how to take " this  when to take this  additional instructions            CONTINUE taking these medications      aspirin 81 mg Cap  Take 81 mg by mouth Daily.     levETIRAcetam 1000 MG tablet  Commonly known as: KEPPRA  TAKE 1 AND 1/2 TABLETS BY MOUTH  TWICE DAILY     multivitamin tablet  Commonly known as: THERAGRAN  Take 1 tablet by mouth once daily.     OXcarbazepine 300 MG Tab  Commonly known as: TRILEPTAL  Take 3 tablets (900 mg total) by mouth 2 (two) times daily.     rosuvastatin 10 MG tablet  Commonly known as: CRESTOR  Take 1 tablet (10 mg total) by mouth every evening.     sertraline 50 MG tablet  Commonly known as: ZOLOFT  TAKE 1 TABLET BY MOUTH ONCE  DAILY     temazepam 15 mg Cap  Commonly known as: RESTORIL  TAKE ONE CAPSULE BY MOUTH AT BEDTIME     topiramate 100 MG tablet  Commonly known as: TOPAMAX  Take 100 mg by mouth 2 (two) times daily.            STOP taking these medications      torsemide 20 MG Tab  Commonly known as: DEMADEX            ASK your doctor about these medications      hyoscyamine 0.125 mg Subl  0.125 mg every 6 (six) hours as needed.     sulfamethoxazole-trimethoprim 800-160mg 800-160 mg Tab  Commonly known as: BACTRIM DS  Take 1 tablet by mouth 2 (two) times daily. for 4 days  Ask about: Should I take this medication?              Indwelling Lines/Drains at time of discharge:   Lines/Drains/Airways       None                   Time spent on the discharge of patient: 38 minutes        Patient screened for food insecurity, housing instability, transportation needs, utility difficulties, and interpersonal safety.   Going to NH    Vaughn Qureshi MD  Department of Hospital Medicine  [facility] - Medical Surgical Unit

## 2025-03-21 NOTE — PLAN OF CARE
03/21/25 1115   Final Note   Assessment Type Final Discharge Note   Anticipated Discharge Disposition [facility]   What phone number can be called within the next 1-3 days to see how you are doing after discharge? [phone]   Hospital Resources/Appts/Education Provided Provided patient/caregiver with written discharge plan information   Post-Acute Status   Post-Acute Authorization Placement   Post-Acute Placement Status Set-up Complete/Auth obtained   Discharge Delays None known at this time     Will discharge to [facility].

## 2025-03-21 NOTE — PT/OT/SLP PROGRESS
Occupational Therapy  Treatment    Name: Nathen Morales    : 1950 (74 y.o.)  MRN: [id number]          TREATMENT SUMMARY AND RECOMMENDATIONS:      Subjective Assessment:   No complaints  Lethargic   x Awake, alert, cooperative  Uncooperative    Agitated x Flat affect   x Appropriate  c/o fatigue    Confused x Treated at bedside     Emotionally liable  Treated in gym/dept.    Impulsive x Other: Pt reported feeling OK today without any pain reports.       Pain/Comfort:  Pain Rating 1: 0/10      Therapy Precautions:    Cognitive deficits  Spinal precautions    Collar - hard  Sternal precautions    Collar - soft   TLSO   x Fall risk  LSO    Hip precautions - posterior  Knee immobilizer    Hip precautions - anterior  WBAT    Impaired communication  Partial weightbearing    Oxygen  TTWB    PEG tube  NWB    Visual deficits  Other:    Hearing deficits           Vitals Value   x Room air      Oxygen (L)     Blood pressure     Heart rate         Treatment Objectives:     Mobility Training:    Mobility task Assist level Comments    Bed mobility     Balance training SBA Dynamic standing bal/saman during ADL activities. Pt tolerated standing up to 1 min SBA with and without UE support.  Noted 2 posterior LOB while bathing and fastening pants, but at able to self recover.   Transfer SBA Transfer training performed BSC > recliner SBA with RW.   Functional mobility     Sit to stand transitions SBA Transfer training performed 5xs SBA during session.  Noted pt required 2 attempts to perform 3/5 stands using a rocking method.          ADL Training:    ADL Assist level Comments    Feeding     Grooming/hygiene Setup ADL training performed sitting in recliner.  After setup, pt able to perform face washing, oral care, and hair grooming SBA.   Bathing MIn ADL bathing training performed with sponge bath.  After setup, pt able to bathe/dry trunk, BUEs, BLEs to ankles and perineal area.  Assist required with back, bottom, and feet.    Upper body dressing Setup ADL UE dressing performed.  Pt able to doff/don t-shirt and long sleeve button down shirt with setup.   Lower body dressing Min ADL LE dressing training performed.  Belt applied to long pants.  Pt able to thread pull-up and pants over feet while sitting, pt able to royer socks and shoes using figure 4 method, pt stood and was able to fasten/buckle pants with 1 tactile assist to adjust.   Toileting Min Upon entry into room, pt sitting on BSC.  Pt reported +BM, after setup pt able to perform hygiene.  Assist required to raise pull-up.   Toilet transfer     Adaptive equipment training     Other:         Additional Treatment:  Reinforced call bell function and call before you fall.    Assessment: Patient tolerated session well.  Noted pt continues to increase overall endurance and standing bal/saman with activity.  Pt does continue to require assist with ADL bathing and dressing but improving.  Cont to recommend 24-hour care.  Insurance denied SNF placement, CM arranging transfer as residential.    OT Plan: OT to focus next treatment session on transfer/ADL training.      GOALS:   Multidisciplinary Problems       Occupational Therapy Goals          Problem: Occupational Therapy    Goal Priority Disciplines Outcome Interventions   Occupational Therapy Goal     OT, PT/OT Progressing    Description:   Patient will increase functional independence with ADLs by performing:    LE Dressing with Minimal Assistance.(Goal Met)  Grooming while seated at sink with Set-up Assistance.(Goal Met)  Toileting from bedside commode with Contact Guard Assistance for hygiene and clothing management.   Toilet transfer to bedside commode with Contact Guard Assistance.(Goal Met)                         Communication with Treatment Team:     Discharge Recommendations:  24-hour care.  Discharge Equipment Recommendations:  none  Barriers to discharge:  Decreased caregiver support      At end of treatment, patient  remained:  x Up in chair     In bed    With alarm activated    Bed rails up   x Call bell in reach     Family/friends present    Restraints secured properly    In bathroom with CNA/RN notified    In gym with PT/PTA/tech   x Nurse aware    Other:         OT Date of Treatment: 03/21/25  OT Start Time: 0845  OT Stop Time: 0916  OT Total Time (min): 31 min    Billable Minutes:Self Care/Home Management 31      3/21/2025

## 2025-03-21 NOTE — ASSESSMENT & PLAN NOTE
Patient's blood pressure range in the last 24 hours was: BP  Min: 124/76  Max: 154/63.The patient's inpatient anti-hypertensive regimen is listed below:  Current Antihypertensives  furosemide (LASIX) tablet, Daily, Oral  metoprolol injection 5 mg, Every 4 hours PRN, Intravenous  furosemide tablet 40 mg, Daily, Oral    Plan  - BP is uncontrolled, will adjust as follows: Holding torsemide due to DORIS. May need to change rx upon DC.

## 2025-03-24 NOTE — PT/OT/SLP DISCHARGE
Physical Therapy Discharge Summary    Name: Nathen Morales  MRN: [id number]   Principal Problem: Urinary tract infection with hematuria     Patient Discharged from acute Physical Therapy on 3-.     Assessment:     Patient appropriate for care in another setting.    Objective:     GOALS:   Multidisciplinary Problems       Physical Therapy Goals          Problem: Physical Therapy    Goal Priority Disciplines Outcome Interventions   Physical Therapy Goal     PT, PT/OT Adequate for Care Transition    Description: Patient will increase functional independence with mobility by performin. Supine to sit with Modified Andrew  2. Sit to supine with Modified Andrew  3. Sit to stand transfer with Modified Andrew  4. Gait  x 200 feet with Modified Andrew using Rolling Walker.                          Reasons for Discontinuation of Therapy Services  Transfer to alternate level of care.      Plan:     Patient Discharged to:  [facility] .      3/24/2025

## 2025-03-25 ENCOUNTER — LAB REQUISITION (OUTPATIENT)
Dept: LAB | Facility: HOSPITAL | Age: 75
End: 2025-03-25
Payer: MEDICARE

## 2025-03-25 DIAGNOSIS — N39.0 URINARY TRACT INFECTION, SITE NOT SPECIFIED: ICD-10-CM

## 2025-03-25 DIAGNOSIS — I10 ESSENTIAL (PRIMARY) HYPERTENSION: ICD-10-CM

## 2025-03-25 DIAGNOSIS — G40.89 OTHER SEIZURES: ICD-10-CM

## 2025-03-25 DIAGNOSIS — N17.9 ACUTE KIDNEY FAILURE, UNSPECIFIED: ICD-10-CM

## 2025-03-25 LAB
ANION GAP SERPL CALC-SCNC: 9 MEQ/L
BASOPHILS # BLD AUTO: 0.08 X10(3)/MCL
BASOPHILS NFR BLD AUTO: 1.4 %
BUN SERPL-MCNC: 30 MG/DL (ref 8.4–25.7)
CALCIUM SERPL-MCNC: 8.9 MG/DL (ref 8.8–10)
CHLORIDE SERPL-SCNC: 112 MMOL/L (ref 98–107)
CHOLEST SERPL-MCNC: 137 MG/DL
CHOLEST/HDLC SERPL: 3 {RATIO} (ref 0–5)
CO2 SERPL-SCNC: 20 MMOL/L (ref 23–31)
CREAT SERPL-MCNC: 1.5 MG/DL (ref 0.72–1.25)
CREAT/UREA NIT SERPL: 20
EOSINOPHIL # BLD AUTO: 0.19 X10(3)/MCL (ref 0–0.9)
EOSINOPHIL NFR BLD AUTO: 3.4 %
ERYTHROCYTE [DISTWIDTH] IN BLOOD BY AUTOMATED COUNT: 13.7 % (ref 11.5–17)
GFR SERPLBLD CREATININE-BSD FMLA CKD-EPI: 48 ML/MIN/1.73/M2
GLUCOSE SERPL-MCNC: 93 MG/DL (ref 82–115)
HCT VFR BLD AUTO: 29.7 % (ref 42–52)
HDLC SERPL-MCNC: 54 MG/DL (ref 35–60)
HGB BLD-MCNC: 9.4 G/DL (ref 14–18)
IMM GRANULOCYTES # BLD AUTO: 0.04 X10(3)/MCL (ref 0–0.04)
IMM GRANULOCYTES NFR BLD AUTO: 0.7 %
LDLC SERPL CALC-MCNC: 74 MG/DL (ref 50–140)
LYMPHOCYTES # BLD AUTO: 0.86 X10(3)/MCL (ref 0.6–4.6)
LYMPHOCYTES NFR BLD AUTO: 15.2 %
MCH RBC QN AUTO: 32 PG (ref 27–31)
MCHC RBC AUTO-ENTMCNC: 31.6 G/DL (ref 33–36)
MCV RBC AUTO: 101 FL (ref 80–94)
MONOCYTES # BLD AUTO: 0.66 X10(3)/MCL (ref 0.1–1.3)
MONOCYTES NFR BLD AUTO: 11.7 %
NEUTROPHILS # BLD AUTO: 3.81 X10(3)/MCL (ref 2.1–9.2)
NEUTROPHILS NFR BLD AUTO: 67.6 %
NRBC BLD AUTO-RTO: 0 %
PLATELET # BLD AUTO: 601 X10(3)/MCL (ref 130–400)
PMV BLD AUTO: 9.3 FL (ref 7.4–10.4)
POTASSIUM SERPL-SCNC: 4.3 MMOL/L (ref 3.5–5.1)
RBC # BLD AUTO: 2.94 X10(6)/MCL (ref 4.7–6.1)
SODIUM SERPL-SCNC: 141 MMOL/L (ref 136–145)
TRIGL SERPL-MCNC: 43 MG/DL (ref 34–140)
VLDLC SERPL CALC-MCNC: 9 MG/DL
WBC # BLD AUTO: 5.64 X10(3)/MCL (ref 4.5–11.5)

## 2025-03-25 PROCEDURE — 85025 COMPLETE CBC W/AUTO DIFF WBC: CPT | Performed by: FAMILY MEDICINE

## 2025-03-25 PROCEDURE — 80177 DRUG SCRN QUAN LEVETIRACETAM: CPT | Performed by: FAMILY MEDICINE

## 2025-03-25 PROCEDURE — 80183 DRUG SCRN QUANT OXCARBAZEPIN: CPT | Performed by: FAMILY MEDICINE

## 2025-03-25 PROCEDURE — 80061 LIPID PANEL: CPT | Performed by: FAMILY MEDICINE

## 2025-03-25 PROCEDURE — 80048 BASIC METABOLIC PNL TOTAL CA: CPT | Performed by: FAMILY MEDICINE

## 2025-03-27 DIAGNOSIS — G47.00 INSOMNIA, UNSPECIFIED TYPE: ICD-10-CM

## 2025-03-27 LAB
10OH-CARBAZEPINE SERPL-MCNC: 24 MCG/ML (ref 10–35)
LEVETIRACETAM SERPL-MCNC: 42 MCG/ML (ref 10–40)

## 2025-03-27 RX ORDER — TEMAZEPAM 15 MG/1
15 CAPSULE ORAL NIGHTLY
Qty: 30 CAPSULE | Refills: 3 | Status: SHIPPED | OUTPATIENT
Start: 2025-03-27

## 2025-03-28 ENCOUNTER — LAB REQUISITION (OUTPATIENT)
Dept: LAB | Facility: HOSPITAL | Age: 75
End: 2025-03-28
Payer: MEDICARE

## 2025-03-28 DIAGNOSIS — C61 MALIGNANT NEOPLASM OF PROSTATE: ICD-10-CM

## 2025-03-28 LAB — PSA SERPL-MCNC: <0.1 NG/ML

## 2025-03-28 PROCEDURE — 84153 ASSAY OF PSA TOTAL: CPT

## 2025-04-01 ENCOUNTER — HOSPITAL ENCOUNTER (OUTPATIENT)
Dept: RADIOLOGY | Facility: HOSPITAL | Age: 75
Discharge: HOME | End: 2025-04-01
Attending: UROLOGY
Payer: MEDICARE

## 2025-04-01 ENCOUNTER — LAB REQUISITION (OUTPATIENT)
Dept: LAB | Facility: HOSPITAL | Age: 75
End: 2025-04-01
Payer: MEDICARE

## 2025-04-01 DIAGNOSIS — N20.1 URETERAL CALCULUS: ICD-10-CM

## 2025-04-01 DIAGNOSIS — I10 ESSENTIAL (PRIMARY) HYPERTENSION: ICD-10-CM

## 2025-04-01 LAB
ALBUMIN SERPL-MCNC: 2.9 G/DL (ref 3.4–4.8)
ALP SERPL-CCNC: 134 UNIT/L (ref 40–150)
ALT SERPL-CCNC: 12 UNIT/L (ref 0–55)
AST SERPL-CCNC: 15 UNIT/L (ref 11–45)
BILIRUB DIRECT SERPL-MCNC: 0.1 MG/DL (ref 0–?)
BILIRUB INDIRECT SERPL-MCNC: 0.1 MG/DL (ref 0–0.8)
BILIRUB SERPL-MCNC: 0.2 MG/DL
PROT SERPL-MCNC: 6.5 GM/DL (ref 5.8–7.6)

## 2025-04-01 PROCEDURE — 76770 US EXAM ABDO BACK WALL COMP: CPT | Mod: TC

## 2025-04-01 PROCEDURE — 80076 HEPATIC FUNCTION PANEL: CPT | Performed by: FAMILY MEDICINE

## 2025-04-01 PROCEDURE — 74018 RADEX ABDOMEN 1 VIEW: CPT | Mod: TC

## 2025-04-03 ENCOUNTER — HOSPITAL ENCOUNTER (OUTPATIENT)
Dept: RADIOLOGY | Facility: HOSPITAL | Age: 75
Discharge: HOME | End: 2025-04-03
Attending: UROLOGY
Payer: MEDICARE

## 2025-04-03 DIAGNOSIS — N20.1 CALCULUS OF URETER: ICD-10-CM

## 2025-04-03 PROCEDURE — 74176 CT ABD & PELVIS W/O CONTRAST: CPT | Mod: TC

## 2025-04-08 ENCOUNTER — LAB REQUISITION (OUTPATIENT)
Dept: LAB | Facility: HOSPITAL | Age: 75
End: 2025-04-08
Payer: MEDICARE

## 2025-04-08 DIAGNOSIS — N17.9 ACUTE KIDNEY FAILURE, UNSPECIFIED: ICD-10-CM

## 2025-04-08 DIAGNOSIS — E78.5 HYPERLIPIDEMIA, UNSPECIFIED: ICD-10-CM

## 2025-04-08 LAB
ALBUMIN SERPL-MCNC: 2.8 G/DL (ref 3.4–4.8)
ALP SERPL-CCNC: 129 UNIT/L (ref 40–150)
ALT SERPL-CCNC: 9 UNIT/L (ref 0–55)
AST SERPL-CCNC: 13 UNIT/L (ref 11–45)
BILIRUB DIRECT SERPL-MCNC: 0.1 MG/DL (ref 0–?)
BILIRUB INDIRECT SERPL-MCNC: 0.1 MG/DL (ref 0–0.8)
BILIRUB SERPL-MCNC: 0.2 MG/DL
CHOLEST SERPL-MCNC: 140 MG/DL
CHOLEST/HDLC SERPL: 2 {RATIO} (ref 0–5)
HDLC SERPL-MCNC: 69 MG/DL (ref 35–60)
LDLC SERPL CALC-MCNC: 61 MG/DL (ref 50–140)
PROT SERPL-MCNC: 6.2 GM/DL (ref 5.8–7.6)
TRIGL SERPL-MCNC: 49 MG/DL (ref 34–140)
VLDLC SERPL CALC-MCNC: 10 MG/DL

## 2025-04-08 PROCEDURE — 80061 LIPID PANEL: CPT

## 2025-04-08 PROCEDURE — 80076 HEPATIC FUNCTION PANEL: CPT

## 2025-04-15 ENCOUNTER — LAB REQUISITION (OUTPATIENT)
Dept: LAB | Facility: HOSPITAL | Age: 75
End: 2025-04-15
Payer: MEDICARE

## 2025-04-15 DIAGNOSIS — G40.909 EPILEPSY, UNSPECIFIED, NOT INTRACTABLE, WITHOUT STATUS EPILEPTICUS: ICD-10-CM

## 2025-04-15 PROCEDURE — 80177 DRUG SCRN QUAN LEVETIRACETAM: CPT | Performed by: FAMILY MEDICINE

## 2025-04-16 LAB — LEVETIRACETAM SERPL-MCNC: 29.7 MCG/ML (ref 10–40)

## 2025-04-29 DIAGNOSIS — N20.1 URETERAL STONE: Primary | ICD-10-CM

## 2025-05-12 ENCOUNTER — HOSPITAL ENCOUNTER (OUTPATIENT)
Dept: RADIOLOGY | Facility: HOSPITAL | Age: 75
Discharge: HOME | End: 2025-05-12
Attending: UROLOGY
Payer: MEDICARE

## 2025-05-12 DIAGNOSIS — N20.1 URETERAL STONE: ICD-10-CM

## 2025-05-12 DIAGNOSIS — N20.1 CALCULUS OF URETER: ICD-10-CM

## 2025-05-12 PROCEDURE — 76770 US EXAM ABDO BACK WALL COMP: CPT | Mod: TC

## 2025-05-12 PROCEDURE — 74018 RADEX ABDOMEN 1 VIEW: CPT | Mod: TC

## 2025-05-13 ENCOUNTER — LAB REQUISITION (OUTPATIENT)
Dept: LAB | Facility: HOSPITAL | Age: 75
End: 2025-05-13
Payer: MEDICARE

## 2025-05-13 DIAGNOSIS — N20.1 CALCULUS OF URETER: ICD-10-CM

## 2025-05-13 LAB
ANION GAP SERPL CALC-SCNC: 10 MEQ/L
BUN SERPL-MCNC: 26 MG/DL (ref 8.4–25.7)
CALCIUM SERPL-MCNC: 8.7 MG/DL (ref 8.8–10)
CHLORIDE SERPL-SCNC: 101 MMOL/L (ref 98–107)
CO2 SERPL-SCNC: 22 MMOL/L (ref 23–31)
CREAT SERPL-MCNC: 1.26 MG/DL (ref 0.72–1.25)
CREAT/UREA NIT SERPL: 21
GFR SERPLBLD CREATININE-BSD FMLA CKD-EPI: 59 ML/MIN/1.73/M2
GLUCOSE SERPL-MCNC: 99 MG/DL (ref 82–115)
POTASSIUM SERPL-SCNC: 3.8 MMOL/L (ref 3.5–5.1)
SODIUM SERPL-SCNC: 133 MMOL/L (ref 136–145)

## 2025-05-13 PROCEDURE — 80048 BASIC METABOLIC PNL TOTAL CA: CPT

## 2025-05-23 DIAGNOSIS — G47.00 INSOMNIA, UNSPECIFIED TYPE: ICD-10-CM

## 2025-05-23 RX ORDER — TEMAZEPAM 15 MG/1
15 CAPSULE ORAL NIGHTLY
Qty: 30 CAPSULE | Refills: 3 | Status: SHIPPED | OUTPATIENT
Start: 2025-05-23

## 2025-06-09 DIAGNOSIS — Z12.5 SCREENING FOR MALIGNANT NEOPLASM OF PROSTATE: ICD-10-CM

## 2025-06-09 DIAGNOSIS — E78.5 HYPERLIPIDEMIA, UNSPECIFIED HYPERLIPIDEMIA TYPE: ICD-10-CM

## 2025-06-09 DIAGNOSIS — I10 HYPERTENSION, UNSPECIFIED TYPE: ICD-10-CM

## 2025-06-09 DIAGNOSIS — Z00.00 WELLNESS EXAMINATION: Primary | ICD-10-CM

## 2025-06-17 ENCOUNTER — LAB REQUISITION (OUTPATIENT)
Dept: LAB | Facility: HOSPITAL | Age: 75
End: 2025-06-17
Payer: MEDICARE

## 2025-06-17 DIAGNOSIS — I48.19 OTHER PERSISTENT ATRIAL FIBRILLATION: ICD-10-CM

## 2025-06-17 DIAGNOSIS — N17.9 ACUTE KIDNEY FAILURE, UNSPECIFIED: ICD-10-CM

## 2025-06-17 LAB
ALBUMIN SERPL-MCNC: 3.2 G/DL (ref 3.4–4.8)
ALBUMIN/GLOB SERPL: 0.9 RATIO (ref 1.1–2)
ALP SERPL-CCNC: 92 UNIT/L (ref 40–150)
ALT SERPL-CCNC: 18 UNIT/L (ref 0–55)
AMORPH PHOS CRY URNS QL MICRO: ABNORMAL /HPF
ANION GAP SERPL CALC-SCNC: 11 MEQ/L
AST SERPL-CCNC: 17 UNIT/L (ref 11–45)
BACTERIA #/AREA URNS AUTO: ABNORMAL /HPF
BASOPHILS # BLD AUTO: 0.11 X10(3)/MCL
BASOPHILS NFR BLD AUTO: 1.7 %
BILIRUB SERPL-MCNC: 0.3 MG/DL
BILIRUB UR QL STRIP.AUTO: NEGATIVE
BUN SERPL-MCNC: 34 MG/DL (ref 8.4–25.7)
CALCIUM SERPL-MCNC: 9 MG/DL (ref 8.8–10)
CHLORIDE SERPL-SCNC: 105 MMOL/L (ref 98–107)
CLARITY UR: CLEAR
CO2 SERPL-SCNC: 22 MMOL/L (ref 23–31)
COLOR UR AUTO: YELLOW
CREAT SERPL-MCNC: 1.48 MG/DL (ref 0.72–1.25)
CREAT/UREA NIT SERPL: 23
EOSINOPHIL # BLD AUTO: 0.33 X10(3)/MCL (ref 0–0.9)
EOSINOPHIL NFR BLD AUTO: 5.2 %
ERYTHROCYTE [DISTWIDTH] IN BLOOD BY AUTOMATED COUNT: 13.5 % (ref 11.5–17)
GFR SERPLBLD CREATININE-BSD FMLA CKD-EPI: 48 ML/MIN/1.73/M2
GLOBULIN SER-MCNC: 3.7 GM/DL (ref 2.4–3.5)
GLUCOSE SERPL-MCNC: 103 MG/DL (ref 82–115)
GLUCOSE UR QL STRIP: NEGATIVE
HCT VFR BLD AUTO: 34.6 % (ref 42–52)
HGB BLD-MCNC: 11.2 G/DL (ref 14–18)
HGB UR QL STRIP: ABNORMAL
IMM GRANULOCYTES # BLD AUTO: 0.05 X10(3)/MCL (ref 0–0.04)
IMM GRANULOCYTES NFR BLD AUTO: 0.8 %
INR PPP: 1
KETONES UR QL STRIP: NEGATIVE
LEUKOCYTE ESTERASE UR QL STRIP: ABNORMAL
LYMPHOCYTES # BLD AUTO: 1.2 X10(3)/MCL (ref 0.6–4.6)
LYMPHOCYTES NFR BLD AUTO: 18.9 %
MCH RBC QN AUTO: 31.1 PG (ref 27–31)
MCHC RBC AUTO-ENTMCNC: 32.4 G/DL (ref 33–36)
MCV RBC AUTO: 96.1 FL (ref 80–94)
MONOCYTES # BLD AUTO: 0.71 X10(3)/MCL (ref 0.1–1.3)
MONOCYTES NFR BLD AUTO: 11.2 %
NEUTROPHILS # BLD AUTO: 3.94 X10(3)/MCL (ref 2.1–9.2)
NEUTROPHILS NFR BLD AUTO: 62.2 %
NITRITE UR QL STRIP: NEGATIVE
NRBC BLD AUTO-RTO: 0 %
PH UR STRIP: 6 [PH]
PLATELET # BLD AUTO: 381 X10(3)/MCL (ref 130–400)
PMV BLD AUTO: 9.5 FL (ref 7.4–10.4)
POTASSIUM SERPL-SCNC: 4.3 MMOL/L (ref 3.5–5.1)
PROT SERPL-MCNC: 6.9 GM/DL (ref 5.8–7.6)
PROT UR QL STRIP: ABNORMAL
PROTHROMBIN TIME: 10.2 SECONDS (ref 9.1–10.9)
RBC # BLD AUTO: 3.6 X10(6)/MCL (ref 4.7–6.1)
RBC #/AREA URNS AUTO: ABNORMAL /HPF
SODIUM SERPL-SCNC: 138 MMOL/L (ref 136–145)
SP GR UR STRIP.AUTO: 1.01 (ref 1–1.03)
SQUAMOUS #/AREA URNS AUTO: ABNORMAL /HPF
UROBILINOGEN UR STRIP-ACNC: 0.2
WBC # BLD AUTO: 6.34 X10(3)/MCL (ref 4.5–11.5)
WBC #/AREA URNS AUTO: >100 /HPF

## 2025-06-17 PROCEDURE — 85610 PROTHROMBIN TIME: CPT

## 2025-06-17 PROCEDURE — 81003 URINALYSIS AUTO W/O SCOPE: CPT

## 2025-06-17 PROCEDURE — 85025 COMPLETE CBC W/AUTO DIFF WBC: CPT

## 2025-06-17 PROCEDURE — 87186 SC STD MICRODIL/AGAR DIL: CPT

## 2025-06-17 PROCEDURE — 80053 COMPREHEN METABOLIC PANEL: CPT

## 2025-06-20 LAB — BACTERIA UR CULT: ABNORMAL

## 2025-07-07 ENCOUNTER — OFFICE VISIT (OUTPATIENT)
Dept: FAMILY MEDICINE | Facility: CLINIC | Age: 75
End: 2025-07-07
Payer: MEDICARE

## 2025-07-07 VITALS
DIASTOLIC BLOOD PRESSURE: 80 MMHG | HEIGHT: 73 IN | OXYGEN SATURATION: 100 % | BODY MASS INDEX: 26.11 KG/M2 | SYSTOLIC BLOOD PRESSURE: 138 MMHG | WEIGHT: 197 LBS | RESPIRATION RATE: 16 BRPM | HEART RATE: 63 BPM | TEMPERATURE: 97 F

## 2025-07-07 DIAGNOSIS — F10.27 DEMENTIA ASSOCIATED WITH ALCOHOLISM WITHOUT BEHAVIORAL DISTURBANCE: ICD-10-CM

## 2025-07-07 DIAGNOSIS — I82.5Y2 CHRONIC DEEP VEIN THROMBOSIS (DVT) OF PROXIMAL VEIN OF LEFT LOWER EXTREMITY: ICD-10-CM

## 2025-07-07 DIAGNOSIS — N18.31 STAGE 3A CHRONIC KIDNEY DISEASE: ICD-10-CM

## 2025-07-07 DIAGNOSIS — Z78.9 NURSING HOME RESIDENT: ICD-10-CM

## 2025-07-07 DIAGNOSIS — I48.0 PAROXYSMAL ATRIAL FIBRILLATION: ICD-10-CM

## 2025-07-07 DIAGNOSIS — Z00.00 WELLNESS EXAMINATION: Primary | ICD-10-CM

## 2025-07-07 PROCEDURE — G0439 PPPS, SUBSEQ VISIT: HCPCS | Mod: ,,, | Performed by: FAMILY MEDICINE

## 2025-07-07 RX ORDER — LEVETIRACETAM 750 MG/1
1500 TABLET ORAL DAILY
COMMUNITY
Start: 2025-06-20

## 2025-07-07 NOTE — PROGRESS NOTES
Patient ID: [id number]     Chief Complaint: Medicare AWV Follow Up      HPI:     Nathen Morales is a 75 y.o. male here today for a Medicare Wellness.       Opioid Screening: Patient medication list reviewed, patient is not taking prescription opioids. Patient is not using additional opioids than prescribed. Patient is not at low risk of substance abuse based on this opioid use history.       Subjective   The following components were reviewed and updated:  Medical history  Family History  Social history  Allergies  Current Medications  Immunizations  Health Maintenance  Patient Care Team        A comprehensive HEALTH RISK ASSESSMENT was completed today. Results are summarized below:                  The patient is NOT A TOBACCO USER.  The patient reports NO SIGNIFICANT ALCOHOL USE.     All Questions regarding food, transportation or housing were not answered today.    I provided Nathen Morales with a 5-10 year written Screening Schedule per USPSTF age appropriate recommendations and a Personal Prevention Plan based on the results of today's Health Risk Assessment. Education, counseling, and referrals were provided as documented above and can be viewed in the After Visit Summary.      none  The patient was asked and declined the use of a free .    Advance Care Planning   Today we discussed advance care planning. He is interested in learning more about how to make Advance Directives. Information was provided and I offered to discuss more at his discretion.         -------------------------------------    BPH (benign prostatic hyperplasia)    Hypertension    Seizures        Past Surgical History:   Procedure Laterality Date    ADENOIDECTOMY      APPENDECTOMY      CARDIAC VALVE REPLACEMENT      CHOLECYSTECTOMY      COLONOSCOPY  04/21/2016    Dr Brock Brown    CORONARY ARTERY BYPASS GRAFT      EYE SURGERY      INCISION AND DRAINAGE OF ABSCESS Right 03/20/2024    Dr Amelia Carrasco    LITHOTRIPSY   11/20/2024    Dr Maurisio King    PROSTATE SURGERY      TONSILLECTOMY      TRANSURETHRAL RESECTION OF PROSTATE         Review of patient's allergies indicates:  No Known Allergies    Outpatient Medications Marked as Taking for the 7/7/25 encounter (Office Visit) with Brock Brown MD   Medication Sig Dispense Refill    aspirin 81 mg Cap Take 81 mg by mouth Daily. 30 capsule 3    furosemide (LASIX) 20 MG tablet Take 1 tablet (20 mg total) by mouth once daily. 30 tablet 0    hyoscyamine 0.125 mg Subl 0.125 mg every 6 (six) hours as needed.      levETIRAcetam (KEPPRA) 1000 MG tablet TAKE 1 AND 1/2 TABLETS BY MOUTH  TWICE DAILY (Patient taking differently: Take 1,000 mg by mouth once daily.) 90 tablet 11    levETIRAcetam (KEPPRA) 750 MG Tab Take 1,500 mg by mouth once daily.      multivitamin (THERAGRAN) tablet Take 1 tablet by mouth once daily. 30 tablet 3    OXcarbazepine (TRILEPTAL) 300 MG Tab Take 3 tablets (900 mg total) by mouth 2 (two) times daily. 180 tablet 1    rosuvastatin (CRESTOR) 10 MG tablet Take 1 tablet (10 mg total) by mouth every evening. 90 tablet 3    sertraline (ZOLOFT) 50 MG tablet TAKE 1 TABLET BY MOUTH ONCE  DAILY 90 tablet 3    tamsulosin (FLOMAX) 0.4 mg Cap TAKE 1 CAPSULE BY MOUTH TWICE DAILY (Patient taking differently: Take 0.4 mg by mouth once daily.) 180 capsule 3    temazepam (RESTORIL) 15 mg Cap Take 1 capsule (15 mg total) by mouth every evening. 30 capsule 3    topiramate (TOPAMAX) 100 MG tablet Take 100 mg by mouth 2 (two) times daily.         Social History[1]     Family History   Problem Relation Name Age of Onset    Hypertension Mother Jeramy Morales         Patient Care Team:  Brock Brown MD as PCP - General (Family Medicine)  Willie Bender MD as Consulting Physician (Neurology)  Usman King MD as Consulting Physician (Urology)  Abdoulaye Hitchcock MD as Consulting Physician (Cardiology)  Reggie Roche MD as Consulting Physician (Cardiothoracic  Surgery)       Subjective:   History of Present Illness    CHIEF COMPLAINT:  Patient presents today for a wellness visit with labs.    LABS:  Cholesterol panel from April shows optimal results with total cholesterol 140, LDL 61, triglycerides 49, and HDL 69. CBC reveals mild anemia with adequate platelet count. Blood sugar is 103. GFR is slightly decreased, attributed to mild dehydration. PSA from March is less than 0.1.    MEDICAL HISTORY:  He has a history of DVT with annual follow-up with Dr. Clinton and annual ECG monitoring. He takes daily aspirin 81mg for management.    GASTROINTESTINAL:  He denies any current issues with bowel movements, reporting regular and normal bowel function.             Review of Systems   Constitutional: Negative.    Respiratory: Negative.     Cardiovascular: Negative.    Gastrointestinal: Negative.    Psychiatric/Behavioral: Negative.           Patient Reported Health Risk Assessment  What [age] 70-79  Are you male or female?: Male  During the past four weeks, how much have you been bothered by emotional problems such as feeling anxious, depressed, irritable, sad, or downhearted and blue?: Moderately  During the past five weeks, has your physical and/or emotional health limited your social activities with family, friends, neighbors, or groups?: Not at all  During the past four weeks, how much bodily pain have you generally had?: No pain  During the past four weeks, was someone available to help if you needed and wanted help?: Yes, as much as I wanted  During the past four weeks, what was the hardest physical activity you could do for at least two minutes?: Light  Can you get to places out of walking distance without help?  (For example, can you travel alone on buses or taxis, or drive your own car?): Yes  Can you go shopping for groceries or clothes without someone's help?: Yes  Can you prepare your own meals?: Yes  Can you do your own housework without help?: Yes  Because of  any health problems, do you need the help of another person with your personal care needs such as eating, bathing, dressing, or getting around the house?: No  Can you handle your own money without help?: Yes  During the past four weeks, how would you rate your health in general?: Good  How have things been going for you during the past four weeks?: Pretty well  Are you having difficulties driving your car?: Not applicable, I do not use a car  Do you always fasten your seat belt when you are in a car?: Yes, usually  How often in the past four weeks have you been bothered by falling or dizzy when standing up?: Never  How often in the past four weeks have you been bothered by sexual problems?: Never  How often in the past four weeks have you been bothered by trouble eating well?: Never  How often in the past four weeks have you been bothered by teeth or denture problems?: Never  How often in the past four weeks have you been bothered with problems using the telephone?: Never  How often in the past four weeks have you been bothered by tiredness or fatigue?: Sometimes  Have you fallen two or more times in the past year?: No  Are you afraid of falling?: No  Are you a smoker?: No  During the past four weeks, how many drinks of wine, beer, or other alcoholic beverages did you have?: No alcohol at all  Do you exercise for about 20 minutes three or more days a week?: No, I usually do not exercise this much  Have you been given any information to help you with hazards in your house that might hurt you?: Yes  Have you been given any information to help you with keeping track of your medications?: Yes  How often do you have trouble taking medicines the way you've been told to take them?: I always take them as prescribed  How confident are you that you can control and manage most of your health problems?: Very confident  What is your race? (Check all that apply.):     Objective:     /80 (BP Location: Left arm,  "Patient Position: Sitting)   Pulse 63   Temp 97 °F (36.1 °C) (Temporal)   Resp 16   Ht 6' 0.84" (1.85 m)   Wt 89.4 kg (197 lb)   SpO2 100%   BMI 26.11 kg/m²     Physical Exam  Constitutional:       Appearance: Normal appearance.   Cardiovascular:      Rate and Rhythm: Normal rate and regular rhythm.   Pulmonary:      Effort: Pulmonary effort is normal.      Breath sounds: Normal breath sounds.   Abdominal:      General: Abdomen is flat. Bowel sounds are normal.      Palpations: Abdomen is soft.   Neurological:      Mental Status: He is alert.   Psychiatric:         Mood and Affect: Mood normal.         Behavior: Behavior normal.         Thought Content: Thought content normal.         Judgment: Judgment normal.       Recent Results (from the past 3 weeks)   Comprehensive Metabolic Panel    Collection Time: 06/17/25  3:00 PM   Result Value Ref Range    Sodium 138 136 - 145 mmol/L    Potassium 4.3 3.5 - 5.1 mmol/L    Chloride 105 98 - 107 mmol/L    CO2 22 (L) 23 - 31 mmol/L    Glucose 103 82 - 115 mg/dL    Blood Urea Nitrogen 34.0 (H) 8.4 - 25.7 mg/dL    Creatinine 1.48 (H) 0.72 - 1.25 mg/dL    Calcium 9.0 8.8 - 10.0 mg/dL    Protein Total 6.9 5.8 - 7.6 gm/dL    Albumin 3.2 (L) 3.4 - 4.8 g/dL    Globulin 3.7 (H) 2.4 - 3.5 gm/dL    Albumin/Globulin Ratio 0.9 (L) 1.1 - 2.0 ratio    Bilirubin Total 0.3 <=1.5 mg/dL    ALP 92 40 - 150 unit/L    ALT 18 0 - 55 unit/L    AST 17 11 - 45 unit/L    eGFR 48 mL/min/1.73/m2    Anion Gap 11.0 mEq/L    BUN/Creatinine Ratio 23    Protime-INR    Collection Time: 06/17/25  3:00 PM   Result Value Ref Range    PT 10.2 9.1 - 10.9 seconds    INR 1.0 <=1.3   Urinalysis, Reflex to Urine Culture    Collection Time: 06/17/25  3:00 PM    Specimen: Urine   Result Value Ref Range    Color, UA Yellow Yellow, Light-Yellow, Dark Yellow, [place], Straw    Appearance, UA Clear Clear    Specific Gravity, UA 1.015 1.005 - 1.030    pH, UA 6.0 5.0 - 8.5    Protein, UA Trace (A) Negative    Glucose, UA " Negative Negative, Normal    Ketones, UA Negative Negative    Blood, UA Trace (A) Negative    Bilirubin, UA Negative Negative    Urobilinogen, UA 0.2 0.2, 1.0, Normal    Nitrites, UA Negative Negative    Leukocyte Esterase, UA 2+ (A) Negative   Urine culture    Collection Time: 06/17/25  3:00 PM    Specimen: Urine, Unspecified   Result Value Ref Range    Urine Culture (A)      >/= 100,000 colonies/ml Klebsiella pneumoniae ssp pneumoniae       Susceptibility    Klebsiella pneumoniae ssp pneumoniae - LIZZIE     Ampicillin >=32 Resistant      Cefepime <=0.12 Sensitive      Ceftriaxone <=0.25 Sensitive      Ciprofloxacin 0.5 Intermediate      ESBL Neg Negative      Gentamicin <=1 Sensitive      Levofloxacin 1 Intermediate      Meropenem <=0.25 Sensitive      Nitrofurantoin 128 Resistant      Piperacillin/Tazobactam 8 Sensitive      Trimeth/Sulfa <=20 Sensitive    CBC with Differential    Collection Time: 06/17/25  3:00 PM   Result Value Ref Range    WBC 6.34 4.50 - 11.50 x10(3)/mcL    RBC 3.60 (L) 4.70 - 6.10 x10(6)/mcL    Hgb 11.2 (L) 14.0 - 18.0 g/dL    Hct 34.6 (L) 42.0 - 52.0 %    MCV 96.1 (H) 80.0 - 94.0 fL    MCH 31.1 (H) 27.0 - 31.0 pg    MCHC 32.4 (L) 33.0 - 36.0 g/dL    RDW 13.5 11.5 - 17.0 %    Platelet 381 130 - 400 x10(3)/mcL    MPV 9.5 7.4 - 10.4 fL    Neut % 62.2 %    Lymph % 18.9 %    Mono % 11.2 %    Eos % 5.2 %    Basophil % 1.7 %    Imm Grans % 0.8 %    Neut # 3.94 2.1 - 9.2 x10(3)/mcL    Lymph # 1.20 0.6 - 4.6 x10(3)/mcL    Mono # 0.71 0.1 - 1.3 x10(3)/mcL    Eos # 0.33 0 - 0.9 x10(3)/mcL    Baso # 0.11 <=0.2 x10(3)/mcL    Imm Gran # 0.05 (H) 0.00 - 0.04 x10(3)/mcL    NRBC% 0.0 %   APTT    Collection Time: 06/17/25  3:00 PM   Result Value Ref Range    PTT 18.7 (L) 21.4 - 28.3 seconds   Urinalysis, Microscopic    Collection Time: 06/17/25  3:00 PM   Result Value Ref Range    Bacteria, UA Moderate (A) None Seen, Rare, Occasional /HPF    Amorphous Phosphate Crystals, UA Moderate (A) None Seen /HPF    RBC,  UA 0-5 None Seen, 0-2, 3-5, 0-5 /HPF    WBC, UA >100 (A) None Seen, 0-2, 3-5, 0-5 /HPF    Squamous Epithelial Cells, UA Few (A) None Seen, Rare, Occasional, Occ /HPF               No data to display                  6/18/2024     1:15 PM 3/19/2024     9:15 AM 1/29/2024     2:00 PM 12/5/2023     1:00 PM 10/23/2023     1:30 PM 6/5/2023     1:30 PM 3/31/2023     8:30 AM   Fall Risk Assessment - Outpatient   Mobility Status Ambulatory w/ assistance Ambulatory Ambulatory w/ assistance Ambulatory w/ assistance Ambulatory w/ assistance Ambulatory Ambulatory   Number of falls 0 0 2+ 0 0 0 0   Identified as fall risk True False True True True False False           Depression Screening  Over the past two weeks, has the patient felt down, depressed, or hopeless?: No  Over the past two weeks, has the patient felt little interest or pleasure in doing things?: No  Functional Ability/Safety Screening  Was the patient's timed Up & Go test unsteady or longer than 30 seconds?: No  Does the patient need help with phone, transportation, shopping, preparing meals, housework, laundry, meds, or managing money?: Yes  Does the patient's home have rugs in the hallway, lack grab bars in the bathroom, lack handrails on the stairs or have poor lighting?: No  Have you noticed any hearing difficulties?: No  Cognitive Function (Assessed through direct observation with due consideration of information obtained by way of patient reports and/or concerns raised by family, friends, caretakers, or others)    Does the patient repeat questions/statements in the same day?: No  Does the patient have trouble remembering the date, year, and time?: No  Does the patient have difficulty managing finances?: No  Does the patient have a decreased sense of direction?: No  Assessment/Plan:     1. Wellness examination  Assessment & Plan:  Lab work reviewed with patient  Continue current medication  Continue diet/exercise  Advanced directive discussed with  patient  Return to clinic with any concerns    Advance Care Planning    Date: 07/07/2025    Living Will  During this visit, I engaged the patient  in the voluntary advance care planning process.  The patient and I reviewed the role for advance directives and their purpose in directing future healthcare if the patient's unable to speak for him/herself.  At this point in time, the patient does have full decision-making capacity.  We discussed different extreme health states that he could experience, and reviewed what kind of medical care he would want in those situations.  The patient communicated that if he were comatose and had little chance of a meaningful recovery, he would not want machines/life-sustaining treatments used.   I spent a total of 5 minutes engaging the patient in this advance care planning discussion.         2. Dementia associated with alcoholism without behavioral disturbance  Assessment & Plan:  Currently residing in nursing home   Continue current care      3. Paroxysmal atrial fibrillation  Assessment & Plan:  Followed by Dr. Hitchcock   Continue ASA 81 mg daily      4. Chronic deep vein thrombosis (DVT) of proximal vein of left lower extremity  Assessment & Plan:  Followed by Dr. Flores      5. Stage 3a chronic kidney disease  Assessment & Plan:  Currently not followed by nephrology   Trending lab work      6. Nursing home resident           Medicare Annual Wellness and Personalized Prevention Plan:   Fall Risk + Home Safety + Hearing Impairment + Depression Screen + Opioid and Substance Abuse Screening + Cognitive Impairment Screen + Health Risk Assessment all reviewed.     Health Maintenance Topics with due status: Not Due       Topic Last Completion Date    Influenza Vaccine 12/16/2024    Lipid Panel 04/08/2025      The patient's Health Maintenance was reviewed and the following appears to be due at this time:   Health Maintenance Due   Topic Date Due    TETANUS VACCINE  Never done     Shingles Vaccine (1 of 2) Never done    RSV Vaccine (Age 60+ and Pregnant patients) (1 - 1-dose 75+ series) Never done    COVID-19 Vaccine (3 - 2024-25 season) 09/01/2024       Advance Care Planning   I attest to discussing Advance Care Planning with patient and/or family member.  Education was provided including the importance of the Health Care Power of , Advance Directives, and/or [place] documentation.  The patient expressed understanding to the importance of this information and discussion.         This note was generated with the assistance of ambient listening technology. Verbal consent was obtained by the patient and accompanying visitor(s) for the recording of patient appointment to facilitate this note. I attest to having reviewed and edited the generated note for accuracy, though some syntax or spelling errors may persist. Please contact the author of this note for any clarification.       Follow up in about 4 months (around 11/7/2025) for Follow up with Anabelle. In addition to their scheduled follow up, the patient has also been instructed to follow up on as needed basis.            [1]   Social History  Socioeconomic History    Marital status: Single   Tobacco Use    Smoking status: Never    Smokeless tobacco: Never   Substance and Sexual Activity    Alcohol use: Never    Drug use: Never    Sexual activity: Never     Social Drivers of Health     Financial Resource Strain: Low Risk  (3/12/2025)    Overall Financial Resource Strain (CARDIA)     Difficulty of Paying Living Expenses: Not hard at all   Food Insecurity: No Food Insecurity (3/12/2025)    Hunger Vital Sign     Worried About Running Out of Food in the Last Year: Never true     Ran Out of Food in the Last Year: Never true   Recent Concern: Food Insecurity - Food Insecurity Present (12/17/2024)    Hunger Vital Sign     Worried About Running Out of Food in the Last Year: Sometimes true     Ran Out of Food in the Last Year: Never true    Transportation Needs: Unmet Transportation Needs (12/17/2024)    TRANSPORTATION NEEDS     Transportation : Yes, it has kept me from medical appointments or from getting my medications.   Physical Activity: Inactive (3/12/2025)    Exercise Vital Sign     Days of Exercise per Week: 0 days     Minutes of Exercise per Session: 0 min   Stress: Stress Concern Present (3/12/2025)    [place] [employer] - Occupational Stress Questionnaire     Feeling of Stress : To some extent   Housing Stability: Low Risk  (3/12/2025)    Housing Stability Vital Sign     Unable to Pay for Housing in the Last Year: No     Homeless in the Last Year: No

## 2025-07-07 NOTE — ASSESSMENT & PLAN NOTE
Lab work reviewed with patient  Continue current medication  Continue diet/exercise  Advanced directive discussed with patient  Return to clinic with any concerns    Advance Care Planning     Date: 07/07/2025    Living Will  During this visit, I engaged the patient  in the voluntary advance care planning process.  The patient and I reviewed the role for advance directives and their purpose in directing future healthcare if the patient's unable to speak for him/herself.  At this point in time, the patient does have full decision-making capacity.  We discussed different extreme health states that he could experience, and reviewed what kind of medical care he would want in those situations.  The patient communicated that if he were comatose and had little chance of a meaningful recovery, he would not want machines/life-sustaining treatments used.   I spent a total of 5 minutes engaging the patient in this advance care planning discussion.

## 2025-07-22 DIAGNOSIS — G47.00 INSOMNIA, UNSPECIFIED TYPE: ICD-10-CM

## 2025-07-22 RX ORDER — TEMAZEPAM 15 MG/1
15 CAPSULE ORAL NIGHTLY
Qty: 30 CAPSULE | Refills: 3 | Status: SHIPPED | OUTPATIENT
Start: 2025-07-22

## 2025-08-19 ENCOUNTER — LAB REQUISITION (OUTPATIENT)
Dept: LAB | Facility: HOSPITAL | Age: 75
End: 2025-08-19
Payer: MEDICARE

## 2025-08-19 DIAGNOSIS — N17.9 ACUTE KIDNEY FAILURE, UNSPECIFIED: ICD-10-CM

## 2025-08-19 DIAGNOSIS — I10 ESSENTIAL (PRIMARY) HYPERTENSION: ICD-10-CM

## 2025-08-19 LAB
ALBUMIN SERPL-MCNC: 3.3 G/DL (ref 3.4–4.8)
ALBUMIN/GLOB SERPL: 1.2 RATIO (ref 1.1–2)
ALP SERPL-CCNC: 93 UNIT/L (ref 40–150)
ALT SERPL-CCNC: 7 UNIT/L (ref 0–55)
ANION GAP SERPL CALC-SCNC: 9 MEQ/L
AST SERPL-CCNC: 16 UNIT/L (ref 11–45)
BACTERIA #/AREA URNS AUTO: ABNORMAL /HPF
BASOPHILS # BLD AUTO: 0.04 X10(3)/MCL
BASOPHILS NFR BLD AUTO: 0.6 %
BILIRUB SERPL-MCNC: 0.3 MG/DL
BILIRUB UR QL STRIP.AUTO: NEGATIVE
BUN SERPL-MCNC: 26 MG/DL (ref 8.4–25.7)
CALCIUM SERPL-MCNC: 8.6 MG/DL (ref 8.8–10)
CHLORIDE SERPL-SCNC: 109 MMOL/L (ref 98–107)
CLARITY UR: ABNORMAL
CO2 SERPL-SCNC: 22 MMOL/L (ref 23–31)
COLOR UR AUTO: YELLOW
CREAT SERPL-MCNC: 1.31 MG/DL (ref 0.72–1.25)
CREAT/UREA NIT SERPL: 20
EOSINOPHIL # BLD AUTO: 0.27 X10(3)/MCL (ref 0–0.9)
EOSINOPHIL NFR BLD AUTO: 4 %
ERYTHROCYTE [DISTWIDTH] IN BLOOD BY AUTOMATED COUNT: 16.6 % (ref 11.5–17)
GFR SERPLBLD CREATININE-BSD FMLA CKD-EPI: 56 ML/MIN/1.73/M2
GLOBULIN SER-MCNC: 2.7 GM/DL (ref 2.4–3.5)
GLUCOSE SERPL-MCNC: 92 MG/DL (ref 82–115)
GLUCOSE UR QL STRIP: NEGATIVE
HCT VFR BLD AUTO: 31.9 % (ref 42–52)
HGB BLD-MCNC: 10.1 G/DL (ref 14–18)
HGB UR QL STRIP: NEGATIVE
IMM GRANULOCYTES # BLD AUTO: 0.02 X10(3)/MCL (ref 0–0.04)
IMM GRANULOCYTES NFR BLD AUTO: 0.3 %
KETONES UR QL STRIP: NEGATIVE
LEUKOCYTE ESTERASE UR QL STRIP: ABNORMAL
LYMPHOCYTES # BLD AUTO: 2.1 X10(3)/MCL (ref 0.6–4.6)
LYMPHOCYTES NFR BLD AUTO: 31.1 %
MCH RBC QN AUTO: 27.2 PG (ref 27–31)
MCHC RBC AUTO-ENTMCNC: 31.7 G/DL (ref 33–36)
MCV RBC AUTO: 86 FL (ref 80–94)
MONOCYTES # BLD AUTO: 0.76 X10(3)/MCL (ref 0.1–1.3)
MONOCYTES NFR BLD AUTO: 11.3 %
NEUTROPHILS # BLD AUTO: 3.56 X10(3)/MCL (ref 2.1–9.2)
NEUTROPHILS NFR BLD AUTO: 52.7 %
NITRITE UR QL STRIP: NEGATIVE
NRBC BLD AUTO-RTO: 0 %
PH UR STRIP: 6.5 [PH]
PLATELET # BLD AUTO: 214 X10(3)/MCL (ref 130–400)
PMV BLD AUTO: 10.5 FL (ref 7.4–10.4)
POTASSIUM SERPL-SCNC: 3.7 MMOL/L (ref 3.5–5.1)
PROT SERPL-MCNC: 6 GM/DL (ref 5.8–7.6)
PROT UR QL STRIP: ABNORMAL
RBC # BLD AUTO: 3.71 X10(6)/MCL (ref 4.7–6.1)
RBC #/AREA URNS AUTO: ABNORMAL /HPF
SODIUM SERPL-SCNC: 140 MMOL/L (ref 136–145)
SP GR UR STRIP.AUTO: 1.01 (ref 1–1.03)
SQUAMOUS #/AREA URNS AUTO: ABNORMAL /HPF
UROBILINOGEN UR STRIP-ACNC: 0.2
WBC # BLD AUTO: 6.75 X10(3)/MCL (ref 4.5–11.5)
WBC #/AREA URNS AUTO: ABNORMAL /HPF

## 2025-08-19 PROCEDURE — 85025 COMPLETE CBC W/AUTO DIFF WBC: CPT | Performed by: UROLOGY

## 2025-08-19 PROCEDURE — 87086 URINE CULTURE/COLONY COUNT: CPT | Performed by: UROLOGY

## 2025-08-19 PROCEDURE — 81003 URINALYSIS AUTO W/O SCOPE: CPT | Performed by: UROLOGY

## 2025-08-19 PROCEDURE — 80053 COMPREHEN METABOLIC PANEL: CPT | Performed by: UROLOGY

## 2025-08-21 LAB — BACTERIA UR CULT: ABNORMAL

## 2025-08-25 ENCOUNTER — LAB REQUISITION (OUTPATIENT)
Dept: LAB | Facility: HOSPITAL | Age: 75
End: 2025-08-25
Payer: MEDICARE

## 2025-08-25 DIAGNOSIS — G40.909 EPILEPSY, UNSPECIFIED, NOT INTRACTABLE, WITHOUT STATUS EPILEPTICUS: ICD-10-CM

## 2025-08-25 PROCEDURE — 80183 DRUG SCRN QUANT OXCARBAZEPIN: CPT

## 2025-08-25 PROCEDURE — 80177 DRUG SCRN QUAN LEVETIRACETAM: CPT

## 2025-08-27 LAB — LEVETIRACETAM SERPL-MCNC: 44.9 MCG/ML (ref 10–40)

## 2025-08-28 LAB — 10OH-CARBAZEPINE SERPL-MCNC: 35 MCG/ML (ref 10–35)

## (undated) DEVICE — NDL HYPO STD REG BVL 25GX1.5IN

## (undated) DEVICE — BLADE ELECTRO EDGE INSULATED

## (undated) DEVICE — SPONGE COTTON TRAY 4X4IN

## (undated) DEVICE — Device

## (undated) DEVICE — TRAY SKIN SCRUB DRY PREMIUM

## (undated) DEVICE — GLOVE SENSICARE PI SURG 8

## (undated) DEVICE — GAUZE PACKING STRIP PLN 1/2X5

## (undated) DEVICE — GOWN ECLIPSE REINF LVL4 TWL XL

## (undated) DEVICE — GLOVE SENSICARE PI SURG 6.5

## (undated) DEVICE — PAD ABDOMINAL STERILE 8X10IN

## (undated) DEVICE — ELECTRODE REM PLYHSV RETURN 9

## (undated) DEVICE — GLOVE SENSICARE PI GRN 7

## (undated) DEVICE — GLOVE PROTEXIS PF LATEX 7.0

## (undated) DEVICE — GLOVE PROTEXIS HYDROGEL SZ8

## (undated) DEVICE — GOWN POLY REINF BRTH SLV 2XL

## (undated) DEVICE — SOL IRRI STRL WATER 1000ML

## (undated) DEVICE — GLOVE SENSICARE PI SURG 7

## (undated) DEVICE — NDL BLUNT FILL 18G 1IN